# Patient Record
Sex: MALE | Race: WHITE | Employment: FULL TIME
[De-identification: names, ages, dates, MRNs, and addresses within clinical notes are randomized per-mention and may not be internally consistent; named-entity substitution may affect disease eponyms.]

---

## 2017-01-18 ENCOUNTER — SURGERY (OUTPATIENT)
Age: 67
End: 2017-01-18

## 2017-01-18 ENCOUNTER — APPOINTMENT (OUTPATIENT)
Dept: GENERAL RADIOLOGY | Age: 67
End: 2017-01-18
Attending: PHYSICAL MEDICINE & REHABILITATION

## 2017-01-18 ENCOUNTER — HOSPITAL ENCOUNTER (OUTPATIENT)
Age: 67
Setting detail: OUTPATIENT SURGERY
Discharge: HOME | End: 2017-01-18
Attending: PHYSICAL MEDICINE & REHABILITATION | Admitting: PHYSICAL MEDICINE & REHABILITATION

## 2017-01-18 VITALS
HEART RATE: 69 BPM | DIASTOLIC BLOOD PRESSURE: 80 MMHG | TEMPERATURE: 97.7 F | HEIGHT: 71 IN | BODY MASS INDEX: 20.86 KG/M2 | RESPIRATION RATE: 18 BRPM | OXYGEN SATURATION: 97 % | SYSTOLIC BLOOD PRESSURE: 130 MMHG | WEIGHT: 149 LBS

## 2017-01-18 RX ORDER — SODIUM CHLORIDE 0.9 % (FLUSH) 0.9 %
5-10 SYRINGE (ML) INJECTION AS NEEDED
Status: DISCONTINUED | OUTPATIENT
Start: 2017-01-18 | End: 2017-01-18 | Stop reason: HOSPADM

## 2017-01-18 RX ORDER — DIAZEPAM 5 MG/1
5-20 TABLET ORAL ONCE
Status: DISCONTINUED | OUTPATIENT
Start: 2017-01-18 | End: 2017-01-18 | Stop reason: HOSPADM

## 2017-01-18 NOTE — DISCHARGE INSTRUCTIONS
[facility]   ([place])  Dr. Tana Myers, Dr. Sly Rothman, Dr. Lashell Betlrán not drive a car, operate heavy machinery or dangerous equipment for 24 hours. * Activity as tolerated; rest for the remainder of the day. * Resume pre-block medications including those for your family doctor. * Do not drink alcoholic beverages for 24 hours. Alcohol and the medications you have received may interact and cause an adverse reaction. * You may feel better this evening and worse tomorrow, as the numbing medications wears off and the steroid has yet to begin to work. After 48 hrs the steroid should begin to release bringing you relief. * You may shower this evening and remove any bandages. * Avoid hot tubs and heating pads for 24 hours. You may use cold packs on the procedure site as tolerated for the first 24 hours. * If a headache develops, drink plenty of fluids and rest.  Take over the counter medications for headache if needed. If the headache continues longer than 24 hours, call MD at the [address] [place] [address]. [phone]    * Continue taking pain medications as needed. * You may resume your regular diet if tolerated. Otherwise, start with sips of water and advance slowly. * If Diabetic: check your blood sugar three times a day for the next 3 days. If your sugar is greater than 300 call your family doctor. If your sugar is greater than 400, have someone transport you to the nearest Emergency Room. * If you experience any of the following problems, Please Call the [address] [place] [address] at [phone].         * Shortness of Breath    * Fever of 101 or higher    * Nausea / Vomiting    * Severe Headache    * Weakness or numbness in arms or legs that is not      resolving    * Prolonged increase in pain greater than 4 days      DISCHARGE SUMMARY from Nurse      PATIENT INSTRUCTIONS:    After oral sedation, for 24 hours or while taking prescription Narcotics:  · Limit your activities  · Do not drive and operate hazardous machinery  · Do not make important personal or business decisions  · Do  not drink alcoholic beverages  · If you have not urinated within 8 hours after discharge, please contact your surgeon on call. Report the following to your surgeon:  · Excessive pain, swelling, redness or odor of or around the surgical area  · Temperature over 101  · Nausea and vomiting lasting longer than 4 hours or if unable to take medications  · Any signs of decreased circulation or nerve impairment to extremity: change in color, persistent  numbness, tingling, coldness or increase pain  · Any questions            What to do at Home:  Recommended activity: Activity as tolerated, NO DRIVING FOR 12 Hours post injection          *  Please give a list of your current medications to your Primary Care Provider. *  Please update this list whenever your medications are discontinued, doses are      changed, or new medications (including over-the-counter products) are added. *  Please carry medication information at all times in case of emergency situations. These are general instructions for a healthy lifestyle:    No smoking/ No tobacco products/ Avoid exposure to second hand smoke    Surgeon General's Warning:  Quitting smoking now greatly reduces serious risk to your health. Obesity, smoking, and sedentary lifestyle greatly increases your risk for illness    A healthy diet, regular physical exercise & weight monitoring are important for maintaining a healthy lifestyle    You may be retaining fluid if you have a history of heart failure or if you experience any of the following symptoms:  Weight gain of 3 pounds or more overnight or 5 pounds in a week, increased swelling in our hands or feet or shortness of breath while lying flat in bed.   Please call your doctor as soon as you notice any of these symptoms; do not wait until your next office visit. Recognize signs and symptoms of STROKE:    F-face looks uneven    A-arms unable to move or move unevenly    S-speech slurred or non-existent    T-time-call 911 as soon as signs and symptoms begin-DO NOT go       Back to bed or wait to see if you get better-TIME IS BRAIN. [employer] Activation    Thank you for requesting access to [employer]. Please follow the instructions below to securely access and download your online medical record. [employer] allows you to send messages to your doctor, view your test results, renew your prescriptions, schedule appointments, and more. How Do I Sign Up? 1. In your internet browser, go to [email or web address]  2. Click on the First Time User? Click Here link in the Sign In box. You will be redirect to the New Member Sign Up page. 3. Enter your [employer] Access Code exactly as it appears below. You will not need to use this code after youve completed the sign-up process. If you do not sign up before the expiration date, you must request a new code. [employer] Access Code: Activation code not generated  Current [employer] Status: Active (This is the date your [employer] access code will )    4. Enter the last four digits of your Social Security Number (xxxx) and Date of Birth (mm/dd/yyyy) as indicated and click Submit. You will be taken to the next sign-up page. 5. Create a [employer] ID. This will be your [employer] login ID and cannot be changed, so think of one that is secure and easy to remember. 6. Create a [employer] password. You can change your password at any time. 7. Enter your Password Reset Question and Answer. This can be used at a later time if you forget your password. 8. Enter your e-mail address. You will receive e-mail notification when new information is available in [address] 9. Click Sign Up. You can now view and download portions of your medical record.   10. Click the Download Summary menu link to download a portable copy of your medical information. Additional Information    If you have questions, please visit the Frequently Asked Questions section of the [employer] website at [email or web address] Remember, [employer] is NOT to be used for urgent needs. For medical emergencies, dial 911. Vikki Yuan

## 2017-02-15 ENCOUNTER — OFFICE VISIT (OUTPATIENT)
Dept: ORTHOPEDIC SURGERY | Age: 67
End: 2017-02-15

## 2017-02-15 VITALS
HEART RATE: 82 BPM | BODY MASS INDEX: 20.44 KG/M2 | TEMPERATURE: 98 F | HEIGHT: 71 IN | OXYGEN SATURATION: 99 % | RESPIRATION RATE: 18 BRPM | DIASTOLIC BLOOD PRESSURE: 82 MMHG | WEIGHT: 146 LBS | SYSTOLIC BLOOD PRESSURE: 135 MMHG

## 2017-02-15 DIAGNOSIS — G57.92 NEURITIS OF LEFT LOWER EXTREMITY: Primary | ICD-10-CM

## 2017-02-15 DIAGNOSIS — M48.061 LUMBAR SPINAL STENOSIS: ICD-10-CM

## 2017-02-15 DIAGNOSIS — M47.816 FACET ARTHRITIS OF LUMBAR REGION: ICD-10-CM

## 2017-02-15 DIAGNOSIS — M62.830 MUSCLE SPASM OF BACK: ICD-10-CM

## 2017-02-15 RX ORDER — TOPIRAMATE 25 MG/1
75 TABLET ORAL
Qty: 90 TAB | Refills: 5 | Status: SHIPPED | OUTPATIENT
Start: 2017-02-15 | End: 2017-10-24 | Stop reason: SDUPTHER

## 2017-02-15 NOTE — PATIENT INSTRUCTIONS
Low Back Arthritis: Exercises  Your Care Instructions  Here are some examples of typical rehabilitation exercises for your condition. Start each exercise slowly. Ease off the exercise if you start to have pain. Your doctor or physical therapist will tell you when you can start these exercises and which ones will work best for you. When you are not being active, find a comfortable position for rest. Some people are comfortable on the floor or a medium-firm bed with a small pillow under their head and another under their knees. Some people prefer to lie on their side with a pillow between their knees. Don't stay in one position for too long. Take short walks (10 to 20 minutes) every 2 to 3 hours. Avoid slopes, hills, and stairs until you feel better. Walk only distances you can manage without pain, especially leg pain. How to do the exercises  Pelvic tilt    1. Lie on your back with your knees bent. 2. \"Brace\" your stomach--tighten your muscles by pulling in and imagining your belly button moving toward your spine. 3. Press your lower back into the floor. You should feel your hips and pelvis rock back. 4. Hold for 6 seconds while breathing smoothly. 5. Relax and allow your pelvis and hips to rock forward. 6. Repeat 8 to 12 times. Back stretches    1. Get down on your hands and knees on the floor. 2. Relax your head and allow it to droop. Round your back up toward the ceiling until you feel a nice stretch in your upper, middle, and lower back. Hold this stretch for as long as it feels comfortable, or about 15 to 30 seconds. 3. Return to the starting position with a flat back while you are on your hands and knees. 4. Let your back sway by pressing your stomach toward the floor. Lift your buttocks toward the ceiling. 5. Hold this position for 15 to 30 seconds. 6. Repeat 2 to 4 times. Follow-up care is a key part of your treatment and safety.  Be sure to make and go to all appointments, and call your doctor if you are having problems. It's also a good idea to know your test results and keep a list of the medicines you take. Where can you learn more? Go to http://jorge-steve.info/. Enter B804 in the search box to learn more about \"Low Back Arthritis: Exercises. \"  Current as of: May 23, 2016  Content Version: 11.1  © 9377-5899 [employer]. Care instructions adapted under license by [employer] (which disclaims liability or warranty for this information). If you have questions about a medical condition or this instruction, always ask your healthcare professional. [address] any warranty or liability for your use of this information.

## 2017-02-15 NOTE — PROGRESS NOTES
[facility]  [address] [place], [facility] [address]  Phone: [phone]  Fax: [phone]          HISTORY OF PRESENT ILLNESS:  Jarett Salmeron is a 61 y.o. male with history of lumbar pain. He states that his left leg pain and numbness worsens as the day progressed. Pt admits to \"instant\" relief when laying on his back and bending his feet and toes towards him. He wishes to try another injection at this time. Of note, Pt tried a left L4, left L5, and left S1 SNRB in 10/2016. Pt discontinued his Neurontin due to nausea. He has been taking Topamax 25 mg 3 tabs QHS with benefit and reports no significant change in pain or numbness when taking Lyrica 75 mg. Pt has nummular dermatitis and states that he experienced inflammation when taking the full does of Lyrica. His inflammation improved when he discontinued the Lyrica. He has been taking Topamax for about 6 weeks and reports that overall his pain and numbness last month was been better than it had been over the last year. Pt reports that he occasionally uses an inversion table. He continues to take Naprosyn and does not need a refill at this time. Pt desires to continue with current care. Pain Scale: 2/10    PCP: Padilla Valencia MD       Past Medical History   Diagnosis Date    Back pain     Chronic pain     Headache(784.0)     HTN (hypertension)     Joint pain      hx of joint pain        Social History     Social History    Marital status:      Spouse name: N/A    Number of children: N/A    Years of education: N/A     Occupational History    Not on file.      Social History Main Topics    Smoking status: Former Smoker     Quit date: 10/17/2012    Smokeless tobacco: Former User    Alcohol use Yes    Drug use: No    Sexual activity: Yes     Other Topics Concern    Not on file     Social History Narrative       Current Outpatient Prescriptions   Medication Sig Dispense Refill    topiramate (TOPAMAX) 25 mg tablet Take 3 Tabs by mouth nightly. 90 Tab 5    naproxen (NAPROSYN) 500 mg tablet Take 1 Tab by mouth two (2) times daily (with meals). 60 Tab 2    LISINOPRIL PO Take 10 mg by mouth daily.  doxycycline (MONODOX) 100 mg capsule Take 100 mg by mouth daily. No Known Allergies      REVIEW OF SYSTEMS    Constitutional: Negative for fever, chills, or weight change. Respiratory: Negative for cough or shortness of breath. Cardiovascular: Negative for chest pain or palpitations. Gastrointestinal: Negative for acid reflux, change in bowel habits, or constipation. Genitourinary: Negative for dysuria and flank pain. Musculoskeletal: Positive for lumbar pain. Skin: Negative for rash. Neurological: Positive for numbness in the left leg. Negative for headaches or dizziness. Endo/Heme/Allergies: Negative for increased bruising. Psychiatric/Behavioral: Negative for difficulty with sleep. PHYSICAL EXAMINATION  Visit Vitals    /82    Pulse 82    Temp 98 °F (36.7 °C) (Oral)    Resp 18    Ht 5' 11\" (1.803 m)    Wt 146 lb (66.2 kg)    SpO2 99%    BMI 20.36 kg/m2       Constitutional: Awake, alert, and in no acute distress  Neurological: 1+ symmetrical DTRs in the lower extremities. Sensation to light touch is intact. Skin: warm, dry, and intact. Musculoskeletal: No tenderness to palpation in the lower lumbar region. Good ROM with mild pain with extension, axial loading on the left, no pain with forward flexion. No pain with internal or external rotation of his hips. Negative straight leg raise bilaterally.      Hip Flex  Quads Hamstrings Ankle DF EHL Ankle PF   Right +4/5 +4/5 +4/5 +4/5 +4/5 +4/5   Left +4/5 +4/5 +4/5 +4/5 +4/5 +4/5     IMAGING:    Lumbar Spine MRI from 12/20/2015 was personally reviewed with the Pt and demonstrated:    Results from [place] encounter on 12/20/15   MRI LUMB SPINE W WO CONT   Narrative Procedure: MRI of the lumbar spine with and without contrast.    CPT code: 53149    Indications: progressive numbness and pain in his left leg; prior laminectomy in  2002 at L4/ L5         Comparisons: None. Technique: T1 weighted, T2 FSE with fat saturation, FSE inversion recovery, T1  weighted post contrast with fat saturation sagittal images are supplemented by  T2 weighted with fat saturation and T1 weighted pre- and post contrast axial  images. Patient was administered 15 cc Magnevist via the intravenous route. Findings:          Sagittal images reveal normal vertebral body morphology. No fractures noted. No osseous lesions or abnormal signal intensity present. Conus medullaris ends at the L1 vertebral body level. No abnormal vertebral body, epidural, or intradural enhancement. Correlation of axial and sagittal images reveals the following: At T12-L1: T12 with mild chronic superior endplate depression. No significant  disc pathology. No significant central canal or foraminal stenosis. At L1-L2: Mild broad-based disc protrusion. Mild to moderate facet arthropathy  and prominent ligamentum flavum buckling closing off the posterior canal. Still  only borderline central canal stenosis. Mild to moderate bilateral foraminal  stenosis. At L2-L3: Mild broad-based disc osteophyte complex posteriorly. Moderate facet  arthropathy and buckling of the ligamentum flavum closing off the posterior  canal. Mild to moderate central canal stenosis. Mild to moderate foraminal  stenosis. At L3-L4: Subtle retrolisthesis L3 on L4 with uncovering of the disc. Overlying  mild disc osteophyte complex. Mild to moderate facet arthropathy. Mild central  canal stenosis. Moderate to severe bilateral foraminal stenosis with contact of  the exiting L3 nerve roots. At L4-L5: Grade 1 anterolisthesis of L4 on L5 with uncovering of the disc and  moderate residual facet arthropathy.  Series 5, image 14 demonstrates severe  central canal stenosis. There is additionally moderate to severe bilateral  foraminal stenosis with compression of the exiting L4 nerve roots. At L5-S1: Crossing L5 nerve roots obscured. Mild broad-based disc protrusion  with left paracentral overlying extrusion migrating 6.6 mm below the disc space. No central canal stenosis. Mild facet arthropathy. Abutment of the crossing  right S1 nerve root and displacement of the crossing left S1 nerve root. Moderate to severe or left and right foraminal stenosis compressing the L5 nerve  roots as they exit. Visualized portions of the sacroiliac joints are unremarkable. Incidentally  imaged retroperitoneal structures are unremarkable as well. Impression Impression:    Multilevel multifactorial degenerative changes as above. Severe central canal  stenosis at L4-L5. ASSESSMENT   Georgia Stratton was seen today for back pain. Diagnoses and all orders for this visit:    Neuritis of left lower extremity  -     topiramate (TOPAMAX) 25 mg tablet; Take 3 Tabs by mouth nightly.  -     SCHEDULE SURGERY    Lumbar spinal stenosis  -     topiramate (TOPAMAX) 25 mg tablet; Take 3 Tabs by mouth nightly.  -     SCHEDULE SURGERY    Facet arthritis of lumbar region  -     SCHEDULE SURGERY    Muscle spasm of back  -     SCHEDULE SURGERY       IMPRESSION AND PLAN:  Christy Esparza is a 61 y.o. male with history of lumbar pain. He states that his left leg pain and numbness worsens as the day progressed. Pt has been taking Topamax 25 mg 3 tabs QHS with benefit and reports no significant change in pain or numbness when taking Lyrica 75 mg. He has been taking Topamax for about 6 weeks and reports that overall his pain and numbness last month was been better than it had been over the last year. 1) Pt was given information on lumbar arthritis exercises. 2) He received a refill of Topamax 25 mg 3 tabs QHS.    3) Pt will continue taking Naprosyn 500 mg and does not need a refill at this time. 4) He was scheduled for a left L4, L5 SNRB and left L4-5 and left L5-S1 facet injections. 5) Mr. Izzy Holland has a reminder for a \"due or due soon\" health maintenance. I have asked that he contact his primary care provider, Nabil Chavarria MD, for follow-up on this health maintenance. 6)  reviewed. 7) Pt will follow-up in 3 months.       Written by Allie Sarmiento, as dictated by John Oneill MD.

## 2017-03-01 ENCOUNTER — HOSPITAL ENCOUNTER (OUTPATIENT)
Age: 67
Setting detail: OUTPATIENT SURGERY
Discharge: HOME | End: 2017-03-01
Attending: PHYSICAL MEDICINE & REHABILITATION | Admitting: PHYSICAL MEDICINE & REHABILITATION
Payer: COMMERCIAL

## 2017-03-01 ENCOUNTER — APPOINTMENT (OUTPATIENT)
Dept: GENERAL RADIOLOGY | Age: 67
End: 2017-03-01
Attending: PHYSICAL MEDICINE & REHABILITATION
Payer: COMMERCIAL

## 2017-03-01 VITALS
HEIGHT: 71 IN | SYSTOLIC BLOOD PRESSURE: 130 MMHG | BODY MASS INDEX: 20.44 KG/M2 | RESPIRATION RATE: 18 BRPM | OXYGEN SATURATION: 100 % | HEART RATE: 62 BPM | DIASTOLIC BLOOD PRESSURE: 82 MMHG | WEIGHT: 146 LBS | TEMPERATURE: 97.6 F

## 2017-03-01 PROCEDURE — 77030003669 HC NDL SPN COOK -B: Performed by: PHYSICAL MEDICINE & REHABILITATION

## 2017-03-01 PROCEDURE — 76010000009 HC PAIN MGT 0 TO 30 MIN PROC: Performed by: PHYSICAL MEDICINE & REHABILITATION

## 2017-03-01 PROCEDURE — 77030003676 HC NDL SPN MPRI -A: Performed by: PHYSICAL MEDICINE & REHABILITATION

## 2017-03-01 RX ORDER — LIDOCAINE HCL/PF 10 MG/ML
SYRINGE (ML) INJECTION AS NEEDED
Status: DISCONTINUED | OUTPATIENT
Start: 2017-03-01 | End: 2017-03-01 | Stop reason: HOSPADM

## 2017-03-01 RX ORDER — DEXAMETHASONE SODIUM PHOSPHATE 100 MG/10ML
INJECTION INTRAMUSCULAR; INTRAVENOUS AS NEEDED
Status: DISCONTINUED | OUTPATIENT
Start: 2017-03-01 | End: 2017-03-01 | Stop reason: HOSPADM

## 2017-03-01 RX ORDER — MELATONIN
1000 DAILY
COMMUNITY
End: 2017-12-18

## 2017-03-01 RX ORDER — DIAZEPAM 5 MG/1
5-20 TABLET ORAL ONCE
Status: DISCONTINUED | OUTPATIENT
Start: 2017-03-01 | End: 2017-03-01 | Stop reason: HOSPADM

## 2017-03-01 NOTE — DISCHARGE INSTRUCTIONS
[facility]   ([place])  Dr. Keegan Meyers, Dr. Nicolasa Green, Dr. Mahin Leonard not drive a car, operate heavy machinery or dangerous equipment for 24 hours. * Activity as tolerated; rest for the remainder of the day. * Resume pre-block medications including those for your family doctor. * Do not drink alcoholic beverages for 24 hours. Alcohol and the medications you have received may interact and cause an adverse reaction. * You may feel better this evening and worse tomorrow, as the numbing medications wears off and the steroid has yet to begin to work. After 48 hrs the steroid should begin to release bringing you relief. * You may shower this evening and remove any bandages. * Avoid hot tubs and heating pads for 24 hours. You may use cold packs on the procedure site as tolerated for the first 24 hours. * If a headache develops, drink plenty of fluids and rest.  Take over the counter medications for headache if needed. If the headache continues longer than 24 hours, call MD at the [address] [place] [address]. [phone]    * Continue taking pain medications as needed. * You may resume your regular diet if tolerated. Otherwise, start with sips of water and advance slowly. * If Diabetic: check your blood sugar three times a day for the next 3 days. If your sugar is greater than 300 call your family doctor. If your sugar is greater than 400, have someone transport you to the nearest Emergency Room. * If you experience any of the following problems, Please Call the [address] [place] [address] at [phone].         * Shortness of Breath    * Fever of 101 or higher    * Nausea / Vomiting    * Severe Headache    * Weakness or numbness in arms or legs that is not      resolving    * Prolonged increase in pain greater than 4 days      DISCHARGE SUMMARY from Nurse      PATIENT INSTRUCTIONS:    After oral sedation, for 24 hours or while taking prescription Narcotics:  · Limit your activities  · Do not drive and operate hazardous machinery  · Do not make important personal or business decisions  · Do  not drink alcoholic beverages  · If you have not urinated within 8 hours after discharge, please contact your surgeon on call. Report the following to your surgeon:  · Excessive pain, swelling, redness or odor of or around the surgical area  · Temperature over 101  · Nausea and vomiting lasting longer than 4 hours or if unable to take medications  · Any signs of decreased circulation or nerve impairment to extremity: change in color, persistent  numbness, tingling, coldness or increase pain  · Any questions            What to do at Home:  Recommended activity: Activity as tolerated, NO DRIVING FOR 12 Hours post injection          *  Please give a list of your current medications to your Primary Care Provider. *  Please update this list whenever your medications are discontinued, doses are      changed, or new medications (including over-the-counter products) are added. *  Please carry medication information at all times in case of emergency situations. These are general instructions for a healthy lifestyle:    No smoking/ No tobacco products/ Avoid exposure to second hand smoke    Surgeon General's Warning:  Quitting smoking now greatly reduces serious risk to your health. Obesity, smoking, and sedentary lifestyle greatly increases your risk for illness    A healthy diet, regular physical exercise & weight monitoring are important for maintaining a healthy lifestyle    You may be retaining fluid if you have a history of heart failure or if you experience any of the following symptoms:  Weight gain of 3 pounds or more overnight or 5 pounds in a week, increased swelling in our hands or feet or shortness of breath while lying flat in bed.   Please call your doctor as soon as you notice any of these symptoms; do not wait until your next office visit. Recognize signs and symptoms of STROKE:    F-face looks uneven    A-arms unable to move or move unevenly    S-speech slurred or non-existent    T-time-call 911 as soon as signs and symptoms begin-DO NOT go       Back to bed or wait to see if you get better-TIME IS BRAIN.

## 2017-03-01 NOTE — PROCEDURES
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 The patient was given the opportunity to ask questions regarding the procedure and its associated risks. In addition to the potential risks associated with the procedure itself, the patient was informed both verbally and in writing of potential side effects of the use of glucocorticoids. The patient appeared to comprehend the informed consent and desired to have the procedure perfored. Procedure: The patient was placed in the prone position on the flouroscopy table and the back was prepped and draped in the usual sterile manner. At each blocked level, the exact location of the facet joint was identified with flouroscopy, and after local Lidocaine 1% injection, a #22 gauge spinal needle was then advanced toward the joint. A total of 15 mg of preservative free Dexamethasone and 2.5 cc of Lidocaine was injected around and equally divided among all of the sites. The patient tolerated the procedure well. The injection area was cleaned and bandaids applied. No excessive bleeding was noted. Patient dressed and was discharged to home with instructions.     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 The patient was given the opportunity to ask questions regarding the procedure and its associated risks. In addition to the potential risks associated with the procedure itself, the patient was informed both verbally and in writing of the potential side effects of the use of glucocorticoid. The patient appeared to comprehend the informed consent and desired to have the procedure performed. Procedure: The patient was placed in the prone position on the fluoroscopy table and the back was prepped and draped in the usual sterile manner. The exact spinal level was  identified using fluoroscopy, and Lidocaine 1 % was injected locally, a # 22 gauge spinal needle was passed to the transverse process. The depth was noted and the needle redirected to pass inferior and approximately one cm anterior to the transverse process. YES    1 cc of Isovue M-200 was used to verify positioning in the epidural and paravertebral space and outlined the course of the spinal nerve into the epidural space. The same procedure was repeated at each spinal level indicated above. A total of 15 mg of preservative free Dexamethasone and 2.5 cc of Lidocaine was slowly injected. The patient tolerated the procedure well. The injection area was cleaned and bandaids applied. Not excessive bleeding was noted.    Patient dressed and discharged to home with instructions. Discussion: The patient tolerated the procedure well.                                               Teja Ríos MD  March 1, 2017

## 2017-03-01 NOTE — H&P
Date of Surgery Update:  Ashly Rankin was seen and examined. History and physical has been reviewed. The patient has been examined. There have been no significant clinical changes since the completion of the last office visit.       Signed By: Jeremy Leal MD     March 1, 2017 12:34 PM

## 2017-03-29 ENCOUNTER — OFFICE VISIT (OUTPATIENT)
Dept: ORTHOPEDIC SURGERY | Age: 67
End: 2017-03-29

## 2017-03-29 VITALS
WEIGHT: 144.2 LBS | TEMPERATURE: 97.6 F | BODY MASS INDEX: 20.19 KG/M2 | HEART RATE: 82 BPM | RESPIRATION RATE: 16 BRPM | HEIGHT: 71 IN | DIASTOLIC BLOOD PRESSURE: 75 MMHG | SYSTOLIC BLOOD PRESSURE: 112 MMHG

## 2017-03-29 DIAGNOSIS — M79.2 NEURITIS: ICD-10-CM

## 2017-03-29 DIAGNOSIS — M47.816 FACET ARTHRITIS OF LUMBAR REGION: ICD-10-CM

## 2017-03-29 DIAGNOSIS — R63.4 WEIGHT LOSS: ICD-10-CM

## 2017-03-29 DIAGNOSIS — M48.061 LUMBAR SPINAL STENOSIS: Primary | ICD-10-CM

## 2017-03-29 RX ORDER — LISINOPRIL AND HYDROCHLOROTHIAZIDE 12.5; 2 MG/1; MG/1
1 TABLET ORAL DAILY
COMMUNITY
Start: 2016-11-29

## 2017-03-29 RX ORDER — NORTRIPTYLINE HYDROCHLORIDE 25 MG/1
CAPSULE ORAL
Qty: 60 CAP | Refills: 2 | Status: SHIPPED | OUTPATIENT
Start: 2017-03-29 | End: 2017-11-16

## 2017-03-29 RX ORDER — DESONIDE 0.5 MG/ML
LOTION TOPICAL
COMMUNITY
Start: 2016-12-21

## 2017-03-29 RX ORDER — CLOBETASOL PROPIONATE 0.5 MG/G
CREAM TOPICAL AS NEEDED
COMMUNITY
Start: 2016-10-21

## 2017-03-29 RX ORDER — ZOLPIDEM TARTRATE 5 MG/1
5 TABLET ORAL
COMMUNITY
Start: 2016-10-21

## 2017-03-29 NOTE — MR AVS SNAPSHOT
Visit Information Date & Time Provider Department Dept. Phone Encounter #  
 3/29/2017  9:00 AM Mily Florez, [address] [facility] [phone] [id number] Follow-up Instructions Return in about 6 weeks (around 5/10/2017) for Medication follow up. Upcoming Health Maintenance Date Due Hepatitis C Screening 1950 DTaP/Tdap/Td series (1 - Tdap) 2/26/1973 FOBT Q 1 YEAR AGE 50-75 2/26/2002 ZOSTER VACCINE AGE 60> 2/26/2012 INFLUENZA AGE 9 TO ADULT 8/1/2016 GLAUCOMA SCREENING Q2Y 2/26/2017 Pneumococcal 65+ Low/Medium Risk (1 of 2 - PCV13) 2/26/2017 MEDICARE YEARLY EXAM 2/26/2017 Allergies as of 3/29/2017  Review Complete On: 3/29/2017 By: Mily Florez MD  
  
 Severity Noted Reaction Type Reactions Bee Sting  [Sting, Bee] High 04/06/2009    Swelling DIZZINESS Current Immunizations  Never Reviewed No immunizations on file. Not reviewed this visit You Were Diagnosed With   
  
 Codes Comments Lumbar spinal stenosis    -  Primary ICD-10-CM: M48.06 
ICD-9-CM: 724.02 Facet arthritis of lumbar region     ICD-10-CM: M46.96 
ICD-9-CM: 721.3 Weight loss     ICD-10-CM: R63.4 ICD-9-CM: 783.21 Neuritis     ICD-10-CM: M79.2 ICD-9-CM: 729.2 Vitals BP Pulse Temp Resp Height(growth percentile) Weight(growth percentile) 112/75 82 97.6 °F (36.4 °C) (Oral) 16 5' 11\" (1.803 m) 144 lb 3.2 oz (65.4 kg) BMI Smoking Status 20.11 kg/m2 Former Smoker BMI and BSA Data Body Mass Index Body Surface Area  
 20.11 kg/m 2 1.81 m 2 Preferred Pharmacy Pharmacy Name Phone [facility] [address] [facility] Your Updated Medication List  
  
   
This list is accurate as of: 3/29/17  9:50 AM.  Always use your most recent med list.  
  
  
  
  
 clobetasol 0.05 % topical cream  
Commonly known as:  Rohit Kerr Use 1 Application to affected area Twice Daily. desonide 0.05 % topical lotion Commonly known as:  Lazarus Guzman Use 1 Application to affected area Twice Daily. doxycycline 100 mg capsule Commonly known as:  Romulo Rick Take 100 mg by mouth daily. lisinopril-hydroCHLOROthiazide 20-12.5 mg per tablet Commonly known as:  PRINZIDE, ZESTORETIC  
TAKE ONE TABLET BY MOUTH ONCE A DAY  
  
 naproxen 500 mg tablet Commonly known as:  NAPROSYN Take 1 Tab by mouth two (2) times daily (with meals). nortriptyline 25 mg capsule Commonly known as:  PAMELOR  
1-2 in the evening as directed  
  
 topiramate 25 mg tablet Commonly known as:  TOPAMAX Take 3 Tabs by mouth nightly. VITAMIN D3 1,000 unit tablet Generic drug:  cholecalciferol Take 1,000 Units by mouth daily. zolpidem 5 mg tablet Commonly known as:  AMBIEN  
5 mg. Prescriptions Printed Refills  
 nortriptyline (PAMELOR) 25 mg capsule 2 Si-2 in the evening as directed Class: Print Follow-up Instructions Return in about 6 weeks (around 5/10/2017) for Medication follow up. Patient Instructions Low Back Arthritis: Exercises Your Care Instructions Here are some examples of typical rehabilitation exercises for your condition. Start each exercise slowly. Ease off the exercise if you start to have pain. Your doctor or physical therapist will tell you when you can start these exercises and which ones will work best for you. When you are not being active, find a comfortable position for rest. Some people are comfortable on the floor or a medium-firm bed with a small pillow under their head and another under their knees. Some people prefer to lie on their side with a pillow between their knees. Don't stay in one position for too long. Take short walks (10 to 20 minutes) every 2 to 3 hours. Avoid slopes, hills, and stairs until you feel better.  Walk only distances you can manage without pain, especially leg pain. How to do the exercises Pelvic tilt 1. Lie on your back with your knees bent. 2. \"Brace\" your stomachtighten your muscles by pulling in and imagining your belly button moving toward your spine. 3. Press your lower back into the floor. You should feel your hips and pelvis rock back. 4. Hold for 6 seconds while breathing smoothly. 5. Relax and allow your pelvis and hips to rock forward. 6. Repeat 8 to 12 times. Back stretches 1. Get down on your hands and knees on the floor. 2. Relax your head and allow it to droop. Round your back up toward the ceiling until you feel a nice stretch in your upper, middle, and lower back. Hold this stretch for as long as it feels comfortable, or about 15 to 30 seconds. 3. Return to the starting position with a flat back while you are on your hands and knees. 4. Let your back sway by pressing your stomach toward the floor. Lift your buttocks toward the ceiling. 5. Hold this position for 15 to 30 seconds. 6. Repeat 2 to 4 times. Follow-up care is a key part of your treatment and safety. Be sure to make and go to all appointments, and call your doctor if you are having problems. It's also a good idea to know your test results and keep a list of the medicines you take. Where can you learn more? Go to http://jorge-steve.info/. Enter Y635 in the search box to learn more about \"Low Back Arthritis: Exercises. \" Current as of: May 23, 2016 Content Version: 11.2 © 7363-1678 [employer]. Care instructions adapted under license by [employer] (which disclaims liability or warranty for this information). If you have questions about a medical condition or this instruction, always ask your healthcare professional. [place] [address] any warranty or liability for your use of this information. Lumbar Stenosis: Care Instructions Your Care Instructions Stenosis in the spine is a narrowing of the canal that is around the spinal cord and nerve roots in your back. It can happen as part of aging. Sometimes bone and other tissue grow into this canal and press on the spinal nerves. This can cause pain, numbness, and weakness. When it happens in the lower part of your back, it is called lumbar stenosis. Lumbar stenosis can cause problems in the legs, feet, and rear end (buttocks). You may be able to relieve symptoms of lumbar stenosis with pain medicine. Your doctor may suggest physical therapy and exercises to keep your spine strong and flexible. Some people try cortisone shots to reduce swelling. If pain and numbness in your legs are still so bad that you cannot do your normal activities, you may need surgery. Follow-up care is a key part of your treatment and safety. Be sure to make and go to all appointments, and call your doctor if you are having problems. It's also a good idea to know your test results and keep a list of the medicines you take. How can you care for yourself at home? · Take an over-the-counter pain medicine, such as acetaminophen (Tylenol), ibuprofen (Advil, Motrin), or naproxen (Aleve). Read and follow all instructions on the label. · Do not take two or more pain medicines at the same time unless the doctor told you to. Many pain medicines have acetaminophen, which is Tylenol. Too much acetaminophen (Tylenol) can be harmful. · Stay at a healthy weight. Being overweight puts extra strain on your spine. · Change positions often when you sit or stand. This can ease pain. For example, lean forward. This may reduce pressure on the spinal cord and its nerves. · Avoid doing things that make your symptoms worse. Walking downhill and standing for a long time may cause pain. · Stretch and strengthen your back muscles as your doctor or physical therapist recommends. If your doctor says it is okay to do them, these exercises may help. ¨ Lie on your back with your knees bent. Gently pull one bent knee to your chest. Put that foot back on the floor, and then pull the other knee to your chest. 
¨ Do pelvic tilts. Lie on your back with your knees bent. Tighten your stomach muscles. Pull your belly button (navel) in and up toward your ribs. You should feel like your back is pressing to the floor and your hips and pelvis are slightly lifting off the floor. Hold for 6 seconds while breathing smoothly. ¨ Stand with your back flat against a wall. Slowly slide down until your knees are slightly bent. Hold for 10 seconds, then slide back up the wall. · Remove or change anything in your house that may cause you to fall. Keep walkways clear of clutter, electrical cords, and throw rugs. When should you call for help? Call 911 anytime you think you may need emergency care. For example, call if: 
· You are unable to move a leg at all. Call your doctor now or seek immediate medical care if: 
· You have new or worse symptoms in your legs, belly, or buttocks. Symptoms may include: ¨ Numbness or tingling. ¨ Weakness. ¨ Pain. · You lose bladder or bowel control. Watch closely for changes in your health, and be sure to contact your doctor if: 
· You are not getting better as expected. Where can you learn more? Go to http://jorge-steve.info/. Marilee Ray in the search box to learn more about \"Lumbar Stenosis: Care Instructions. \" Current as of: May 23, 2016 Content Version: 11.2 © 6139-5542 [employer]. Care instructions adapted under license by [employer] (which disclaims liability or warranty for this information). If you have questions about a medical condition or this instruction, always ask your healthcare professional. [place] [address] any warranty or liability for your use of this information. Introducing [facility]! Dear Lurdes Oneal: Thank you for requesting a COVEGA account. Our records indicate that you already have an active COVEGA account. You can access your account anytime at [email or web address] Did you know that you can access your hospital and ER discharge instructions at any time in COVEGA? You can also review all of your test results from your hospital stay or ER visit. Additional Information If you have questions, please visit the Frequently Asked Questions section of the COVEGA website at [email or web address] Remember, COVEGA is NOT to be used for urgent needs. For medical emergencies, dial 911. Now available from your iPhone and Android! Please provide this summary of care documentation to your next provider. Your primary care clinician is listed as Zulema Snow. If you have any questions after today's visit, please call [phone].

## 2017-03-29 NOTE — PATIENT INSTRUCTIONS
Low Back Arthritis: Exercises  Your Care Instructions  Here are some examples of typical rehabilitation exercises for your condition. Start each exercise slowly. Ease off the exercise if you start to have pain. Your doctor or physical therapist will tell you when you can start these exercises and which ones will work best for you. When you are not being active, find a comfortable position for rest. Some people are comfortable on the floor or a medium-firm bed with a small pillow under their head and another under their knees. Some people prefer to lie on their side with a pillow between their knees. Don't stay in one position for too long. Take short walks (10 to 20 minutes) every 2 to 3 hours. Avoid slopes, hills, and stairs until you feel better. Walk only distances you can manage without pain, especially leg pain. How to do the exercises  Pelvic tilt    1. Lie on your back with your knees bent. 2. \"Brace\" your stomach--tighten your muscles by pulling in and imagining your belly button moving toward your spine. 3. Press your lower back into the floor. You should feel your hips and pelvis rock back. 4. Hold for 6 seconds while breathing smoothly. 5. Relax and allow your pelvis and hips to rock forward. 6. Repeat 8 to 12 times. Back stretches    1. Get down on your hands and knees on the floor. 2. Relax your head and allow it to droop. Round your back up toward the ceiling until you feel a nice stretch in your upper, middle, and lower back. Hold this stretch for as long as it feels comfortable, or about 15 to 30 seconds. 3. Return to the starting position with a flat back while you are on your hands and knees. 4. Let your back sway by pressing your stomach toward the floor. Lift your buttocks toward the ceiling. 5. Hold this position for 15 to 30 seconds. 6. Repeat 2 to 4 times. Follow-up care is a key part of your treatment and safety.  Be sure to make and go to all appointments, and call your doctor if you are having problems. It's also a good idea to know your test results and keep a list of the medicines you take. Where can you learn more? Go to http://jorge-steve.info/. Enter Q529 in the search box to learn more about \"Low Back Arthritis: Exercises. \"  Current as of: May 23, 2016  Content Version: 11.2  © [id number] [employer]. Care instructions adapted under license by [employer] (which disclaims liability or warranty for this information). If you have questions about a medical condition or this instruction, always ask your healthcare professional. [place] [address] any warranty or liability for your use of this information. Lumbar Stenosis: Care Instructions  Your Care Instructions    Stenosis in the spine is a narrowing of the canal that is around the spinal cord and nerve roots in your back. It can happen as part of aging. Sometimes bone and other tissue grow into this canal and press on the spinal nerves. This can cause pain, numbness, and weakness. When it happens in the lower part of your back, it is called lumbar stenosis. Lumbar stenosis can cause problems in the legs, feet, and rear end (buttocks). You may be able to relieve symptoms of lumbar stenosis with pain medicine. Your doctor may suggest physical therapy and exercises to keep your spine strong and flexible. Some people try cortisone shots to reduce swelling. If pain and numbness in your legs are still so bad that you cannot do your normal activities, you may need surgery. Follow-up care is a key part of your treatment and safety. Be sure to make and go to all appointments, and call your doctor if you are having problems. It's also a good idea to know your test results and keep a list of the medicines you take. How can you care for yourself at home?   · Take an over-the-counter pain medicine, such as acetaminophen (Tylenol), ibuprofen (Advil, Motrin), or naproxen (Nadine). Read and follow all instructions on the label. · Do not take two or more pain medicines at the same time unless the doctor told you to. Many pain medicines have acetaminophen, which is Tylenol. Too much acetaminophen (Tylenol) can be harmful. · Stay at a healthy weight. Being overweight puts extra strain on your spine. · Change positions often when you sit or stand. This can ease pain. For example, lean forward. This may reduce pressure on the spinal cord and its nerves. · Avoid doing things that make your symptoms worse. Walking downhill and standing for a long time may cause pain. · Stretch and strengthen your back muscles as your doctor or physical therapist recommends. If your doctor says it is okay to do them, these exercises may help. ¨ Lie on your back with your knees bent. Gently pull one bent knee to your chest. Put that foot back on the floor, and then pull the other knee to your chest.  ¨ Do pelvic tilts. Lie on your back with your knees bent. Tighten your stomach muscles. Pull your belly button (navel) in and up toward your ribs. You should feel like your back is pressing to the floor and your hips and pelvis are slightly lifting off the floor. Hold for 6 seconds while breathing smoothly. ¨ Stand with your back flat against a wall. Slowly slide down until your knees are slightly bent. Hold for 10 seconds, then slide back up the wall. · Remove or change anything in your house that may cause you to fall. Keep walkways clear of clutter, electrical cords, and throw rugs. When should you call for help? Call 911 anytime you think you may need emergency care. For example, call if:  · You are unable to move a leg at all. Call your doctor now or seek immediate medical care if:  · You have new or worse symptoms in your legs, belly, or buttocks. Symptoms may include:  ¨ Numbness or tingling. ¨ Weakness. ¨ Pain. · You lose bladder or bowel control.   Watch closely for changes in your health, and be sure to contact your doctor if:  · You are not getting better as expected. Where can you learn more? Go to http://jorge-steve.info/. Shelley Meeks in the search box to learn more about \"Lumbar Stenosis: Care Instructions. \"  Current as of: May 23, 2016  Content Version: 11.2  © 3490-6995 [employer]. Care instructions adapted under license by [employer] (which disclaims liability or warranty for this information). If you have questions about a medical condition or this instruction, always ask your healthcare professional. [place] [address] any warranty or liability for your use of this information.

## 2017-03-29 NOTE — PROGRESS NOTES
[facility]  [address] [place], [facility] [address]  Phone: [phone]  Fax: [phone]          HISTORY OF PRESENT ILLNESS:  Amira Sher is a 74 y.o. male with history of lumbar pain. Pt c/o constant numbness in the left leg. He reports that he is able to walk in the treadmill for about 1 hour without pain but when he starts to vacuum or rake, he experiences pain in the left leg. Pt reports that his pain is generally worse when standing and twisting. He tried a left L4-5 and left L5-S1 facet injection and a left L4 and left L5 SNRB on 03/01/2017 and has experienced improvement since the procedure. Pt continues to take Topamax 25 mg 3 tabs QHS and reports that he has lost 2 lbs since his last office visit. He experienced itching when taking Lyrica and reports that the itching improved when he discontinued the medication. Pt reports sedation when trying Neurontin in the past. Pt at this time desires to proceed with medication evaluation. Pain Scale: 1/10    PCP: Gabriela Baugh MD       Past Medical History:   Diagnosis Date    Back pain     Chronic pain     Headache(784.0)     HTN (hypertension)     Joint pain     hx of joint pain        Social History     Social History    Marital status:      Spouse name: N/A    Number of children: N/A    Years of education: N/A     Occupational History    Not on file. Social History Main Topics    Smoking status: Former Smoker     Quit date: 10/17/2012    Smokeless tobacco: Former User    Alcohol use Yes      Comment: socially     Drug use: No    Sexual activity: Yes     Other Topics Concern    Not on file     Social History Narrative       Current Outpatient Prescriptions   Medication Sig Dispense Refill    clobetasol (TEMOVATE) 0.05 % topical cream Use 1 Application to affected area Twice Daily.       desonide (TRIDESILON) 0.05 % topical lotion Use 1 Application to affected area Twice Daily.      lisinopril-hydroCHLOROthiazide (PRINZIDE, ZESTORETIC) 20-12.5 mg per tablet TAKE ONE TABLET BY MOUTH ONCE A DAY      zolpidem (AMBIEN) 5 mg tablet 5 mg.  nortriptyline (PAMELOR) 25 mg capsule 1-2 in the evening as directed 60 Cap 2    cholecalciferol (VITAMIN D3) 1,000 unit tablet Take 1,000 Units by mouth daily.  topiramate (TOPAMAX) 25 mg tablet Take 3 Tabs by mouth nightly. 90 Tab 5    naproxen (NAPROSYN) 500 mg tablet Take 1 Tab by mouth two (2) times daily (with meals). 60 Tab 2    doxycycline (MONODOX) 100 mg capsule Take 100 mg by mouth daily. Allergies   Allergen Reactions    Bee Sting  [Sting, Bee] Swelling     DIZZINESS         REVIEW OF SYSTEMS    Constitutional: Positive for 2 lb weight loss. Negative for fever or chills. Respiratory: Negative for cough or shortness of breath. Cardiovascular: Negative for chest pain or palpitations. Gastrointestinal: Negative for acid reflux, change in bowel habits, or constipation. Genitourinary: Negative for dysuria and flank pain. Musculoskeletal: Positive for lumbar pain. Skin: Negative for rash. Neurological: Positive for numbness in the left leg. Negative for headaches or dizziness. Endo/Heme/Allergies: Negative for increased bruising. Psychiatric/Behavioral: Negative for difficulty with sleep. PHYSICAL EXAMINATION  Visit Vitals    /75    Pulse 82    Temp 97.6 °F (36.4 °C) (Oral)    Resp 16    Ht 5' 11\" (1.803 m)    Wt 144 lb 3.2 oz (65.4 kg)    BMI 20.11 kg/m2       Constitutional: Awake, alert, and in no acute distress  Neurological: 1+ symmetrical DTRs in the lower extremities. Sensation to light touch is intact. Skin: warm, dry, and intact. Musculoskeletal: Minimal tenderness to palpation in the lower lumbar region. Moderate pain with extension and axial loading. No pain with internal or external rotation of his hips. Negative straight leg raise bilaterally.      Hip Flex  Quads Hamstrings Ankle DF EHL Ankle PF   Right +4/5 +4/5 +4/5 +4/5 +4/5 +4/5   Left +4/5 +4/5 +4/5 +4/5 +4/5 +4/5     IMAGING:    Lumbar spine MRI from 12/20/2015 was personally reviewed with the Pt and demonstrated:    Results from [place] encounter on 12/20/15   MRI LUMB SPINE W WO CONT   Narrative Procedure: MRI of the lumbar spine with and without contrast.    CPT code: 56792    Indications: progressive numbness and pain in his left leg; prior laminectomy in  2002 at L4/ L5         Comparisons: None. Technique: T1 weighted, T2 FSE with fat saturation, FSE inversion recovery, T1  weighted post contrast with fat saturation sagittal images are supplemented by  T2 weighted with fat saturation and T1 weighted pre- and post contrast axial  images. Patient was administered 15 cc Magnevist via the intravenous route. Findings:          Sagittal images reveal normal vertebral body morphology. No fractures noted. No osseous lesions or abnormal signal intensity present. Conus medullaris ends at the L1 vertebral body level. No abnormal vertebral body, epidural, or intradural enhancement. Correlation of axial and sagittal images reveals the following: At T12-L1: T12 with mild chronic superior endplate depression. No significant  disc pathology. No significant central canal or foraminal stenosis. At L1-L2: Mild broad-based disc protrusion. Mild to moderate facet arthropathy  and prominent ligamentum flavum buckling closing off the posterior canal. Still  only borderline central canal stenosis. Mild to moderate bilateral foraminal  stenosis. At L2-L3: Mild broad-based disc osteophyte complex posteriorly. Moderate facet  arthropathy and buckling of the ligamentum flavum closing off the posterior  canal. Mild to moderate central canal stenosis. Mild to moderate foraminal  stenosis. At L3-L4: Subtle retrolisthesis L3 on L4 with uncovering of the disc. Overlying  mild disc osteophyte complex.  Mild to moderate facet arthropathy. Mild central  canal stenosis. Moderate to severe bilateral foraminal stenosis with contact of  the exiting L3 nerve roots. At L4-L5: Grade 1 anterolisthesis of L4 on L5 with uncovering of the disc and  moderate residual facet arthropathy. Series 5, image 14 demonstrates severe  central canal stenosis. There is additionally moderate to severe bilateral  foraminal stenosis with compression of the exiting L4 nerve roots. At L5-S1: Crossing L5 nerve roots obscured. Mild broad-based disc protrusion  with left paracentral overlying extrusion migrating 6.6 mm below the disc space. No central canal stenosis. Mild facet arthropathy. Abutment of the crossing  right S1 nerve root and displacement of the crossing left S1 nerve root. Moderate to severe or left and right foraminal stenosis compressing the L5 nerve  roots as they exit. Visualized portions of the sacroiliac joints are unremarkable. Incidentally  imaged retroperitoneal structures are unremarkable as well. Impression Impression:    Multilevel multifactorial degenerative changes as above. Severe central canal  stenosis at L4-L5. ASSESSMENT   Kinga Ro was seen today for back pain. Diagnoses and all orders for this visit:    Lumbar spinal stenosis  -     nortriptyline (PAMELOR) 25 mg capsule; 1-2 in the evening as directed    Facet arthritis of lumbar region    Weight loss    Neuritis  -     nortriptyline (PAMELOR) 25 mg capsule; 1-2 in the evening as directed         IMPRESSION AND PLAN:  Rajat Ramachandran is a 74 y.o. male with history of lumbar pain. Pt c/o constant numbness in the left leg. He reports minimal left leg pain when walking but experiences pain when standing and twisting. He tried a left L4-5 and left L5-S1 facet injection and a left L4 and left L5 SNRB on 03/01/2017 and has experienced improvement since the procedure.  Pt reports weight loss with the Topamax and does not tolerated Lyrica or Neurontin. 1) Pt was given information on lumbar arthritis exercises. 2) Discussed treatment options with the Pt, including steroid injections and surgical intervention. 3) He will try tapering off Topamax 25 mg 3 tabs QHS in order to address his weight loss. 4) Pt was prescribed Pamelor 25 mg 1-2 tabs QHS. 5) Mr. Rigo Martins has a reminder for a \"due or due soon\" health maintenance. I have asked that he contact his primary care provider, Zulema Snow MD, for follow-up on this health maintenance. 6)  reviewed. 7) Pt will follow-up in 6 weeks. Written by Rojelio Matthews, as dictated by Joel Serrano MD.  I, Dr. Joel Serrano confirm that all documentation is accurate.

## 2017-05-30 ENCOUNTER — OFFICE VISIT (OUTPATIENT)
Dept: ORTHOPEDIC SURGERY | Age: 67
End: 2017-05-30

## 2017-05-30 VITALS
DIASTOLIC BLOOD PRESSURE: 93 MMHG | RESPIRATION RATE: 16 BRPM | HEIGHT: 71 IN | SYSTOLIC BLOOD PRESSURE: 144 MMHG | WEIGHT: 145.8 LBS | BODY MASS INDEX: 20.41 KG/M2 | TEMPERATURE: 97.6 F | HEART RATE: 73 BPM

## 2017-05-30 DIAGNOSIS — G57.92 NEURITIS OF LEFT LOWER EXTREMITY: ICD-10-CM

## 2017-05-30 DIAGNOSIS — M47.816 LUMBAR FACET ARTHROPATHY: ICD-10-CM

## 2017-05-30 DIAGNOSIS — M48.061 LUMBAR SPINAL STENOSIS: Primary | ICD-10-CM
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
## 2017-05-30 NOTE — PATIENT INSTRUCTIONS
Low Back Arthritis: Exercises  Your Care Instructions  Here are some examples of typical rehabilitation exercises for your condition. Start each exercise slowly. Ease off the exercise if you start to have pain. Your doctor or physical therapist will tell you when you can start these exercises and which ones will work best for you. When you are not being active, find a comfortable position for rest. Some people are comfortable on the floor or a medium-firm bed with a small pillow under their head and another under their knees. Some people prefer to lie on their side with a pillow between their knees. Don't stay in one position for too long. Take short walks (10 to 20 minutes) every 2 to 3 hours. Avoid slopes, hills, and stairs until you feel better. Walk only distances you can manage without pain, especially leg pain. How to do the exercises  Pelvic tilt    1. Lie on your back with your knees bent. 2. \"Brace\" your stomachtighten your muscles by pulling in and imagining your belly button moving toward your spine. 3. Press your lower back into the floor. You should feel your hips and pelvis rock back. 4. Hold for 6 seconds while breathing smoothly. 5. Relax and allow your pelvis and hips to rock forward. 6. Repeat 8 to 12 times. Back stretches    1. Get down on your hands and knees on the floor. 2. Relax your head and allow it to droop. Round your back up toward the ceiling until you feel a nice stretch in your upper, middle, and lower back. Hold this stretch for as long as it feels comfortable, or about 15 to 30 seconds. 3. Return to the starting position with a flat back while you are on your hands and knees. 4. Let your back sway by pressing your stomach toward the floor. Lift your buttocks toward the ceiling. 5. Hold this position for 15 to 30 seconds. 6. Repeat 2 to 4 times. Follow-up care is a key part of your treatment and safety.  Be sure to make and go to all appointments, and call your doctor if you are having problems. It's also a good idea to know your test results and keep a list of the medicines you take. Where can you learn more? Go to http://jorge-steve.info/. Enter U690 in the search box to learn more about \"Low Back Arthritis: Exercises. \"  Current as of: May 23, 2016  Content Version: 11.2  © 5057-9844 [employer]. Care instructions adapted under license by [employer] (which disclaims liability or warranty for this information). If you have questions about a medical condition or this instruction, always ask your healthcare professional. [place] [address] any warranty or liability for your use of this information.

## 2017-05-30 NOTE — MR AVS SNAPSHOT
Visit Information Date & Time Provider Department Dept. Phone Encounter #  
 5/30/2017  8:45 AM Jacque Hernandez MD [facility] [phone] [id number] Follow-up Instructions Return in about 3 weeks (around 6/20/2017) for Diagnostic Test follow up, Medication follow up. Upcoming Health Maintenance Date Due Hepatitis C Screening 1950 DTaP/Tdap/Td series (1 - Tdap) 2/26/1973 FOBT Q 1 YEAR AGE 50-75 2/26/2002 ZOSTER VACCINE AGE 60> 2/26/2012 GLAUCOMA SCREENING Q2Y 2/26/2017 Pneumococcal 65+ Low/Medium Risk (1 of 2 - PCV13) 2/26/2017 MEDICARE YEARLY EXAM 2/26/2017 INFLUENZA AGE 9 TO ADULT 8/1/2017 Allergies as of 5/30/2017  Review Complete On: 5/30/2017 By: Mayte Sherman LPN Severity Noted Reaction Type Reactions Bee Sting  [Sting, Bee] High 04/06/2009    Swelling DIZZINESS Current Immunizations  Never Reviewed No immunizations on file. Not reviewed this visit You Were Diagnosed With   
  
 Codes Comments Lumbar spinal stenosis    -  Primary ICD-10-CM: M48.06 
ICD-9-CM: 724.02 Neuritis of left lower extremity     ICD-10-CM: G57.92 
ICD-9-CM: 355.8 Lumbar facet arthropathy (HCC)     ICD-10-CM: M12.88 ICD-9-CM: 721.3 Vitals BP Pulse Temp Resp Height(growth percentile) Weight(growth percentile) (!) 144/93 73 97.6 °F (36.4 °C) (Oral) 16 5' 11\" (1.803 m) 145 lb 12.8 oz (66.1 kg) BMI Smoking Status 20.33 kg/m2 Former Smoker BMI and BSA Data Body Mass Index Body Surface Area  
 20.33 kg/m 2 1.82 m 2 Preferred Pharmacy Pharmacy Name Phone [facility] Your Updated Medication List  
  
   
This list is accurate as of: 5/30/17 10:00 AM.  Always use your most recent med list.  
  
  
  
  
 clobetasol 0.05 % topical cream  
Commonly known as:  Saul Gonzalez Use 1 Application to affected area Twice Daily. desonide 0.05 % topical lotion Commonly known as:  Riki Gifford Use 1 Application to affected area Twice Daily. doxycycline 100 mg capsule Commonly known as:  Meade Smart Take 100 mg by mouth daily. lisinopril-hydroCHLOROthiazide 20-12.5 mg per tablet Commonly known as:  PRINZIDE, ZESTORETIC  
TAKE ONE TABLET BY MOUTH ONCE A DAY  
  
 naproxen 500 mg tablet Commonly known as:  NAPROSYN Take 1 Tab by mouth two (2) times daily (with meals). nortriptyline 25 mg capsule Commonly known as:  PAMELOR  
1-2 in the evening as directed  
  
 topiramate 25 mg tablet Commonly known as:  TOPAMAX Take 3 Tabs by mouth nightly. VITAMIN D3 1,000 unit tablet Generic drug:  cholecalciferol Take 1,000 Units by mouth daily. zolpidem 5 mg tablet Commonly known as:  AMBIEN  
5 mg. Follow-up Instructions Return in about 3 weeks (around 6/20/2017) for Diagnostic Test follow up, Medication follow up. To-Do List   
 05/30/2017 Imaging:  MRI LUMB SPINE W WO CONT   
  
 06/03/2017 7:00 AM  
  Appointment with [facility] MRI RM 1 at [address] [place] [address] ([phone]) GENERAL INSTRUCTIONS  Bring information (ID card) if you have any medically implanted devices. You will be required to lie still while the procedure is being performed. Remove any jewelry (including body piercing, hairpins) prior to MRI. If you have had a creatinine level drawn within the past 30 days, please bring most recent results to your appt. Bring any films, CD's, and reports related to your study with you on the day of your exam.  This only includes studies done outside of [address] [place] [address], [facility], and [facility].   Bring a complete list of all medications you are currently taking to include prescriptions, over-the-counter meds, herbals, vitamins & any dietary supplements. If you were given medications for claustrophobia or anxiety, please arrange to have someone drive you to your appointment. QUESTIONS  Notify the MRI Department if you have any questions concerning your study. [facility] - [phone] [facility] [address] [place] [address] [phone] Referral Information Referral ID Referred By Referred To  
  
 [id number] Mariposa Urbina Not Available Visits Status Start Date End Date 1 New Request 5/30/17 5/30/18 If your referral has a status of pending review or denied, additional information will be sent to support the outcome of this decision. Patient Instructions Low Back Arthritis: Exercises Your Care Instructions Here are some examples of typical rehabilitation exercises for your condition. Start each exercise slowly. Ease off the exercise if you start to have pain. Your doctor or physical therapist will tell you when you can start these exercises and which ones will work best for you. When you are not being active, find a comfortable position for rest. Some people are comfortable on the floor or a medium-firm bed with a small pillow under their head and another under their knees. Some people prefer to lie on their side with a pillow between their knees. Don't stay in one position for too long. Take short walks (10 to 20 minutes) every 2 to 3 hours. Avoid slopes, hills, and stairs until you feel better. Walk only distances you can manage without pain, especially leg pain. How to do the exercises Pelvic tilt 1. Lie on your back with your knees bent. 2. \"Brace\" your stomachtighten your muscles by pulling in and imagining your belly button moving toward your spine. 3. Press your lower back into the floor. You should feel your hips and pelvis rock back. 4. Hold for 6 seconds while breathing smoothly. 5. Relax and allow your pelvis and hips to rock forward. 6. Repeat 8 to 12 times. Back stretches 1. Get down on your hands and knees on the floor. 2. Relax your head and allow it to droop. Round your back up toward the ceiling until you feel a nice stretch in your upper, middle, and lower back. Hold this stretch for as long as it feels comfortable, or about 15 to 30 seconds. 3. Return to the starting position with a flat back while you are on your hands and knees. 4. Let your back sway by pressing your stomach toward the floor. Lift your buttocks toward the ceiling. 5. Hold this position for 15 to 30 seconds. 6. Repeat 2 to 4 times. Follow-up care is a key part of your treatment and safety. Be sure to make and go to all appointments, and call your doctor if you are having problems. It's also a good idea to know your test results and keep a list of the medicines you take. Where can you learn more? Go to http://jorge-steve.info/. Enter M861 in the search box to learn more about \"Low Back Arthritis: Exercises. \" Current as of: May 23, 2016 Content Version: 11.2 © 2801-3832 Healthwise, Incorporated. Care instructions adapted under license by [employer] (which disclaims liability or warranty for this information). If you have questions about a medical condition or this instruction, always ask your healthcare professional. [place] [address] any warranty or liability for your use of this information. Introducing [facility]! Dear Courtney Diaz: Thank you for requesting a Optifreeze account. Our records indicate that you already have an active Optifreeze account. You can access your account anytime at [email or web address] Did you know that you can access your hospital and ER discharge instructions at any time in Optifreeze? You can also review all of your test results from your hospital stay or ER visit. Additional Information If you have questions, please visit the Frequently Asked Questions section of the [employer]I website at [email or web address] Remember, D[employer]I is NOT to be used for urgent needs. For medical emergencies, dial 911. Now available from your iPhone and Android! Please provide this summary of care documentation to your next provider. Your primary care clinician is listed as Madi Bhatti. If you have any questions after today's visit, please call [phone].

## 2017-06-03 ENCOUNTER — HOSPITAL ENCOUNTER (OUTPATIENT)
Age: 67
Discharge: HOME | End: 2017-06-03
Attending: PHYSICAL MEDICINE & REHABILITATION
Payer: COMMERCIAL

## 2017-06-03 DIAGNOSIS — M48.061 LUMBAR SPINAL STENOSIS: ICD-10-CM

## 2017-06-03 DIAGNOSIS — G57.92 NEURITIS OF LEFT LOWER EXTREMITY: ICD-10-CM

## 2017-06-03 DIAGNOSIS — M47.816 LUMBAR FACET ARTHROPATHY: ICD-10-CM

## 2017-06-03 LAB — CREAT UR-MCNC: 0.8 MG/DL (ref 0.6–1.3)

## 2017-06-03 PROCEDURE — 74011250636 HC RX REV CODE- 250/636: Performed by: PHYSICAL MEDICINE & REHABILITATION

## 2017-06-03 PROCEDURE — 72158 MRI LUMBAR SPINE W/O & W/DYE: CPT

## 2017-06-03 PROCEDURE — 82565 ASSAY OF CREATININE: CPT

## 2017-06-03 PROCEDURE — A9585 GADOBUTROL INJECTION: HCPCS | Performed by: PHYSICAL MEDICINE & REHABILITATION

## 2017-06-03 RX ADMIN — GADOBUTROL 7 ML: 604.72 INJECTION INTRAVENOUS at 08:00

## 2017-07-07 ENCOUNTER — OFFICE VISIT (OUTPATIENT)
Dept: ORTHOPEDIC SURGERY | Age: 67
End: 2017-07-07

## 2017-07-07 VITALS
TEMPERATURE: 97.7 F | HEIGHT: 71 IN | DIASTOLIC BLOOD PRESSURE: 76 MMHG | OXYGEN SATURATION: 100 % | BODY MASS INDEX: 20.47 KG/M2 | RESPIRATION RATE: 18 BRPM | HEART RATE: 85 BPM | SYSTOLIC BLOOD PRESSURE: 118 MMHG | WEIGHT: 146.2 LBS

## 2017-07-07 DIAGNOSIS — M47.816 LUMBAR FACET ARTHROPATHY: ICD-10-CM

## 2017-07-07 DIAGNOSIS — M43.16 SPONDYLOLISTHESIS AT L4-L5 LEVEL: ICD-10-CM

## 2017-07-07 DIAGNOSIS — M79.2 NEURITIS: ICD-10-CM

## 2017-07-07 DIAGNOSIS — R29.898 LEFT LEG WEAKNESS: ICD-10-CM

## 2017-07-07 DIAGNOSIS — M48.061 LUMBAR SPINAL STENOSIS: Primary | ICD-10-CM

## 2017-07-07 NOTE — PATIENT INSTRUCTIONS
Low Back Arthritis: Exercises  Your Care Instructions  Here are some examples of typical rehabilitation exercises for your condition. Start each exercise slowly. Ease off the exercise if you start to have pain. Your doctor or physical therapist will tell you when you can start these exercises and which ones will work best for you. When you are not being active, find a comfortable position for rest. Some people are comfortable on the floor or a medium-firm bed with a small pillow under their head and another under their knees. Some people prefer to lie on their side with a pillow between their knees. Don't stay in one position for too long. Take short walks (10 to 20 minutes) every 2 to 3 hours. Avoid slopes, hills, and stairs until you feel better. Walk only distances you can manage without pain, especially leg pain. How to do the exercises  Pelvic tilt    1. Lie on your back with your knees bent. 2. \"Brace\" your stomach--tighten your muscles by pulling in and imagining your belly button moving toward your spine. 3. Press your lower back into the floor. You should feel your hips and pelvis rock back. 4. Hold for 6 seconds while breathing smoothly. 5. Relax and allow your pelvis and hips to rock forward. 6. Repeat 8 to 12 times. Back stretches    1. Get down on your hands and knees on the floor. 2. Relax your head and allow it to droop. Round your back up toward the ceiling until you feel a nice stretch in your upper, middle, and lower back. Hold this stretch for as long as it feels comfortable, or about 15 to 30 seconds. 3. Return to the starting position with a flat back while you are on your hands and knees. 4. Let your back sway by pressing your stomach toward the floor. Lift your buttocks toward the ceiling. 5. Hold this position for 15 to 30 seconds. 6. Repeat 2 to 4 times. Follow-up care is a key part of your treatment and safety.  Be sure to make and go to all appointments, and call your doctor if you are having problems. It's also a good idea to know your test results and keep a list of the medicines you take. Where can you learn more? Go to http://jorge-steve.info/. Enter N186 in the search box to learn more about \"Low Back Arthritis: Exercises. \"  Current as of: March 21, 2017  Content Version: 11.3  © 6194-1809 [employer]. Care instructions adapted under license by [employer] (which disclaims liability or warranty for this information). If you have questions about a medical condition or this instruction, always ask your healthcare professional. [place] [address] any warranty or liability for your use of this information.

## 2017-07-07 NOTE — PROGRESS NOTES
[facility]  [address] [place], [facility] [address]  Phone: [phone]  Fax: [phone]      CARLA Bosch was seen today for back pain. Diagnoses and all orders for this visit:    Lumbar spinal stenosis    Spondylolisthesis at L4-L5 level    Lumbar facet arthropathy (HCC)    Left leg weakness    Neuritis         IMPRESSION AND PLAN:  Mulugeta Rouse is a 74 y.o. male with history of lumbar pain and spinal stenosis. Pt admits to experiencing sharp burning pain radiating down the left lower extremity when standing and with lateral movement. He denies any loss of bowel or bladder control and had a L4-5 laminectomy in 02/2002. 1) Pt was given information on lumbar arthritis exercises. 2) Discussed treatment options with the Pt, including surgical intervention. 3) He will follow up with Dr. Willow Peña for surgical evaluation regarding his spinal stenosis. 4) Pt was given a lumbar surgery brochure. 5) Mr. Christina Suarez has a reminder for a \"due or due soon\" health maintenance. I have asked that he contact his primary care provider, Marcelle Thapa MD, for follow-up on this health maintenance. 6)  demonstrated consistency with prescribing. 7) Pt will follow-up in Dr. Willow Peña in 1 month. HISTORY OF PRESENT ILLNESS:  Mulugeta Rouse is a 74 y.o. male with history of lumbar pain and spinal stenosis. He presents to the office today for lumbar MRI follow up. Pt admits to experiencing sharp burning pain radiating down the left lower extremity when standing and with lateral movement. He states that his pain is pretty constant and regularly experiences pain when walking and playing golf. Pt admits to weakness, numbness, and pain in the left leg, particularly when climbing and descending stairs. He denies any loss of bowel or bladder control. Pt is not opposed to surgical intervention at this time.  Of note, Pt had a prior L4-5 laminectomy in 02/2002 with Dr. Edy Pike to address his spinal stenosis. He takes Topamax 25 mg 3 tabs QHS. Pt experienced increased pain when taking Pamelor 25 mg. Pt at this time desires to proceed with surgical evaluation with Dr. Barbie Miller regarding his spinal stenosis. Pain Scale: 2/10    PCP: Margaret Gómez MD       Past Medical History:   Diagnosis Date    Back pain     Chronic pain     Headache     HTN (hypertension)     Joint pain     hx of joint pain        Social History     Social History    Marital status:      Spouse name: N/A    Number of children: N/A    Years of education: N/A     Occupational History    Not on file. Social History Main Topics    Smoking status: Former Smoker     Quit date: 10/17/2012    Smokeless tobacco: Former User    Alcohol use Yes      Comment: socially     Drug use: No    Sexual activity: Yes     Other Topics Concern    Not on file     Social History Narrative       Current Outpatient Prescriptions   Medication Sig Dispense Refill    clobetasol (TEMOVATE) 0.05 % topical cream Use 1 Application to affected area Twice Daily.  desonide (TRIDESILON) 0.05 % topical lotion Use 1 Application to affected area Twice Daily.  lisinopril-hydroCHLOROthiazide (PRINZIDE, ZESTORETIC) 20-12.5 mg per tablet TAKE ONE TABLET BY MOUTH ONCE A DAY      zolpidem (AMBIEN) 5 mg tablet 5 mg.  cholecalciferol (VITAMIN D3) 1,000 unit tablet Take 1,000 Units by mouth daily.  topiramate (TOPAMAX) 25 mg tablet Take 3 Tabs by mouth nightly. 90 Tab 5    naproxen (NAPROSYN) 500 mg tablet Take 1 Tab by mouth two (2) times daily (with meals). 60 Tab 2    nortriptyline (PAMELOR) 25 mg capsule 1-2 in the evening as directed 60 Cap 2    doxycycline (MONODOX) 100 mg capsule Take 100 mg by mouth daily. Allergies   Allergen Reactions    Bee Sting  [Sting, Bee] Swelling     DIZZINESS         REVIEW OF SYSTEMS    Constitutional: Negative for fever, chills, or weight change. Respiratory: Negative for cough or shortness of breath. Cardiovascular: Negative for chest pain or palpitations. Gastrointestinal: Negative for acid reflux, change in bowel habits, or constipation. Genitourinary: Negative for dysuria and flank pain. Musculoskeletal: Positive for lumbar pain. Skin: Negative for rash. Neurological: Positive for intermittent numbness in the left leg. Negative for headaches or dizziness. Endo/Heme/Allergies: Negative for increased bruising. Psychiatric/Behavioral: Negative for difficulty with sleep. PHYSICAL EXAMINATION  Visit Vitals    /76    Pulse 85    Temp 97.7 °F (36.5 °C) (Oral)    Resp 18    Ht 5' 11\" (1.803 m)    Wt 146 lb 3.2 oz (66.3 kg)    SpO2 100%    BMI 20.39 kg/m2       Constitutional: Awake, alert, and in no acute distress  Neurological: 1+ symmetrical DTRs in the lower extremities. Sensation to light touch is intact. Skin: warm, dry, and intact. Musculoskeletal: Tenderness to palpation in the lower lumbar region. Moderate pain with extension and axial loading. No pain with internal or external rotation of his hips. Negative straight leg raise bilaterally. Hip Flex  Quads Hamstrings Ankle DF EHL Ankle PF   Right 4/5 +4/5 +4/5 +4/5 +4/5 +4/5   Left +4/5 +4/5  4/5  4/5  4/5 +4/5     IMAGING:    Lumbar spine MRI from 06/03/2017 was personally reviewed with the Pt and demonstrated:    Results from [place] encounter on 06/03/17   MRI LUMB SPINE W WO CONT   Narrative Sagittal and axial multisequence MR images of lumbar spine were obtained without  and with 7 cc Gadavist gadolinium IV contrast    Comparison December 20, 2015    Significant motion artifacts present    Straightening of lumbar curvature, stable, with similar 11 mm anterior  spondylolisthesis of L4 on L5. Bilateral L4 spondylolysis. Discogenic endplate  edema at P5-X5. Compression deformity of L3 and T12, stable. No retropulsion. No  marrow edema.     More facet inflammation at L1-L2 and L3-L4, with mild inflammation in the  posterior soft tissues. No abnormal neural enhancement. No focal fluid  collection. Conus medullaris ends at L1 with normal morphology and signal  intensity. No disc disease in the lower thoracic segment. L1-L2: Mild posterior disc bulge. Posterior epidural fat present. No significant  central stenosis. Mild triangular configuration of the central canal. Facet  ligamentous hypertrophy with mild right lateral indentation to the thecal sac. Mild foraminal narrowing with no significant nerve root compression suspected. L2-L3: No focal disc herniation. Facet and ligamentous hypertrophy with mild to  moderate central stenosis, posterior lateral indentation of the thecal sac. AP  canal measures 8.2 mm. Perhaps stable. Moderate bilateral foraminal stenosis. Mild compression of exiting L2 nerve root possible. Perhaps slightly more  conspicuous than before. L3-L4: Posterior disc bulge. Facet ligamentous hypertrophy. Mild central  stenosis. Moderately severe bilateral foraminal stenosis with compression of  bilateral exiting L3 nerve roots. Worse on the right. Stable since last study. L4-L5: Disc bulge, uncovertebral disc from anterior spondylolisthesis of L4 on  L5. Severe facet hypertrophy, facet fragmentation, subluxation. Severe central  stenosis with AP canal measuring less than 5 mm. Obliterated CSF signal.  Compression of descending nerve roots. Severe bilateral foraminal stenosis with  compression of bilateral exiting L4 nerve roots. Similar to prior study. L5-S1: Posterior disc bulge with left posterior lateral disc protrusion. Facet  hypertrophy. Severe bilateral foraminal stenosis with compression of bilateral  exiting L5 nerve roots. Also suspect mild displacement or contact to left  descending S1 nerve root.          Impression IMPRESSION: Severe degenerative disease at multiple levels with central  stenosis, foraminal stenosis and nerve root compression as described. Worst  central stenosis at L4-L5. More facet inflammation at L1-L2 and L3-4. Written by Claribel Hills, as dictated by Abel Woods MD.  I, Dr. Abel Woods confirm that all documentation is accurate.

## 2017-08-11 ENCOUNTER — OFFICE VISIT (OUTPATIENT)
Dept: ORTHOPEDIC SURGERY | Age: 67
End: 2017-08-11

## 2017-08-11 VITALS
HEIGHT: 71 IN | OXYGEN SATURATION: 99 % | SYSTOLIC BLOOD PRESSURE: 124 MMHG | HEART RATE: 79 BPM | DIASTOLIC BLOOD PRESSURE: 77 MMHG | BODY MASS INDEX: 20.13 KG/M2 | WEIGHT: 143.8 LBS | RESPIRATION RATE: 18 BRPM | TEMPERATURE: 97.8 F

## 2017-08-11 DIAGNOSIS — G89.29 CHRONIC BILATERAL LOW BACK PAIN WITH LEFT-SIDED SCIATICA: Primary | ICD-10-CM

## 2017-08-11 DIAGNOSIS — M48.061 LUMBAR SPINAL STENOSIS: Primary | ICD-10-CM

## 2017-08-11 DIAGNOSIS — Z98.890 HX OF DECOMPRESSIVE LUMBAR LAMINECTOMY: ICD-10-CM

## 2017-08-11 DIAGNOSIS — M43.16 SPONDYLOLISTHESIS AT L4-L5 LEVEL: ICD-10-CM

## 2017-08-11 DIAGNOSIS — M54.42 CHRONIC BILATERAL LOW BACK PAIN WITH LEFT-SIDED SCIATICA: Primary | ICD-10-CM

## 2017-08-11 NOTE — PROGRESS NOTES
[address]  [address], [address]  [address]  Phone: [phone]  Fax: [phone]  INITIAL CONSULTATION  Patient: Evangelina Centeno                MRN: [id number]       SSN: [id number]  YOB: 1950        AGE: 74 y.o. SEX: male  Body mass index is 20.06 kg/(m^2). PCP: Mila Barthel, MD  08/11/17    Chief Complaint   Patient presents with    Back Pain     back pain eval    Referral / Consult         HISTORY OF PRESENT ILLNESS, RADIOGRAPHS, and PLAN:         HISTORY OF PRESENT ILLNESS:  Mr. Fadia Monge is seen today at the request of Dr. Diana Shin and Dr. Christiano Bowles. 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 He has a back history of a previous T12 and L3 compression fracture from a service injury, as well as an L4-5 laminectomy in 2002 by Dr. Brice Blas for right-sided radiculopathy. It appears to have been a total laminectomy. He has noticed for the past couple of years progressive right-sided buttock and left-sided buttock and radicular pain with progressive weakness and classic stenotic symptoms and maybe a functional foot drop. He is quite a physically fit individual and exercises regularly and powered through it. He gets relief with forward bending. He has been through physical therapy and medications. PHYSICAL EXAMINATION:  His physical exam is significant for weakness of his left EHL. It is otherwise normal.    RADIOGRAPHS:  Radiographs demonstrate a grade II-III spondylolisthesis and possible pars defect. MRI, however, demonstrates what looks more like a degenerative spondylolisthesis with severe stenosis, occlusive foraminal stenosis, and severe central stenosis. There is also an left L5-S1 left foraminal lateral recess disc herniation. The stenosis at L4-5 is occlusive and dramatic.   This is what would often cause a cauda equina syndrome. The foraminal stenosis is severe, disc space collapse complete. ASSESSMENT/PLAN: I discussed the matter at length with him. The patient has dramatic instability, stenosis, and severe radiculopathy. I think he is not greater affected just by the high level of fitness he has maintained and his great flexibility. The need for surgery is evident having failed conservative treatments, his neurologic deficit, and the severity of his stenosis. He requires a decompression and fusion of L4-5 and decompression on the left at L5-S1. The degree of up/down foraminal stenosis and listhesis is such that he needs to be reduced, and we have to restore disc space height. Given the type of surgery he has had previously, he will likely require an interbody fusion. I prefer not to have to do that from a posterior approach given the scarring I know I am going to see. Either an anterior or lateral approach may be preferable. I would like to see if he has a true pars defect. We are going to get a CT scan of his lumbar spine for preoperative planning and a better view of some of his vascular anatomy to consider whether he would be a reasonable candidate for an ALIF, XLIF or other combined approach. I will see him back following these studies and discuss surgical decompression and fusion. cc: Tmair Nuñez M.D. Past Medical History:   Diagnosis Date    Back pain     Chronic pain     Headache     HTN (hypertension)     Joint pain     hx of joint pain       Family History   Problem Relation Age of Onset    Colon Cancer Mother     Hypertension Mother     Hypertension Father     Other Brother     Cancer Brother        Current Outpatient Prescriptions   Medication Sig Dispense Refill    clobetasol (TEMOVATE) 0.05 % topical cream Use 1 Application to affected area Twice Daily.       desonide (TRIDESILON) 0.05 % topical lotion Use 1 Application to affected area Twice Daily.  lisinopril-hydroCHLOROthiazide (PRINZIDE, ZESTORETIC) 20-12.5 mg per tablet TAKE ONE TABLET BY MOUTH ONCE A DAY      zolpidem (AMBIEN) 5 mg tablet Take 5 mg by mouth nightly as needed.  cholecalciferol (VITAMIN D3) 1,000 unit tablet Take 1,000 Units by mouth daily.  topiramate (TOPAMAX) 25 mg tablet Take 3 Tabs by mouth nightly. 90 Tab 5    naproxen (NAPROSYN) 500 mg tablet Take 1 Tab by mouth two (2) times daily (with meals). 60 Tab 2    nortriptyline (PAMELOR) 25 mg capsule 1-2 in the evening as directed 60 Cap 2    doxycycline (MONODOX) 100 mg capsule Take 100 mg by mouth daily. Allergies   Allergen Reactions    Bee Sting  [Sting, Bee] Swelling     DIZZINESS       Past Surgical History:   Procedure Laterality Date    HX LUMBAR LAMINECTOMY  2002    L4/L5    NEUROLOGICAL PROCEDURE UNLISTED  2003    pt spinal fracture T12 and L3       Past Medical History:   Diagnosis Date    Back pain     Chronic pain     Headache     HTN (hypertension)     Joint pain     hx of joint pain       Social History     Social History    Marital status:      Spouse name: N/A    Number of children: N/A    Years of education: N/A     Occupational History    Not on file. Social History Main Topics    Smoking status: Former Smoker     Quit date: 10/17/2012    Smokeless tobacco: Former User    Alcohol use Yes      Comment: socially     Drug use: No    Sexual activity: Yes     Other Topics Concern    Not on file     Social History Narrative       REVIEW OF SYSTEMS:   CONSTITUTIONAL SYMPTOMS:  Negative. EYES:  Negative. EARS, NOSE, THROAT AND MOUTH:  Negative. CARDIOVASCULAR:  Negative. RESPIRATORY:  Negative. GENITOURINARY: Negative. GASTROINTESTINAL:  Negative. INTEGUMENTARY (SKIN AND/OR BREAST):  Negative. MUSCULOSKELETAL: Per HPI.   ENDOCRINE/RHEUMATOLOGIC:  Negative. NEUROLOGICAL:  Per HPI. HEMATOLOGIC/LYMPHATIC:  Negative.    ALLERGIC/IMMUNOLOGIC: Negative. PSYCHIATRIC:  Negative. PHYSICAL EXAMINATION:   Visit Vitals    /77    Pulse 79    Temp 97.8 °F (36.6 °C) (Oral)    Resp 18    Ht 5' 11\" (1.803 m)    Wt 143 lb 12.8 oz (65.2 kg)    SpO2 99%    BMI 20.06 kg/m2    PAIN SCALE: 2/10    CONSTITUTIONAL: The patient is in no apparent distress and is alert and oriented x 3. NEUROLOGICAL: Alert and oriented x 3. Ambulation without assistive device. FWB. EXTREMITIES:  See musculoskeletal.  MUSCULOSKELETAL:   Head and Neck:  Negative for misalignment, asymmetry, crepitation, defects, tenderness masses or effusions.  Left Upper Extremity: Inspection, percussion and palpation preformed. Sauls sign is negative.  Right Upper Extremity: Inspection, percussion and palpation preformed. Sauls sign is negative.  Spine, Ribs and Pelvis: Inspection, percussion and palpation preformed. Negative for misalignment, asymmetry, crepitation, defects, tenderness masses or effusions.  Left Lower Extremity: Burning pain. Paresthesia. Slight weakness EHL. Inspection, percussion and palpation preformed. Negative straight leg raise.  Right Lower Extremity: Inspection, percussion and palpation preformed. Negative straight leg raise. SPINE EXAM:     Lumbar spine: No rash, ecchymosis, or gross obliquity. No focal atrophy is noted. ASSESSMENT    ICD-10-CM ICD-9-CM    1. Lumbar spinal stenosis M48.06 724.02 AMB POC XRAY, SPINE, LUMBOSACRAL; 4+      CT SPINE LUMB WO CONT   2. Spondylolisthesis at L4-L5 level M43.16 756.12 AMB POC XRAY, SPINE, LUMBOSACRAL; 4+      CT SPINE LUMB WO CONT   3. Hx of decompressive lumbar laminectomy Z98.890 V45.89 CT SPINE LUMB WO CONT       Written by Kanika Ramirez, as dictated by Cecilia Elizabeth MD.    I, Dr. Cecilia Elizabeth MD, confirm that all documentation is accurate.

## 2017-08-12 ENCOUNTER — HOSPITAL ENCOUNTER (OUTPATIENT)
Dept: CT IMAGING | Age: 67
Discharge: HOME | End: 2017-08-12
Attending: ORTHOPAEDIC SURGERY
Payer: COMMERCIAL

## 2017-08-12 DIAGNOSIS — M43.16 SPONDYLOLISTHESIS AT L4-L5 LEVEL: ICD-10-CM

## 2017-08-12 DIAGNOSIS — Z98.890 HX OF DECOMPRESSIVE LUMBAR LAMINECTOMY: ICD-10-CM

## 2017-08-12 DIAGNOSIS — M48.061 LUMBAR SPINAL STENOSIS: ICD-10-CM

## 2017-08-12 PROCEDURE — 72131 CT LUMBAR SPINE W/O DYE: CPT

## 2017-08-25 ENCOUNTER — OFFICE VISIT (OUTPATIENT)
Dept: ORTHOPEDIC SURGERY | Age: 67
End: 2017-08-25

## 2017-08-25 VITALS
SYSTOLIC BLOOD PRESSURE: 111 MMHG | TEMPERATURE: 97.8 F | BODY MASS INDEX: 20.3 KG/M2 | DIASTOLIC BLOOD PRESSURE: 69 MMHG | HEIGHT: 71 IN | OXYGEN SATURATION: 99 % | RESPIRATION RATE: 20 BRPM | WEIGHT: 145 LBS | HEART RATE: 62 BPM

## 2017-08-25 DIAGNOSIS — M43.00 PARS DEFECT WITH SPONDYLOLISTHESIS: ICD-10-CM

## 2017-08-25 DIAGNOSIS — M48.061 LUMBAR SPINAL STENOSIS: Primary | ICD-10-CM

## 2017-08-25 DIAGNOSIS — M43.10 PARS DEFECT WITH SPONDYLOLISTHESIS: ICD-10-CM

## 2017-08-25 NOTE — PROGRESS NOTES
[address] [facility]   Pre-Surgical Worksheet    Patient: Junie Rojo                         MRN: [id number]     Age:  74 y.o.,      Sex: male    YOB: 1950           MARÍA: August 25, 2017  PCP: Ilsa Curtis MD    Allergies   Allergen Reactions    Bee Sting  [Sting, Bee] Swelling     DIZZINESS         ICD-10-CM ICD-9-CM    1. Lumbar spinal stenosis M48.06 724.02    2. Pars defect with spondylolisthesis M43.00 756.11     M43.10 756.12        Surgery: L4/5 Redo Lami Fusion TLIF    Pain Assessment   Pain Assessment  8/25/2017   Location of Pain Back;Leg;Foot   Location Modifiers Left   Severity of Pain 2   Quality of Pain Aching;Dull; Other (Comment)   Quality of Pain Comment numbness and weakness   Duration of Pain Persistent   Frequency of Pain Constant   Aggravating Factors Standing   Aggravating Factors Comment -   Relieving Factors Other (Comment)   Relieving Factors Comment stretching   Result of Injury No       Visit Vitals    /69    Pulse 62    Temp 97.8 °F (36.6 °C) (Oral)    Resp 20    Ht 5' 11\" (1.803 m)    Wt 145 lb (65.8 kg)    SpO2 99%    BMI 20.22 kg/m2       ADL Limits: Bathing and Dressing, Standing, Working, Driving, Walking    Spine Surgery?: Yes When 2002. Where Dr. Yelitza Ma . Spinal Injections?: Yes  When 2016/2017. Where . Physical Therapy?: No:   When N/A. Where N/A.    NSAID's?: Yes    Pain Medications?: No:   Type: N/A. In Pain Management: NO, Where: N/A    Current Outpatient Prescriptions   Medication Sig    clobetasol (TEMOVATE) 0.05 % topical cream Use 1 Application to affected area Twice Daily.  desonide (TRIDESILON) 0.05 % topical lotion Use 1 Application to affected area Twice Daily.  lisinopril-hydroCHLOROthiazide (PRINZIDE, ZESTORETIC) 20-12.5 mg per tablet TAKE ONE TABLET BY MOUTH ONCE A DAY    zolpidem (AMBIEN) 5 mg tablet Take 5 mg by mouth nightly as needed.     cholecalciferol (VITAMIN D3) 1,000 unit tablet Take 1,000 Units by mouth daily.  topiramate (TOPAMAX) 25 mg tablet Take 3 Tabs by mouth nightly.  naproxen (NAPROSYN) 500 mg tablet Take 1 Tab by mouth two (2) times daily (with meals).  doxycycline (MONODOX) 100 mg capsule Take 100 mg by mouth daily.  nortriptyline (PAMELOR) 25 mg capsule 1-2 in the evening as directed     No current facility-administered medications for this visit.         Past Medical History:   Diagnosis Date    Back pain     Chronic pain     Headache     HTN (hypertension)     Joint pain     hx of joint pain       Past Surgical History:   Procedure Laterality Date    HX LUMBAR LAMINECTOMY  2002    L4/L5    NEUROLOGICAL PROCEDURE UNLISTED  2003    pt spinal fracture T12 and L3       Social History     Social History    Marital status:      Spouse name: N/A    Number of children: N/A    Years of education: N/A     Social History Main Topics    Smoking status: Former Smoker     Quit date: 10/17/2012    Smokeless tobacco: Former User    Alcohol use Yes      Comment: socially     Drug use: No    Sexual activity: Yes     Other Topics Concern    None     Social History Narrative

## 2017-08-25 NOTE — PROGRESS NOTES
[address]  [address], [address]  [place], [address] [place] [address] Street  Phone: [phone]  Fax: [phone]  PROGRESS NOTE  Patient: Jr Rebolledo                MRN: [id number]       SSN: [id number]  YOB: 1950        AGE: 74 y.o. SEX: male  Body mass index is 20.22 kg/(m^2). PCP: Maya Ochoa MD  08/25/17    Chief Complaint   Patient presents with    Back Pain     CT follow up       HISTORY OF PRESENT ILLNESS, RADIOGRAPHS, and PLAN:     HISTORY:  Mr. Jeff Navas returns today. I reviewed his CT scan. He is continuing to have severe back and radiating left leg pain with leg pain more severe than back pain. His CT scan demonstrates severity of the slip. It appears that one of these pars defects may be partially healed with this rotated high-grade slip, which may make it impossible for him to reduce spontaneously simply on positioning. Also, with his disc space at the intercostal line, it may be difficult to do as an XLIF. ASSESSMENT/PLAN: If I cannot get him to reduce as an ALIF, we would be in a very difficult position. With all of these factors in place, I think it is probably best that we proceed with this as a posterior fusion conventionally for decompression and an interbody approach to restore disc space height with anterior column support. I discussed the risks, benefits, complications, and alternatives to surgery. He wishes to proceed. We will proceed with a posterior lumbar interbody fusion at L4-5 once the appropriate approvals and clearances take place. cc: Emilia Celis M.D.          Past Medical History:   Diagnosis Date    Back pain     Chronic pain     Headache     HTN (hypertension)     Joint pain     hx of joint pain       Family History   Problem Relation Age of Onset    Colon Cancer Mother     Hypertension Mother     Hypertension Father     Other Brother     Cancer Brother        Current Outpatient Prescriptions Medication Sig Dispense Refill    clobetasol (TEMOVATE) 0.05 % topical cream Use 1 Application to affected area Twice Daily.  desonide (TRIDESILON) 0.05 % topical lotion Use 1 Application to affected area Twice Daily.  lisinopril-hydroCHLOROthiazide (PRINZIDE, ZESTORETIC) 20-12.5 mg per tablet TAKE ONE TABLET BY MOUTH ONCE A DAY      zolpidem (AMBIEN) 5 mg tablet Take 5 mg by mouth nightly as needed.  cholecalciferol (VITAMIN D3) 1,000 unit tablet Take 1,000 Units by mouth daily.  topiramate (TOPAMAX) 25 mg tablet Take 3 Tabs by mouth nightly. 90 Tab 5    naproxen (NAPROSYN) 500 mg tablet Take 1 Tab by mouth two (2) times daily (with meals). 60 Tab 2    doxycycline (MONODOX) 100 mg capsule Take 100 mg by mouth daily.  nortriptyline (PAMELOR) 25 mg capsule 1-2 in the evening as directed 60 Cap 2       Allergies   Allergen Reactions    Bee Sting  [Sting, Bee] Swelling     DIZZINESS       Past Surgical History:   Procedure Laterality Date    HX LUMBAR LAMINECTOMY  2002    L4/L5    NEUROLOGICAL PROCEDURE UNLISTED  2003    pt spinal fracture T12 and L3       Past Medical History:   Diagnosis Date    Back pain     Chronic pain     Headache     HTN (hypertension)     Joint pain     hx of joint pain       Social History     Social History    Marital status:      Spouse name: N/A    Number of children: N/A    Years of education: N/A     Occupational History    Not on file. Social History Main Topics    Smoking status: Former Smoker     Quit date: 10/17/2012    Smokeless tobacco: Former User    Alcohol use Yes      Comment: socially     Drug use: No    Sexual activity: Yes     Other Topics Concern    Not on file     Social History Narrative         REVIEW OF SYSTEMS:   CONSTITUTIONAL SYMPTOMS:  Negative. EYES:  Negative. EARS, NOSE, THROAT AND MOUTH:  Negative. CARDIOVASCULAR:  Negative. RESPIRATORY:  Negative. GENITOURINARY: Negative.    GASTROINTESTINAL: Negative. INTEGUMENTARY (SKIN AND/OR BREAST):  Negative. MUSCULOSKELETAL: Per HPI.   ENDOCRINE/RHEUMATOLOGIC:  Negative. NEUROLOGICAL:  Per HPI. HEMATOLOGIC/LYMPHATIC:  Negative. ALLERGIC/IMMUNOLOGIC:  Negative. PSYCHIATRIC:  Negative. PHYSICAL EXAMINATION:   Visit Vitals    /69    Pulse 62    Temp 97.8 °F (36.6 °C) (Oral)    Resp 20    Ht 5' 11\" (1.803 m)    Wt 145 lb (65.8 kg)    SpO2 99%    BMI 20.22 kg/m2    PAIN SCALE: 2/10    CONSTITUTIONAL: The patient is in no apparent distress and is alert and oriented x 3. HEENT: Normocephalic. Hearing grossly intact. NECK: Supple and symmetric. no tenderness, or masses were felt. RESPIRATORY: No labored breathing. CARDIOVASCULAR: The carotid pulses were normal. Peripheral pulses were 2+. CHEST: Normal AP diameter and normal contour without any kyphoscoliosis. LYMPHATIC: No lymphadenopathy was appreciated in the neck, axillae or groin. SKIN: . Negative for scars, rashes, lesions, or ulcers on the right upper, right lower, left upper, left lower and trunk. NEUROLOGICAL: Alert and oriented x 3. Ambulation without assistive device. FWB. EXTREMITIES: See musculoskeletal.  MUSCULOSKELETAL:   Head and Neck:  Negative for misalignment, asymmetry, crepitation, defects, tenderness masses or effusions.  Left Upper Extremity: Inspection, percussion and palpation preformed. Sauls sign is negative.  Right Upper Extremity: Inspection, percussion and palpation preformed. Sauls sign is negative.  Spine, Ribs and Pelvis: Inspection, percussion and palpation preformed. Negative for misalignment, asymmetry, crepitation, defects, tenderness masses or effusions.  Left Lower Extremity: Burning radiating pain. Paresthesias. Slight weakness EHL. Foot drop. Inspection, percussion and palpation preformed. Negative straight leg raise.  Right Lower Extremity: Inspection, percussion and palpation preformed.    Negative straight leg raise.        SPINE EXAM:     Lumbar spine: No rash, ecchymosis, or gross obliquity. No focal atrophy is noted. ASSESSMENT    ICD-10-CM ICD-9-CM    1. Lumbar spinal stenosis M48.06 724.02    2. Pars defect with spondylolisthesis M43.00 756.11     M43.10 756.12        Written by Basilia Gonsalves, as dictated by Carrie Wyatt MD.    I, Dr. Carrie Wyatt MD, confirm that all documentation is accurate.

## 2017-11-24 ENCOUNTER — HOSPITAL ENCOUNTER (OUTPATIENT)
Dept: PREADMISSION TESTING | Age: 67
Discharge: HOME | End: 2017-11-24
Payer: COMMERCIAL

## 2017-11-24 DIAGNOSIS — Z01.818 PRE-OP TESTING: ICD-10-CM

## 2017-11-24 DIAGNOSIS — M43.00 SPONDYLOLYSIS: ICD-10-CM

## 2017-11-24 LAB
ABO + RH BLD: NORMAL
ALBUMIN SERPL-MCNC: 3.9 G/DL (ref 3.4–5)
ALBUMIN/GLOB SERPL: 1.3 {RATIO} (ref 0.8–1.7)
ALP SERPL-CCNC: 73 U/L (ref 45–117)
ALT SERPL-CCNC: 20 U/L (ref 16–61)
ANION GAP SERPL CALC-SCNC: 6 MMOL/L (ref 3–18)
AST SERPL-CCNC: 19 U/L (ref 15–37)
ATRIAL RATE: 50 BPM
BILIRUB SERPL-MCNC: 0.6 MG/DL (ref 0.2–1)
BLOOD GROUP ANTIBODIES SERPL: NORMAL
BUN SERPL-MCNC: 23 MG/DL (ref 7–18)
BUN/CREAT SERPL: 28 (ref 12–20)
CALCIUM SERPL-MCNC: 8.4 MG/DL (ref 8.5–10.1)
CALCULATED P AXIS, ECG09: 73 DEGREES
CALCULATED R AXIS, ECG10: 74 DEGREES
CALCULATED T AXIS, ECG11: 64 DEGREES
CHLORIDE SERPL-SCNC: 104 MMOL/L (ref 100–108)
CO2 SERPL-SCNC: 28 MMOL/L (ref 21–32)
CREAT SERPL-MCNC: 0.82 MG/DL (ref 0.6–1.3)
DIAGNOSIS, 93000: NORMAL
ERYTHROCYTE [DISTWIDTH] IN BLOOD BY AUTOMATED COUNT: 13 % (ref 11.6–14.5)
GLOBULIN SER CALC-MCNC: 3 G/DL (ref 2–4)
GLUCOSE SERPL-MCNC: 87 MG/DL (ref 74–99)
HCT VFR BLD AUTO: 44.9 % (ref 36–48)
HGB BLD-MCNC: 15.3 G/DL (ref 13–16)
MCH RBC QN AUTO: 31.1 PG (ref 24–34)
MCHC RBC AUTO-ENTMCNC: 34.1 G/DL (ref 31–37)
MCV RBC AUTO: 91.3 FL (ref 74–97)
P-R INTERVAL, ECG05: 150 MS
PLATELET # BLD AUTO: 234 K/UL (ref 135–420)
PMV BLD AUTO: 9.5 FL (ref 9.2–11.8)
POTASSIUM SERPL-SCNC: 4.7 MMOL/L (ref 3.5–5.5)
PROT SERPL-MCNC: 6.9 G/DL (ref 6.4–8.2)
Q-T INTERVAL, ECG07: 476 MS
QRS DURATION, ECG06: 104 MS
QTC CALCULATION (BEZET), ECG08: 433 MS
RBC # BLD AUTO: 4.92 M/UL (ref 4.7–5.5)
SODIUM SERPL-SCNC: 138 MMOL/L (ref 136–145)
SPECIMEN EXP DATE BLD: NORMAL
VENTRICULAR RATE, ECG03: 50 BPM
WBC # BLD AUTO: 4.3 K/UL (ref 4.6–13.2)

## 2017-11-24 PROCEDURE — 93005 ELECTROCARDIOGRAM TRACING: CPT

## 2017-11-24 PROCEDURE — 36415 COLL VENOUS BLD VENIPUNCTURE: CPT | Performed by: ORTHOPAEDIC SURGERY

## 2017-11-24 PROCEDURE — 80053 COMPREHEN METABOLIC PANEL: CPT | Performed by: ORTHOPAEDIC SURGERY

## 2017-11-24 PROCEDURE — 86900 BLOOD TYPING SEROLOGIC ABO: CPT | Performed by: ORTHOPAEDIC SURGERY

## 2017-11-24 PROCEDURE — 85027 COMPLETE CBC AUTOMATED: CPT | Performed by: ORTHOPAEDIC SURGERY

## 2017-11-29 NOTE — H&P
Pre-Admission History and Physical    Patient: Sheila Stone   MRN: [id number]   SSN: [id number]   YOB: 1950   Age: 74 y.o. Sex: male     Patient scheduled for: Redo L4/5 lami fusion, TLIF. Date of surgery: 12/4/17. [facility] Surgeon: Chinedu Juárez MD    HPI:  Sheila Stone is a 74 y.o. male with progressive back pain with radiating left greater than right leg pain, numbness, and weakness. He has a back history of a previous T12 and L3 compression fracture from a service injury, as well as an L4-5 laminectomy in 2002 by Dr. Yennifer Herman for right-sided radiculopathy. It appears to have been a total laminectomy. He has noticed for the past couple of years progressive right-sided buttock and left-sided buttock and radicular pain with progressive weakness and classic stenotic symptoms and maybe a functional foot drop. He is quite a physically fit individual and exercises regularly and powered through it. He gets relief with forward bending. He has been through physical therapy and medications. Leg pain more severe than back pain. Radiographs demonstrate a grade II-III spondylolisthesis and possible pars defect. MRI, however, demonstrates what looks more like a degenerative spondylolisthesis with severe stenosis, occlusive foraminal stenosis, and severe central stenosis. There is also an left L5-S1 left foraminal lateral recess disc herniation. The stenosis at L4-5 is occlusive and dramatic. This is what would often cause a cauda equina syndrome. The foraminal stenosis is severe, disc space collapse complete. His CT scan demonstrates severity of the slip. It appears that one of these pars defects may be partially healed with this rotated high-grade slip, which may make it impossible for him to reduce spontaneously simply on positioning    He reports a pain level of 2-8/10.  This patient has failed the presurgical conservative treatments  including exercise, spinal block injections and medications. Pain has impacted the patient's functional ability to bathe, dress, stand, work, drive or walk without pain. He is being admitted for surgical intervention. Past Medical History:   Diagnosis Date    Back pain     Chronic pain     Headache(784.0)     HTN (hypertension)     Joint pain     hx of joint pain     Social History     Social History    Marital status:      Spouse name: N/A    Number of children: N/A    Years of education: N/A     Social History Main Topics    Smoking status: Former Smoker     Quit date: 10/17/2012    Smokeless tobacco: Former User    Alcohol use Yes      Comment: daily-beer and wine    Drug use: No    Sexual activity: Yes     Other Topics Concern    None     Social History Narrative     Past Surgical History:   Procedure Laterality Date    HX LUMBAR LAMINECTOMY  2002    L4/L5    HX OTHER SURGICAL  1978    \"open lung bx\"    NEUROLOGICAL PROCEDURE UNLISTED  2003    pt spinal fracture T12 and L3     Family History   Problem Relation Age of Onset    Colon Cancer Mother     Hypertension Mother     Hypertension Father     Other Brother     Cancer Brother      Allergies   Allergen Reactions    Bee Sting  [Sting, Bee] Swelling     DIZZINESS     Current Outpatient Prescriptions   Medication Sig Dispense Refill    topiramate (TOPAMAX) 25 mg tablet Take 3 Tabs by mouth nightly. Indications: neuritis 90 Tab 5    clobetasol (TEMOVATE) 0.05 % topical cream Use 1 Application to affected area Twice Daily.  desonide (TRIDESILON) 0.05 % topical lotion Use 1 Application to affected area Twice Daily.  lisinopril-hydroCHLOROthiazide (PRINZIDE, ZESTORETIC) 20-12.5 mg per tablet TAKE ONE TABLET BY MOUTH ONCE A DAY      cholecalciferol (VITAMIN D3) 1,000 unit tablet Take 1,000 Units by mouth daily.  zolpidem (AMBIEN) 5 mg tablet Take 5 mg by mouth nightly as needed.       naproxen (NAPROSYN) 500 mg tablet Take 1 Tab by mouth two (2) times daily (with meals). 60 Tab 2    doxycycline (MONODOX) 100 mg capsule Take 100 mg by mouth daily. ROS:  Denies chills, fever,night sweats,  bowel or bladder dysfunction, unexplained weight loss/weight gain, chest pain, sob or anxiety. Physical Examination    Gen: Well developed, well nourished 74 y.o. male    /69    Pulse 62    Temp 97.8 °F (36.6 °C) (Oral)    Resp 20    Ht 5' 11\" (1.803 m)    Wt 145 lb (65.8 kg)    SpO2 99%    BMI 20.22 kg/m2    PAIN SCALE: 2/10     CONSTITUTIONAL: The patient is in no apparent distress and is alert and oriented x 3. HEENT: Normocephalic. Hearing grossly intact. NECK: Supple and symmetric. no tenderness, or masses were felt. RESPIRATORY: No labored breathing. CARDIOVASCULAR: The carotid pulses were normal. Peripheral pulses were 2+. CHEST: Normal AP diameter and normal contour without any kyphoscoliosis. LYMPHATIC: No lymphadenopathy was appreciated in the neck, axillae or groin. SKIN: . Negative for scars, rashes, lesions, or ulcers on the right upper, right lower, left upper, left lower and trunk. NEUROLOGICAL: Alert and oriented x 3. Ambulation without assistive device. FWB. EXTREMITIES: See musculoskeletal.  MUSCULOSKELETAL:  · Head and Neck:  Negative for misalignment, asymmetry, crepitation, defects, tenderness masses or effusions. · Left Upper Extremity: Inspection, percussion and palpation preformed. Sauls sign is negative. · Right Upper Extremity: Inspection, percussion and palpation preformed. Sauls sign is negative. · Spine, Ribs and Pelvis: Inspection, percussion and palpation preformed. Negative for misalignment, asymmetry, crepitation, defects, tenderness masses or effusions. · Left Lower Extremity: Burning radiating pain. Paresthesias. Slight weakness EHL. Foot drop. Inspection, percussion and palpation preformed. Negative straight leg raise.   · Right Lower Extremity: Inspection, percussion and palpation preformed. Negative straight leg raise.           SPINE EXAM:      Lumbar spine: No rash, ecchymosis, or gross obliquity. No focal atrophy is noted. Assessment and Plan    Due to the pt's persistent symptoms unrelieved by conservative measure Mejia Toledo is being admitted [facility] to undergo surgical intervention. The post-operative plan of care consists of physical therapy, home health and a 2 week f/u office visit. We are pending medical clearance by Dr. Manuel Jones. The risks, benefits, complications and alternatives to surgery have been discussed in detail with the patient. The patient understands and agrees to proceed.      Catrina Perez NP-C dictating for Missy Ferro MD

## 2017-12-01 ENCOUNTER — ANESTHESIA EVENT (OUTPATIENT)
Dept: SURGERY | Age: 67
DRG: 460 | End: 2017-12-01
Payer: COMMERCIAL

## 2017-12-04 ENCOUNTER — ANESTHESIA (OUTPATIENT)
Dept: SURGERY | Age: 67
DRG: 460 | End: 2017-12-04
Payer: COMMERCIAL

## 2017-12-04 ENCOUNTER — HOSPITAL ENCOUNTER (INPATIENT)
Age: 67
LOS: 2 days | Discharge: HOME HEALTH | DRG: 460 | End: 2017-12-06
Attending: ORTHOPAEDIC SURGERY | Admitting: ORTHOPAEDIC SURGERY
Payer: COMMERCIAL

## 2017-12-04 ENCOUNTER — APPOINTMENT (OUTPATIENT)
Dept: GENERAL RADIOLOGY | Age: 67
DRG: 460 | End: 2017-12-04
Attending: ORTHOPAEDIC SURGERY
Payer: COMMERCIAL

## 2017-12-04 PROBLEM — M43.16 SPONDYLOLISTHESIS OF LUMBAR REGION: Status: ACTIVE | Noted: 2017-12-04

## 2017-12-04 LAB
HCT VFR BLD AUTO: 35.9 % (ref 36–48)
HGB BLD-MCNC: 11.8 G/DL (ref 13–16)

## 2017-12-04 PROCEDURE — 77030019908 HC STETH ESOPH SIMS -A: Performed by: ANESTHESIOLOGY

## 2017-12-04 PROCEDURE — 65270000029 HC RM PRIVATE

## 2017-12-04 PROCEDURE — 77030034479 HC ADH SKN CLSR PRINEO J&J -B: Performed by: ORTHOPAEDIC SURGERY

## 2017-12-04 PROCEDURE — 77030032490 HC SLV COMPR SCD KNE COVD -B: Performed by: ORTHOPAEDIC SURGERY

## 2017-12-04 PROCEDURE — 97530 THERAPEUTIC ACTIVITIES: CPT

## 2017-12-04 PROCEDURE — 74011250636 HC RX REV CODE- 250/636: Performed by: NURSE ANESTHETIST, CERTIFIED REGISTERED

## 2017-12-04 PROCEDURE — 85018 HEMOGLOBIN: CPT | Performed by: ANESTHESIOLOGY

## 2017-12-04 PROCEDURE — 77030034850: Performed by: ORTHOPAEDIC SURGERY

## 2017-12-04 PROCEDURE — C1713 ANCHOR/SCREW BN/BN,TIS/BN: HCPCS | Performed by: ORTHOPAEDIC SURGERY

## 2017-12-04 PROCEDURE — 74011250636 HC RX REV CODE- 250/636

## 2017-12-04 PROCEDURE — 00NY0ZZ RELEASE LUMBAR SPINAL CORD, OPEN APPROACH: ICD-10-PCS | Performed by: ORTHOPAEDIC SURGERY

## 2017-12-04 PROCEDURE — 77030014005 HC SPNG HEMSTAT GEL CARD -B: Performed by: ORTHOPAEDIC SURGERY

## 2017-12-04 PROCEDURE — 77030005530 HC CATH URETH FOL40 BARD -B: Performed by: ORTHOPAEDIC SURGERY

## 2017-12-04 PROCEDURE — 77030019938 HC TBNG IV PCA ICUM -A: Performed by: ORTHOPAEDIC SURGERY

## 2017-12-04 PROCEDURE — 77030037134 HC WRAP COMPR BACK THER SOLM -B

## 2017-12-04 PROCEDURE — 0SG0071 FUSION OF LUMBAR VERTEBRAL JOINT WITH AUTOLOGOUS TISSUE SUBSTITUTE, POSTERIOR APPROACH, POSTERIOR COLUMN, OPEN APPROACH: ICD-10-PCS | Performed by: ORTHOPAEDIC SURGERY

## 2017-12-04 PROCEDURE — 77030012406 HC DRN WND PENRS BARD -A: Performed by: ORTHOPAEDIC SURGERY

## 2017-12-04 PROCEDURE — 77030030409 HC DIL SPN KT M5 DISP NUVA -G: Performed by: ORTHOPAEDIC SURGERY

## 2017-12-04 PROCEDURE — 77030031878 HC NDL SPN ACC SYS I-PAS NUVA -D: Performed by: ORTHOPAEDIC SURGERY

## 2017-12-04 PROCEDURE — 74011250636 HC RX REV CODE- 250/636: Performed by: ORTHOPAEDIC SURGERY

## 2017-12-04 PROCEDURE — 36415 COLL VENOUS BLD VENIPUNCTURE: CPT | Performed by: ANESTHESIOLOGY

## 2017-12-04 PROCEDURE — 74011000250 HC RX REV CODE- 250: Performed by: ORTHOPAEDIC SURGERY

## 2017-12-04 PROCEDURE — 99218 HC RM OBSERVATION: CPT

## 2017-12-04 PROCEDURE — 76010000174 HC OR TIME 3.5 TO 4 HR INTENSV-TIER 1: Performed by: ORTHOPAEDIC SURGERY

## 2017-12-04 PROCEDURE — 77030031898 HC WRE K DISP NUVA -B: Performed by: ORTHOPAEDIC SURGERY

## 2017-12-04 PROCEDURE — 77030035236 HC SUT PDS STRATFX BARB J&J -B: Performed by: ORTHOPAEDIC SURGERY

## 2017-12-04 PROCEDURE — 77030020782 HC GWN BAIR PAWS FLX 3M -B: Performed by: ORTHOPAEDIC SURGERY

## 2017-12-04 PROCEDURE — 74011000272 HC RX REV CODE- 272: Performed by: ORTHOPAEDIC SURGERY

## 2017-12-04 PROCEDURE — 77030018836 HC SOL IRR NACL ICUM -A: Performed by: ORTHOPAEDIC SURGERY

## 2017-12-04 PROCEDURE — 77030026438 HC STYL ET INTUB CARD -A: Performed by: ANESTHESIOLOGY

## 2017-12-04 PROCEDURE — 77030020268 HC MISC GENERAL SUPPLY: Performed by: ORTHOPAEDIC SURGERY

## 2017-12-04 PROCEDURE — 77030008683 HC TU ET CUF COVD -A: Performed by: ANESTHESIOLOGY

## 2017-12-04 PROCEDURE — 74011250637 HC RX REV CODE- 250/637: Performed by: NURSE ANESTHETIST, CERTIFIED REGISTERED

## 2017-12-04 PROCEDURE — 77030034475 HC MISC IMPL SPN: Performed by: ORTHOPAEDIC SURGERY

## 2017-12-04 PROCEDURE — 97162 PT EVAL MOD COMPLEX 30 MIN: CPT

## 2017-12-04 PROCEDURE — 74011250636 HC RX REV CODE- 250/636: Performed by: NURSE PRACTITIONER

## 2017-12-04 PROCEDURE — 77030027138 HC INCENT SPIROMETER -A

## 2017-12-04 PROCEDURE — 74011250637 HC RX REV CODE- 250/637: Performed by: ORTHOPAEDIC SURGERY

## 2017-12-04 PROCEDURE — 74011000250 HC RX REV CODE- 250

## 2017-12-04 PROCEDURE — 0SB20ZZ EXCISION OF LUMBAR VERTEBRAL DISC, OPEN APPROACH: ICD-10-PCS | Performed by: ORTHOPAEDIC SURGERY

## 2017-12-04 PROCEDURE — 77030026188 HC BN CANC CHP CRSH PR LIFV -E: Performed by: ORTHOPAEDIC SURGERY

## 2017-12-04 PROCEDURE — 77030030163 HC BN WAX J&J -A: Performed by: ORTHOPAEDIC SURGERY

## 2017-12-04 PROCEDURE — 77030013567 HC DRN WND RESERV BARD -A: Performed by: ORTHOPAEDIC SURGERY

## 2017-12-04 PROCEDURE — 77030004402 HC BUR NEUR STRY -C: Performed by: ORTHOPAEDIC SURGERY

## 2017-12-04 PROCEDURE — 77030011640 HC PAD GRND REM COVD -A: Performed by: ORTHOPAEDIC SURGERY

## 2017-12-04 PROCEDURE — C1763 CONN TISS, NON-HUMAN: HCPCS | Performed by: ORTHOPAEDIC SURGERY

## 2017-12-04 PROCEDURE — 01NB0ZZ RELEASE LUMBAR NERVE, OPEN APPROACH: ICD-10-PCS | Performed by: ORTHOPAEDIC SURGERY

## 2017-12-04 PROCEDURE — 77030012893: Performed by: ORTHOPAEDIC SURGERY

## 2017-12-04 PROCEDURE — 77030031139 HC SUT VCRL2 J&J -A: Performed by: ORTHOPAEDIC SURGERY

## 2017-12-04 PROCEDURE — 76060000038 HC ANESTHESIA 3.5 TO 4 HR: Performed by: ORTHOPAEDIC SURGERY

## 2017-12-04 PROCEDURE — 76210000017 HC OR PH I REC 1.5 TO 2 HR: Performed by: ORTHOPAEDIC SURGERY

## 2017-12-04 DEVICE — IMPLANTABLE DEVICE: Type: IMPLANTABLE DEVICE | Site: SPINE LUMBAR | Status: FUNCTIONAL

## 2017-12-04 DEVICE — K WIRE FIX NIT BVL BLNT TIP PRECEPT: Type: IMPLANTABLE DEVICE | Status: FUNCTIONAL

## 2017-12-04 DEVICE — BONE CHIP CANC CRSH 1-8MM 30ML --: Type: IMPLANTABLE DEVICE | Site: SPINE LUMBAR | Status: FUNCTIONAL

## 2017-12-04 DEVICE — SCREW SPNL DIA5.5MM OPN TULIP LOK RELINE: Type: IMPLANTABLE DEVICE | Site: SPINE LUMBAR | Status: FUNCTIONAL

## 2017-12-04 DEVICE — GRAFT BONE SUB 5ML PUTTY CA PHOS SYNTH GRAN BIOABSRB ATTRAX: Type: IMPLANTABLE DEVICE | Site: SPINE LUMBAR | Status: FUNCTIONAL

## 2017-12-04 RX ORDER — CELECOXIB 400 MG/1
400 CAPSULE ORAL
Status: COMPLETED | OUTPATIENT
Start: 2017-12-04 | End: 2017-12-04

## 2017-12-04 RX ORDER — GLYCOPYRROLATE 0.2 MG/ML
INJECTION INTRAMUSCULAR; INTRAVENOUS AS NEEDED
Status: DISCONTINUED | OUTPATIENT
Start: 2017-12-04 | End: 2017-12-04 | Stop reason: HOSPADM

## 2017-12-04 RX ORDER — HYDROMORPHONE HYDROCHLORIDE 1 MG/ML
INJECTION, SOLUTION INTRAMUSCULAR; INTRAVENOUS; SUBCUTANEOUS AS NEEDED
Status: DISCONTINUED | OUTPATIENT
Start: 2017-12-04 | End: 2017-12-04 | Stop reason: HOSPADM

## 2017-12-04 RX ORDER — SODIUM CHLORIDE 9 MG/ML
100 INJECTION, SOLUTION INTRAVENOUS CONTINUOUS
Status: DISCONTINUED | OUTPATIENT
Start: 2017-12-04 | End: 2017-12-05

## 2017-12-04 RX ORDER — ONDANSETRON 2 MG/ML
INJECTION INTRAMUSCULAR; INTRAVENOUS AS NEEDED
Status: DISCONTINUED | OUTPATIENT
Start: 2017-12-04 | End: 2017-12-04 | Stop reason: HOSPADM

## 2017-12-04 RX ORDER — BISACODYL 5 MG
10 TABLET, DELAYED RELEASE (ENTERIC COATED) ORAL DAILY
Status: DISCONTINUED | OUTPATIENT
Start: 2017-12-05 | End: 2017-12-06 | Stop reason: HOSPADM

## 2017-12-04 RX ORDER — ALBUTEROL SULFATE 0.83 MG/ML
2.5 SOLUTION RESPIRATORY (INHALATION) AS NEEDED
Status: DISCONTINUED | OUTPATIENT
Start: 2017-12-04 | End: 2017-12-04 | Stop reason: HOSPADM

## 2017-12-04 RX ORDER — LISINOPRIL AND HYDROCHLOROTHIAZIDE 12.5; 2 MG/1; MG/1
1 TABLET ORAL DAILY
Status: DISCONTINUED | OUTPATIENT
Start: 2017-12-05 | End: 2017-12-06 | Stop reason: HOSPADM

## 2017-12-04 RX ORDER — HYDROMORPHONE HYDROCHLORIDE 1 MG/ML
1 INJECTION, SOLUTION INTRAMUSCULAR; INTRAVENOUS; SUBCUTANEOUS
Status: DISCONTINUED | OUTPATIENT
Start: 2017-12-04 | End: 2017-12-06 | Stop reason: HOSPADM

## 2017-12-04 RX ORDER — CEFAZOLIN SODIUM 2 G/50ML
2 SOLUTION INTRAVENOUS ONCE
Status: COMPLETED | OUTPATIENT
Start: 2017-12-04 | End: 2017-12-04

## 2017-12-04 RX ORDER — NALOXONE HYDROCHLORIDE 0.4 MG/ML
0.04 INJECTION, SOLUTION INTRAMUSCULAR; INTRAVENOUS; SUBCUTANEOUS AS NEEDED
Status: DISCONTINUED | OUTPATIENT
Start: 2017-12-04 | End: 2017-12-04 | Stop reason: HOSPADM

## 2017-12-04 RX ORDER — HYDROMORPHONE HYDROCHLORIDE 2 MG/ML
0.5 INJECTION, SOLUTION INTRAMUSCULAR; INTRAVENOUS; SUBCUTANEOUS
Status: DISCONTINUED | OUTPATIENT
Start: 2017-12-04 | End: 2017-12-04 | Stop reason: HOSPADM

## 2017-12-04 RX ORDER — SODIUM CHLORIDE 0.9 % (FLUSH) 0.9 %
5-10 SYRINGE (ML) INJECTION AS NEEDED
Status: DISCONTINUED | OUTPATIENT
Start: 2017-12-04 | End: 2017-12-06 | Stop reason: HOSPADM

## 2017-12-04 RX ORDER — ONDANSETRON 2 MG/ML
4 INJECTION INTRAMUSCULAR; INTRAVENOUS AS NEEDED
Status: DISCONTINUED | OUTPATIENT
Start: 2017-12-04 | End: 2017-12-05

## 2017-12-04 RX ORDER — VANCOMYCIN HYDROCHLORIDE 1 G/20ML
INJECTION, POWDER, LYOPHILIZED, FOR SOLUTION INTRAVENOUS AS NEEDED
Status: DISCONTINUED | OUTPATIENT
Start: 2017-12-04 | End: 2017-12-04 | Stop reason: HOSPADM

## 2017-12-04 RX ORDER — DIAZEPAM 5 MG/1
5 TABLET ORAL
Status: DISCONTINUED | OUTPATIENT
Start: 2017-12-04 | End: 2017-12-06 | Stop reason: HOSPADM

## 2017-12-04 RX ORDER — CEFAZOLIN SODIUM 2 G/50ML
2 SOLUTION INTRAVENOUS EVERY 8 HOURS
Status: DISCONTINUED | OUTPATIENT
Start: 2017-12-04 | End: 2017-12-06 | Stop reason: HOSPADM

## 2017-12-04 RX ORDER — SODIUM CHLORIDE, SODIUM LACTATE, POTASSIUM CHLORIDE, CALCIUM CHLORIDE 600; 310; 30; 20 MG/100ML; MG/100ML; MG/100ML; MG/100ML
75 INJECTION, SOLUTION INTRAVENOUS CONTINUOUS
Status: DISCONTINUED | OUTPATIENT
Start: 2017-12-05 | End: 2017-12-04 | Stop reason: HOSPADM

## 2017-12-04 RX ORDER — NALOXONE HYDROCHLORIDE 0.4 MG/ML
0.4 INJECTION, SOLUTION INTRAMUSCULAR; INTRAVENOUS; SUBCUTANEOUS AS NEEDED
Status: DISCONTINUED | OUTPATIENT
Start: 2017-12-04 | End: 2017-12-06 | Stop reason: HOSPADM

## 2017-12-04 RX ORDER — ONDANSETRON 2 MG/ML
4 INJECTION INTRAMUSCULAR; INTRAVENOUS ONCE
Status: COMPLETED | OUTPATIENT
Start: 2017-12-04 | End: 2017-12-04

## 2017-12-04 RX ORDER — DIPHENHYDRAMINE HYDROCHLORIDE 50 MG/ML
12.5 INJECTION, SOLUTION INTRAMUSCULAR; INTRAVENOUS
Status: DISCONTINUED | OUTPATIENT
Start: 2017-12-04 | End: 2017-12-04 | Stop reason: HOSPADM

## 2017-12-04 RX ORDER — DIPHENHYDRAMINE HYDROCHLORIDE 50 MG/ML
12.5 INJECTION, SOLUTION INTRAMUSCULAR; INTRAVENOUS
Status: DISCONTINUED | OUTPATIENT
Start: 2017-12-04 | End: 2017-12-06 | Stop reason: HOSPADM

## 2017-12-04 RX ORDER — SODIUM CHLORIDE 0.9 % (FLUSH) 0.9 %
5-10 SYRINGE (ML) INJECTION AS NEEDED
Status: DISCONTINUED | OUTPATIENT
Start: 2017-12-04 | End: 2017-12-04 | Stop reason: HOSPADM

## 2017-12-04 RX ORDER — LORAZEPAM 2 MG/ML
1 INJECTION INTRAMUSCULAR
Status: DISCONTINUED | OUTPATIENT
Start: 2017-12-04 | End: 2017-12-06 | Stop reason: HOSPADM

## 2017-12-04 RX ORDER — FENTANYL CITRATE 50 UG/ML
INJECTION, SOLUTION INTRAMUSCULAR; INTRAVENOUS AS NEEDED
Status: DISCONTINUED | OUTPATIENT
Start: 2017-12-04 | End: 2017-12-04 | Stop reason: HOSPADM

## 2017-12-04 RX ORDER — DEXAMETHASONE SODIUM PHOSPHATE 4 MG/ML
INJECTION, SOLUTION INTRA-ARTICULAR; INTRALESIONAL; INTRAMUSCULAR; INTRAVENOUS; SOFT TISSUE AS NEEDED
Status: DISCONTINUED | OUTPATIENT
Start: 2017-12-04 | End: 2017-12-04 | Stop reason: HOSPADM

## 2017-12-04 RX ORDER — ZOLPIDEM TARTRATE 5 MG/1
5 TABLET ORAL
Status: DISCONTINUED | OUTPATIENT
Start: 2017-12-04 | End: 2017-12-06 | Stop reason: HOSPADM

## 2017-12-04 RX ORDER — PREGABALIN 75 MG/1
75 CAPSULE ORAL
Status: COMPLETED | OUTPATIENT
Start: 2017-12-04 | End: 2017-12-04

## 2017-12-04 RX ORDER — OXYCODONE AND ACETAMINOPHEN 10; 325 MG/1; MG/1
1 TABLET ORAL
Status: DISCONTINUED | OUTPATIENT
Start: 2017-12-04 | End: 2017-12-06 | Stop reason: HOSPADM

## 2017-12-04 RX ORDER — SODIUM CHLORIDE 0.9 % (FLUSH) 0.9 %
5-10 SYRINGE (ML) INJECTION EVERY 8 HOURS
Status: DISCONTINUED | OUTPATIENT
Start: 2017-12-04 | End: 2017-12-06 | Stop reason: HOSPADM

## 2017-12-04 RX ORDER — NALOXONE HYDROCHLORIDE 0.4 MG/ML
0.1 INJECTION, SOLUTION INTRAMUSCULAR; INTRAVENOUS; SUBCUTANEOUS AS NEEDED
Status: DISCONTINUED | OUTPATIENT
Start: 2017-12-04 | End: 2017-12-06 | Stop reason: HOSPADM

## 2017-12-04 RX ORDER — MIDAZOLAM HYDROCHLORIDE 1 MG/ML
INJECTION, SOLUTION INTRAMUSCULAR; INTRAVENOUS AS NEEDED
Status: DISCONTINUED | OUTPATIENT
Start: 2017-12-04 | End: 2017-12-04 | Stop reason: HOSPADM

## 2017-12-04 RX ORDER — ONDANSETRON 2 MG/ML
4 INJECTION INTRAMUSCULAR; INTRAVENOUS
Status: DISCONTINUED | OUTPATIENT
Start: 2017-12-04 | End: 2017-12-06 | Stop reason: HOSPADM

## 2017-12-04 RX ORDER — DIPHENHYDRAMINE HYDROCHLORIDE 50 MG/ML
12.5 INJECTION, SOLUTION INTRAMUSCULAR; INTRAVENOUS
Status: DISCONTINUED | OUTPATIENT
Start: 2017-12-04 | End: 2017-12-04 | Stop reason: SDUPTHER

## 2017-12-04 RX ORDER — PROPOFOL 10 MG/ML
INJECTION, EMULSION INTRAVENOUS AS NEEDED
Status: DISCONTINUED | OUTPATIENT
Start: 2017-12-04 | End: 2017-12-04 | Stop reason: HOSPADM

## 2017-12-04 RX ORDER — LIDOCAINE HYDROCHLORIDE 20 MG/ML
INJECTION, SOLUTION EPIDURAL; INFILTRATION; INTRACAUDAL; PERINEURAL AS NEEDED
Status: DISCONTINUED | OUTPATIENT
Start: 2017-12-04 | End: 2017-12-04 | Stop reason: HOSPADM

## 2017-12-04 RX ORDER — SUCCINYLCHOLINE CHLORIDE 20 MG/ML
INJECTION INTRAMUSCULAR; INTRAVENOUS AS NEEDED
Status: DISCONTINUED | OUTPATIENT
Start: 2017-12-04 | End: 2017-12-04 | Stop reason: HOSPADM

## 2017-12-04 RX ORDER — SODIUM CHLORIDE 0.9 % (FLUSH) 0.9 %
5-10 SYRINGE (ML) INJECTION EVERY 8 HOURS
Status: DISCONTINUED | OUTPATIENT
Start: 2017-12-04 | End: 2017-12-04 | Stop reason: HOSPADM

## 2017-12-04 RX ORDER — FAMOTIDINE 20 MG/1
20 TABLET, FILM COATED ORAL ONCE
Status: COMPLETED | OUTPATIENT
Start: 2017-12-04 | End: 2017-12-04

## 2017-12-04 RX ORDER — EPHEDRINE SULFATE/0.9% NACL/PF 25 MG/5 ML
SYRINGE (ML) INTRAVENOUS AS NEEDED
Status: DISCONTINUED | OUTPATIENT
Start: 2017-12-04 | End: 2017-12-04 | Stop reason: HOSPADM

## 2017-12-04 RX ORDER — TOPIRAMATE 25 MG/1
75 TABLET ORAL
Status: DISCONTINUED | OUTPATIENT
Start: 2017-12-04 | End: 2017-12-06 | Stop reason: HOSPADM

## 2017-12-04 RX ORDER — SODIUM CHLORIDE, SODIUM LACTATE, POTASSIUM CHLORIDE, CALCIUM CHLORIDE 600; 310; 30; 20 MG/100ML; MG/100ML; MG/100ML; MG/100ML
50 INJECTION, SOLUTION INTRAVENOUS CONTINUOUS
Status: DISCONTINUED | OUTPATIENT
Start: 2017-12-04 | End: 2017-12-04 | Stop reason: HOSPADM

## 2017-12-04 RX ADMIN — SODIUM CHLORIDE, SODIUM LACTATE, POTASSIUM CHLORIDE, AND CALCIUM CHLORIDE 75 ML/HR: 600; 310; 30; 20 INJECTION, SOLUTION INTRAVENOUS at 06:41

## 2017-12-04 RX ADMIN — CELECOXIB 400 MG: 400 CAPSULE ORAL at 06:40

## 2017-12-04 RX ADMIN — Medication 10 ML: at 21:53

## 2017-12-04 RX ADMIN — PREGABALIN 75 MG: 75 CAPSULE ORAL at 06:41

## 2017-12-04 RX ADMIN — CEFAZOLIN SODIUM 2 G: 2 SOLUTION INTRAVENOUS at 07:28

## 2017-12-04 RX ADMIN — SUCCINYLCHOLINE CHLORIDE 140 MG: 20 INJECTION INTRAMUSCULAR; INTRAVENOUS at 07:33

## 2017-12-04 RX ADMIN — SODIUM CHLORIDE, SODIUM LACTATE, POTASSIUM CHLORIDE, AND CALCIUM CHLORIDE: 600; 310; 30; 20 INJECTION, SOLUTION INTRAVENOUS at 07:28

## 2017-12-04 RX ADMIN — HYDROMORPHONE HYDROCHLORIDE 1 MG: 1 INJECTION, SOLUTION INTRAMUSCULAR; INTRAVENOUS; SUBCUTANEOUS at 08:06

## 2017-12-04 RX ADMIN — Medication 20 MG: at 07:56

## 2017-12-04 RX ADMIN — PROPOFOL 160 MG: 10 INJECTION, EMULSION INTRAVENOUS at 07:33

## 2017-12-04 RX ADMIN — Medication: at 11:42

## 2017-12-04 RX ADMIN — Medication 10 ML: at 06:30

## 2017-12-04 RX ADMIN — Medication 20 MG: at 10:32

## 2017-12-04 RX ADMIN — SODIUM CHLORIDE 100 ML/HR: 900 INJECTION, SOLUTION INTRAVENOUS at 16:31

## 2017-12-04 RX ADMIN — FENTANYL CITRATE 100 MCG: 50 INJECTION, SOLUTION INTRAMUSCULAR; INTRAVENOUS at 11:12

## 2017-12-04 RX ADMIN — FENTANYL CITRATE 100 MCG: 50 INJECTION, SOLUTION INTRAMUSCULAR; INTRAVENOUS at 07:33

## 2017-12-04 RX ADMIN — ONDANSETRON 4 MG: 2 INJECTION INTRAMUSCULAR; INTRAVENOUS at 07:28

## 2017-12-04 RX ADMIN — CEFAZOLIN SODIUM 2 G: 2 SOLUTION INTRAVENOUS at 21:49

## 2017-12-04 RX ADMIN — HYDROMORPHONE HYDROCHLORIDE 0.5 MG: 2 INJECTION, SOLUTION INTRAMUSCULAR; INTRAVENOUS; SUBCUTANEOUS at 12:02

## 2017-12-04 RX ADMIN — ONDANSETRON 4 MG: 2 INJECTION INTRAMUSCULAR; INTRAVENOUS at 11:52

## 2017-12-04 RX ADMIN — HYDROMORPHONE HYDROCHLORIDE 0.5 MG: 2 INJECTION, SOLUTION INTRAMUSCULAR; INTRAVENOUS; SUBCUTANEOUS at 11:47

## 2017-12-04 RX ADMIN — Medication 20 MG: at 10:12

## 2017-12-04 RX ADMIN — Medication 10 ML: at 16:32

## 2017-12-04 RX ADMIN — SODIUM CHLORIDE, SODIUM LACTATE, POTASSIUM CHLORIDE, AND CALCIUM CHLORIDE 50 ML/HR: 600; 310; 30; 20 INJECTION, SOLUTION INTRAVENOUS at 12:13

## 2017-12-04 RX ADMIN — LIDOCAINE HYDROCHLORIDE 80 MG: 20 INJECTION, SOLUTION EPIDURAL; INFILTRATION; INTRACAUDAL; PERINEURAL at 07:33

## 2017-12-04 RX ADMIN — CEFAZOLIN SODIUM 2 G: 2 SOLUTION INTRAVENOUS at 16:32

## 2017-12-04 RX ADMIN — DEXAMETHASONE SODIUM PHOSPHATE 8 MG: 4 INJECTION, SOLUTION INTRA-ARTICULAR; INTRALESIONAL; INTRAMUSCULAR; INTRAVENOUS; SOFT TISSUE at 07:38

## 2017-12-04 RX ADMIN — HYDROMORPHONE HYDROCHLORIDE 0.5 MG: 2 INJECTION, SOLUTION INTRAMUSCULAR; INTRAVENOUS; SUBCUTANEOUS at 12:17

## 2017-12-04 RX ADMIN — SODIUM CHLORIDE, SODIUM LACTATE, POTASSIUM CHLORIDE, AND CALCIUM CHLORIDE: 600; 310; 30; 20 INJECTION, SOLUTION INTRAVENOUS at 10:00

## 2017-12-04 RX ADMIN — TOPIRAMATE 75 MG: 25 TABLET, FILM COATED ORAL at 21:49

## 2017-12-04 RX ADMIN — MIDAZOLAM HYDROCHLORIDE 2 MG: 1 INJECTION, SOLUTION INTRAMUSCULAR; INTRAVENOUS at 07:28

## 2017-12-04 RX ADMIN — GLYCOPYRROLATE 0.2 MG: 0.2 INJECTION INTRAMUSCULAR; INTRAVENOUS at 07:28

## 2017-12-04 RX ADMIN — FAMOTIDINE 20 MG: 20 TABLET, FILM COATED ORAL at 06:41

## 2017-12-04 NOTE — PROGRESS NOTES
Problem: Mobility Impaired (Adult and Pediatric)  Goal: *Acute Goals and Plan of Care (Insert Text)  STG's to be addressed within 3 days:  1. Bed mobility:  Supine to sit to supine S with HR for meals. 2. Activity tolerance: Tolerate up in chair 1-2 hrs for ADLs. 3. Transfers:  Sit to stand to chair S with LRAD for ADL's. LTG's to be addressed within 7 days:  1. Standing/Ambulation Balance:  Increase to Good with LRAD for safe transfers and gait. 2. Ambulation:  Ambulate > 200 ft. S with LRAD for home mobility. 3. Patient Education:  Independent with HEP for home safety. 4. Stairs:  Up/Down 4 steps CGA with HR for home entry. Outcome: Progressing Towards Goal  physical Therapy EVALUATION    Patient: Barbie Winslow (78 y.o. male)  Date: 12/4/2017  Primary Diagnosis: Spinal stenosis of lumbar region, unspecified whether neurogenic claudication present [M48.061]  Spondylolysis, site unspecified [M43.00]  Procedure(s) (LRB):  REDO L4/5  LAMINECTOMY/ REDO L4/5 LAMINECTOMY FUSION WITH INSTRUMENTATION (N/A) Day of Surgery   Precautions: Fall, Back    ASSESSMENT :  Based on the objective data described below, the patient presents with impaired functional mobility including bed mobility, transfers, ambulation, and general activity tolerance s/p L4-S1 laminectomy fusion. Patient presents today drowsy but agreeable to PT evaluation. He was educated on back precautions with only minimal cuing needed, transferred to sitting EOB with Tamiko using log roll technique. Upon sitting, patient reports increased dizziness and nausea, provided with emesis bag. Following prolonged seated rest break, patient transferred to standing using Tamiko and RW. He again required rest break prior to ambulation d/t dizziness. Patient ambulated ~15 ft with CGA and RW, reported continued dizziness and returned to supine using log roll technique. Patient left with ice pack in place, call bell in reach, and nurse notified.     Patient will benefit from skilled intervention to address the above impairments. Patients rehabilitation potential is considered to be Good  Factors which may influence rehabilitation potential include:   []         None noted  []         Mental ability/status  [x]         Medical condition  []         Home/family situation and support systems  []         Safety awareness  [x]         Pain tolerance/management  []         Other:      PLAN :  Recommendations and Planned Interventions:  [x]           Bed Mobility Training             [x]    Neuromuscular Re-Education  [x]           Transfer Training                   []    Orthotic/Prosthetic Training  [x]           Gait Training                          []    Modalities  [x]           Therapeutic Exercises          []    Edema Management/Control  [x]           Therapeutic Activities            [x]    Patient and Family Training/Education  []           Other (comment):    Frequency/Duration: Patient will be followed by physical therapy 1-2 times per day to address goals. Discharge Recommendations: Home Health  Further Equipment Recommendations for Discharge: rolling walker     SUBJECTIVE:   Patient stated I'm feeling nauseous.     OBJECTIVE DATA SUMMARY:     Past Medical History:   Diagnosis Date    Back pain     Chronic pain     Headache(784.0)     HTN (hypertension)     Joint pain     hx of joint pain     Past Surgical History:   Procedure Laterality Date    HX LUMBAR LAMINECTOMY  2002    L4/L5    HX OTHER SURGICAL  1978    \"open lung bx\"    NEUROLOGICAL PROCEDURE UNLISTED  2003    pt spinal fracture T12 and L3     Barriers to Learning/Limitations: None  Compensate with: N/A  Prior Level of Function/Home Situation: Patient resides on first floor of 2 story home with his wife, was independent with all functional mobility PTA and was walking 5 miles/day prior to surgery.   Home Situation  Home Environment: Private residence  # Steps to Enter: 4  Rails to Enter: Yes  [employer] : Bilateral  One/Two Story Residence: Two story, live on 1st floor  Living Alone: No  Support Systems: Spouse/Significant Other/Partner  Patient Expects to be Discharged to[de-identified] Private residence  Current DME Used/Available at Home: jose elias Aldana  Critical Behavior:  Neurologic State: Drowsy; Alert  Psychosocial  Patient Behaviors: Calm; Cooperative  Strength:    Strength: Generally decreased, functional (BLE)  Tone & Sensation:   Tone: Normal  Sensation: Intact (BLE)   Range Of Motion:  AROM: Within functional limits (BLE)  Functional Mobility:  Bed Mobility:  Rolling: Contact guard assistance  Supine to Sit: Minimum assistance  Sit to Supine: Contact guard assistance  Scooting: Contact guard assistance  Transfers:  Sit to Stand: Minimum assistance  Stand to Sit: Minimum assistance  Balance:   Sitting: Intact  Standing: Impaired; With support (RW)  Standing - Static: Good  Standing - Dynamic : Fair  Ambulation/Gait Training:  Distance (ft): 15 Feet (ft)  Assistive Device: Walker, rolling  Ambulation - Level of Assistance: Contact guard assistance  Base of Support: Narrowed  Speed/Katie: Pace decreased (<100 feet/min)  Pain:  Pain Scale 1: Numeric (0 - 10)  Pain Intensity 1: 4  Pain Location 1: Back; Incisional  Pain Orientation 1: Lower  Pain Description 1: Sore  Pain Intervention(s) 1: Medication (see MAR)  Activity Tolerance:   Fair  Please refer to the flowsheet for vital signs taken during this treatment. After treatment:   [] Patient left in no apparent distress sitting up in chair  [] Patient left sitting on EOB  [x] Patient left in no apparent distress in bed  [] Patient declined to be OOB at this time due to  [x] Call bell left within reach  [x] Nursing notified(Fe)  [] Caregiver present  [] Bed alarm activated    COMMUNICATION/EDUCATION:   [x]         Fall prevention education was provided and the patient/caregiver indicated understanding.   [x]         Patient/family have participated as able in goal setting and plan of care. [x]         Patient/family agree to work toward stated goals and plan of care. []         Patient understands intent and goals of therapy, but is neutral about his/her participation. []         Patient is unable to participate in goal setting and plan of care. Thank you for this referral.  Richard Nealeladio   Time Calculation: 29 mins      Mobility  Current  CJ= 20-39%   Goal  CI= 1-19%. The severity rating is based on the Level of Assistance required for Functional Mobility and ADLs.     Eval Complexity: History: MEDIUM  Complexity : 1-2 comorbidities / personal factors will impact the outcome/ POC Exam:MEDIUM Complexity : 3 Standardized tests and measures addressing body structure, function, activity limitation and / or participation in recreation  Presentation: MEDIUM Complexity : Evolving with changing characteristics Overall Complexity:MEDIUM

## 2017-12-04 NOTE — PROGRESS NOTES
Date of Surgery: OR today  Surgery: 1. Redo laminectomy L4-L5. 2. Redo laminectomy left L5-S1.  3. Diskectomy on left L4-L5. 4. Posterolateral fusion, segmental spinal instrumentation, NuVasive  type L4-5. VSS  Wound: Dressing: intact. There is a small amount of bloody drainage to the bottom dressing. Nursing instructed to outline this and monitor. SANDRA in place. Ice to back. SANDRA:to suction, scant output  PCA: Dilaudid 30 mg/ 30 mL. Nurse loading dose 0.1 mg. Patient bolus dose 0.1 mg. Lockout 6 mins, one hour dose limit 1.5 mg. Swain: draining clear, yellow urine. PT: in to see patient now. Swallowing ok. Neuro intact. Moving all extremities. 4/5 strength to BLE. Pre op pain: back pain, L>R leg pain/ numbness/ weakness. Post op pain: patient states his pain is controlled at the time. He can not tell if the leg pain/numbness has improved at this time.      Jesus Montero NP

## 2017-12-04 NOTE — PROGRESS NOTES
conducted a pre-surgery visit with Mejia Toledo, who is a 74 y. o.,male. The  provided the following Interventions:  Initiated a relationship of care and support. Plan:  Chaplains will continue to follow and will provide pastoral care on an as needed/requested basis.  recommends bedside caregivers page  on duty if patient shows signs of acute spiritual or emotional distress.     [address] [facility] Certified [facility]   [phone]

## 2017-12-04 NOTE — PROGRESS NOTES
Patient arrives in room 547 alert awake and oriented, CMS+ area to back noted slight drainage and dry no complain. Family members at bed side.  Ice pack given and ics education given and verbalized understanding

## 2017-12-04 NOTE — ANESTHESIA POSTPROCEDURE EVALUATION
Post-Anesthesia Evaluation and Assessment    Patient: Sridhar Reyna MRN: [id number]  SSN: [id number]    YOB: 1950  Age: 74 y.o. Sex: male     VS from flow sheet    Cardiovascular Function/Vital Signs  Visit Vitals    /77    Pulse 84    Temp 36.3 °C (97.4 °F)    Resp 14    Ht 5' 11\" (1.803 m)    Wt 67.2 kg (148 lb 4 oz)    SpO2 99%    BMI 20.68 kg/m2       Patient is status post general anesthesia for Procedure(s):  REDO L4/5  LAMINECTOMY/ REDO L4/5 LAMINECTOMY FUSION WITH INSTRUMENTATION. Nausea/Vomiting: None    Postoperative hydration reviewed and adequate. Pain:  Pain Scale 1: Numeric (0 - 10) (12/04/17 1247)  Pain Intensity 1: 4 (12/04/17 1247)   Managed    Neurological Status:   Neuro (WDL): Within Defined Limits (12/04/17 1247)  Neuro  Neurologic State: Drowsy (12/04/17 1200)  LUE Motor Response: Moderate (12/04/17 1247)  LLE Motor Response: Moderate (12/04/17 1247)  RUE Motor Response: Moderate (12/04/17 1247)  RLE Motor Response: Moderate (12/04/17 1247)   At baseline    Mental Status and Level of Consciousness: Arousable    Pulmonary Status:   O2 Device: Nasal cannula (12/04/17 1134)   Adequate oxygenation and airway patent    Complications related to anesthesia: None    Post-anesthesia assessment completed.  No concerns    Signed By: Jamie Noble MD     December 4, 2017

## 2017-12-04 NOTE — ANESTHESIA PREPROCEDURE EVALUATION
Anesthetic History   No history of anesthetic complications            Review of Systems / Medical History  Patient summary reviewed and pertinent labs reviewed    Pulmonary  Within defined limits                 Neuro/Psych         Headaches     Cardiovascular    Hypertension                   GI/Hepatic/Renal                Endo/Other        Arthritis     Other Findings   Comments:   Risk Factors for Postoperative nausea/vomiting:       History of postoperative nausea/vomiting? NO       Female? NO       Motion sickness? NO       Intended opioid administration for postoperative analgesia? YES      Smoking Abstinence  Current Smoker? NO  Elective Surgery? YES  Seen preoperatively by anesthesiologist or proxy prior to day of surgery? YES  Pt abstained from smoking 24 hours prior to anesthesia?  N/A           Physical Exam    Airway  Mallampati: III  TM Distance: 4 - 6 cm  Neck ROM: normal range of motion   Mouth opening: Normal     Cardiovascular    Rhythm: irregular  Rate: normal         Dental    Dentition: Poor dentition     Pulmonary  Breath sounds clear to auscultation               Abdominal  GI exam deferred       Other Findings            Anesthetic Plan    ASA: 2  Anesthesia type: general          Induction: Intravenous  Anesthetic plan and risks discussed with: Patient

## 2017-12-04 NOTE — PROGRESS NOTES
Di Many attended the Spine Surgery Pre-Operative class on 11/16/17. Topics discussed included surgery preparation, what to expect the day of surgery, medications, physical and occupational therapy, and discharge planning. It was discussed that this is considered an elective surgery and that prior to the surgery they need to make decisions such as arranging for help at home once they are discharged. He was given a Spine Patient education notebook to take home. Opportunity was given to ask questions and the phone number of the Orthopaedic Nurse Clinician was given for any questions or concerns that may arise later.

## 2017-12-04 NOTE — PERIOP NOTES
TRANSFER - OUT REPORT:    Verbal report given to Angelika Caceres RN(name) on Luwanna Aschoff  being transferred to [address] [place] [address](unit) for routine post - op       Report consisted of patients Situation, Background, Assessment and   Recommendations(SBAR). Information from the following report(s) SBAR, Kardex, OR Summary, Procedure Summary, Intake/Output, MAR, Recent Results and Med Rec Status was reviewed with the receiving nurse. Lines:   Peripheral IV 12/04/17 Left Arm (Active)   Site Assessment Clean, dry, & intact 12/4/2017 11:27 AM   Phlebitis Assessment 0 12/4/2017 11:27 AM   Infiltration Assessment 0 12/4/2017 11:27 AM   Dressing Status Clean, dry, & intact 12/4/2017 11:27 AM   Dressing Type Tape;Transparent 12/4/2017 11:27 AM   Hub Color/Line Status Pink; Infusing 12/4/2017 11:27 AM        Opportunity for questions and clarification was provided.       Patient transported with:   O2 @ 2 liters  Tech

## 2017-12-04 NOTE — ROUTINE PROCESS
Bedside and Verbal shift change report given to Louie Gutierrez RN (oncoming nurse) by Mariah Lynn RN (offgoing nurse). Report included the following information SBAR, Kardex, Intake/Output and MAR.

## 2017-12-04 NOTE — IP AVS SNAPSHOT
[address] [id number] [facility] [address] [facility] Patient: Liliane Ahumada MRN: [id number] About your hospitalization You were admitted on:  December 4, 2017 You last received care in the:  [facility] [address] [facility] You were discharged on:  December 6, 2017 Why you were hospitalized Your primary diagnosis was:  Not on File Your diagnoses also included:  Spondylolisthesis Of Lumbar Region Things You Need To Do (next 8 weeks) Friday Dec 08, 2017 Follow up with INAN Wright December 8, 2017 @ 1:40 pm with Barrera KEITH. Phone:  [phone] Where:  Talya Nair [address], [address] [facility] [address] [facility], [id number] [place] [address] Tuesday Dec 12, 2017 POST OP with Kale Mitchell NP at  9:30 AM  
Where:  4 [facility], [address] [facility] ([facility]) Tuesday Jan 16, 2018 POST OP with Pa Perea MD at  8:00 AM  
Where:  4 [facility], [address] [facility] ([facility]) Discharge Orders None A check shalini indicates which time of day the medication should be taken. My Medications STOP taking these medications   
 naproxen 500 mg tablet Commonly known as:  NAPROSYN  
   
  
  
TAKE these medications as instructed Instructions Each Dose to Equal  
 Morning Noon Evening Bedtime  
 cephALEXin 500 mg capsule Commonly known as:  Melissa Rummer Your last dose was: Your next dose is: Take 1 Cap by mouth four (4) times daily. 500 mg  
    
   
   
   
  
 clobetasol 0.05 % topical cream  
Commonly known as:  Luetta Slimmer Your last dose was: Your next dose is:    
   
   
 Use 1 Application to affected area Twice Daily. desonide 0.05 % topical lotion Commonly known as:  Adolfo Cordia Your last dose was: Your next dose is: Use 1 Application to affected area Twice Daily. doxycycline 100 mg capsule Commonly known as:  Romulo Cifuentes Your last dose was: Your next dose is: Take 100 mg by mouth daily. 100 mg  
    
   
   
   
  
 lisinopril-hydroCHLOROthiazide 20-12.5 mg per tablet Commonly known as:  Raul Nashville Your last dose was: Your next dose is: TAKE ONE TABLET BY MOUTH ONCE A DAY  
     
   
   
   
  
 oxyCODONE-acetaminophen  mg per tablet Commonly known as:  PERCOCET 10 Your last dose was: Your next dose is: Take 1 Tab by mouth every six (6) hours as needed. Max Daily Amount: 4 Tabs. 1 Tab  
    
   
   
   
  
 topiramate 25 mg tablet Commonly known as:  TOPAMAX Your last dose was: Your next dose is: Take 3 Tabs by mouth nightly. Indications: neuritis 75 mg  
    
   
   
   
  
 VITAMIN D3 1,000 unit tablet Generic drug:  cholecalciferol Your last dose was: Your next dose is: Take 1,000 Units by mouth daily. 1000 Units  
    
   
   
   
  
 zolpidem 5 mg tablet Commonly known as:  AMBIEN Your last dose was: Your next dose is: Take 5 mg by mouth nightly as needed. 5 mg Where to Get Your Medications These medications were sent to [address], [address] [place] [address] Phone:  [phone]  
  cephALEXin 500 mg capsule Information on where to get these meds will be given to you by the nurse or doctor. ! Ask your nurse or doctor about these medications  
  oxyCODONE-acetaminophen  mg per tablet Discharge Instructions Post-Operative Discharge Instructions after Spine Surgery [facility] 
[phone] At your 2-week post-operative visit we will remove any skin staples or sutures, if present. (Appointment was made at the time you scheduled your surgery) Call office or physician on-call for any of the following: · Fever greater than 100.5 · Uncontrollable chills · Drainage from incision · Pain not controlled by pain medication · New pain · New weakness or progressive weakness · Food sticking in throat or excessive coughing when swallowing Stop all anti-inflammatories (arthritis medication) such as Ibuprofen, Motrin, Aleve, Voltaren, Relafen, Naproxen, Arthrotec, etc. For 12 weeks ONLY if you had a neck or back Fusion. You may take Tylenol or acetaminophen over the counter. May remove RAFY stockings when discharged from the hospital. 
 
May shower on the third day after surgery. Leave dressing on while you shower; the dressing is waterproof as long as the edges are sealed. Change dressing after 1 week, or sooner if saturated with drainage. Replace with a gauze dressing. Abstain from driving until your first post-operative visit. If you must drive, do not take pain medications that may alter your abilities. Lumbar braces and neck collars are for comfort only. Neck patients feel better sleeping in a recliner or \"propped up\" position for a few days after surgery. This helps reduce the swelling. It is normal for neck patients to have some difficulty swallowing the first few days. Use a straw for all liquids. Cut up solid foods smaller than normal until you are swallowing without difficulty. Walking is the best therapy after back surgery. Avoid extremes of bending, twisting, or lifting. All post-operative surgical patients should function within the range of the pain. \"If it hurts - do not do it. \" 
 
 
Patient armband removed and shredded DISCHARGE SUMMARY from Nurse PATIENT INSTRUCTIONS: 
 
 
F-face looks uneven A-arms unable to move or move unevenly S-speech slurred or non-existent T-time-call 911 as soon as signs and symptoms begin-DO NOT go Back to bed or wait to see if you get better-TIME IS BRAIN. Warning Signs of HEART ATTACK Call 911 if you have these symptoms: 
? Chest discomfort. Most heart attacks involve discomfort in the center of the chest that lasts more than a few minutes, or that goes away and comes back. It can feel like uncomfortable pressure, squeezing, fullness, or pain. ? Discomfort in other areas of the upper body. Symptoms can include pain or discomfort in one or both arms, the back, neck, jaw, or stomach. ? Shortness of breath with or without chest discomfort. ? Other signs may include breaking out in a cold sweat, nausea, or lightheadedness. Don't wait more than five minutes to call [address]! Fast action can save your life. Calling 911 is almost always the fastest way to get lifesaving treatment. Emergency Medical Services staff can begin treatment when they arrive  up to an hour sooner than if someone gets to the hospital by car. The discharge information has been reviewed with the patient. The patient verbalized understanding. Discharge medications reviewed with the patient and appropriate educational materials and side effects teaching were provided. Oxycodone/Acetaminophen (By mouth) Acetaminophen (s-fjhh-d-MIN-oh-fen), Oxycodone Hydrochloride (wf-u-LPE-done lexi-droe-KLOR-lelo) Treats moderate to moderately severe pain. This medicine is a narcotic pain reliever.   
Brand Name(s): Endocet, Percocet, Primlev, Xartemis XR  
 There may be other brand names for this medicine. When This Medicine Should Not Be Used: This medicine is not right for everyone. Do not use it if you had an allergic reaction to acetaminophen or oxycodone, or if you have serious breathing problems or paralytic ileus. How to Use This Medicine:  
Capsule, Liquid, Tablet, Long Acting Tablet · Your doctor will tell you how much medicine to use. Do not use more than directed. · An overdose can be dangerous. Follow directions carefully so you do not get too much medicine at one time. · Oral liquid: Measure the oral liquid medicine with a marked measuring spoon, oral syringe, or medicine cup. · Swallow the extended-release tablet whole. Do not crush, break, or chew it. Do not lick or wet the tablet before placing it in your mouth. Do not give this medicine through a feeding tube. · This medicine should come with a Medication Guide. Ask your pharmacist for a copy if you do not have one. · Missed dose: If you miss a dose of this medicine, skip the missed dose and go back to your regular dosing schedule. Do not double doses. · Store the medicine in a closed container at room temperature, away from heat, moisture, and direct light. Ask your pharmacist about the best way to dispose of medicine you do not use. Drugs and Foods to Avoid: Ask your doctor or pharmacist before using any other medicine, including over-the-counter medicines, vitamins, and herbal products. · Do not use Xartemis XR if you are using or have used an MAO inhibitor in the past 14 days. · Some medicines can affect how this medicine works. Tell your doctor if you are using any of the following: ¨ Carbamazepine, erythromycin, ketoconazole, lamotrigine, mirtazapine, naltrexone, phenytoin, propranolol, rifampin, ritonavir, tramadol, trazodone, or zidovudine ¨ Birth control pills ¨ Diuretic (water pill) ¨ Medicine to treat depression ¨ Phenothiazine medicine ¨ Triptan medicine to treat migraine headaches · Do not drink alcohol while you are using this medicine. Acetaminophen can damage your liver, and alcohol can increase this risk. Do not take acetaminophen without asking your doctor if you have 3 or more drinks of alcohol every day. · Tell your doctor if you use anything else that makes you sleepy. Some examples are allergy medicine, narcotic pain medicine, and alcohol. Tell your doctor if you are using buprenorphine, butorphanol, nalbuphine, pentazocine, a benzodiazepine, or a muscle relaxer. Warnings While Using This Medicine: · Tell your doctor if you are pregnant or breastfeeding, or if you have kidney disease, liver disease, heart disease, low blood pressure, breathing problems or lung disease (such as asthma, COPD), thyroid problems, [place] disease, pancreas or gallbladder problems, prostate problems, trouble urinating, or a stomach problems, or a history of head injury or brain damage, seizures, or alcohol or drug abuse. Tell your doctor if you are allergic to codeine. · This medicine may cause the following problems: 
¨ High risk of overdose, which can lead to death ¨ Respiratory depression (serious breathing problem that can be life-threatening) ¨ Liver problems ¨ Serious skin reactions ¨ Serotonin syndrome (when used with certain medicines) · This medicine may make you dizzy or drowsy. Do not drive or do anything that could be dangerous until you know how this medicine affects you. Sit or lie down if you feel dizzy. Stand up carefully. · This medicine contains acetaminophen. Read the labels of all other medicines you are using to see if they also contain acetaminophen, or ask your doctor or pharmacist. Moon Hollingsworth not use more than 4 grams (4,000 milligrams) total of acetaminophen in one day. · This medicine can be habit-forming. Do not use more than your prescribed dose. Call your doctor if you think your medicine is not working. · Do not stop using this medicine suddenly. Your doctor will need to slowly decrease your dose before you stop it completely. · This medicine could cause infertility. Talk with your doctor before using this medicine if you plan to have children. · This medicine may cause constipation, especially with long-term use. Ask your doctor if you should use a laxative to prevent and treat constipation. · Keep all medicine out of the reach of children. Never share your medicine with anyone. Possible Side Effects While Using This Medicine:  
Call your doctor right away if you notice any of these side effects: · Allergic reaction: Itching or hives, swelling in your face or hands, swelling or tingling in your mouth or throat, chest tightness, trouble breathing · Anxiety, restlessness, fast heartbeat, fever, muscle spasms, twitching, diarrhea, seeing or hearing things that are not there · Blistering, peeling, red skin rash · Blue lips, fingernails, or skin · Dark urine or pale stools, loss of appetite, stomach pain, yellow skin or eyes · Extreme weakness, shallow breathing, uneven heartbeat, seizures, sweating, or cold or clammy skin · Severe confusion, lightheadedness, dizziness, or fainting · Severe constipation, nausea, or vomiting · Trouble breathing or slow breathing If you notice these less serious side effects, talk with your doctor:  
· Headache · Mild constipation, nausea, or vomiting · Mild sleepiness or drowsiness If you notice other side effects that you think are caused by this medicine, tell your doctor. Call your doctor for medical advice about side effects. You may report side effects to FDA at 0-963-KJX-0604 © 2017 [facility] Information is for End User's use only and may not be sold, redistributed or otherwise used for commercial purposes. The above information is an  only.  It is not intended as medical advice for individual conditions or treatments. Talk to your doctor, nurse or pharmacist before following any medical regimen to see if it is safe and effective for you. Jobr Announcement We are excited to announce that we are making your provider's discharge notes available to you in Jobr. You will see these notes when they are completed and signed by the physician that discharged you from your recent hospital stay. If you have any questions or concerns about any information you see in Jobr, please call the Health Information Department where you were seen or reach out to your Primary Care Provider for more information about your plan of care. Introducing hospitals & HEALTH SERVICES! Dear Nitin Thibodeaux: Thank you for requesting a Jobr account. Our records indicate that you already have an active Jobr account. You can access your account anytime at [email or web address] Did you know that you can access your hospital and ER discharge instructions at any time in Jobr? You can also review all of your test results from your hospital stay or ER visit. Additional Information If you have questions, please visit the Frequently Asked Questions section of the Jobr website at [email or web address] Remember, Jobr is NOT to be used for urgent needs. For medical emergencies, dial 911. Now available from your iPhone and Android! Unresulted Labs-Please follow up with your PCP about these lab tests Order Current Status [place] XR TECHNOLOGIST SERVICE In process Providers Seen During Your Hospitalization Provider Specialty Primary office phone Carin Osorio MD Orthopedic Surgery [phone] Your Primary Care Physician (PCP) Primary Care Physician Office Phone Office Fax  
 [address], [address] [phone] You are allergic to the following Allergen Reactions Bee Sting  (Sting, Bee) Swelling DIZZINESS Recent Documentation Height Weight BMI Smoking Status 1.803 m 67.2 kg 20.68 kg/m2 Former Smoker Emergency Contacts Name Discharge Info Relation Home Work Mobile [facility] DISCHARGE CAREGIVER [3] Spouse [3] [phone] Patient Belongings The following personal items are in your possession at time of discharge: 
  Dental Appliances: None  Visual Aid: Glasses      Home Medications: None   Jewelry: Watch  Clothing: Pants, Robe    Other Valuables: Cell Phone (tablet) Discharge Instructions Attachments/References CEPHALEXIN (BY MOUTH) (ENGLISH) OXYCODONE/ACETAMINOPHEN (BY MOUTH) (ENGLISH) Patient Handouts Cephalexin (By mouth) Cephalexin (vsh-t-GMM-in) Treats infections. This medicine is a cephalosporin antibiotic. Brand Name(s): Crow Altamirano There may be other brand names for this medicine. When This Medicine Should Not Be Used: This medicine is not right for everyone. Do not use this medicine if you had an allergic reaction to cephalexin or another cephalosporin medicine. How to Use This Medicine:  
Capsule, Tablet, Tablet for Suspension, Liquid · Your doctor will tell you how much medicine to use. Do not use more than directed. · Read and follow the patient instructions that come with this medicine. Talk to your doctor or pharmacist if you have any questions. · You may take your medicine with food or milk to avoid stomach upset. · Oral liquid: Shake well just before use. Measure the oral liquid medicine with a marked measuring spoon, oral syringe, or medicine cup. · Take all of the medicine in your prescription to clear up your infection, even if you feel better after the first few doses. · Missed dose: Take a dose as soon as you remember. If it is almost time for your next dose, wait until then and take a regular dose. Do not take extra medicine to make up for a missed dose. · Capsule or tablet: Store at room temperature away from heat, moisture, and direct light. · Oral liquid: Store in the refrigerator for 14 days. After 14 days, throw away any unused medicine. Do not freeze. Drugs and Foods to Avoid: Ask your doctor or pharmacist before using any other medicine, including over-the-counter medicines, vitamins, and herbal products. · Some medicines and foods can affect how cephalexin works. Tell your doctor if you are also using ¨ Metformin ¨ Probenecid Warnings While Using This Medicine: · Tell your doctor if you are pregnant or breastfeeding, or if you have kidney disease, liver disease, or a history of digestive problems, such as colitis. Tell your doctor if you are allergic to penicillin. · This medicine can cause diarrhea. Call your doctor if the diarrhea becomes severe, does not stop, or is bloody. Do not take any medicine to stop diarrhea until you have talked to your doctor. Diarrhea can occur 2 months or more after you stop taking this medicine. · Tell any doctor or dentist who treats you that you are using this medicine. This medicine may affect certain medical test results. · Call your doctor if your symptoms do not improve or if they get worse. · Keep all medicine out of the reach of children. Never share your medicine with anyone. Possible Side Effects While Using This Medicine:  
Call your doctor right away if you notice any of these side effects: · Allergic reaction: Itching or hives, swelling in your face or hands, swelling or tingling in your mouth or throat, chest tightness, trouble breathing · Blistering, peeling, red skin rash · Severe diarrhea, especially if bloody or ongoing · Severe stomach pain, vomiting If you notice these less serious side effects, talk with your doctor: · Mild diarrhea or nausea If you notice other side effects that you think are caused by this medicine, tell your doctor. Call your doctor for medical advice about side effects. You may report side effects to FDA at 0-885-XHB-1457 © 2017 [facility] Information is for End User's use only and may not be sold, redistributed or otherwise used for commercial purposes. The above information is an  only. It is not intended as medical advice for individual conditions or treatments. Talk to your doctor, nurse or pharmacist before following any medical regimen to see if it is safe and effective for you. Oxycodone/Acetaminophen (By mouth) Acetaminophen (e-npzd-m-MIN-oh-fen), Oxycodone Hydrochloride (oq-w-APW-done lexi-droe-KLOR-lelo) Treats moderate to moderately severe pain. This medicine is a narcotic pain reliever. Brand Name(s): Endocet, Percocet, Primlev, Xartemis XR There may be other brand names for this medicine. When This Medicine Should Not Be Used: This medicine is not right for everyone. Do not use it if you had an allergic reaction to acetaminophen or oxycodone, or if you have serious breathing problems or paralytic ileus. How to Use This Medicine:  
Capsule, Liquid, Tablet, Long Acting Tablet · Your doctor will tell you how much medicine to use. Do not use more than directed. · An overdose can be dangerous. Follow directions carefully so you do not get too much medicine at one time. · Oral liquid: Measure the oral liquid medicine with a marked measuring spoon, oral syringe, or medicine cup. · Swallow the extended-release tablet whole. Do not crush, break, or chew it. Do not lick or wet the tablet before placing it in your mouth. Do not give this medicine through a feeding tube. · This medicine should come with a Medication Guide. Ask your pharmacist for a copy if you do not have one. · Missed dose: If you miss a dose of this medicine, skip the missed dose and go back to your regular dosing schedule. Do not double doses. · Store the medicine in a closed container at room temperature, away from heat, moisture, and direct light. Ask your pharmacist about the best way to dispose of medicine you do not use. Drugs and Foods to Avoid: Ask your doctor or pharmacist before using any other medicine, including over-the-counter medicines, vitamins, and herbal products. · Do not use Xartemis XR if you are using or have used an MAO inhibitor in the past 14 days. · Some medicines can affect how this medicine works. Tell your doctor if you are using any of the following: ¨ Carbamazepine, erythromycin, ketoconazole, lamotrigine, mirtazapine, naltrexone, phenytoin, propranolol, rifampin, ritonavir, tramadol, trazodone, or zidovudine ¨ Birth control pills ¨ Diuretic (water pill) ¨ Medicine to treat depression ¨ Phenothiazine medicine ¨ Triptan medicine to treat migraine headaches · Do not drink alcohol while you are using this medicine. Acetaminophen can damage your liver, and alcohol can increase this risk. Do not take acetaminophen without asking your doctor if you have 3 or more drinks of alcohol every day. · Tell your doctor if you use anything else that makes you sleepy. Some examples are allergy medicine, narcotic pain medicine, and alcohol. Tell your doctor if you are using buprenorphine, butorphanol, nalbuphine, pentazocine, a benzodiazepine, or a muscle relaxer. Warnings While Using This Medicine: · Tell your doctor if you are pregnant or breastfeeding, or if you have kidney disease, liver disease, heart disease, low blood pressure, breathing problems or lung disease (such as asthma, COPD), thyroid problems, [place] disease, pancreas or gallbladder problems, prostate problems, trouble urinating, or a stomach problems, or a history of head injury or brain damage, seizures, or alcohol or drug abuse. Tell your doctor if you are allergic to codeine. · This medicine may cause the following problems: ¨ High risk of overdose, which can lead to death ¨ Respiratory depression (serious breathing problem that can be life-threatening) ¨ Liver problems ¨ Serious skin reactions ¨ Serotonin syndrome (when used with certain medicines) · This medicine may make you dizzy or drowsy. Do not drive or do anything that could be dangerous until you know how this medicine affects you. Sit or lie down if you feel dizzy. Stand up carefully. · This medicine contains acetaminophen. Read the labels of all other medicines you are using to see if they also contain acetaminophen, or ask your doctor or pharmacist. Sara Salgado not use more than 4 grams (4,000 milligrams) total of acetaminophen in one day. · This medicine can be habit-forming. Do not use more than your prescribed dose. Call your doctor if you think your medicine is not working. · Do not stop using this medicine suddenly. Your doctor will need to slowly decrease your dose before you stop it completely. · This medicine could cause infertility. Talk with your doctor before using this medicine if you plan to have children. · This medicine may cause constipation, especially with long-term use. Ask your doctor if you should use a laxative to prevent and treat constipation. · Keep all medicine out of the reach of children. Never share your medicine with anyone. Possible Side Effects While Using This Medicine:  
Call your doctor right away if you notice any of these side effects: · Allergic reaction: Itching or hives, swelling in your face or hands, swelling or tingling in your mouth or throat, chest tightness, trouble breathing · Anxiety, restlessness, fast heartbeat, fever, muscle spasms, twitching, diarrhea, seeing or hearing things that are not there · Blistering, peeling, red skin rash · Blue lips, fingernails, or skin · Dark urine or pale stools, loss of appetite, stomach pain, yellow skin or eyes · Extreme weakness, shallow breathing, uneven heartbeat, seizures, sweating, or cold or clammy skin · Severe confusion, lightheadedness, dizziness, or fainting · Severe constipation, nausea, or vomiting · Trouble breathing or slow breathing If you notice these less serious side effects, talk with your doctor:  
· Headache · Mild constipation, nausea, or vomiting · Mild sleepiness or drowsiness If you notice other side effects that you think are caused by this medicine, tell your doctor. Call your doctor for medical advice about side effects. You may report side effects to FDA at 4-461-FDA-9753 © 2017 [facility] Information is for End User's use only and may not be sold, redistributed or otherwise used for commercial purposes. The above information is an  only. It is not intended as medical advice for individual conditions or treatments. Talk to your doctor, nurse or pharmacist before following any medical regimen to see if it is safe and effective for you. Please provide this summary of care documentation to your next provider. Signatures-by signing, you are acknowledging that this After Visit Summary has been reviewed with you and you have received a copy. Patient Signature:  ____________________________________________________________ Date:  ____________________________________________________________  
  
Regine Ku Provider Signature:  ____________________________________________________________ Date:  ____________________________________________________________

## 2017-12-04 NOTE — BRIEF OP NOTE
BRIEF OPERATIVE NOTE    Date of Procedure: 12/4/2017   Preoperative Diagnosis: Spinal stenosis of lumbar region, unspecified whether neurogenic claudication present [M48.061]hnp l5  Spondylolysis, site unspecified [M43.00]  Postoperative Diagnosis: Spinal stenosis of lumbar region, unspecified whether neurogenic claudication present [M48.061]  Spondylolysis, site unspecified [M43.00]    Procedure(s):REDO L5/s1 left  LAMINECTOMYdisc REDO L4/5 LAMINECTOMY FUSION WITH INSTRUMENTATION  Surgeon(s) and Role:     * Sara Glaser MD - Primary         Assistant Staff:       Surgical Staff:  Circ-1: Roc Dennis RN  Circ-Relief: Saima Singh RN  Radiology Technician: Trice Coronel  Scrub Tech-1: Javi Martin  Surg Asst-1: Lencho Pac  Event Time In   Incision Start 6142   Incision Close      Anesthesia: General   Estimated Blood Loss: 500  Specimens: * No specimens in log *   Findings: hnp l5 on left, severe stenosis, scarring, neural irritability, loose lamina   Complications: 0  Implants:   Implant Name Type Inv.  Item Serial No.  Lot No. LRB No. Used Action   Reline Reduction Screw 6.5 x 40 mm Screw   NUVASIVE NA N/A 4 Implanted   SCR LCK OPEN TULIP 5.5MM -- RELINE - [id number]  SCR LCK OPEN TULIP 5.5MM -- RELINE  NUVASIVE NA N/A 4 Implanted   BONE CHIP CANC [address] 1-8MM 30ML --  - [id number]  BONE CHIP CANC [address] 1-8MM 30ML --  [id number] [facility]  N/A 1 Implanted   GRAFT PTTY BNE SYNTH 5CC -- ATTRAX - [id number]  GRAFT PTTY BNE SYNTH 5CC -- ATTRAX  NUVASIVE [id number] N/A 1 Implanted   GRAFT PTTY BNE SYNTH 5CC -- ATTRAX - [id number]  GRAFT PTTY BNE SYNTH 5CC -- ATTRAX  NUVASIVE [id number] N/A 1 Implanted   CALEB SPNE LORDTC 5.5X50MM TI -- RELINE - [id number]   CALEB SPNE LORDTC 5.5X50MM TI -- RELINE   NUVASIVE NA N/A 2 Implanted

## 2017-12-04 NOTE — IP AVS SNAPSHOT
Merlin Laroche 
 
 
 [address] Patient: Frannie Quintana MRN: [id number]:[id number] My Medications STOP taking these medications   
 naproxen 500 mg tablet Commonly known as:  NAPROSYN  
   
  
  
TAKE these medications as instructed Instructions Each Dose to Equal  
 Morning Noon Evening Bedtime  
 cephALEXin 500 mg capsule Commonly known as:  Cassi Waldron Your last dose was: Your next dose is: Take 1 Cap by mouth four (4) times daily. 500 mg  
    
   
   
   
  
 clobetasol 0.05 % topical cream  
Commonly known as:  Heidi Ovalle Your last dose was: Your next dose is:    
   
   
 Use 1 Application to affected area Twice Daily. desonide 0.05 % topical lotion Commonly known as:  Moiz Richmond Your last dose was: Your next dose is:    
   
   
 Use 1 Application to affected area Twice Daily. doxycycline 100 mg capsule Commonly known as:  Evelyn Figueroa Your last dose was: Your next dose is: Take 100 mg by mouth daily. 100 mg  
    
   
   
   
  
 lisinopril-hydroCHLOROthiazide 20-12.5 mg per tablet Commonly known as:  Larissa Benton Your last dose was: Your next dose is: TAKE ONE TABLET BY MOUTH ONCE A DAY  
     
   
   
   
  
 oxyCODONE-acetaminophen  mg per tablet Commonly known as:  PERCOCET 10 Your last dose was: Your next dose is: Take 1 Tab by mouth every six (6) hours as needed. Max Daily Amount: 4 Tabs. 1 Tab  
    
   
   
   
  
 topiramate 25 mg tablet Commonly known as:  TOPAMAX Your last dose was: Your next dose is: Take 3 Tabs by mouth nightly. Indications: neuritis 75 mg  
    
   
   
   
  
 VITAMIN D3 1,000 unit tablet Generic drug:  cholecalciferol Your last dose was: Your next dose is: Take 1,000 Units by mouth daily. 1000 Units  
    
   
   
   
  
 zolpidem 5 mg tablet Commonly known as:  AMBIEN Your last dose was: Your next dose is: Take 5 mg by mouth nightly as needed. 5 mg Where to Get Your Medications These medications were sent to [address], [address] [place] [id number] Phone:  [phone]  
  cephALEXin 500 mg capsule Information on where to get these meds will be given to you by the nurse or doctor. ! Ask your nurse or doctor about these medications  
  oxyCODONE-acetaminophen  mg per tablet

## 2017-12-04 NOTE — OP NOTES
[address]    Name:  Yash Grider  MR#:  [id number]  :  1950  Account #:  [de-identified]  Date of Adm:  2017  Date of Surgery:  2017      SURGEON: Yuliana Hale MD.    PREOPERATIVE DIAGNOSES:  1. Postlaminectomy syndrome, lumbar. 2. Spinal stenosis L4-5. 3. Post-laminectomy spondylolisthesis grade 2.  4. Herniated nucleus pulposus L5-S1, recurrent. POSTOPERATIVE DIAGNOSES:  1. Postlaminectomy syndrome, lumbar. 2. Spinal stenosis L4-5. 3. Post-laminectomy spondylolisthesis grade 2.  4. Herniated nucleus pulposus L5-S1, recurrent. PROCEDURES PERFORMED:  1. Redo laminectomy L4-L5. 2. Redo laminectomy left L5-S1.  3. Diskectomy on left L4-L5. 4. Posterolateral fusion, segmental spinal instrumentation, NuVasive  type L4-5. ANESTHESIA:      FINDINGS: The patient had dramatic severe stenosis at L4-5 with  dense perineural scarring. NuVasive intraoperative neural monitoring  was utilized. NeuroVision manipulation of the neural elements of the  wall at L4-5 brought about evidence of severe nerve irritability. It has  limited our ability to safely decompress the neural elements. Due to the  scarring, I was able to decompress on the left at 4-5, which was the  area the patient was symptomatic. Given the minimal symptoms on the  right. The patient had preoperatively, in the lack of back pain, I felt it  was best not to attempt any reduction of the patient's listhesis, which  proved to be quite stiff intraoperatively. I simply decompressed the disk  herniation at L5-S1 on the left and did the best possible given the  nerve irritability and the dense perineural scarring that I could  decompress the neural elements on the left at 4-5. Instrumented fusion  was done to prevent progression of listhesis at L4-5 which was quite  stiff intraoperatively.  I did not attempt to manipulate the neural  elements on the right at 4-5 which was relatively asymptomatic  though it had significant radiographic stenosis. Again, there was  dramatic stenosis with multiple loose lamina fragments, dense  perineural scarring, loss of landmark for the patient's previous  extensive surgery. DESCRIPTION OF PROCEDURE: Following induction of endotracheal  anesthesia, the patient was turned in the prone position on spinal  frame. The patient was prepped and draped in the usual fashion. The  patient was quite thin and from radiographic appearance in the  preoperative studies, I felt it was best to first place instrumentation in  the pedicles of 4 and 5 and this could be NeuroVision neural  monitoring had been placed with good clean runs. There were a  percutaneous system with NuVasive screws, I placed SCDs  transpedicularly at 4 and 5, placed guidewires, tapped and placed  screws at L4 and L5, 40 mm screws with good fixation and clean neuro  stimulation runs. Fixation appeared to be good. The tabs were broken  with only the percutaneous holes and this let me have  dissection  landmarks to visualize the dissection best and had excellently placed  instrumentation from the onset. Midline incision was made. Paramedian incisions were made in lumbodorsal fascia. Dense scar  was encountered as anticipated. Best I could, I did subperiosteal  dissection down countering the pedicle screws at 4 and 5 and a  previous laminectomy done down to the sacrum. Slow meticulous  dissection was done to define huge hypertrophic facets were evident at  4-5. Synovitis was not found at the L4-5 facets. The instrumentation as  it has been placed was localized. The bony landmarks were defined as  best possible. Self-retaining retractors were placed. Previous  laminectomy was defined as best possible. It was quite ragged with  loose lamina evident at the 4-5 level with  laminectomy defined  once we got to 5. PREOPERATIVE STUDIES: There was a focal left paracentral disk  herniation evident at 5-1. A small revision and a laminotomy was done  with small medial facetectomy, defining this disk herniation which was  incised and resected, freeing the S1 nerve root. No instability was  created. Disk herniation was focal and decompressing the S1 nerve. Attention then turned to the grade 2 instability at 4-5. I debrided and  defined the facets for which on positioning and debridement, there was  no reduction, though there was maybe 2 mm of restoration of disk  space height and positioning. One could see several loose lamina  fragments though no reduction consistent with what I was concerned  about a possible healed pars injury, but just loose lamina pieces from  the previous surgery that had been done. With slow meticulous  dissection, I tried to debride and free several of these loose lamina  fragments focusing primarily on the left. The dura was quite irritable  with any manipulation at all it set off the neural monitoring consistent  with how it appeared on MRI studies with these bony fragments  invaginated into the dura causing stenosis. I focused on the interval  just proximal to the 5 pedicle screw, where the stenosis and the lateral  recess was the most severe where I felt most likely the patient's  symptoms were coming from, as well as the 4-5 foramina. I continued  to debride out the 4-5 foramina where it was terribly scarified and  stenotic. Ultimately, I was able to debride out the foramina, but it was  difficult with the neural elements quite tethered and one was on the  verge of creating a dural defect. No dural defect was encountered  throughout the debridement. Once I felt I had achieved a measure of  decompression being able to palpate with a [facility] out the  foramina at 4-5 and along the lateral recess, I reascertained  our situation.  The patient preoperatively was truly having left-sided leg  pain greater than back pain which was somewhat minimal which was  greater than any type of right leg symptoms. While he had progressed  to have a grade 2 spondylolisthesis, his back pain was not significant  and there was not much motion on flexion-extension studies. While my  initial hope was to provide interbody support, the density of the  scarring and the immobility of the segment was such that I feared that  further manipulation of the neural elements could cause neural  damage and dural injury, that I would be risking creating back pain  where there was none, creating instability where there was minimal,  and I felt james that we had not created durotomy or dural injury in our  decompression thus far. On surveying the surgical field, I felt the patient  would be best served by concluding the procedure at this point with  having decompressed the disk herniation at 5-1, doing what I felt was a  relatively safe and hopefully effective decompression at 4-5, and  leaving the right-sided stenosis which we knew was present, but  asymptomatic alone at 4-5. Unable to instrument and do an  instrumented posterolateral fusion. There was no functional anterior  column defect given the lack of motion even on positioning and  collapse present. The irritability present on neural monitoring would  give concern that further manipulation of the neural elements could  cause greater harm than good and I felt we had done the patient the  best service possible with the least assumption of risk. At this point, I  decorticated the remaining facets and intertransverse region and  placed a combination of autograft, allograft, and demineralized bone  matrix. Lordosis rods were placed in the screw heads with final  tightening and torquing. Vancomycin powder instilled upon the wound. Bleeding controlled with Gelfoam and thrombin as well as  electrocautery. There was bleeding throughout the case, especially  during the decompression.  There was epidural bleeding and bony  bleeding and bleeding from the intertransverse region on a consistent  basis. A deep drain was placed. The lumbodorsal fascia was closed  with #1 Vicryl, subcutaneous tissues closed with 2-0 Vicryl, skin closed  with a 4-0 Monocryl subcuticular suture and Dermabond. Sterile  dressing placed upon the wound. All counts were correct. ESTIMATED BLOOD LOSS: Nearly 500 mL. COMPLICATIONS: There were no complications. SPECIMENS REMOVED: No specimens.         Allan Bejarano MD    [address] Ev[address]  D:  12/04/2017   11:08  T:  12/04/2017   14:21  Job #:  [id number]

## 2017-12-04 NOTE — PROGRESS NOTES
Received report from [facility] regarding current medical situations for continuation of care.

## 2017-12-04 NOTE — INTERVAL H&P NOTE
H&P Update:  Barbie Winslow was seen and examined. History and physical has been reviewed. The patient has been examined.  There have been no significant clinical changes since the completion of the originally dated History and Physical.    Signed By: Parker Burrell MD     December 4, 2017 6:44 AM

## 2017-12-05 LAB
ERYTHROCYTE [DISTWIDTH] IN BLOOD BY AUTOMATED COUNT: 12.9 % (ref 11.6–14.5)
HCT VFR BLD AUTO: 29.5 % (ref 36–48)
HGB BLD-MCNC: 9.8 G/DL (ref 13–16)
MCH RBC QN AUTO: 29.8 PG (ref 24–34)
MCHC RBC AUTO-ENTMCNC: 33.2 G/DL (ref 31–37)
MCV RBC AUTO: 89.7 FL (ref 74–97)
PLATELET # BLD AUTO: 194 K/UL (ref 135–420)
PMV BLD AUTO: 9.3 FL (ref 9.2–11.8)
RBC # BLD AUTO: 3.29 M/UL (ref 4.7–5.5)
WBC # BLD AUTO: 7.8 K/UL (ref 4.6–13.2)

## 2017-12-05 PROCEDURE — 97116 GAIT TRAINING THERAPY: CPT

## 2017-12-05 PROCEDURE — 74011250637 HC RX REV CODE- 250/637: Performed by: ORTHOPAEDIC SURGERY

## 2017-12-05 PROCEDURE — 85027 COMPLETE CBC AUTOMATED: CPT | Performed by: ORTHOPAEDIC SURGERY

## 2017-12-05 PROCEDURE — 97535 SELF CARE MNGMENT TRAINING: CPT

## 2017-12-05 PROCEDURE — 65270000029 HC RM PRIVATE

## 2017-12-05 PROCEDURE — 97165 OT EVAL LOW COMPLEX 30 MIN: CPT

## 2017-12-05 PROCEDURE — 74011250636 HC RX REV CODE- 250/636: Performed by: ORTHOPAEDIC SURGERY

## 2017-12-05 PROCEDURE — 77030037134 HC WRAP COMPR BACK THER SOLM -B

## 2017-12-05 PROCEDURE — 36415 COLL VENOUS BLD VENIPUNCTURE: CPT | Performed by: ORTHOPAEDIC SURGERY

## 2017-12-05 RX ADMIN — HYDROMORPHONE HYDROCHLORIDE 1 MG: 1 INJECTION, SOLUTION INTRAMUSCULAR; INTRAVENOUS; SUBCUTANEOUS at 13:27

## 2017-12-05 RX ADMIN — LISINOPRIL AND HYDROCHLOROTHIAZIDE 1 TABLET: 12.5; 2 TABLET ORAL at 09:28

## 2017-12-05 RX ADMIN — Medication 10 ML: at 22:30

## 2017-12-05 RX ADMIN — BISACODYL 10 MG: 5 TABLET, COATED ORAL at 09:28

## 2017-12-05 RX ADMIN — Medication 10 ML: at 05:26

## 2017-12-05 RX ADMIN — CEFAZOLIN SODIUM 2 G: 2 SOLUTION INTRAVENOUS at 13:27

## 2017-12-05 RX ADMIN — CEFAZOLIN SODIUM 2 G: 2 SOLUTION INTRAVENOUS at 05:26

## 2017-12-05 RX ADMIN — OXYCODONE HYDROCHLORIDE AND ACETAMINOPHEN 1 TABLET: 10; 325 TABLET ORAL at 23:31

## 2017-12-05 RX ADMIN — TOPIRAMATE 75 MG: 25 TABLET, FILM COATED ORAL at 22:28

## 2017-12-05 RX ADMIN — ONDANSETRON 4 MG: 2 INJECTION INTRAMUSCULAR; INTRAVENOUS at 06:51

## 2017-12-05 RX ADMIN — OXYCODONE HYDROCHLORIDE AND ACETAMINOPHEN 1 TABLET: 10; 325 TABLET ORAL at 16:18

## 2017-12-05 RX ADMIN — DIAZEPAM 5 MG: 5 TABLET ORAL at 02:10

## 2017-12-05 RX ADMIN — CEFAZOLIN SODIUM 2 G: 2 SOLUTION INTRAVENOUS at 22:28

## 2017-12-05 RX ADMIN — OXYCODONE HYDROCHLORIDE AND ACETAMINOPHEN 1 TABLET: 10; 325 TABLET ORAL at 09:27

## 2017-12-05 NOTE — PROGRESS NOTES
Date of Surgery: PO day #1. Surgery: 1. Redo laminectomy L4-L5. 2. Redo laminectomy left L5-S1.  3. Diskectomy on left L4-L5. 4. Posterolateral fusion, segmental spinal instrumentation, NuVasive  type L4-5. VSS. BP 99/63. Asymptomatic. IV fluids D/C. Encouraged PO intake. Wound: bleeding from percutaneous screw site over night. Reinforced this morning by Surgical tech with glue. The bleeding has slowed down. There is about a half dollar size of drainage. The dressing is falling off of the lower back. Nursing instructed to replace the dressing, leave glue in place. Monitor site. Reinforce/ pressure dressing if actively bleeding and to notify NP. Continue Ancef. Plan: D/C home on Keflex. PT: 200 ft  Neuro intact. Moving all extremities. 4/5 strength to BLE. Pre op pain: back pain, L>R leg pain/ numbness/ weakness  Post op pain: left leg pain/ numbness has improved. He states his right groin is having some discomfort with ambulation. He is noticing his right leg pain a little more today. He feels his left leg pain was so severe prior to surgery that he did not notice the right leg as much. He is hopefull this will resolve like the left side.     Roselia Butler NP

## 2017-12-05 NOTE — PROGRESS NOTES
Problem: Mobility Impaired (Adult and Pediatric)  Goal: *Acute Goals and Plan of Care (Insert Text)  STG's to be addressed within 3 days:  1. Bed mobility:  Supine to sit to supine S with HR for meals. 2. Activity tolerance: Tolerate up in chair 1-2 hrs for ADLs. 3. Transfers:  Sit to stand to chair S with LRAD for ADL's. LTG's to be addressed within 7 days:  1. Standing/Ambulation Balance:  Increase to Good with LRAD for safe transfers and gait. 2. Ambulation:  Ambulate > 200 ft. S with LRAD for home mobility. 3. Patient Education:  Independent with HEP for home safety. 4. Stairs:  Up/Down 4 steps CGA with HR for home entry. Outcome: Progressing Towards Goal  physical Therapy TREATMENT    Patient: Lake Richards (22 y.o. male)  Date: 12/5/2017  Diagnosis: Spinal stenosis of lumbar region, unspecified whether neurogenic claudication present [M48.061]  Spondylolysis, site unspecified [M43.00] <principal problem not specified>  Procedure(s) (LRB):  REDO L4/5  LAMINECTOMY/ REDO L4/5 LAMINECTOMY FUSION WITH INSTRUMENTATION (N/A) 1 Day Post-Op  Precautions: Fall, Spinal   Chart, physical therapy assessment, plan of care and goals were reviewed. ASSESSMENT:  Patient presents today sitting in recliner, alert and agreeable to PT treatment. Patient able to verbalize all back precautions without cuing. He transferred to standing with Deborah without assistive device. He required SBA to ambulate ~200 ft without assistive device, reports R>L LE numbness and pain at this time which is not his norm. PT educated on effects of inflammation, encouraged to f/u with Dr. Michelle Ash should symptoms persist. Patient ascended/descended 4 stairs using B HR with SBA for safety with VCs for sequencing. Patient returned to locked recliner, left with LE elevated, call bell in reach and eating lunch.   Progression toward goals:  [x]      Improving appropriately and progressing toward goals  []      Improving slowly and progressing toward goals  []      Not making progress toward goals and plan of care will be adjusted     PLAN:  Patient continues to benefit from skilled intervention to address the above impairments. Continue treatment per established plan of care. Discharge Recommendations:  Home Health  Further Equipment Recommendations for Discharge:  N/A     SUBJECTIVE:   Patient stated My left leg feels better, but now my right one is acting up.     OBJECTIVE DATA SUMMARY:   Critical Behavior:  Neurologic State: Alert  Orientation Level: Oriented X4  Cognition: Follows commands  Safety/Judgement: Awareness of environment, Fall prevention, Insight into deficits  Functional Mobility Training:  Bed Mobility:  Rolling: Modified independent (log roll technique)  Supine to Sit: Modified independent  Scooting: Modified independent  Transfers:  Sit to Stand: Modified independent  Stand to Sit: Modified independent  Balance:  Sitting: Intact  Standing: Impaired; Without support  Standing - Static: Good  Standing - Dynamic : Fair  Ambulation/Gait Training:  Distance (ft): 200 Feet (ft)  Assistive Device:  (None)  Ambulation - Level of Assistance: Stand-by asssistance  Gait Abnormalities: Decreased step clearance; Steppage gait  Base of Support: Narrowed  Speed/Katie: Pace decreased (<100 feet/min)  Step Length: Left shortened;Right shortened  Stairs:  Number of Stairs Trained: 4  Stairs - Level of Assistance: Stand-by asssistance  Rail Use: Both  Pain:  Pain Scale 1: Numeric (0 - 10)  Pain Intensity 1: 0  Activity Tolerance:   Good  Please refer to the flowsheet for vital signs taken during this treatment. After treatment:   [x] Patient left in no apparent distress sitting up in chair  [] Patient left in no apparent distress in bed  [x] Call bell left within reach  [] Nursing notified  [] Caregiver present  [] Bed alarm activated      Stephen Diop   Time Calculation: 12 mins    Mobility  Current  CI= 1-19%   Goal  CI= 1-19%.   The severity rating is based on the Level of Assistance required for Functional Mobility and ADLs.

## 2017-12-05 NOTE — ROUTINE PROCESS
2000 Patient is alert and oriented times three with no signs or symptoms of distress. Dressing has blood on it but not leaking beyond the border of dressing yet. Neuro is intact and eating and drinking well. Patient is voiding. No numbness or tingling reported. No drainage is in the bulb. 2215   Mobility Intervention:       [] Pt dangled at edge of bed    [] Pt assisted OOB to bedside commode    [] Pt assisted OOB to chair    [] Pt ambulated to bathroom    [x] Patient was ambulated in room/hallway    Assistive Device Utilized:       [] Rolling walker   [] Crutches   [] Straight Cane   [] Knee immobilizer   [x] IV pole    After Mobilization:     [] Patient left in no apparent distress sitting up in chair  [x] Patient left in no apparent distress in bed  [x] Call bell left within reach  [x] SCDs on & machine turned on  [x] Ice applied  [] RN notified  [] Caregiver present  [] Bed alarm activated    Reason patient not mobilized:      [] Patient refused   [] Nausea/vomiting   [] Low blood pressure   [] Drowsy/lethargic    Pain Rating:     [] 0  [] 1  Assistive Device:        [] 2  [x] 3  [] 4  [] 5  [] 6  Assistive Device:        [] 7  [] 8  [] 9  [] 10    Comments:     0000 Patient is alert and oriented times three with no signs or symptoms of distress. Reinforced dressing none in the drain. Neuro is intact. 0400 Patient is alert and oriented times three with no signs or symptoms of distress. Dressing has blood on it neuro is intact.

## 2017-12-05 NOTE — ROUTINE PROCESS
Bedside and Verbal shift change report given to Gus Edwards (oncoming nurse) by Nneka Robles (offgoing nurse). Report included the following information SBAR, Kardex and MAR.

## 2017-12-05 NOTE — PROGRESS NOTES
Mobility Intervention:       [x] Pt dangled at edge of bed    [] Pt assisted OOB to bedside commode    [] Pt assisted OOB to chair    [] Pt ambulated to bathroom    [x] Patient was ambulated in room/hallway    Assistive Device Utilized:       [] Rolling walker   [] Crutches   [] Straight Cane   [] Knee immobilizer   [] IV pole    After Mobilization:     [x] Patient left in no apparent distress sitting up in chair  [] Patient left in no apparent distress in bed  [x] Call bell left within reach  [x] SCDs on & machine turned on  [] Ice applied  [] RN notified  [] Caregiver present  [] Bed alarm activated    Reason patient not mobilized:      [] Patient refused   [] Nausea/vomiting   [] Low blood pressure   [] Drowsy/lethargic    Pain Rating:     [] 0  [] 1  Assistive Device:        [] 2  [] 3  [x] 4  [] 5  [] 6  Assistive Device:        [] 7  [] 8  [] 9  [] 10    Comments:ambulated with patient in the hallway and tolerated well

## 2017-12-05 NOTE — PROGRESS NOTES
vss afeb  Neuro intact  Drain not functioning- was having wound drainage from one perc screw site- dressing replaced.   Ambulating  Pain controlled  PT OT

## 2017-12-05 NOTE — ROUTINE PROCESS
Bedside and Verbal shift change report given to Cait Nelson RN (oncoming nurse) by Jaydon Streeter (offgoing nurse). Report included the following information SBAR, Kardex and MAR.

## 2017-12-05 NOTE — PROGRESS NOTES
Rounded on patient. Discussed with patient the importance of ambulating with assistance. Reinforced using incentive spirometer and doing ankle pumps when up in chair. Encouraged patient to take pain medication so that they can get OOB and ambulate to help recovery. Informed patient that ice is to be used not heat to surgical site. Reviewed the importance of eating a healthy diet and not skipping meals for healing. Reviewed pain medication, bowel medication. Reinforced to patient that they should not take any NSAIDS until given permission by Dr. Jennifer Aguilar. Patient verbalizing understanding. Educational material given to patient. Patient reminded no bending, lifting or twisting. Log roll to side to get Out of bed. Also no lifting anything heavier than a half a gallon of milk. Patient given  CHG soap to use in hospital and at home to wash off with using a clean towel and clean clothes everyday. Patient also encouraged to use ice for pain control, distraction and repositioning. Dressing intact. Instructed no to remove dressing. Call light in reach. Patient asked if there is anything they need or questions they have. Asked patient if there is anything they need. Mobility Intervention: Patient up in the chair.         [] Pt dangled at edge of bed    [] Pt assisted OOB to bedside commode    [] Pt assisted OOB to chair    [] Pt ambulated to bathroom    [] Patient was ambulated in room/hallway    Assistive Device Utilized:       [] Rolling walker   [] Crutches   [] Straight Cane   [] Knee immobilizer   [] IV pole    After Mobilization:     [] Patient left in no apparent distress sitting up in chair  [] Patient left in no apparent distress in bed  [x] Call bell left within reach  [x] SCDs on & machine turned on  [x] Ice applied  [x] RN notified  [] Caregiver present  [] Bed alarm activated    Reason patient not mobilized:      [] Patient refused   [] Nausea/vomiting   [] Low blood pressure   [] Drowsy/lethargic    Pain Rating:     [] 0  [] 1  Assistive Device:        [] 2  [x] 3  [] 4  [] 5  [] 6  Assistive Device:        [] 7  [] 8  [] 9  [] 10    Comments:       Orthopedic

## 2017-12-05 NOTE — INTERDISCIPLINARY ROUNDS
Interdisciplinary Round Note   Patient Information: Patient Information: Desiree Washington                                      712/69   Reason for Admission: Spinal stenosis of lumbar region, unspecified whether neurogenic claudication present [M48.061]  Spondylolysis, site unspecified [M43.00]   Attending Provider:   Dale Traore MD  Primary Care Physician:       Jerry Canada MD       [phone]   Past Medical History:   Past Medical History:   Diagnosis Date    Back pain     Chronic pain     Headache(784.0)     HTN (hypertension)     Joint pain     hx of joint pain      Hospital day: 1  Estimated discharge date: tbd  RRAT Score: Low Risk            8       Total Score        3 Has Seen PCP in Last 6 Months (Yes=3, No=0)    5 Pt. Coverage (Medicare=5 , Medicaid, or Self-Pay=4)        Criteria that do not apply:    . Living with Significant Other. Assisted Living. LTAC. SNF. or   Rehab    Patient Length of Stay (>5 days = 3)    IP Visits Last 12 Months (1-3=4, 4=9, >4=11)    Charlson Comorbidity Score (Age + Comorbid Conditions)       Goals for Today: pain control      Overnight Events: n/a     VITAL SIGNS  Vitals:    12/04/17 1903 12/04/17 2347 12/05/17 0500 12/05/17 0806   BP: 111/74 112/71 110/66 110/71   Pulse: 69 63 73 67   Resp: 16 17 18 16   Temp: 96.7 °F (35.9 °C) 96 °F (35.6 °C) 97.1 °F (36.2 °C) 96.7 °F (35.9 °C)   SpO2: 99% 99% 99% 97%   Weight:       Height:              Lines, Drains, & Airways    Peripheral IV 12/04/17 Left Arm-Site Assessment: Clean, dry, & intact  Khoi-Briones Drain 12/04/17 Lower; Left Back-Site Assessment: Clean, dry, & intact       VTE Prophylaxis               Sequential Compression Device: Bilateral       Graduated Compression Stockings: Bilateral           Intake and Output:   12/03 1901 - 12/05 0700  In: 4478 [P.O.:953;  I.V.:3525]  Out: 2900 [Urine:2200]  12/05 0701 - 12/05 1900  In: 120 [P.O.:120]  Out: -  Current Diet: DIET REGULAR       Abdominal   Last Bowel Movement Date: 12/04/17  Stool Appearance: Loose, Soft  GI Prophylaxis: yes        Type: zofran        Recent Glucose Results: No results found for: GLU, GLUPOC, GLUCPOC       IV Antibiotics? yes       When started: yesterday   Activity Level: Activity Level: Bath Room Privileges  Needs assistance with ADLs: yes  PT Consult Status: pt/ot Current Immunizations: There is no immunization history on file for this patient.        Recommendations:   Discharge Disposition: Home with home health PT    Needs for Discharge: walker Recommendations from IDR team: n/a    Other Notes: n/a

## 2017-12-05 NOTE — PROGRESS NOTES
Mobility Intervention:       [] Pt dangled at edge of bed    [] Pt assisted OOB to bedside commode    [] Pt assisted OOB to chair    [] Pt ambulated to bathroom    [x] Patient was ambulated in room/hallway    Assistive Device Utilized:       [] Rolling walker   [] Crutches   [] Straight Cane   [] Knee immobilizer   [] IV pole    After Mobilization:     [] Patient left in no apparent distress sitting up in chair  [x] Patient left in no apparent distress in bed  [x] Call bell left within reach  [x] SCDs on & machine turned on  [] Ice applied  [] RN notified  [] Caregiver present  [] Bed alarm activated    Reason patient not mobilized:      [] Patient refused   [] Nausea/vomiting   [] Low blood pressure   [] Drowsy/lethargic    Pain Rating:     [x] 0  [] 1  Assistive Device:        [] 2  [] 3  [] 4  [] 5  [] 6  Assistive Device:        [] 7  [] 8  [] 9  [] 10    Comments:

## 2017-12-05 NOTE — PROGRESS NOTES
Problem: Self Care Deficits Care Plan (Adult)  Goal: *Acute Goals and Plan of Care (Insert Text)  Occupational Therapy Goals  Initiated 12/5/2017    1. Patient will perform lower body dressing/bathing with modified independence with AE. -achieved 12/5/17  Outcome: Resolved/Met Date Met: 12/05/17  Occupational Therapy EVALUATION/discharge    Patient: Timur Gonsales (42 y.o. male)  Date: 12/5/2017  Primary Diagnosis: Spinal stenosis of lumbar region, unspecified whether neurogenic claudication present [M48.061]  Spondylolysis, site unspecified [M43.00]  Procedure(s) (LRB):  REDO L4/5  LAMINECTOMY/ REDO L4/5 LAMINECTOMY FUSION WITH INSTRUMENTATION (N/A) 1 Day Post-Op   Precautions:  Fall, Spinal    ASSESSMENT AND RECOMMENDATIONS:  Based on the objective data described below, the patient was modified independent/sueprvision with functional mobility/simulated toilet transfer with HHA. Patient had WFL BUE AROM and  strength. Educated patient on adaptive dressing equipment during LE bathing/dressing tasks; patient was modified independent with donning/doffing socks/underwear and simulated LE bathing with adaptive equipment. Patient reported he will have help donning shoes at home. Educated patient on spinal precautions as it relates to function; patient needed min verbal cues to recall precautions and no verbal cues implement them during functional tasks. Patient issued a long handled sponge, reacher, and sock aide. Patient deferred to PT for mobility. Skilled acute care occupational therapy is not indicated at this time. Discharge Recommendations: Home Health  Further Equipment Recommendations for Discharge: N/A      Barriers to Learning/Limitations: None    COMPLEXITY     Eval Complexity: History: LOW Complexity : Brief history review ;  Examination: LOW Complexity : 1-3 performance deficits relating to physical, cognitive , or psychosocial skils that result in activity limitations and / or participation restrictions ; Decision Making:LOW Complexity : No comorbidities that affect functional and no verbal or physical assistance needed to complete eval tasks  Assessment: Low Complexity        G-CODES:     Self Care  Current  CI= 1-19%   Goal  CI= 1-19%   D/C  CI= 1-19%. The severity rating is based on the Level of Assistance required for Functional Mobility and ADLs. SUBJECTIVE:   Patient stated Thank you.     OBJECTIVE DATA SUMMARY:     Past Medical History:   Diagnosis Date    Back pain     Chronic pain     Headache(784.0)     HTN (hypertension)     Joint pain     hx of joint pain     Past Surgical History:   Procedure Laterality Date    HX LUMBAR LAMINECTOMY  2002    L4/L5    HX OTHER SURGICAL  1978    \"open lung bx\"    NEUROLOGICAL PROCEDURE UNLISTED  2003    pt spinal fracture T12 and L3     Prior Level of Function/Home Situation: Patient reported he was independent in basic self care tasks and functional mobility PTA. Home Situation  Home Environment: Private residence  # Steps to Enter: 4  Rails to Enter: Yes  Hand Rails : Bilateral  One/Two Story Residence: Two story, live on 1st floor  # of Interior Steps: 8  Living Alone: No  Support Systems: Family member(s)  Patient Expects to be Discharged to[de-identified] Private residence  Current DME Used/Available at Home: Lissette Kim, jose elias, Shower chair  [x]     Right hand dominant   []     Left hand dominant  Cognitive/Behavioral Status:  Neurologic State: Alert  Orientation Level: Oriented X4  Cognition: Follows commands    Skin: No skin changes noted    Edema: No edema noted    Vision/Perceptual:       Acuity: Within Defined Limits      Coordination:  Coordination: Within functional limits (BUEs)       Balance:  Sitting: Intact  Standing: Impaired; With support (HHA)  Standing - Static: Good  Standing - Dynamic : Fair    Strength:  Strength:  Within functional limits ( strength)     Tone & Sensation:  Tone: Normal (BUEs)  Sensation: Intact (BUEs) Range of Motion:  AROM: Within functional limits (BUEs)     Functional Mobility and Transfers for ADLs:  Bed Mobility:  Rolling: Modified independent (log roll technique)  Supine to Sit: Modified independent  Scooting: Modified independent  Transfers:  Sit to Stand: Modified independent    Toilet Transfer : Supervision (simulated)      ADL Assessment:(clinical judgement)  Feeding: Independent    Oral Facial Hygiene/Grooming: Modified Independent    Bathing: Modified independent; Adaptive equipment    Upper Body Dressing: Independent    Lower Body Dressing: Modified independent; Adaptive equipment    Toileting: Modified independent     Pain:  Pt reports 3/10 pain or discomfort prior to treatment.    Pt reports 3/10 pain or discomfort post treatment. Activity Tolerance:   Good    Please refer to the flowsheet for vital signs taken during this treatment. After treatment:   [x]  Patient left in no apparent distress sitting up in chair  []  Patient left in no apparent distress in bed  [x]  Call bell left within reach  [x]  Nursing notified  []  Caregiver present  []  Bed alarm activated    COMMUNICATION/EDUCATION:   Communication/Collaboration:  []      Home safety education was provided and the patient/caregiver indicated understanding. [x]      Patient/family have participated as able and agree with findings and recommendations. []      Patient is unable to participate in plan of care at this time.     Carry Current, OTR/L  Time Calculation: 25 mins

## 2017-12-05 NOTE — NURSE NAVIGATOR
Spoke with patient in room, he verifies his address and phone# as correct on face sheet. His emergency contact is his spouse [facility] [phone]. He has a cane for DME's. His PCP is Dr. Maribell Estrada and he uses [facility]. His wife will provide transportation home at time of discharge.

## 2017-12-06 ENCOUNTER — HOME HEALTH ADMISSION (OUTPATIENT)
Dept: HOME HEALTH SERVICES | Facility: HOME HEALTH | Age: 67
End: 2017-12-06
Payer: COMMERCIAL

## 2017-12-06 VITALS
OXYGEN SATURATION: 96 % | DIASTOLIC BLOOD PRESSURE: 59 MMHG | TEMPERATURE: 97.7 F | HEIGHT: 71 IN | RESPIRATION RATE: 18 BRPM | BODY MASS INDEX: 20.76 KG/M2 | SYSTOLIC BLOOD PRESSURE: 95 MMHG | HEART RATE: 79 BPM | WEIGHT: 148.25 LBS

## 2017-12-06 PROCEDURE — 74011250637 HC RX REV CODE- 250/637: Performed by: ORTHOPAEDIC SURGERY

## 2017-12-06 PROCEDURE — 74011250636 HC RX REV CODE- 250/636: Performed by: ORTHOPAEDIC SURGERY

## 2017-12-06 PROCEDURE — 97116 GAIT TRAINING THERAPY: CPT

## 2017-12-06 RX ORDER — CEPHALEXIN 500 MG/1
500 CAPSULE ORAL 4 TIMES DAILY
Qty: 28 CAP | Refills: 0 | Status: SHIPPED | OUTPATIENT
Start: 2017-12-06 | End: 2017-12-18

## 2017-12-06 RX ORDER — OXYCODONE AND ACETAMINOPHEN 10; 325 MG/1; MG/1
1 TABLET ORAL
Qty: 40 TAB | Refills: 0 | Status: SHIPPED | OUTPATIENT
Start: 2017-12-06 | End: 2017-12-12 | Stop reason: SDUPTHER

## 2017-12-06 RX ADMIN — BISACODYL 10 MG: 5 TABLET, COATED ORAL at 09:45

## 2017-12-06 RX ADMIN — OXYCODONE HYDROCHLORIDE AND ACETAMINOPHEN 1 TABLET: 10; 325 TABLET ORAL at 06:07

## 2017-12-06 RX ADMIN — LISINOPRIL AND HYDROCHLOROTHIAZIDE 1 TABLET: 12.5; 2 TABLET ORAL at 09:45

## 2017-12-06 RX ADMIN — CEFAZOLIN SODIUM 2 G: 2 SOLUTION INTRAVENOUS at 06:07

## 2017-12-06 NOTE — PROGRESS NOTES
Problem: Mobility Impaired (Adult and Pediatric)  Goal: *Acute Goals and Plan of Care (Insert Text)  STG's to be addressed within 3 days:  1. Bed mobility:  Supine to sit to supine S with HR for meals. 2. Activity tolerance: Tolerate up in chair 1-2 hrs for ADLs. 3. Transfers:  Sit to stand to chair S with LRAD for ADL's. LTG's to be addressed within 7 days:  1. Standing/Ambulation Balance:  Increase to Good with LRAD for safe transfers and gait. 2. Ambulation:  Ambulate > 200 ft. S with LRAD for home mobility. 3. Patient Education:  Independent with HEP for home safety. 4. Stairs:  Up/Down 4 steps CGA with HR for home entry. Outcome: Resolved/Met Date Met: 12/06/17  physical Therapy TREATMENT/DISCHARGE    Patient: Mariam Roy (4 y.o. male)  Date: 12/6/2017  Diagnosis: Spinal stenosis of lumbar region, unspecified whether neurogenic claudication present [M48.061]  Spondylolysis, site unspecified [M43.00] <principal problem not specified>  Procedure(s) (LRB):  REDO L4/5  LAMINECTOMY/ REDO L4/5 LAMINECTOMY FUSION WITH INSTRUMENTATION (N/A) 2 Days Post-Op  Precautions: Fall, Spinal  Chart, physical therapy assessment, plan of care and goals were reviewed. ASSESSMENT:  Patient presents today sitting up in bed with HOB elevated, alert and agreeable to PT treatment. He transferred to standing with Deborah, ambulated >300 ft with supervision, demonstrates improved balance and activity tolerance today however continues to c/o R>L LE pain in the groin region. Patient returned to room, demonstrated proper log roll technique with Deborah, left seated EOB with call bell in reach and nurse notified. He has met/exceeded all goals, will be D/C from skilled PT services at this time.   Progression toward goals:  [x]      Goals met  []      Improving appropriately and progressing toward goals  []      Improving slowly and progressing toward goals  []      Not making progress toward goals and plan of care will be adjusted PLAN:  Patient will be discharged from physical therapy at this time. Rationale for discharge:  [x] Goals Achieved  [] [address]  [] Patient not participating in therapy  [] Other:  Discharge Recommendations:  Home Health  Further Equipment Recommendations for Discharge:  N/A     SUBJECTIVE:   Patient stated It feels like I pulled my groin muscle.     OBJECTIVE DATA SUMMARY:   Critical Behavior:  Neurologic State: Alert  Orientation Level: Oriented X4  Cognition: Follows commands  Safety/Judgement: Awareness of environment, Fall prevention, Insight into deficits  Functional Mobility Training:  Bed Mobility:  Rolling: Modified independent  Supine to Sit: Modified independent  Sit to Supine: Modified independent  Scooting: Modified independent  Transfers:  Sit to Stand: Modified independent  Stand to Sit: Modified independent  Balance:  Sitting: Intact  Standing: Intact; Without support  Standing - Static: Good  Standing - Dynamic : Good  Ambulation/Gait Training:  Distance (ft): 300 Feet (ft)  Assistive Device:  (None)  Ambulation - Level of Assistance: Supervision  Gait Abnormalities: Decreased step clearance  Base of Support: Narrowed  Speed/Katie: Pace decreased (<100 feet/min)  Step Length: Left shortened;Right shortened  Pain:   Mild R LE pain, did not qualify  Activity Tolerance:   Good  Please refer to the flowsheet for vital signs taken during this treatment. After treatment:   [x] Patient left in no apparent distress sitting on EOB  [] Patient left in no apparent distress in bed  [x] Call bell left within reach  [x] Nursing notified Oralia Sanabria)  [] Caregiver present  [] Bed alarm activated  Kirk Logosman   Time Calculation: 8 mins    Mobility  Current  CI= 1-19%   Goal  CI= 1-19%  D/C  CI= 1-19%. The severity rating is based on the Level of Assistance required for Functional Mobility and ADLs.

## 2017-12-06 NOTE — PROGRESS NOTES
vss afeb  Neuro intact  Some drainage out of one screw site  will put a stitch in later to stop it  Dc home fu 2 weeks

## 2017-12-06 NOTE — CDMP QUERY
Please clarify if this patient is being treated/managed for:    => Acute Blood Loss Anemia  =>Other Explanation of clinical findings  =>Unable to Determine (no explanation of clinical findings)    The medical record reflects the following:    Risk:67yom redo l-fusion    Clinical Indicators: EBL 500cc,PRESENT H/H HGB: 9.8 (L)  HCT: 29.5 (L):preop h/h  HGB: 15.3  HCT: 44.9     Treatment: LABS MONITORED     Please clarify and document your clinical opinion in the progress notes and discharge summary including the definitive and/or presumptive diagnosis, (suspected or probable), related to the above clinical findings. Please include clinical findings supporting your diagnosis. If you DECLINE this query or would like to communicate with [facility], please utilize the \"[facility] message box\" at the TOP of the Progress Note on the right. Thank you,[facility]. .. Shara Dominguez [phone]

## 2017-12-06 NOTE — DISCHARGE INSTRUCTIONS
[facility]-Operative Dis[facility] Instructions [facility]  [phone]      At your 2-week post-operative visit we will remove any skin staples or sutures, if present. (Appointment was made at the time you scheduled your surgery)    Call office or physician on-call for any of the following:  · Fever greater than 100.5  · Uncontrollable chills  · Drainage from incision  · Pain not controlled by pain medication  · New pain  · New weakness or progressive weakness  · Food sticking in throat or excessive coughing when swallowing    Stop all anti-inflammatories (arthritis medication) such as Ibuprofen, Motrin, Aleve, Voltaren, Relafen, Naproxen, Arthrotec, etc. For 12 weeks ONLY if you had a neck or back Fusion. You may take Tylenol or acetaminophen over the counter. May remove RAFY stockings when discharged from the hospital.    May shower on the third day after surgery. Leave dressing on while you shower; the dressing is waterproof as long as the edges are sealed. Change dressing after 1 week, or sooner if saturated with drainage. Replace with a gauze dressing. Abstain from driving until your first post-operative visit. If you must drive, do not take pain medications that may alter your abilities. Lumbar braces and neck collars are for comfort only. Neck patients feel better sleeping in a recliner or \"propped up\" position for a few days after surgery. This helps reduce the swelling. It is normal for neck patients to have some difficulty swallowing the first few days. Use a straw for all liquids. Cut up solid foods smaller than normal until you are swallowing without difficulty. Walking is the best therapy after back surgery. Avoid extremes of bending, twisting, or lifting. All post-operative surgical patients should function within the range of the pain. \"If it hurts - do not do it. \"      Patient armband removed and shredded      DISCHARGE SUMMARY from Nurse    PATIENT INSTRUCTIONS:    After general anesthesia or intravenous sedation, for 24 hours or while taking prescription Narcotics:  · Limit your activities  · Do not drive and operate hazardous machinery  · Do not make important personal or business decisions  · Do  not drink alcoholic beverages  · If you have not urinated within 8 hours after discharge, please contact your surgeon on call. Report the following to your surgeon:  · Excessive pain, swelling, redness or odor of or around the surgical area  · Temperature over 100.5  · Nausea and vomiting lasting longer than 4 hours or if unable to take medications  · Any signs of decreased circulation or nerve impairment to extremity: change in color, persistent  numbness, tingling, coldness or increase pain  · Any questions    What to do at Home:  Recommended activity: Activity as tolerated within restrictions detailed by provider    If you experience any of the following symptoms Nausea, vomiting, diarrhea, fever greater than 100.5, dizziness, severe headache, shortness of breath, chest pain, increased pain, please follow up with PCP. *  Please give a list of your current medications to your Primary Care Provider. *  Please update this list whenever your medications are discontinued, doses are      changed, or new medications (including over-the-counter products) are added. *  Please carry medication information at all times in case of emergency situations. These are general instructions for a healthy lifestyle:    No smoking/ No tobacco products/ Avoid exposure to second hand smoke  Surgeon General's Warning:  Quitting smoking now greatly reduces serious risk to your health.     Obesity, smoking, and sedentary lifestyle greatly increases your risk for illness    A healthy diet, regular physical exercise & weight monitoring are important for maintaining a healthy lifestyle    You may be retaining fluid if you have a history of heart failure or if you experience any of the following symptoms:  Weight gain of 3 pounds or more overnight or 5 pounds in a week, increased swelling in our hands or feet or shortness of breath while lying flat in bed. Please call your doctor as soon as you notice any of these symptoms; do not wait until your next office visit. Recognize signs and symptoms of STROKE:    F-face looks uneven    A-arms unable to move or move unevenly    S-speech slurred or non-existent    T-time-call 911 as soon as signs and symptoms begin-DO NOT go       Back to bed or wait to see if you get better-TIME IS BRAIN. Warning Signs of HEART ATTACK     Call 911 if you have these symptoms:   Chest discomfort. Most heart attacks involve discomfort in the center of the chest that lasts more than a few minutes, or that goes away and comes back. It can feel like uncomfortable pressure, squeezing, fullness, or pain.  Discomfort in other areas of the upper body. Symptoms can include pain or discomfort in one or both arms, the back, neck, jaw, or stomach.  Shortness of breath with or without chest discomfort.  Other signs may include breaking out in a cold sweat, nausea, or lightheadedness. Don't wait more than five minutes to call 911 - MINUTES MATTER! Fast action can save your life. Calling 911 is almost always the fastest way to get lifesaving treatment. Emergency Medical Services staff can begin treatment when they arrive -- up to an hour sooner than if someone gets to the hospital by car. The discharge information has been reviewed with the patient. The patient verbalized understanding. Discharge medications reviewed with the patient and appropriate educational materials and side effects teaching were provided. Oxycodone/Acetaminophen (By mouth)   Acetaminophen (i-drws-q-MIN-oh-fen), Oxycodone Hydrochloride (er-c-ZVT-done lexi-droe-KLOR-lelo)  Treats moderate to moderately severe pain. This medicine is a narcotic pain reliever.    Brand Name(s): Endocet, Percocet, Primlev, Xartemis XR   There may be other brand names for this medicine. When This Medicine Should Not Be Used: This medicine is not right for everyone. Do not use it if you had an allergic reaction to acetaminophen or oxycodone, or if you have serious breathing problems or paralytic ileus. How to Use This Medicine:   Capsule, Liquid, Tablet, Long Acting Tablet  · Your doctor will tell you how much medicine to use. Do not use more than directed. · An overdose can be dangerous. Follow directions carefully so you do not get too much medicine at one time. · Oral liquid: Measure the oral liquid medicine with a marked measuring spoon, oral syringe, or medicine cup. · Swallow the extended-release tablet whole. Do not crush, break, or chew it. Do not lick or wet the tablet before placing it in your mouth. Do not give this medicine through a feeding tube. · This medicine should come with a Medication Guide. Ask your pharmacist for a copy if you do not have one. · Missed dose: If you miss a dose of this medicine, skip the missed dose and go back to your regular dosing schedule. Do not double doses. · Store the medicine in a closed container at room temperature, away from heat, moisture, and direct light. Ask your pharmacist about the best way to dispose of medicine you do not use. Drugs and Foods to Avoid:   Ask your doctor or pharmacist before using any other medicine, including over-the-counter medicines, vitamins, and herbal products. · Do not use Xartemis XR if you are using or have used an MAO inhibitor in the past 14 days. · Some medicines can affect how this medicine works.  Tell your doctor if you are using any of the following:   ¨ Carbamazepine, erythromycin, ketoconazole, lamotrigine, mirtazapine, naltrexone, phenytoin, propranolol, rifampin, ritonavir, tramadol, trazodone, or zidovudine  ¨ Birth control pills  ¨ Diuretic (water pill)  ¨ Medicine to treat depression  ¨ Phenothiazine medicine  ¨ Triptan medicine to treat migraine headaches  · Do not drink alcohol while you are using this medicine. Acetaminophen can damage your liver, and alcohol can increase this risk. Do not take acetaminophen without asking your doctor if you have 3 or more drinks of alcohol every day. · Tell your doctor if you use anything else that makes you sleepy. Some examples are allergy medicine, narcotic pain medicine, and alcohol. Tell your doctor if you are using buprenorphine, butorphanol, nalbuphine, pentazocine, a benzodiazepine, or a muscle relaxer. Warnings While Using This Medicine:   · Tell your doctor if you are pregnant or breastfeeding, or if you have kidney disease, liver disease, heart disease, low blood pressure, breathing problems or lung disease (such as asthma, COPD), thyroid problems, [place] disease, pancreas or gallbladder problems, prostate problems, trouble urinating, or a stomach problems, or a history of head injury or brain damage, seizures, or alcohol or drug abuse. Tell your doctor if you are allergic to codeine. · This medicine may cause the following problems:  ¨ High risk of overdose, which can lead to death  ¨ Respiratory depression (serious breathing problem that can be life-threatening)  ¨ Liver problems  ¨ Serious skin reactions  ¨ Serotonin syndrome (when used with certain medicines)  · This medicine may make you dizzy or drowsy. Do not drive or do anything that could be dangerous until you know how this medicine affects you. Sit or lie down if you feel dizzy. Stand up carefully. · This medicine contains acetaminophen. Read the labels of all other medicines you are using to see if they also contain acetaminophen, or ask your doctor or pharmacist. Nina Enriquez not use more than 4 grams (4,000 milligrams) total of acetaminophen in one day. · This medicine can be habit-forming. Do not use more than your prescribed dose.  Call your doctor if you think your medicine is not working. · Do not stop using this medicine suddenly. Your doctor will need to slowly decrease your dose before you stop it completely. · This medicine could cause infertility. Talk with your doctor before using this medicine if you plan to have children. · This medicine may cause constipation, especially with long-term use. Ask your doctor if you should use a laxative to prevent and treat constipation. · Keep all medicine out of the reach of children. Never share your medicine with anyone. Possible Side Effects While Using This Medicine:   Call your doctor right away if you notice any of these side effects:  · Allergic reaction: Itching or hives, swelling in your face or hands, swelling or tingling in your mouth or throat, chest tightness, trouble breathing  · Anxiety, restlessness, fast heartbeat, fever, muscle spasms, twitching, diarrhea, seeing or hearing things that are not there  · Blistering, peeling, red skin rash  · Blue lips, fingernails, or skin  · Dark urine or pale stools, loss of appetite, stomach pain, yellow skin or eyes  · Extreme weakness, shallow breathing, uneven heartbeat, seizures, sweating, or cold or clammy skin  · Severe confusion, lightheadedness, dizziness, or fainting  · Severe constipation, nausea, or vomiting  · Trouble breathing or slow breathing  If you notice these less serious side effects, talk with your doctor:   · Headache  · Mild constipation, nausea, or vomiting  · Mild sleepiness or drowsiness  If you notice other side effects that you think are caused by this medicine, tell your doctor. Call your doctor for medical advice about side effects. You may report side effects to FDA at 7-241-FDA-2534  © 2017 [employer] Information is for End User's use only and may not be sold, redistributed or otherwise used for commercial purposes. The above information is an  only.  It is not intended as medical advice for individual conditions or treatments. Talk to your doctor, nurse or pharmacist before following any medical regimen to see if it is safe and effective for you.

## 2017-12-06 NOTE — DISCHARGE SUMMARY
Discharge  Summary     Patient: Mejia Toledo MRN: [id number]  SSN: [id number]    YOB: 1950  Age: 74 y.o. Sex: male       Admit Date: 12/4/2017    Discharge Date: 12/6/2017      Admission Diagnoses: Spinal stenosis of lumbar region, unspecified whether neurogenic claudication present [M48.061]  Spondylolysis, site unspecified [M43.00]    Discharge Diagnoses:   Problem List as of 12/6/2017  Date Reviewed: 12/4/2017          Codes Class Noted - Resolved    Spondylolisthesis of lumbar region ICD-10-CM: M43.16  ICD-9-CM: 738.4  12/4/2017 - Present        Pars defect with spondylolisthesis ICD-10-CM: M43.00, M43.10  ICD-9-CM: 756.11, 756.12  8/25/2017 - Present        Spondylolisthesis at L4-L5 level ICD-10-CM: M43.16  ICD-9-CM: 756.12  7/7/2017 - Present        Lumbar facet arthropathy ICD-10-CM: M12.88  ICD-9-CM: 721.3  7/7/2017 - Present        Left leg weakness ICD-10-CM: R29.898  ICD-9-CM: 729.89  7/7/2017 - Present        Neuritis ICD-10-CM: M79.2  ICD-9-CM: 729.2  7/7/2017 - Present        HNP (herniated nucleus pulposus), lumbar ICD-10-CM: M51.26  ICD-9-CM: 722.10  9/8/2016 - Present        Lumbar spinal stenosis ICD-10-CM: M48.061  ICD-9-CM: 724.02  9/8/2016 - Present        Facet arthritis of lumbar region [facility]) ICD-10-CM: M46.96  ICD-9-CM: 721.3  12/11/2015 - Present        DDD (degenerative disc disease), cervical ICD-10-CM: M50.30  ICD-9-CM: 722.4  12/11/2015 - Present        DDD (degenerative disc disease), lumbar ICD-10-CM: M51.36  ICD-9-CM: 722.52  12/11/2015 - Present        Neuritis of left lower extremity ICD-10-CM: G57.92  ICD-9-CM: 355.8  12/11/2015 - Present               Discharge Condition: Good    Procedure: 1. Redo laminectomy L4-L5. 2. Redo laminectomy left L5-S1.  3. Diskectomy on left L4-L5. 4. Posterolateral fusion, segmental spinal instrumentation, NuVasive  type L4-5.       Hospital Course: Normal hospital course for this procedure.   Tolerated surgical intervention well.  Incision dry and intact. Some mild  bleeding from percutaneous screw site, dermabond applied and bleeding controlled. Ambulatory. Disposition: home    Discharge Medications:   Current Discharge Medication List      START taking these medications    Details   oxyCODONE-acetaminophen (PERCOCET 10)  mg per tablet Take 1 Tab by mouth every six (6) hours as needed. Max Daily Amount: 4 Tabs. Qty: 40 Tab, Refills: 0      cephALEXin (KEFLEX) 500 mg capsule Take 1 Cap by mouth four (4) times daily. Qty: 28 Cap, Refills: 0         CONTINUE these medications which have NOT CHANGED    Details   topiramate (TOPAMAX) 25 mg tablet Take 3 Tabs by mouth nightly. Indications: neuritis  Qty: 90 Tab, Refills: 5    Associated Diagnoses: Neuritis of left lower extremity      lisinopril-hydroCHLOROthiazide (PRINZIDE, ZESTORETIC) 20-12.5 mg per tablet TAKE ONE TABLET BY MOUTH ONCE A DAY      cholecalciferol (VITAMIN D3) 1,000 unit tablet Take 1,000 Units by mouth daily. clobetasol (TEMOVATE) 0.05 % topical cream Use 1 Application to affected area Twice Daily. desonide (TRIDESILON) 0.05 % topical lotion Use 1 Application to affected area Twice Daily. zolpidem (AMBIEN) 5 mg tablet Take 5 mg by mouth nightly as needed. doxycycline (MONODOX) 100 mg capsule Take 100 mg by mouth daily. STOP taking these medications       naproxen (NAPROSYN) 500 mg tablet Comments:   Reason for Stopping: Follow-up Appointments   Procedures    FOLLOW UP VISIT Appointment in: Two Weeks     Standing Status:   Standing     Number of Occurrences:   1     Order Specific Question:   Appointment in     Answer:    Two Weeks       Signed By: Doris Wilburn NP     December 6, 2017

## 2017-12-06 NOTE — PROGRESS NOTES
Discussed HH orders with pt. Signed FOC on chart for [facility]. Referral submitted to [facility] and Annelle Soulier made aware. Pt declines offer for walker stating that he has been ambulating with PT without the use of a walker. No recommendation for walker in PT note. Pt plans to have wife, Alysia Hernándezr, provide transportation home.

## 2017-12-06 NOTE — PROGRESS NOTES
Mobility Intervention:       [] Pt dangled at edge of bed    [] Pt assisted OOB to bedside commode    [] Pt assisted OOB to chair    [x] Pt ambulated to bathroom    [] Patient was ambulated in room/hallway    Assistive Device Utilized:       [] Rolling walker   [] Crutches   [] Straight Cane   [] Knee immobilizer   [] IV pole    After Mobilization:     [] Patient left in no apparent distress sitting up in chair  [x] Patient left in no apparent distress in bed  [] Call bell left within reach  [] SCDs on & machine turned on  [] Ice applied  [] RN notified  [] Caregiver present  [] Bed alarm activated    Reason patient not mobilized:      [] Patient refused   [] Nausea/vomiting   [] Low blood pressure   [] Drowsy/lethargic    Pain Rating:     [] 0  [] 1  Assistive Device:        [] 2  [] 3  [] 4  [x] 5  [] 6  Assistive Device:        [] 7  [] 8  [] 9  [] 10    Comments:   A little greater than a quarter size amount of drainage on dressing.

## 2017-12-07 ENCOUNTER — HOME CARE VISIT (OUTPATIENT)
Dept: SCHEDULING | Facility: HOME HEALTH | Age: 67
End: 2017-12-07

## 2017-12-07 ENCOUNTER — HOME CARE VISIT (OUTPATIENT)
Dept: SCHEDULING | Facility: HOME HEALTH | Age: 67
End: 2017-12-07
Payer: COMMERCIAL

## 2017-12-07 PROCEDURE — 400013 HH SOC

## 2017-12-07 PROCEDURE — G0151 HHCP-SERV OF PT,EA 15 MIN: HCPCS

## 2017-12-12 ENCOUNTER — OFFICE VISIT (OUTPATIENT)
Dept: ORTHOPEDIC SURGERY | Age: 67
End: 2017-12-12

## 2017-12-12 VITALS
HEART RATE: 84 BPM | TEMPERATURE: 97.8 F | DIASTOLIC BLOOD PRESSURE: 70 MMHG | WEIGHT: 145 LBS | RESPIRATION RATE: 18 BRPM | SYSTOLIC BLOOD PRESSURE: 110 MMHG | HEIGHT: 71 IN | BODY MASS INDEX: 20.3 KG/M2

## 2017-12-12 DIAGNOSIS — Z98.1 S/P LUMBAR FUSION: Primary | ICD-10-CM

## 2017-12-12 RX ORDER — OXYCODONE AND ACETAMINOPHEN 10; 325 MG/1; MG/1
1 TABLET ORAL
Qty: 40 TAB | Refills: 0 | Status: ON HOLD | OUTPATIENT
Start: 2017-12-15 | End: 2017-12-19

## 2017-12-12 NOTE — PATIENT INSTRUCTIONS
Lumbar Spinal Fusion: What to Expect at Home  Your Recovery    After surgery, you can expect your back to feel stiff and sore. You may have trouble sitting or standing in one position for very long and may need pain medicine in the weeks after your surgery. It may take 4 to 6 weeks to get back to doing simple activities, such as light housework. It may take 6 months to a year for your back to get better completely. You may need to wear a back brace while your back heals. And your doctor may have you go to physical therapy. If your job doesn't require physical labor, you will probably be able to go back to work after 1 or 2 months. If your job involves light physical labor, it may take 3 to 6 months. Most people whose jobs involved heavy labor can never return to those jobs. This care sheet gives you a general idea about how long it will take for you to recover. But each person recovers at a different pace. Follow the steps below to get better as quickly as possible. How can you care for yourself at home? Activity  ? · Rest when you feel tired. Getting enough sleep will help you recover. ? · Try to walk each day. Start by walking a little more than you did the day before. Bit by bit, increase the amount you walk. Walking boosts blood flow and helps prevent pneumonia and constipation. Walking may also decrease your muscle soreness after surgery. ? · If advised by your doctor, you may need to avoid lifting anything that would cause excessive strain on your back. This may include a child, heavy grocery bags and milk containers, a heavy briefcase or backpack, cat litter or dog food bags, or a vacuum . ? · Avoid strenuous activities, such as bicycle riding, jogging, weight lifting, or aerobic exercise, until your doctor says it is okay. ? · Do not drive for 2 to 4 weeks after your surgery or until your doctor says it is okay.    ? · Avoid riding in a car for more than 30 minutes at a time for 2 to 4 weeks after surgery. If you must ride in a car for a longer distance, stop often to walk and stretch your legs. ? · Try to change your position about every 30 minutes while sitting or standing. This will help decrease your back pain while you are healing. ? · You will probably need to take at least 4 to 6 weeks off from work. It depends on the type of work you do and how you feel. ? · You may have sex as soon as you feel able, but avoid positions that put stress on your back or cause pain. Diet  ? · You can eat your normal diet. If your stomach is upset, try bland, low-fat foods like plain rice, broiled chicken, toast, and yogurt. ? · Drink plenty of fluids (unless your doctor tells you not to). ? · You may notice that your bowel movements are not regular right after your surgery. This is common. Try to avoid constipation and straining with bowel movements. You may want to take a fiber supplement every day. If you have not had a bowel movement after a couple of days, ask your doctor about taking a mild laxative. Medicines  ? · Be safe with medicines. Take pain medicines exactly as directed. ¨ If the doctor gave you a prescription medicine for pain, take it as prescribed. ¨ If you are not taking a prescription pain medicine, ask your doctor if you can take an over-the-counter medicine. ? · If your doctor prescribed antibiotics, take them as directed. Do not stop taking them just because you feel better. You need to take the full course of antibiotics. ? · If you think your pain medicine is making you sick to your stomach:  ¨ Take your medicine after meals (unless your doctor has told you not to). ¨ Ask your doctor for a different pain medicine. Incision care  ? · You will be given specific instructions about how to care for the cuts (incisions) the doctor made. The instructions will depend on the type of materials used to close the cut. Exercise  ?  · Do back exercises as instructed by your doctor. ? · Your doctor may advise you to work with a physical therapist to improve the strength and flexibility of your back. Other instructions  ? · To reduce stiffness and help sore muscles, use a warm water bottle, a heating pad set on low, or a warm cloth on your back. Do not put heat right over the incision. Do not go to sleep with a heating pad on your skin. Follow-up care is a key part of your treatment and safety. Be sure to make and go to all appointments, and call your doctor if you are having problems. It's also a good idea to know your test results and keep a list of the medicines you take. When should you call for help? Call 911 anytime you think you may need emergency care. For example, call if:  ? · You passed out (lost consciousness). ? · You have sudden chest pain and shortness of breath, or you cough up blood. ? · You are unable to move a leg at all. ?Call your doctor now or seek immediate medical care if:  ? · You have pain that does not get better after you take pain pills. ? · You have new or worse symptoms in your legs or buttocks. Symptoms may include:  ¨ Numbness or tingling. ¨ Weakness. ¨ Pain. ? · You lose bladder or bowel control. ? · You have loose stitches, or your incision comes open. ? · You have blood or fluid draining from the incision. ? · You have signs of infection, such as:  ¨ Increased pain, swelling, warmth, or redness. ¨ Pus draining from the incision. ¨ A fever. ? Watch closely for any changes in your health, and be sure to contact your doctor if:  ? · You do not have a bowel movement after taking a laxative. ? · You are not getting better as expected. Where can you learn more? Go to http://jorge-steve.info/. Enter X300 in the search box to learn more about \"Lumbar Spinal Fusion: What to Expect at Home. \"  Current as of: March 21, 2017  Content Version: 11.4  © 6597-8888 [employer].  Care instructions adapted under license by [employer] (which disclaims liability or warranty for this information). If you have questions about a medical condition or this instruction, always ask your healthcare professional. [address] any warranty or liability for your use of this information. Lumbar Spinal Fusion: What to Expect at Home  Your Recovery    After surgery, you can expect your back to feel stiff and sore. You may have trouble sitting or standing in one position for very long and may need pain medicine in the weeks after your surgery. It may take 4 to 6 weeks to get back to doing simple activities, such as light housework. It may take 6 months to a year for your back to get better completely. You may need to wear a back brace while your back heals. And your doctor may have you go to physical therapy. If your job doesn't require physical labor, you will probably be able to go back to work after 1 or 2 months. If your job involves light physical labor, it may take 3 to 6 months. Most people whose jobs involved heavy labor can never return to those jobs. This care sheet gives you a general idea about how long it will take for you to recover. But each person recovers at a different pace. Follow the steps below to get better as quickly as possible. How can you care for yourself at home? Activity  ? · Rest when you feel tired. Getting enough sleep will help you recover. ? · Try to walk each day. Start by walking a little more than you did the day before. Bit by bit, increase the amount you walk. Walking boosts blood flow and helps prevent pneumonia and constipation. Walking may also decrease your muscle soreness after surgery. ? · If advised by your doctor, you may need to avoid lifting anything that would cause excessive strain on your back.  This may include a child, heavy grocery bags and milk containers, a heavy briefcase or backpack, cat litter or dog food bags, or a vacuum . ? · Avoid strenuous activities, such as bicycle riding, jogging, weight lifting, or aerobic exercise, until your doctor says it is okay. ? · Do not drive for 2 to 4 weeks after your surgery or until your doctor says it is okay. ? · Avoid riding in a car for more than 30 minutes at a time for 2 to 4 weeks after surgery. If you must ride in a car for a longer distance, stop often to walk and stretch your legs. ? · Try to change your position about every 30 minutes while sitting or standing. This will help decrease your back pain while you are healing. ? · You will probably need to take at least 4 to 6 weeks off from work. It depends on the type of work you do and how you feel. ? · You may have sex as soon as you feel able, but avoid positions that put stress on your back or cause pain. Diet  ? · You can eat your normal diet. If your stomach is upset, try bland, low-fat foods like plain rice, broiled chicken, toast, and yogurt. ? · Drink plenty of fluids (unless your doctor tells you not to). ? · You may notice that your bowel movements are not regular right after your surgery. This is common. Try to avoid constipation and straining with bowel movements. You may want to take a fiber supplement every day. If you have not had a bowel movement after a couple of days, ask your doctor about taking a mild laxative. Medicines  ? · Be safe with medicines. Take pain medicines exactly as directed. ¨ If the doctor gave you a prescription medicine for pain, take it as prescribed. ¨ If you are not taking a prescription pain medicine, ask your doctor if you can take an over-the-counter medicine. ? · If your doctor prescribed antibiotics, take them as directed. Do not stop taking them just because you feel better. You need to take the full course of antibiotics. ?  · If you think your pain medicine is making you sick to your stomach:  ¨ Take your medicine after meals (unless your doctor has told you not to). ¨ Ask your doctor for a different pain medicine. Incision care  ? · You will be given specific instructions about how to care for the cuts (incisions) the doctor made. The instructions will depend on the type of materials used to close the cut. Exercise  ? · Do back exercises as instructed by your doctor. ? · Your doctor may advise you to work with a physical therapist to improve the strength and flexibility of your back. Other instructions  ? · To reduce stiffness and help sore muscles, use a warm water bottle, a heating pad set on low, or a warm cloth on your back. Do not put heat right over the incision. Do not go to sleep with a heating pad on your skin. Follow-up care is a key part of your treatment and safety. Be sure to make and go to all appointments, and call your doctor if you are having problems. It's also a good idea to know your test results and keep a list of the medicines you take. When should you call for help? Call 911 anytime you think you may need emergency care. For example, call if:  ? · You passed out (lost consciousness). ? · You have sudden chest pain and shortness of breath, or you cough up blood. ? · You are unable to move a leg at all. ?Call your doctor now or seek immediate medical care if:  ? · You have pain that does not get better after you take pain pills. ? · You have new or worse symptoms in your legs or buttocks. Symptoms may include:  ¨ Numbness or tingling. ¨ Weakness. ¨ Pain. ? · You lose bladder or bowel control. ? · You have loose stitches, or your incision comes open. ? · You have blood or fluid draining from the incision. ? · You have signs of infection, such as:  ¨ Increased pain, swelling, warmth, or redness. ¨ Pus draining from the incision. ¨ A fever. ? Watch closely for any changes in your health, and be sure to contact your doctor if:  ? · You do not have a bowel movement after taking a laxative.    ? · You are not getting better as expected. Where can you learn more? Go to http://jorge-steve.info/. Enter G183 in the search box to learn more about \"Lumbar Spinal Fusion: What to Expect at Home. \"  Current as of: March 21, 2017  Content Version: 11.4  © 0226-5476 [employer], [employer]. Care instructions adapted under license by [employer] (which disclaims liability or warranty for this information). If you have questions about a medical condition or this instruction, always ask your healthcare professional. [address] any warranty or liability for your use of this information.

## 2017-12-12 NOTE — MR AVS SNAPSHOT
Visit Information Date & Time Provider Department Dept. Phone Encounter #  
 12/12/2017  9:30 AM Andrés Segovia NP [facility] [phone] [id number] Follow-up Instructions Return in about 4 weeks (around 1/9/2018) for with Dr. Chaim Avina. Your Appointments 1/16/2018  8:00 AM  
POST OP with Jessica Jaffe MD  
[facility] ([facility]) Appt Note: 6 wk fu sx 12-4-17  
 [address] [facility] [address]  
[phone]  
  
   
 [address] [facility] [address] Upcoming Health Maintenance Date Due Hepatitis C Screening 1950 DTaP/Tdap/Td series (1 - Tdap) 2/26/1973 FOBT Q 1 YEAR AGE 50-75 2/26/2002 ZOSTER VACCINE AGE 60> 12/26/2011 GLAUCOMA SCREENING Q2Y 2/26/2017 Pneumococcal 65+ Low/Medium Risk (1 of 2 - PCV13) 2/26/2017 MEDICARE YEARLY EXAM 2/26/2017 Influenza Age 5 to Adult 8/1/2017 Allergies as of 12/12/2017  Review Complete On: 12/12/2017 By: Lam Tran LPN Severity Noted Reaction Type Reactions Bee Sting  [Sting, Bee] High 04/06/2009    Swelling DIZZINESS Current Immunizations  Never Reviewed No immunizations on file. Not reviewed this visit You Were Diagnosed With   
  
 Codes Comments S/P lumbar fusion    -  Primary ICD-10-CM: Z98.1 ICD-9-CM: V45.4 Vitals BP Pulse Temp Resp Height(growth percentile) Weight(growth percentile) 110/70 84 97.8 °F (36.6 °C) (Oral) 18 5' 11\" (1.803 m) 145 lb (65.8 kg) BMI Smoking Status 20.22 kg/m2 Former Smoker Vitals History BMI and BSA Data Body Mass Index Body Surface Area  
 20.22 kg/m 2 1.82 m 2 Preferred Pharmacy Pharmacy Name Phone [facility] [address] [employer] [address] [facility] Your Updated Medication List  
  
   
 This list is accurate as of: 12/12/17 10:34 AM.  Always use your most recent med list.  
  
  
  
  
 cephALEXin 500 mg capsule Commonly known as:  Tarjavi Vasyl Take 1 Cap by mouth four (4) times daily. clobetasol 0.05 % topical cream  
Commonly known as:  Marilee Dame Use 1 Application to affected area Twice Daily. desonide 0.05 % topical lotion Commonly known as:  Foster Bridegroom Use 1 Application to affected area Twice Daily. doxycycline 100 mg capsule Commonly known as:  Mary Cabot Take 100 mg by mouth daily. lisinopril-hydroCHLOROthiazide 20-12.5 mg per tablet Commonly known as:  PRINZIDE, ZESTORETIC  
TAKE ONE TABLET BY MOUTH ONCE A DAY  
  
 oxyCODONE-acetaminophen  mg per tablet Commonly known as:  PERCOCET 10 Take 1 Tab by mouth every six (6) hours as needed. Max Daily Amount: 4 Tabs. Indications: Pain Start taking on:  12/15/2017  
  
 topiramate 25 mg tablet Commonly known as:  TOPAMAX Take 3 Tabs by mouth nightly. Indications: neuritis VITAMIN D3 1,000 unit tablet Generic drug:  cholecalciferol Take 1,000 Units by mouth daily. zolpidem 5 mg tablet Commonly known as:  AMBIEN Take 5 mg by mouth nightly as needed. Prescriptions Printed Refills  
 oxyCODONE-acetaminophen (PERCOCET 10)  mg per tablet 0 Starting on: 12/15/2017 Sig: Take 1 Tab by mouth every six (6) hours as needed. Max Daily Amount: 4 Tabs. Indications: Pain Class: Print Route: Oral  
  
Follow-up Instructions Return in about 4 weeks (around 1/9/2018) for with Dr. Marce Gordillo. Patient Instructions Lumbar Spinal Fusion: What to Expect at Home Your Recovery After surgery, you can expect your back to feel stiff and sore. You may have trouble sitting or standing in one position for very long and may need pain medicine in the weeks after your surgery.  It may take 4 to 6 weeks to get back to doing simple activities, such as light housework. It may take 6 months to a year for your back to get better completely. You may need to wear a back brace while your back heals. And your doctor may have you go to physical therapy. If your job doesn't require physical labor, you will probably be able to go back to work after 1 or 2 months. If your job involves light physical labor, it may take 3 to 6 months. Most people whose jobs involved heavy labor can never return to those jobs. This care sheet gives you a general idea about how long it will take for you to recover. But each person recovers at a different pace. Follow the steps below to get better as quickly as possible. How can you care for yourself at home? Activity ? · Rest when you feel tired. Getting enough sleep will help you recover. ? · Try to walk each day. Start by walking a little more than you did the day before. Bit by bit, increase the amount you walk. Walking boosts blood flow and helps prevent pneumonia and constipation. Walking may also decrease your muscle soreness after surgery. ? · If advised by your doctor, you may need to avoid lifting anything that would cause excessive strain on your back. This may include a child, heavy grocery bags and milk containers, a heavy briefcase or backpack, cat litter or dog food bags, or a vacuum . ? · Avoid strenuous activities, such as bicycle riding, jogging, weight lifting, or aerobic exercise, until your doctor says it is okay. ? · Do not drive for 2 to 4 weeks after your surgery or until your doctor says it is okay. ? · Avoid riding in a car for more than 30 minutes at a time for 2 to 4 weeks after surgery. If you must ride in a car for a longer distance, stop often to walk and stretch your legs. ? · Try to change your position about every 30 minutes while sitting or standing. This will help decrease your back pain while you are healing. ? · You will probably need to take at least 4 to 6 weeks off from work. It depends on the type of work you do and how you feel. ? · You may have sex as soon as you feel able, but avoid positions that put stress on your back or cause pain. Diet ? · You can eat your normal diet. If your stomach is upset, try bland, low-fat foods like plain rice, broiled chicken, toast, and yogurt. ? · Drink plenty of fluids (unless your doctor tells you not to). ? · You may notice that your bowel movements are not regular right after your surgery. This is common. Try to avoid constipation and straining with bowel movements. You may want to take a fiber supplement every day. If you have not had a bowel movement after a couple of days, ask your doctor about taking a mild laxative. Medicines ? · Be safe with medicines. Take pain medicines exactly as directed. ¨ If the doctor gave you a prescription medicine for pain, take it as prescribed. ¨ If you are not taking a prescription pain medicine, ask your doctor if you can take an over-the-counter medicine. ? · If your doctor prescribed antibiotics, take them as directed. Do not stop taking them just because you feel better. You need to take the full course of antibiotics. ? · If you think your pain medicine is making you sick to your stomach: 
¨ Take your medicine after meals (unless your doctor has told you not to). ¨ Ask your doctor for a different pain medicine. Incision care ? · You will be given specific instructions about how to care for the cuts (incisions) the doctor made. The instructions will depend on the type of materials used to close the cut. Exercise ? · Do back exercises as instructed by your doctor. ? · Your doctor may advise you to work with a physical therapist to improve the strength and flexibility of your back. Other instructions ?  · To reduce stiffness and help sore muscles, use a warm water bottle, a heating pad set on low, or a warm cloth on your back. Do not put heat right over the incision. Do not go to sleep with a heating pad on your skin. Follow-up care is a key part of your treatment and safety. Be sure to make and go to all appointments, and call your doctor if you are having problems. It's also a good idea to know your test results and keep a list of the medicines you take. When should you call for help? Call 911 anytime you think you may need emergency care. For example, call if: 
? · You passed out (lost consciousness). ? · You have sudden chest pain and shortness of breath, or you cough up blood. ? · You are unable to move a leg at all. ?Call your doctor now or seek immediate medical care if: 
? · You have pain that does not get better after you take pain pills. ? · You have new or worse symptoms in your legs or buttocks. Symptoms may include: ¨ Numbness or tingling. ¨ Weakness. ¨ Pain. ? · You lose bladder or bowel control. ? · You have loose stitches, or your incision comes open. ? · You have blood or fluid draining from the incision. ? · You have signs of infection, such as: 
¨ Increased pain, swelling, warmth, or redness. ¨ Pus draining from the incision. ¨ A fever. ? Watch closely for any changes in your health, and be sure to contact your doctor if: 
? · You do not have a bowel movement after taking a laxative. ? · You are not getting better as expected. Where can you learn more? Go to http://jorge-steve.info/. Enter V814 in the search box to learn more about \"Lumbar Spinal Fusion: What to Expect at Home. \" Current as of: March 21, 2017 Content Version: 11.4 © 7441-4030 Send the Trend. Care instructions adapted under license by [employer] (which disclaims liability or warranty for this information).  If you have questions about a medical condition or this instruction, always ask your healthcare professional. [employer] disclaims any warranty or liability for your use of this information. Lumbar Spinal Fusion: What to Expect at Home Your Recovery After surgery, you can expect your back to feel stiff and sore. You may have trouble sitting or standing in one position for very long and may need pain medicine in the weeks after your surgery. It may take 4 to 6 weeks to get back to doing simple activities, such as light housework. It may take 6 months to a year for your back to get better completely. You may need to wear a back brace while your back heals. And your doctor may have you go to physical therapy. If your job doesn't require physical labor, you will probably be able to go back to work after 1 or 2 months. If your job involves light physical labor, it may take 3 to 6 months. Most people whose jobs involved heavy labor can never return to those jobs. This care sheet gives you a general idea about how long it will take for you to recover. But each person recovers at a different pace. Follow the steps below to get better as quickly as possible. How can you care for yourself at home? Activity ? · Rest when you feel tired. Getting enough sleep will help you recover. ? · Try to walk each day. Start by walking a little more than you did the day before. Bit by bit, increase the amount you walk. Walking boosts blood flow and helps prevent pneumonia and constipation. Walking may also decrease your muscle soreness after surgery. ? · If advised by your doctor, you may need to avoid lifting anything that would cause excessive strain on your back. This may include a child, heavy grocery bags and milk containers, a heavy briefcase or backpack, cat litter or dog food bags, or a vacuum . ? · Avoid strenuous activities, such as bicycle riding, jogging, weight lifting, or aerobic exercise, until your doctor says it is okay.   
? · Do not drive for 2 to 4 weeks after your surgery or until your doctor says it is okay. ? · Avoid riding in a car for more than 30 minutes at a time for 2 to 4 weeks after surgery. If you must ride in a car for a longer distance, stop often to walk and stretch your legs. ? · Try to change your position about every 30 minutes while sitting or standing. This will help decrease your back pain while you are healing. ? · You will probably need to take at least 4 to 6 weeks off from work. It depends on the type of work you do and how you feel. ? · You may have sex as soon as you feel able, but avoid positions that put stress on your back or cause pain. Diet ? · You can eat your normal diet. If your stomach is upset, try bland, low-fat foods like plain rice, broiled chicken, toast, and yogurt. ? · Drink plenty of fluids (unless your doctor tells you not to). ? · You may notice that your bowel movements are not regular right after your surgery. This is common. Try to avoid constipation and straining with bowel movements. You may want to take a fiber supplement every day. If you have not had a bowel movement after a couple of days, ask your doctor about taking a mild laxative. Medicines ? · Be safe with medicines. Take pain medicines exactly as directed. ¨ If the doctor gave you a prescription medicine for pain, take it as prescribed. ¨ If you are not taking a prescription pain medicine, ask your doctor if you can take an over-the-counter medicine. ? · If your doctor prescribed antibiotics, take them as directed. Do not stop taking them just because you feel better. You need to take the full course of antibiotics. ? · If you think your pain medicine is making you sick to your stomach: 
¨ Take your medicine after meals (unless your doctor has told you not to). ¨ Ask your doctor for a different pain medicine. Incision care ? · You will be given specific instructions about how to care for the cuts (incisions) the doctor made.  The instructions will depend on the type of materials used to close the cut. Exercise ? · Do back exercises as instructed by your doctor. ? · Your doctor may advise you to work with a physical therapist to improve the strength and flexibility of your back. Other instructions ? · To reduce stiffness and help sore muscles, use a warm water bottle, a heating pad set on low, or a warm cloth on your back. Do not put heat right over the incision. Do not go to sleep with a heating pad on your skin. Follow-up care is a key part of your treatment and safety. Be sure to make and go to all appointments, and call your doctor if you are having problems. It's also a good idea to know your test results and keep a list of the medicines you take. When should you call for help? Call 911 anytime you think you may need emergency care. For example, call if: 
? · You passed out (lost consciousness). ? · You have sudden chest pain and shortness of breath, or you cough up blood. ? · You are unable to move a leg at all. ?Call your doctor now or seek immediate medical care if: 
? · You have pain that does not get better after you take pain pills. ? · You have new or worse symptoms in your legs or buttocks. Symptoms may include: ¨ Numbness or tingling. ¨ Weakness. ¨ Pain. ? · You lose bladder or bowel control. ? · You have loose stitches, or your incision comes open. ? · You have blood or fluid draining from the incision. ? · You have signs of infection, such as: 
¨ Increased pain, swelling, warmth, or redness. ¨ Pus draining from the incision. ¨ A fever. ? Watch closely for any changes in your health, and be sure to contact your doctor if: 
? · You do not have a bowel movement after taking a laxative. ? · You are not getting better as expected. Where can you learn more? Go to http://jorge-steve.info/. Enter B308 in the search box to learn more about \"Lumbar Spinal Fusion: What to Expect at Home. \" 
 Current as of: March 21, 2017 Content Version: 11.4 © 5261-0439 Healthwise, [employer]. Care instructions adapted under license by [employer] (which disclaims liability or warranty for this information). If you have questions about a medical condition or this instruction, always ask your healthcare professional. [address] any warranty or liability for your use of this information. Introducing [facility] HE[facility]! Dear Kylah Calderon: Thank you for requesting a [employer] account. Our records indicate that you already have an active [employer] account. You can access your account anytime at [email or web address]. The Trade Desk/Cutting Edge Information Did you know that you can access your hospital and ER discharge instructions at any time in Qu[employer] Energie? You can also review all of your test results from your hospital stay or ER visit. Additional Information If you have questions, please visit the Frequently Asked Questions section of the [employer] website at [email or web address] Remember, [employer] is NOT to be used for urgent needs. For medical emergencies, dial 911. Now available from your iPhone and Android! Please provide this summary of care documentation to your next provider. Your primary care clinician is listed as Jonathan Lim. If you have any questions after today's visit, please call [phone].

## 2017-12-12 NOTE — PROGRESS NOTES
Chief complaint/History of Present Illness:  Chief Complaint   Patient presents with    Back Pain     PO f/U     HPI  Mejia Toledo is a  74 y.o.  male      HISTORY OF PRESENT ILLNESS:  The patient comes in today eight days out from his redo L4-5, L5-S1 laminectomy with a discectomy at L4-5 and a PLIF at L4-5. He states his left leg pain is much improved. The feeling in it has come back. He is still having back pain, but it is more of a discomfort related to surgical pain. He is taking Indocet 10/325 mg four times a day. He states about an hour and a half before the six hours, the pain starts to come back, and he is waiting until the six-hour shalini to take another one. It does cause him some constipation. He has increased his Ducolax, and that has helped. He is still taking the Keflex. He has about three days left of that. He denies fever and bowel and bladder dysfunction. He is a nonsmoker. 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 He does not plan on going back to work until his six weeks is up. He denies fever and bowel and bladder dysfunction. PHYSICAL EXAM:  Mr. Robb Edwards is a 79-year-old male. He is alert and oriented. He has a full weightbearing, non-antalgic gait. He has 4/5 strength of his bilateral lower extremities and negative straight leg raise. His posterior lumbar incision is healing nicely. The edge is well approximated. There is no erythema, warmth, drainage, or signs of infection. He also has four stab incisions, two on each side of the main incision that are also healing up nicely. ASSESSENT/PLAN:  This is a patient eight days out from his L4-5 PLIF. He is doing well. We went over wound care and activity level. We talked about being able to decrease his pain medication as he heals. We will give him one more script of the Indocet, #40, to get filled this Friday, as he has about three days worth left. Hopefully, he will be able to wean off of it with this script. If not, he did have a fusion. We may have to give him a little more. We will see him back in four to five weeks with Dr. Brent Simmons, at which time, he will get a lumbar AP and lateral x-ray. Review of systems:    Past Medical History:   Diagnosis Date    Back pain     Chronic pain     Headache(784.0)     HTN (hypertension)     Joint pain     hx of joint pain     Past Surgical History:   Procedure Laterality Date    HX LUMBAR LAMINECTOMY  2002    L4/L5    HX ORTHOPAEDIC  12/04/2017    Lumbar surgery (Dr. Brent Simmons)   [address]    \"open lung bx\"    NEUROLOGICAL PROCEDURE UNLISTED  2003    pt spinal fracture T12 and L3     Social History     Social History    Marital status:      Spouse name: N/A    Number of children: N/A    Years of education: N/A     Occupational History    Not on file. Social History Main Topics    Smoking status: Former Smoker     Quit date: 10/17/2012    Smokeless tobacco: Former User    Alcohol use Yes      Comment: daily-beer and wine    Drug use: No    Sexual activity: Yes     Other Topics Concern    Not on file     Social History Narrative     Family History   Problem Relation Age of Onset    Colon Cancer Mother     Hypertension Mother     Hypertension Father     Other Brother     Cancer Brother        Physical Exam:  Visit Vitals    /70    Pulse 84    Temp 97.8 °F (36.6 °C) (Oral)    Resp 18    Ht 5' 11\" (1.803 m)    Wt 145 lb (65.8 kg)    BMI 20.22 kg/m2     Pain Scale: 4/10          has been . reviewed and is appropriate          Diagnoses and all orders for this visit:    1. S/P lumbar fusion    Other orders  -     oxyCODONE-acetaminophen (PERCOCET 10)  mg per tablet; Take 1 Tab by mouth every six (6) hours as needed. Max Daily Amount: 4 Tabs. Indications: Pain            Follow-up Disposition:  Return in about 4 weeks (around 1/9/2018) for with Dr. Brent Simmons.         We have informed Timur Gonsales to notify us for immediate appointment if he has any worsening neurogical symptoms or if an emergency situation presents, then call 911

## 2017-12-15 VITALS — DIASTOLIC BLOOD PRESSURE: 70 MMHG | OXYGEN SATURATION: 98 % | SYSTOLIC BLOOD PRESSURE: 110 MMHG | HEART RATE: 72 BPM

## 2017-12-18 ENCOUNTER — APPOINTMENT (OUTPATIENT)
Dept: CT IMAGING | Age: 67
DRG: 176 | End: 2017-12-18
Attending: EMERGENCY MEDICINE
Payer: COMMERCIAL

## 2017-12-18 ENCOUNTER — APPOINTMENT (OUTPATIENT)
Dept: GENERAL RADIOLOGY | Age: 67
DRG: 176 | End: 2017-12-18
Attending: EMERGENCY MEDICINE
Payer: COMMERCIAL

## 2017-12-18 ENCOUNTER — DOCUMENTATION ONLY (OUTPATIENT)
Dept: ORTHOPEDIC SURGERY | Age: 67
End: 2017-12-18

## 2017-12-18 ENCOUNTER — HOSPITAL ENCOUNTER (INPATIENT)
Age: 67
LOS: 2 days | Discharge: HOME | DRG: 176 | End: 2017-12-20
Attending: EMERGENCY MEDICINE | Admitting: INTERNAL MEDICINE
Payer: COMMERCIAL

## 2017-12-18 DIAGNOSIS — I26.99 OTHER ACUTE PULMONARY EMBOLISM WITHOUT ACUTE COR PULMONALE (HCC): Primary | ICD-10-CM

## 2017-12-18 DIAGNOSIS — R07.9 CHEST PAIN, UNSPECIFIED TYPE: ICD-10-CM

## 2017-12-18 PROBLEM — M96.1 POST LAMINECTOMY SYNDROME: Status: ACTIVE | Noted: 2017-12-18

## 2017-12-18 PROBLEM — I10 HYPERTENSION: Status: ACTIVE | Noted: 2017-12-18

## 2017-12-18 LAB
ANION GAP SERPL CALC-SCNC: 8 MMOL/L (ref 3–18)
APTT PPP: 95.3 SEC (ref 23–36.4)
APTT PPP: >180 SEC (ref 23–36.4)
ATRIAL RATE: 60 BPM
BASOPHILS # BLD: 0 K/UL (ref 0–0.06)
BASOPHILS NFR BLD: 0 % (ref 0–2)
BUN SERPL-MCNC: 29 MG/DL (ref 7–18)
BUN/CREAT SERPL: 40 (ref 12–20)
CALCIUM SERPL-MCNC: 8.9 MG/DL (ref 8.5–10.1)
CALCULATED P AXIS, ECG09: 77 DEGREES
CALCULATED R AXIS, ECG10: 57 DEGREES
CALCULATED T AXIS, ECG11: 43 DEGREES
CHLORIDE SERPL-SCNC: 101 MMOL/L (ref 100–108)
CK MB CFR SERPL CALC: 3.2 % (ref 0–4)
CK MB SERPL-MCNC: 1.5 NG/ML (ref 5–25)
CK SERPL-CCNC: 47 U/L (ref 39–308)
CO2 SERPL-SCNC: 27 MMOL/L (ref 21–32)
CREAT SERPL-MCNC: 0.73 MG/DL (ref 0.6–1.3)
D DIMER PPP FEU-MCNC: 1.82 UG/ML(FEU)
DIAGNOSIS, 93000: NORMAL
DIFFERENTIAL METHOD BLD: ABNORMAL
EOSINOPHIL # BLD: 0.1 K/UL (ref 0–0.4)
EOSINOPHIL NFR BLD: 1 % (ref 0–5)
ERYTHROCYTE [DISTWIDTH] IN BLOOD BY AUTOMATED COUNT: 12.7 % (ref 11.6–14.5)
GLUCOSE SERPL-MCNC: 101 MG/DL (ref 74–99)
HCT VFR BLD AUTO: 35.1 % (ref 36–48)
HGB BLD-MCNC: 11.4 G/DL (ref 13–16)
INR PPP: 0.9 (ref 0.8–1.2)
INR PPP: 1.1 (ref 0.8–1.2)
LYMPHOCYTES # BLD: 1.3 K/UL (ref 0.9–3.6)
LYMPHOCYTES NFR BLD: 13 % (ref 21–52)
MCH RBC QN AUTO: 29.2 PG (ref 24–34)
MCHC RBC AUTO-ENTMCNC: 32.5 G/DL (ref 31–37)
MCV RBC AUTO: 90 FL (ref 74–97)
MONOCYTES # BLD: 0.6 K/UL (ref 0.05–1.2)
MONOCYTES NFR BLD: 6 % (ref 3–10)
NEUTS SEG # BLD: 7.9 K/UL (ref 1.8–8)
NEUTS SEG NFR BLD: 80 % (ref 40–73)
P-R INTERVAL, ECG05: 146 MS
PLATELET # BLD AUTO: 487 K/UL (ref 135–420)
PMV BLD AUTO: 8.3 FL (ref 9.2–11.8)
POTASSIUM SERPL-SCNC: 4.1 MMOL/L (ref 3.5–5.5)
PROTHROMBIN TIME: 12.1 SEC (ref 11.5–15.2)
PROTHROMBIN TIME: 13.3 SEC (ref 11.5–15.2)
Q-T INTERVAL, ECG07: 440 MS
QRS DURATION, ECG06: 106 MS
QTC CALCULATION (BEZET), ECG08: 440 MS
RBC # BLD AUTO: 3.9 M/UL (ref 4.7–5.5)
SODIUM SERPL-SCNC: 136 MMOL/L (ref 136–145)
TROPONIN I SERPL-MCNC: <0.02 NG/ML (ref 0–0.04)
TROPONIN I SERPL-MCNC: <0.02 NG/ML (ref 0–0.06)
VENTRICULAR RATE, ECG03: 60 BPM
WBC # BLD AUTO: 9.9 K/UL (ref 4.6–13.2)

## 2017-12-18 PROCEDURE — 71275 CT ANGIOGRAPHY CHEST: CPT

## 2017-12-18 PROCEDURE — 74011250637 HC RX REV CODE- 250/637: Performed by: INTERNAL MEDICINE

## 2017-12-18 PROCEDURE — 85730 THROMBOPLASTIN TIME PARTIAL: CPT | Performed by: INTERNAL MEDICINE

## 2017-12-18 PROCEDURE — 86148 ANTI-PHOSPHOLIPID ANTIBODY: CPT | Performed by: INTERNAL MEDICINE

## 2017-12-18 PROCEDURE — 65660000000 HC RM CCU STEPDOWN

## 2017-12-18 PROCEDURE — 85306 CLOT INHIBIT PROT S FREE: CPT | Performed by: INTERNAL MEDICINE

## 2017-12-18 PROCEDURE — 99285 EMERGENCY DEPT VISIT HI MDM: CPT

## 2017-12-18 PROCEDURE — 74011636320 HC RX REV CODE- 636/320: Performed by: EMERGENCY MEDICINE

## 2017-12-18 PROCEDURE — 85303 CLOT INHIBIT PROT C ACTIVITY: CPT | Performed by: INTERNAL MEDICINE

## 2017-12-18 PROCEDURE — 36415 COLL VENOUS BLD VENIPUNCTURE: CPT | Performed by: INTERNAL MEDICINE

## 2017-12-18 PROCEDURE — 85379 FIBRIN DEGRADATION QUANT: CPT | Performed by: EMERGENCY MEDICINE

## 2017-12-18 PROCEDURE — 85025 COMPLETE CBC W/AUTO DIFF WBC: CPT | Performed by: EMERGENCY MEDICINE

## 2017-12-18 PROCEDURE — 82550 ASSAY OF CK (CPK): CPT | Performed by: INTERNAL MEDICINE

## 2017-12-18 PROCEDURE — 85610 PROTHROMBIN TIME: CPT | Performed by: EMERGENCY MEDICINE

## 2017-12-18 PROCEDURE — 74011250636 HC RX REV CODE- 250/636: Performed by: EMERGENCY MEDICINE

## 2017-12-18 PROCEDURE — 71020 XR CHEST PA LAT: CPT

## 2017-12-18 PROCEDURE — 93005 ELECTROCARDIOGRAM TRACING: CPT

## 2017-12-18 PROCEDURE — 80048 BASIC METABOLIC PNL TOTAL CA: CPT | Performed by: EMERGENCY MEDICINE

## 2017-12-18 PROCEDURE — 85730 THROMBOPLASTIN TIME PARTIAL: CPT | Performed by: EMERGENCY MEDICINE

## 2017-12-18 PROCEDURE — 84484 ASSAY OF TROPONIN QUANT: CPT | Performed by: EMERGENCY MEDICINE

## 2017-12-18 PROCEDURE — 85220 BLOOC CLOT FACTOR V TEST: CPT | Performed by: INTERNAL MEDICINE

## 2017-12-18 RX ORDER — NALOXONE HYDROCHLORIDE 0.4 MG/ML
0.4 INJECTION, SOLUTION INTRAMUSCULAR; INTRAVENOUS; SUBCUTANEOUS AS NEEDED
Status: DISCONTINUED | OUTPATIENT
Start: 2017-12-18 | End: 2017-12-20 | Stop reason: HOSPADM

## 2017-12-18 RX ORDER — OXYCODONE AND ACETAMINOPHEN 5; 325 MG/1; MG/1
1 TABLET ORAL
Status: DISCONTINUED | OUTPATIENT
Start: 2017-12-18 | End: 2017-12-20 | Stop reason: HOSPADM

## 2017-12-18 RX ORDER — HEPARIN SODIUM 10000 [USP'U]/100ML
18 INJECTION, SOLUTION INTRAVENOUS
Status: DISCONTINUED | OUTPATIENT
Start: 2017-12-18 | End: 2017-12-18

## 2017-12-18 RX ORDER — HEPARIN SODIUM 1000 [USP'U]/ML
5000 INJECTION, SOLUTION INTRAVENOUS; SUBCUTANEOUS
Status: COMPLETED | OUTPATIENT
Start: 2017-12-18 | End: 2017-12-18

## 2017-12-18 RX ORDER — DIPHENHYDRAMINE HYDROCHLORIDE 50 MG/ML
12.5 INJECTION, SOLUTION INTRAMUSCULAR; INTRAVENOUS
Status: DISCONTINUED | OUTPATIENT
Start: 2017-12-18 | End: 2017-12-20 | Stop reason: HOSPADM

## 2017-12-18 RX ORDER — LISINOPRIL AND HYDROCHLOROTHIAZIDE 12.5; 2 MG/1; MG/1
1 TABLET ORAL DAILY
Status: CANCELLED | OUTPATIENT
Start: 2017-12-19

## 2017-12-18 RX ORDER — ONDANSETRON 2 MG/ML
4 INJECTION INTRAMUSCULAR; INTRAVENOUS
Status: DISCONTINUED | OUTPATIENT
Start: 2017-12-18 | End: 2017-12-20 | Stop reason: HOSPADM

## 2017-12-18 RX ORDER — HEPARIN SODIUM 10000 [USP'U]/100ML
15 INJECTION, SOLUTION INTRAVENOUS
Status: DISCONTINUED | OUTPATIENT
Start: 2017-12-18 | End: 2017-12-20

## 2017-12-18 RX ORDER — HYDROMORPHONE HYDROCHLORIDE 1 MG/ML
0.5 INJECTION, SOLUTION INTRAMUSCULAR; INTRAVENOUS; SUBCUTANEOUS
Status: DISCONTINUED | OUTPATIENT
Start: 2017-12-18 | End: 2017-12-20 | Stop reason: HOSPADM

## 2017-12-18 RX ADMIN — HEPARIN SODIUM AND DEXTROSE 18 UNITS/KG/HR: 10000; 5 INJECTION INTRAVENOUS at 15:06

## 2017-12-18 RX ADMIN — OXYCODONE HYDROCHLORIDE AND ACETAMINOPHEN 1 TABLET: 5; 325 TABLET ORAL at 18:13

## 2017-12-18 RX ADMIN — OXYCODONE HYDROCHLORIDE AND ACETAMINOPHEN 1 TABLET: 5; 325 TABLET ORAL at 22:13

## 2017-12-18 RX ADMIN — HEPARIN SODIUM AND DEXTROSE 18 UNITS/KG/HR: 10000; 5 INJECTION INTRAVENOUS at 19:31

## 2017-12-18 RX ADMIN — IOPAMIDOL 80 ML: 755 INJECTION, SOLUTION INTRAVENOUS at 11:34

## 2017-12-18 RX ADMIN — HEPARIN SODIUM 5000 UNITS: 1000 INJECTION, SOLUTION INTRAVENOUS; SUBCUTANEOUS at 15:08

## 2017-12-18 NOTE — H&P
History & Physical    Patient: Mitchell Gonzalez MRN: [id number]  CSN: [id number]    YOB: 1950  Age: 74 y.o. Sex: male      DOA: 12/18/2017    Chief Complaint:   Chief Complaint   Patient presents with    Chest Pain          HPI:     Mitchell Gonzalez is a 74 y.o.  male who has hx of HTN presents to ER with one day hx of chest pain described as sharp in nature reproducible to left breast. Pain slightly worse with inspiration   Patient had low back fusion 2 weeks ago without any complications; He has been ambulating regularly and infact has been waling a mile a day since surgery; Patient also travels over 20hrs with his job his last trip was in October to [facility]. He has no previous hx of blood clots but did have an elderly Father who had dvt  Patient is also up to date  on all cancer screening( due for colon check next year FH of colon cancer)    In ER found to have elevated D dimer and CT scan performed. Patient currently chest pain free. Currently complains of right leg tingling and numbness post op   Left leg numbness improved after surgery .  Has not noticed any swelling of lower legs     Past Medical History:   Diagnosis Date    Back pain     Chronic pain     Headache(784.0)     HTN (hypertension)     Joint pain     hx of joint pain       Past Surgical History:   Procedure Laterality Date    HX LUMBAR LAMINECTOMY  2002    L4/L5    HX ORTHOPAEDIC  12/04/2017    Lumbar surgery (Dr. Day )    HX OTHER SURGICAL  1978    \"open lung bx\"    HX OTHER SURGICAL      laminectomy/fusion L4-5  S1    NEUROLOGICAL PROCEDURE UNLISTED  2003    pt spinal fracture T12 and L3       Family History   Problem Relation Age of Onset    Colon Cancer Mother     Hypertension Mother     Hypertension Father     Other Brother     Cancer Brother        Social History     Social History    Marital status:      Spouse name: N/A    Number of children: N/A    Years of education: N/A     Social History Main Topics    Smoking status: Former Smoker     Quit date: 10/17/2012    Smokeless tobacco: Former User    Alcohol use Yes      Comment: daily-beer and wine    Drug use: No    Sexual activity: Yes     Other Topics Concern    None     Social History Narrative       Prior to Admission medications    Medication Sig Start Date End Date Taking? Authorizing Provider   oxyCODONE-acetaminophen (PERCOCET 10)  mg per tablet Take 1 Tab by mouth every six (6) hours as needed. Max Daily Amount: 4 Tabs. Indications: Pain 12/15/17  Yes Leena Samano, NP   topiramate (TOPAMAX) 25 mg tablet Take 3 Tabs by mouth nightly. Indications: neuritis 10/24/17  Yes Leena Samano, NP   clobetasol (TEMOVATE) 0.05 % topical cream Use 1 Application to affected area Twice Daily. 10/21/16  Yes Historical Provider   desonide (TRIDESILON) 0.05 % topical lotion Use 1 Application to affected area Twice Daily. 12/21/16  Yes Historical Provider   lisinopril-hydroCHLOROthiazide (PRINZIDE, ZESTORETIC) 20-12.5 mg per tablet TAKE ONE TABLET BY MOUTH ONCE A DAY 11/29/16  Yes Historical Provider   zolpidem (AMBIEN) 5 mg tablet Take 5 mg by mouth nightly as needed. 10/21/16  Yes Historical Provider       Allergies   Allergen Reactions    Bee Sting  [Sting, Bee] Swelling     DIZZINESS         Review of Systems  GENERAL: Patient alert, awake and oriented times 3, able to communicate full sentences and not in distress. HEENT: No change in vision, no earache, tinnitus, sore throat or sinus congestion. NECK: No pain or stiffness. PULMONARY: No shortness of breath, cough or wheeze. Cardiovascular: no pnd or orthopnea, no CP  GASTROINTESTINAL: No abdominal pain, nausea, vomiting or diarrhea, melena or bright red blood per rectum. GENITOURINARY: No urinary frequency, urgency, hesitancy or dysuria. MUSCULOSKELETAL: No joint or muscle pain, no back pain, no recent trauma. DERMATOLOGIC: No rash, no itching, no lesions.    ENDOCRINE: No polyuria, polydipsia, no heat or cold intolerance. No recent change in weight. HEMATOLOGICAL: No anemia or easy bruising or bleeding. NEUROLOGIC: No headache, seizures, numbness, tingling or weakness. Physical Exam:     Physical Exam:  Visit Vitals    /75    Pulse 64    Temp 97.5 °F (36.4 °C)    Resp 17    Ht 5' 11\" (1.803 m)    Wt 65.8 kg (145 lb)    SpO2 99%    BMI 20.22 kg/m2      O2 Device: Room air    Temp (24hrs), Av.5 °F (36.4 °C), Min:97.5 °F (36.4 °C), Max:97.5 °F (36.4 °C)             General:  Alert, cooperative, no distress, appears stated age. Head: Normocephalic, without obvious abnormality, atraumatic. Eyes:  Conjunctivae/corneas clear. PERRL, EOMs intact. Nose: Nares normal. No drainage or sinus tenderness. Neck: Supple, symmetrical, trachea midline, no adenopathy, thyroid: no enlargement, no carotid bruit and no JVD. Lungs:   Clear to auscultation bilaterally. Heart:  Regular rate and rhythm, S1, S2 normal.     Abdomen: Soft, non-tender. Bowel sounds normal.    Extremities: Extremities normal, atraumatic, no cyanosis or edema. Pulses: 2+ and symmetric all extremities. Skin:  No rashes or lesions   Neurologic: AAOx3, No focal motor or sensory deficit.        Labs Reviewed:  Recent Results (from the past 24 hour(s))   EKG, 12 LEAD, INITIAL    Collection Time: 17 10:10 AM   Result Value Ref Range    Ventricular Rate 60 BPM    Atrial Rate 60 BPM    P-R Interval 146 ms    QRS Duration 106 ms    Q-T Interval 440 ms    QTC Calculation (Bezet) 440 ms    Calculated P Axis 77 degrees    Calculated R Axis 57 degrees    Calculated T Axis 43 degrees    Diagnosis       Normal sinus rhythm  Incomplete right bundle branch block  Borderline ECG  When compared with ECG of 2017 08:35,  No significant change was found  Confirmed by Em Gan ([id number]) on 2017 1:18:08 PM     CBC WITH AUTOMATED DIFF    Collection Time: 17 10:15 AM   Result Value Ref Range    WBC 9.9 4.6 - 13.2 K/uL    RBC 3.90 (L) 4.70 - 5.50 M/uL    HGB 11.4 (L) 13.0 - 16.0 g/dL    HCT 35.1 (L) 36.0 - 48.0 %    MCV 90.0 74.0 - 97.0 FL    MCH 29.2 24.0 - 34.0 PG    MCHC 32.5 31.0 - 37.0 g/dL    RDW 12.7 11.6 - 14.5 %    PLATELET 468 (H) 386 - 420 K/uL    MPV 8.3 (L) 9.2 - 11.8 FL    NEUTROPHILS 80 (H) 40 - 73 %    LYMPHOCYTES 13 (L) 21 - 52 %    MONOCYTES 6 3 - 10 %    EOSINOPHILS 1 0 - 5 %    BASOPHILS 0 0 - 2 %    ABS. NEUTROPHILS 7.9 1.8 - 8.0 K/UL    ABS. LYMPHOCYTES 1.3 0.9 - 3.6 K/UL    ABS. MONOCYTES 0.6 0.05 - 1.2 K/UL    ABS. EOSINOPHILS 0.1 0.0 - 0.4 K/UL    ABS. BASOPHILS 0.0 0.0 - 0.06 K/UL    DF AUTOMATED     METABOLIC PANEL, BASIC    Collection Time: 12/18/17 10:15 AM   Result Value Ref Range    Sodium 136 136 - 145 mmol/L    Potassium 4.1 3.5 - 5.5 mmol/L    Chloride 101 100 - 108 mmol/L    CO2 27 21 - 32 mmol/L    Anion gap 8 3.0 - 18 mmol/L    Glucose 101 (H) 74 - 99 mg/dL    BUN 29 (H) 7.0 - 18 MG/DL    Creatinine 0.73 0.6 - 1.3 MG/DL    BUN/Creatinine ratio 40 (H) 12 - 20      GFR est AA >60 >60 ml/min/1.73m2    GFR est non-AA >60 >60 ml/min/1.73m2    Calcium 8.9 8.5 - 10.1 MG/DL   D DIMER    Collection Time: 12/18/17 10:15 AM   Result Value Ref Range    D DIMER 1.82 (H) <0.46 ug/ml(FEU)   TROPONIN I    Collection Time: 12/18/17 10:15 AM   Result Value Ref Range    Troponin-I, Qt. <0.02 0.00 - 0.06 NG/ML   PROTHROMBIN TIME + INR    Collection Time: 12/18/17 10:15 AM   Result Value Ref Range    Prothrombin time 12.1 11.5 - 15.2 sec    INR 0.9 0.8 - 1.2     PTT    Collection Time: 12/18/17 10:15 AM   Result Value Ref Range    aPTT 95.3 (H) 23.0 - 36.4 SEC     All lab results for the last 24 hours reviewed. and EKG  CT Results  (Last 48 hours)               12/18/17 1212  CTA CHEST W OR W WO CONT Final result    Impression:  IMPRESSION:       1.  Small nonocclusive filling defects within an anterior right lower lobe   subsegmental pulmonary artery branch and a left upper lobe subsegmental branch   as above. Findings called to Dr. Oz Torres at [phone] hours. 2. COPD. Postsurgical change of the right lung with several staple lines present   as above. 3. Scattered areas of presumed chronic scarring due to morphology and regional   minor or bronchial dilatation. There is no prior CT for comparison. 4. No pleural effusion or consolidation. Narrative:  CT chest pulmonary embolism protocol        CPT code: 10093       INDICATION: Pleuritic chest pain. Shortness of breath. Recent lumbar surgery. Question pulmonary embolism. TECHNIQUE: 8.5MR collimation helical images obtained through the level of the   pulmonary arteries with additional imaging through the chest following the   uneventful administration of 80 cc's nonionic intravenous contrast.  Images   reconstructed into three dimensional coronal and sagittal projections for   complete evaluation of the tortuous and overlapping pulmonary vascular   structures and to reduce patient radiation dose. All CT scans at this facility are performed using dose optimization technique as   appropriate to this specific exam, to include automated exposure control,   adjustment of the mA and/or KP according to patient size or use of iterative   reconstruction techniques. COMPARISON: Chest x-ray earlier the same day       FINDINGS:   Opacification of the pulmonary arteries is adequate. Study is mildly degraded by   patient respiratory motion artifact. There is a tiny nonocclusive filling defect within a subsegmental inferior right   upper lobe pulmonary artery branch on image 119. There is a subsegmental filling   defect within right lower lobe anterior segment branches, image 197 through 201. No other intraluminal filling defects identified elsewhere. Heart size is normal. There is no pericardial effusion.  There is concave   contours of the free wall right ventricle apparently due to impression of right   lobe liver adjacent. No bowing of the septum. The thoracic aorta is normal caliber. Inadequate contrast opacification for   assessment otherwise. There is no pneumothorax. There is a 1 cm rounded cystic lucency at the anterior   right apex adjacent to some presumed bronchial dilatation and scarring. There is   a surgical staple line in the lateral right midlung as well as in the anterior   medial right lung adjacent to the middle lobe fissure. There are some mild   bronchial wall thickening and peribronchial groundglass change in the lateral   right lower lobe on image 42. Only available history is that of \"open lung   biopsy\" remotely. There is some curvilinear atelectasis or scarring in the   anterior lateral left lower lobe along the fissure. There is some mild bronchial   dilatation peripherally in the lateral left upper lobe associated with some   platelike atelectasis or scarring. Lungs are hyperinflated. Partially included liver and spleen are normal size. No adrenal mass to the   extent included. There is a superior endplate compression deformity of T12,   present on lumbar spine CT dating back to August 2017.                  Procedures/imaging: see electronic medical records for all procedures/Xrays and details which were not copied into this note but were reviewed prior to creation of Plan      Assessment/Plan     Principal Problem:    Pulmonary emboli ([address] (12/18/2017)  Starting heparin drip   Start coumadin in am   Check cardiac enzymes  Check for factor v protein C and S defienc as well antipholipid ab   Active Problems:    Facet arthritis of lumbar region [facility]) (12/11/2015)  Discussed with ortho surgeon Dr. Poncho Estrada he will see in consultation    HTN   Cont lisinopril     Hx of  Sarcoid lung bx   Can obtain Pulmonary consult     DVT/GI Prophylaxis: Hep SQ    Discussed with patient at bedside about hospital admission and my plan care, who understood and agree with my plan care.     Vivi Lopez MD  12/18/2017 3:02 PM

## 2017-12-18 NOTE — ROUTINE PROCESS
TRANSFER - OUT REPORT:    Verbal report given to Gaetano Rooney RN(name) on Miguelina Jarrett  being transferred to [facility] ER room 361(unit) for routine progression of care       Report consisted of patients Situation, Background, Assessment and   Recommendations(SBAR). Information from the following report(s) SBAR, ED Summary, [facility] and Recent Results was reviewed with the receiving nurse. Opportunity for questions and clarification was provided.

## 2017-12-18 NOTE — ED TRIAGE NOTES
Pt c/o sharp left side chest pain that started yesterday. Happened about 5 times. Has happened 3 times again this morning. Pt was advised to come to ED by his surgeon. Had laminectomy/fusion on 12/4.  Denies any CP while at triage

## 2017-12-18 NOTE — ED PROVIDER NOTES
EMERGENCY DEPARTMENT HISTORY AND PHYSICAL EXAM    10:05 AM      Date: 12/18/2017  Patient Name: Oziel Tolbert    History of Presenting Illness     Chief Complaint   Patient presents with    Chest Pain         History Provided By: Patient    Chief Complaint: CP  Duration:  Hours  Timing:  Acute and Intermittent  Location: left chest  Quality: Sharp  Severity: Mild  Modifying Factors: n/a  Associated Symptoms: denies any other associated signs or symptoms      Additional History (Context): 10:05 AM Oziel Tolbert is a 74 y.o. male with a h/o HTN, Back pain, Chronic pain, Lumbar Laminectomy, and Smoking presents to the ED with c/o sharp intermittent left sided CP onset last night s/p having a redo L4-5, L5-S1 laminectomy with a discectomy at L4-5 and a PLIF at L4-5 by Dr. Kadie Ibrahim 2 weeks ago. Pt states he was reaching for something yesterday when his sx's occurred 4x last night and 2x this morning. Pt states that the pain lasts <1sec. No associated symptoms. Denies any known complications related to his recent sx. Has had 2 post op checks since procedure. Pt denied n/v/d, SOB, fever, urinary sx's, numbness, weakness, or cough. Pt noted he called Dr. Nieto Generous office this morning who recommended he come to the ED. All other sx denied. No other complaints at this time.            PCP: Scott Chacon MD        Past History     Past Medical History:  Past Medical History:   Diagnosis Date    Back pain     Chronic pain     Headache(784.0)     HTN (hypertension)     Joint pain     hx of joint pain       Past Surgical History:  Past Surgical History:   Procedure Laterality Date    HX LUMBAR LAMINECTOMY  2002    L4/L5    HX ORTHOPAEDIC  12/04/2017    Lumbar surgery (Dr. Kadie Ibrahim)    [address]    \"open lung bx\"    HX OTHER SURGICAL      laminectomy/fusion L4-5  S1    NEUROLOGICAL PROCEDURE UNLISTED  2003    pt spinal fracture T12 and L3       Family History:  Family History   Problem Relation Age of Onset    Colon Cancer Mother     Hypertension Mother     Hypertension Father     Other Brother     Cancer Brother        Social History:  Social History   Substance Use Topics    Smoking status: Former Smoker     Quit date: 10/17/2012    Smokeless tobacco: Former User    Alcohol use Yes      Comment: daily-beer and wine       Allergies: Allergies   Allergen Reactions    Bee Sting  [Sting, Bee] Swelling     DIZZINESS         Review of Systems     Review of Systems   Constitutional: Negative for fever. HENT: Negative for sore throat. Eyes: Negative for redness and visual disturbance. Respiratory: Negative for shortness of breath and wheezing. Cardiovascular: Positive for chest pain. Gastrointestinal: Negative for abdominal pain, diarrhea, nausea and vomiting. Endocrine: Negative for polyuria. Genitourinary: Negative for dysuria. Musculoskeletal: Negative for arthralgias and neck stiffness. Skin: Negative for rash. Neurological: Negative for headaches. All other systems reviewed and are negative. Physical Exam     Visit Vitals    /77    Pulse (!) 58    Temp 97.5 °F (36.4 °C)    Resp 13    Ht 5' 11\" (1.803 m)    Wt 65.8 kg (145 lb)    SpO2 100%    BMI 20.22 kg/m2       Physical Exam   Constitutional: He appears well-developed and well-nourished. No distress. HENT:   Head: Normocephalic and atraumatic. Eyes: Conjunctivae are normal. Right eye exhibits no discharge. Left eye exhibits no discharge. Neck: Normal range of motion. Neck supple. Cardiovascular: Normal rate and regular rhythm. Pulmonary/Chest: Effort normal and breath sounds normal. No respiratory distress. He exhibits tenderness. TTP with deep palpation in left chest wall in area of left nipple   Abdominal: Soft. Bowel sounds are normal. There is no tenderness. There is no rebound and no guarding. Musculoskeletal: Normal range of motion. He exhibits no edema, tenderness or deformity.    No palpable cords   Neurological: He is alert. He exhibits normal muscle tone. Skin: Skin is warm and dry. He is not diaphoretic. Psychiatric: He has a normal mood and affect. His behavior is normal.   Nursing note and vitals reviewed. Diagnostic Study Results     Labs -  Recent Results (from the past 12 hour(s))   EKG, 12 LEAD, INITIAL    Collection Time: 12/18/17 10:10 AM   Result Value Ref Range    Ventricular Rate 60 BPM    Atrial Rate 60 BPM    P-R Interval 146 ms    QRS Duration 106 ms    Q-T Interval 440 ms    QTC Calculation (Bezet) 440 ms    Calculated P Axis 77 degrees    Calculated R Axis 57 degrees    Calculated T Axis 43 degrees    Diagnosis       Normal sinus rhythm  Incomplete right bundle branch block  Borderline ECG  When compared with ECG of 24-NOV-2017 08:35,  No significant change was found  Confirmed by David Floyd ([id number]) on 12/18/2017 1:18:08 PM     CBC WITH AUTOMATED DIFF    Collection Time: 12/18/17 10:15 AM   Result Value Ref Range    WBC 9.9 4.6 - 13.2 K/uL    RBC 3.90 (L) 4.70 - 5.50 M/uL    HGB 11.4 (L) 13.0 - 16.0 g/dL    HCT 35.1 (L) 36.0 - 48.0 %    MCV 90.0 74.0 - 97.0 FL    MCH 29.2 24.0 - 34.0 PG    MCHC 32.5 31.0 - 37.0 g/dL    RDW 12.7 11.6 - 14.5 %    PLATELET 190 (H) 112 - 420 K/uL    MPV 8.3 (L) 9.2 - 11.8 FL    NEUTROPHILS 80 (H) 40 - 73 %    LYMPHOCYTES 13 (L) 21 - 52 %    MONOCYTES 6 3 - 10 %    EOSINOPHILS 1 0 - 5 %    BASOPHILS 0 0 - 2 %    ABS. NEUTROPHILS 7.9 1.8 - 8.0 K/UL    ABS. LYMPHOCYTES 1.3 0.9 - 3.6 K/UL    ABS. MONOCYTES 0.6 0.05 - 1.2 K/UL    ABS. EOSINOPHILS 0.1 0.0 - 0.4 K/UL    ABS.  BASOPHILS 0.0 0.0 - 0.06 K/UL    DF AUTOMATED     METABOLIC PANEL, BASIC    Collection Time: 12/18/17 10:15 AM   Result Value Ref Range    Sodium 136 136 - 145 mmol/L    Potassium 4.1 3.5 - 5.5 mmol/L    Chloride 101 100 - 108 mmol/L    CO2 27 21 - 32 mmol/L    Anion gap 8 3.0 - 18 mmol/L    Glucose 101 (H) 74 - 99 mg/dL    BUN 29 (H) 7.0 - 18 MG/DL    Creatinine 0.73 0.6 - 1.3 MG/DL    BUN/Creatinine ratio 40 (H) 12 - 20      GFR est AA >60 >60 ml/min/1.73m2    GFR est non-AA >60 >60 ml/min/1.73m2    Calcium 8.9 8.5 - 10.1 MG/DL   D DIMER    Collection Time: 12/18/17 10:15 AM   Result Value Ref Range    D DIMER 1.82 (H) <0.46 ug/ml(FEU)   TROPONIN I    Collection Time: 12/18/17 10:15 AM   Result Value Ref Range    Troponin-I, Qt. <0.02 0.00 - 0.06 NG/ML   PROTHROMBIN TIME + INR    Collection Time: 12/18/17 10:15 AM   Result Value Ref Range    Prothrombin time 12.1 11.5 - 15.2 sec    INR 0.9 0.8 - 1.2     PTT    Collection Time: 12/18/17 10:15 AM   Result Value Ref Range    aPTT 95.3 (H) 23.0 - 36.4 SEC       Radiologic Studies -   CTA CHEST W OR W WO CONT   Final Result   IMPRESSION:     1. Small nonocclusive filling defects within an anterior right lower lobe  subsegmental pulmonary artery branch and a left upper lobe subsegmental branch  as above.     Findings called to Dr. Lizbeth Dubose at 1252 hours.     2. COPD. Postsurgical change of the right lung with several staple lines present  as above.     3. Scattered areas of presumed chronic scarring due to morphology and regional  minor or bronchial dilatation. There is no prior CT for comparison.     4. No pleural effusion or consolidation   XR CHEST PA LAT   Final Result   IMPRESSION  Impression: No acute finding         Medical Decision Making   I am the first provider for this patient. I reviewed the vital signs, available nursing notes, past medical history, past surgical history, family history and social history. Vital Signs-Reviewed the patient's vital signs. Pulse Oximetry Analysis -  100 on room air (Interpretation)NL. EKG: Interpreted by the EP.   SR at 60bpm, nml axis, incomplete rbbb, no stemi, sim to prior ekg from 11/24/17 (incomplete RBBB present on prior ekg)    Records Reviewed: Nursing Notes and Old Medical Records (Time of Review: 10:05 AM)    ED Course: Progress Notes, Reevaluation, and Consults:  Consult: Discussed care with NP-Joshe  Standard discussion; including history of patients chief complaint, available diagnostic results, and treatment course. regarding anticoagulant given pt being 2 weeks post op shew will check with Dr. Cesar Chavarria who she thinks will be fine with anticoagulant and will speak with Dr. Brent Simmons regarding admission/observation. 1:23 PM    Consult:  Discussed care with Dr. Carlin Zimmerman. Standard discussion; including history of patients chief complaint, available diagnostic results, and treatment course. She accepted the pt and stated if it is ok with surgery she would prefer the pt to be started on Heparin for anti coagulation. 1:59 PM    Consult:  Discussed care with NP-Derrick. Standard discussion; including history of patients chief complaint, available diagnostic results, and treatment course. She will discuss case with surgeon Dr. Brent Simmons and is fine with anticoagulation for pts PE and has no preference for any specific anticoagulant. She will also differ to the hospitalist as well. 2:18 PM        Provider Notes (Medical Decision Making):   MDM  Small bilateral PEs. Recently post op. Hemodynamically stable. Heparin bolus and gtt. Admit for further management. For Hospitalized Patients:    1. Hospitalization Decision Time:  The decision to hospitalize the patient was made by Dr. Carlin Zimmerman at 1:59PM on 12/18/2017    Medications   heparin 25,000 units in D5W 250 ml infusion (18 Units/kg/hr × 65.8 kg IntraVENous New Bag 12/18/17 1506)   iopamidol (ISOVUE-370) 76 % injection 80 mL (80 mL IntraVENous Given 12/18/17 1134)   heparin (porcine) 1,000 unit/mL injection 5,000 Units (5,000 Units IntraVENous Given 12/18/17 1508)         Diagnosis     Clinical Impression:   1.  Other acute pulmonary embolism without acute cor pulmonale (HCC)    2. Chest pain, unspecified type        Disposition: admit    Follow-up Information     None          Patient's Medications   Start Taking    No medications on file   Continue Taking    CLOBETASOL (TEMOVATE) 0.05 % TOPICAL CREAM    Use 1 Application to affected area Twice Daily. DESONIDE (TRIDESILON) 0.05 % TOPICAL LOTION    Use 1 Application to affected area Twice Daily. LISINOPRIL-HYDROCHLOROTHIAZIDE (PRINZIDE, ZESTORETIC) 20-12.5 MG PER TABLET    TAKE ONE TABLET BY MOUTH ONCE A DAY    OXYCODONE-ACETAMINOPHEN (PERCOCET 10)  MG PER TABLET    Take 1 Tab by mouth every six (6) hours as needed. Max Daily Amount: 4 Tabs. Indications: Pain    TOPIRAMATE (TOPAMAX) 25 MG TABLET    Take 3 Tabs by mouth nightly. Indications: neuritis    ZOLPIDEM (AMBIEN) 5 MG TABLET    Take 5 mg by mouth nightly as needed. These Medications have changed    No medications on file   Stop Taking    CEPHALEXIN (KEFLEX) 500 MG CAPSULE    Take 1 Cap by mouth four (4) times daily. CHOLECALCIFEROL (VITAMIN D3) 1,000 UNIT TABLET    Take 1,000 Units by mouth daily. DOXYCYCLINE (MONODOX) 100 MG CAPSULE    Take 100 mg by mouth daily. _______________________________    Attestations:  Jace [address] [place] [address] acting as a scribe for and in the presence of Juan Erwin,       December 18, 2017 at 4:27 PM       Provider Attestation:      I personally performed the services described in the documentation, reviewed the documentation, as recorded by the scribe in my presence, and it accurately and completely records my words and actions.  December 18, 2017 at 4:27 PM - [facility], DO    _______________________________

## 2017-12-18 NOTE — PROGRESS NOTES
Received phone call from [facility] ER. Pt has acute SOB and chest pain with 2-small non-occlusive Pulmonary Embolisms per ER physician Dr. Erma Cai. Pt had PLF L4/5 on 12/04/17, Dr. Erma Cai wanted to know if it was ok to start anti-coagulation? Yes, confirmed with Dr. Kelly Tinoco this is ok.

## 2017-12-18 NOTE — IP AVS SNAPSHOT
Thom Hung 
 
 
 [facility] [address] Patient: Tracie Townsend MRN: [id number] SCR:[id number] My Medications TAKE these medications as instructed Instructions Each Dose to Equal  
 Morning Noon Evening Bedtime  
 clobetasol 0.05 % topical cream  
Commonly known as:  Cheryl Lunch Your last dose was: Your next dose is:    
   
   
 Use 1 Application to affected area Twice Daily. desonide 0.05 % topical lotion Commonly known as:  Auther Card Your last dose was: Your next dose is:    
   
   
 Use 1 Application to affected area Twice Daily. lisinopril-hydroCHLOROthiazide 20-12.5 mg per tablet Commonly known as:  Catarina Davies Your last dose was: Your next dose is: TAKE ONE TABLET BY MOUTH ONCE A DAY  
     
   
   
   
  
 oxyCODONE-acetaminophen  mg per tablet Commonly known as:  PERCOCET 10 Your last dose was: Your next dose is: Take 1 Tab by mouth every six (6) hours as needed. Max Daily Amount: 4 Tabs. Indications: Pain 1 Tab * rivaroxaban 15 mg (42)- 20 mg (9) Dspk Commonly known as:  Shanna Bowie Your last dose was: Your next dose is: Take one 15 mg tablet twice a day with food for the first 21 days. Then, take one 20 mg tablet once a day with food for 9 days. contact PCP for further refills * rivaroxaban 15 mg Tab tablet Commonly known as:  Shanna Bowie Your last dose was: Your next dose is: Take 1 Tab by mouth two (2) times daily (with meals) for 21 days. Indications: pulmonary thromboembolism 15 mg  
    
   
   
   
  
 * rivaroxaban 20 mg Tab tablet Commonly known as:  Shanna Bowie Start taking on:  1/11/2018 Your last dose was: Your next dose is: Take 1 Tab by mouth daily (with breakfast). Indications: pulmonary thromboembolism 20 mg  
    
   
   
   
  
 topiramate 25 mg tablet Commonly known as:  TOPAMAX Your last dose was: Your next dose is: Take 3 Tabs by mouth nightly. Indications: neuritis 75 mg  
    
   
   
   
  
 zolpidem 5 mg tablet Commonly known as:  AMBIEN Your last dose was: Your next dose is: Take 5 mg by mouth nightly as needed. 5 mg * Notice: This list has 3 medication(s) that are the same as other medications prescribed for you. Read the directions carefully, and ask your doctor or other care provider to review them with you. Where to Get Your Medications Information on where to get these meds will be given to you by the nurse or doctor. ! Ask your nurse or doctor about these medications  
  oxyCODONE-acetaminophen  mg per tablet  
 rivaroxaban 15 mg (42)- 20 mg (9) Dspk  
 rivaroxaban 15 mg Tab tablet  
 rivaroxaban 20 mg Tab tablet

## 2017-12-19 LAB
ALBUMIN SERPL-MCNC: 3.6 G/DL (ref 3.4–5)
ALBUMIN/GLOB SERPL: 1.2 {RATIO} (ref 0.8–1.7)
ALP SERPL-CCNC: 83 U/L (ref 45–117)
ALT SERPL-CCNC: 20 U/L (ref 16–61)
ANION GAP SERPL CALC-SCNC: 5 MMOL/L (ref 3–18)
APTT PPP: 143.1 SEC (ref 23–36.4)
APTT PPP: >180 SEC (ref 23–36.4)
APTT PPP: >180 SEC (ref 23–36.4)
AST SERPL-CCNC: 12 U/L (ref 15–37)
BASOPHILS # BLD: 0.1 K/UL (ref 0–0.1)
BASOPHILS NFR BLD: 1 % (ref 0–2)
BILIRUB DIRECT SERPL-MCNC: 0.1 MG/DL (ref 0–0.2)
BILIRUB SERPL-MCNC: 0.3 MG/DL (ref 0.2–1)
BUN SERPL-MCNC: 16 MG/DL (ref 7–18)
BUN/CREAT SERPL: 26 (ref 12–20)
CALCIUM SERPL-MCNC: 8.5 MG/DL (ref 8.5–10.1)
CHLORIDE SERPL-SCNC: 102 MMOL/L (ref 100–108)
CK MB CFR SERPL CALC: 2.8 % (ref 0–4)
CK MB SERPL-MCNC: 1.3 NG/ML (ref 5–25)
CK SERPL-CCNC: 46 U/L (ref 39–308)
CO2 SERPL-SCNC: 30 MMOL/L (ref 21–32)
CREAT SERPL-MCNC: 0.62 MG/DL (ref 0.6–1.3)
DIFFERENTIAL METHOD BLD: ABNORMAL
EOSINOPHIL # BLD: 0.1 K/UL (ref 0–0.4)
EOSINOPHIL NFR BLD: 2 % (ref 0–5)
ERYTHROCYTE [DISTWIDTH] IN BLOOD BY AUTOMATED COUNT: 12.8 % (ref 11.6–14.5)
GLOBULIN SER CALC-MCNC: 2.9 G/DL (ref 2–4)
GLUCOSE SERPL-MCNC: 86 MG/DL (ref 74–99)
HCT VFR BLD AUTO: 34.4 % (ref 36–48)
HGB BLD-MCNC: 11.3 G/DL (ref 13–16)
INR PPP: 1.1 (ref 0.8–1.2)
LYMPHOCYTES # BLD: 1.2 K/UL (ref 0.9–3.6)
LYMPHOCYTES NFR BLD: 21 % (ref 21–52)
MAGNESIUM SERPL-MCNC: 2.6 MG/DL (ref 1.6–2.6)
MCH RBC QN AUTO: 29.3 PG (ref 24–34)
MCHC RBC AUTO-ENTMCNC: 32.8 G/DL (ref 31–37)
MCV RBC AUTO: 89.1 FL (ref 74–97)
MONOCYTES # BLD: 0.6 K/UL (ref 0.05–1.2)
MONOCYTES NFR BLD: 10 % (ref 3–10)
NEUTS SEG # BLD: 3.7 K/UL (ref 1.8–8)
NEUTS SEG NFR BLD: 66 % (ref 40–73)
PHOSPHATE SERPL-MCNC: 3.8 MG/DL (ref 2.5–4.9)
PLATELET # BLD AUTO: 435 K/UL (ref 135–420)
PMV BLD AUTO: 8.8 FL (ref 9.2–11.8)
POTASSIUM SERPL-SCNC: 4.6 MMOL/L (ref 3.5–5.5)
PROT SERPL-MCNC: 6.5 G/DL (ref 6.4–8.2)
PROTHROMBIN TIME: 13.4 SEC (ref 11.5–15.2)
RBC # BLD AUTO: 3.86 M/UL (ref 4.7–5.5)
SODIUM SERPL-SCNC: 137 MMOL/L (ref 136–145)
TROPONIN I SERPL-MCNC: <0.02 NG/ML (ref 0–0.04)
WBC # BLD AUTO: 5.7 K/UL (ref 4.6–13.2)

## 2017-12-19 PROCEDURE — 85730 THROMBOPLASTIN TIME PARTIAL: CPT | Performed by: INTERNAL MEDICINE

## 2017-12-19 PROCEDURE — 82550 ASSAY OF CK (CPK): CPT | Performed by: INTERNAL MEDICINE

## 2017-12-19 PROCEDURE — 80076 HEPATIC FUNCTION PANEL: CPT | Performed by: PHYSICIAN ASSISTANT

## 2017-12-19 PROCEDURE — 80048 BASIC METABOLIC PNL TOTAL CA: CPT | Performed by: PHYSICIAN ASSISTANT

## 2017-12-19 PROCEDURE — 85025 COMPLETE CBC W/AUTO DIFF WBC: CPT | Performed by: PHYSICIAN ASSISTANT

## 2017-12-19 PROCEDURE — 65660000000 HC RM CCU STEPDOWN

## 2017-12-19 PROCEDURE — 36415 COLL VENOUS BLD VENIPUNCTURE: CPT | Performed by: INTERNAL MEDICINE

## 2017-12-19 PROCEDURE — 84100 ASSAY OF PHOSPHORUS: CPT | Performed by: PHYSICIAN ASSISTANT

## 2017-12-19 PROCEDURE — 74011250637 HC RX REV CODE- 250/637: Performed by: INTERNAL MEDICINE

## 2017-12-19 PROCEDURE — 74011250636 HC RX REV CODE- 250/636: Performed by: INTERNAL MEDICINE

## 2017-12-19 PROCEDURE — 83735 ASSAY OF MAGNESIUM: CPT | Performed by: PHYSICIAN ASSISTANT

## 2017-12-19 PROCEDURE — 85610 PROTHROMBIN TIME: CPT | Performed by: INTERNAL MEDICINE

## 2017-12-19 PROCEDURE — 93970 EXTREMITY STUDY: CPT

## 2017-12-19 RX ORDER — POLYETHYLENE GLYCOL 3350 17 G/17G
17 POWDER, FOR SOLUTION ORAL DAILY
Status: DISCONTINUED | OUTPATIENT
Start: 2017-12-19 | End: 2017-12-20 | Stop reason: HOSPADM

## 2017-12-19 RX ORDER — OXYCODONE AND ACETAMINOPHEN 10; 325 MG/1; MG/1
1 TABLET ORAL
Qty: 10 TAB | Refills: 0 | Status: SHIPPED | OUTPATIENT
Start: 2017-12-19 | End: 2018-01-09 | Stop reason: SDUPTHER

## 2017-12-19 RX ADMIN — OXYCODONE HYDROCHLORIDE AND ACETAMINOPHEN 1 TABLET: 5; 325 TABLET ORAL at 06:09

## 2017-12-19 RX ADMIN — OXYCODONE HYDROCHLORIDE AND ACETAMINOPHEN 1 TABLET: 5; 325 TABLET ORAL at 23:49

## 2017-12-19 RX ADMIN — HEPARIN SODIUM AND DEXTROSE 15 UNITS/KG/HR: 10000; 5 INJECTION INTRAVENOUS at 07:28

## 2017-12-19 RX ADMIN — HEPARIN SODIUM AND DEXTROSE 12 UNITS/KG/HR: 10000; 5 INJECTION INTRAVENOUS at 12:41

## 2017-12-19 RX ADMIN — POLYETHYLENE GLYCOL 3350 17 G: 17 POWDER, FOR SOLUTION ORAL at 11:32

## 2017-12-19 RX ADMIN — OXYCODONE HYDROCHLORIDE AND ACETAMINOPHEN 1 TABLET: 5; 325 TABLET ORAL at 15:27

## 2017-12-19 RX ADMIN — OXYCODONE HYDROCHLORIDE AND ACETAMINOPHEN 1 TABLET: 5; 325 TABLET ORAL at 19:23

## 2017-12-19 RX ADMIN — OXYCODONE HYDROCHLORIDE AND ACETAMINOPHEN 1 TABLET: 5; 325 TABLET ORAL at 10:24

## 2017-12-19 NOTE — PROGRESS NOTES
Spoke to Dr. Isabella Ruvalcaba about pt's concern for bowel movement and scheduled home meds. Dr. Isabella Ruvalcaba stated to place one time daily order for miralax and he will be up to schedule meds. Will place order and update pt.

## 2017-12-19 NOTE — PROGRESS NOTES
Care Management Interventions  Current Support Network: Lives with Spouse Alexandra Graham [phone])  Confirm Follow Up Transport: Family (spouse)      74 yr old male admit with pulmonary embolism. Patient stated that he lives at home with his spouse and address was verified. Patient stated that he was independent prior to this admission and denies any DME/home 02. Patient wants to go home with family when ready for discharge and verbalized no needs at this time, will remain available.

## 2017-12-19 NOTE — PROGRESS NOTES
[facility]ist [facility]  Progress Note    Patient: Sridhar Reyna Age: 74 y.o. : 1950 MR#: [id number]: [id number]  Date/Time: 2017     Subjective:     Review of systems  General: No fevers or chills. Cardiovascular: No chest pain or pressure. No palpitations. Pulmonary: No shortness of breath, cough or wheeze. Gastrointestinal: No abdominal pain, nausea, vomiting or diarrhea. Genitourinary: No urinary frequency, urgency, hesitancy or dysuria. Musculoskeletal: No joint or muscle pain, no back pain, no recent trauma. Neurologic: No headache, numbness, tingling or weakness. Assessment/Plan:   1.  Acute Bilateral PE   2 Recent Lumbar surgery     PLAN   - On Heaprin   - Follow DVT study   - Possible dc in Am with NOAC         Case discussed with:  [x]Patient  []Family  [x]Nursing  []Case Management  DVT Prophylaxis:  []Lovenox  []Hep SQ  []SCDs  []Coumadin   [x]On Heparin gtt    Patient Active Problem List   Diagnosis Code    Facet arthritis of lumbar region ([facility] [address] M46.96    DDD (degenerative disc disease), cervical M50.30    DDD (degenerative disc disease), lumbar M51.36    Neuritis of left lower extremity G57.92    HNP (herniated nucleus pulposus), lumbar M51.26    Lumbar spinal stenosis M48.061    Spondylolisthesis at L4-L5 level M43.16    Lumbar facet arthropathy M12.88    Left leg weakness R29.898    Neuritis M79.2    Pars defect with spondylolisthesis M43.00, M43.10    Spondylolisthesis of lumbar region M43.16    S/P lumbar fusion Z98.1    Pulmonary emboli (HCC) I26.99    Hypertension I10    Pulmonary embolism (HCC) I26.99    Post laminectomy syndrome M96.1         Objective:   VS:   Visit Vitals    /69 (BP 1 Location: Right arm, BP Patient Position: At rest)    Pulse 63    Temp 98 °F (36.7 °C)    Resp 16    Ht 5' 11\" (1.803 m)    Wt 62.8 kg (138 lb 8 oz)    SpO2 96%    BMI 19.32 kg/m2      Tmax/24hrs: Temp (24hrs), Av.6 °F (36.4 °C), Min:97.3 °F (36.3 °C), Max:98 °F (36.7 °C)  IOBRIEF  Intake/Output Summary (Last 24 hours) at 12/19/17 1437  Last data filed at 12/18/17 2000   Gross per 24 hour   Intake            82.42 ml   Output                0 ml   Net            82.42 ml       General:  Alert, cooperative, no acute distress   HEENT:  NC, Atraumatic. PERRLA, anicteric sclerae. Pulmonary:  Bilateral air entry present . No Wheezing/Rhonchi/Rales. Cardiovascular: Regular rate and Rhythm. GI:  Soft, Non distended, Non tender. + Bowel sounds. Extremities:  No edema, cyanosis, clubbing. No calf tenderness. Psych:  Not anxious or agitated. Neurologic: Grossly - Motor and Sensory functions are intact .       Medications:   Current Facility-Administered Medications   Medication Dose Route Frequency    polyethylene glycol (MIRALAX) packet 17 g  17 g Oral DAILY    heparin 25,000 units in D5W 250 ml infusion  15 Units/kg/hr IntraVENous TITRATE    oxyCODONE-acetaminophen (PERCOCET) 5-325 mg per tablet 1 Tab  1 Tab Oral Q4H PRN    HYDROmorphone (DILAUDID) syringe 0.5 mg  0.5 mg IntraVENous Q3H PRN    naloxone (NARCAN) injection 0.4 mg  0.4 mg IntraVENous PRN    diphenhydrAMINE (BENADRYL) injection 12.5 mg  12.5 mg IntraVENous Q4H PRN    ondansetron (ZOFRAN) injection 4 mg  4 mg IntraVENous Q4H PRN       Labs:    Recent Results (from the past 24 hour(s))   PROTHROMBIN TIME + INR    Collection Time: 12/18/17  9:05 PM   Result Value Ref Range    Prothrombin time 13.3 11.5 - 15.2 sec    INR 1.1 0.8 - 1.2     CARDIAC PANEL,(CK, CKMB & TROPONIN)    Collection Time: 12/18/17  9:05 PM   Result Value Ref Range    CK 47 39 - 308 U/L    CK - MB 1.5 <3.6 ng/ml    CK-MB Index 3.2 0.0 - 4.0 %    Troponin-I, Qt. <0.02 0.0 - 0.045 NG/ML   PTT    Collection Time: 12/18/17  9:05 PM   Result Value Ref Range    aPTT >180.0 (HH) 23.0 - 36.4 SEC   PTT    Collection Time: 12/19/17  2:32 AM   Result Value Ref Range    aPTT >180.0 (HH) 23.0 - 36.4 SEC PROTHROMBIN TIME + INR    Collection Time: 12/19/17  2:32 AM   Result Value Ref Range    Prothrombin time 13.4 11.5 - 15.2 sec    INR 1.1 0.8 - 1.2     CARDIAC PANEL,(CK, CKMB & TROPONIN)    Collection Time: 12/19/17  2:32 AM   Result Value Ref Range    CK 46 39 - 308 U/L    CK - MB 1.3 <3.6 ng/ml    CK-MB Index 2.8 0.0 - 4.0 %    Troponin-I, Qt. <0.02 0.0 - 0.045 NG/ML   PTT    Collection Time: 12/19/17 10:15 AM   Result Value Ref Range    aPTT >180.0 (HH) 23.0 - 36.4 SEC   CBC WITH AUTOMATED DIFF    Collection Time: 12/19/17  1:03 PM   Result Value Ref Range    WBC 5.7 4.6 - 13.2 K/uL    RBC 3.86 (L) 4.70 - 5.50 M/uL    HGB 11.3 (L) 13.0 - 16.0 g/dL    HCT 34.4 (L) 36.0 - 48.0 %    MCV 89.1 74.0 - 97.0 FL    MCH 29.3 24.0 - 34.0 PG    MCHC 32.8 31.0 - 37.0 g/dL    RDW 12.8 11.6 - 14.5 %    PLATELET 689 (H) 993 - 420 K/uL    MPV 8.8 (L) 9.2 - 11.8 FL    NEUTROPHILS 66 40 - 73 %    LYMPHOCYTES 21 21 - 52 %    MONOCYTES 10 3 - 10 %    EOSINOPHILS 2 0 - 5 %    BASOPHILS 1 0 - 2 %    ABS. NEUTROPHILS 3.7 1.8 - 8.0 K/UL    ABS. LYMPHOCYTES 1.2 0.9 - 3.6 K/UL    ABS. MONOCYTES 0.6 0.05 - 1.2 K/UL    ABS. EOSINOPHILS 0.1 0.0 - 0.4 K/UL    ABS.  BASOPHILS 0.1 0.0 - 0.1 K/UL    DF AUTOMATED     METABOLIC PANEL, BASIC    Collection Time: 12/19/17  1:03 PM   Result Value Ref Range    Sodium 137 136 - 145 mmol/L    Potassium 4.6 3.5 - 5.5 mmol/L    Chloride 102 100 - 108 mmol/L    CO2 30 21 - 32 mmol/L    Anion gap 5 3.0 - 18 mmol/L    Glucose 86 74 - 99 mg/dL    BUN 16 7.0 - 18 MG/DL    Creatinine 0.62 0.6 - 1.3 MG/DL    BUN/Creatinine ratio 26 (H) 12 - 20      GFR est AA >60 >60 ml/min/1.73m2    GFR est non-AA >60 >60 ml/min/1.73m2    Calcium 8.5 8.5 - 10.1 MG/DL   MAGNESIUM    Collection Time: 12/19/17  1:03 PM   Result Value Ref Range    Magnesium 2.6 1.6 - 2.6 mg/dL   PHOSPHORUS    Collection Time: 12/19/17  1:03 PM   Result Value Ref Range    Phosphorus 3.8 2.5 - 4.9 MG/DL   HEPATIC FUNCTION PANEL    Collection Time: 12/19/17  1:03 PM   Result Value Ref Range    Protein, total 6.5 6.4 - 8.2 g/dL    Albumin 3.6 3.4 - 5.0 g/dL    Globulin 2.9 2.0 - 4.0 g/dL    A-G Ratio 1.2 0.8 - 1.7      Bilirubin, total 0.3 0.2 - 1.0 MG/DL    Bilirubin, direct 0.1 0.0 - 0.2 MG/DL    Alk.  phosphatase 83 45 - 117 U/L    AST (SGOT) 12 (L) 15 - 37 U/L    ALT (SGPT) 20 16 - 61 U/L       Signed By: Lazarus Bland, MD     December 19, 2017

## 2017-12-19 NOTE — PROCEDURES
[facility]  *** FINAL REPORT ***    Name: Chris Matthews  MRN: [id number]    Inpatient  : 1950  HIS Order #: [id number]  [address] [facility] Visit #: [id number]  Date: 19 Dec 2017    TYPE OF TEST: Peripheral Venous Testing    REASON FOR TEST    Right Leg:-  Deep venous thrombosis:           No  Superficial venous thrombosis:    No  Deep venous insufficiency:        Not examined  Superficial venous insufficiency: Not examined    Left Leg:-  Deep venous thrombosis:           No  Superficial venous thrombosis:    No  Deep venous insufficiency:        Not examined  Superficial venous insufficiency: Not examined      INTERPRETATION/FINDINGS  Duplex images were obtained using 2-D gray scale, color flow, and  spectral Doppler analysis. Right le. No evidence of deep venous thrombosis detected in the veins  visualized. 2. Deep veins visualized include the common femoral, femoral,  popliteal, posterior tibial and peroneal veins. 3. No evidence of superficial thrombosis detected. 4. Superficial veins visualized include the proximal great saphenous  vein. Left le. No evidence of deep venous thrombosis detected in the veins  visualized. 2. Deep veins visualized include the common femoral, femoral,  popliteal, posterior tibial and peroneal veins. 3. No evidence of superficial thrombosis detected. 4. Superficial veins visualized include the proximal great saphenous  vein. ADDITIONAL COMMENTS    I have personally reviewed the data relevant to the interpretation of  this  study. TECHNOLOGIST: Brendolyn Cabot, RVT  Signed: 2017 02:33 PM    PHYSICIAN: Susie Reeves MD  Signed: 2017 10:54 AM

## 2017-12-19 NOTE — PROGRESS NOTES
Received critical lab level PTT >180. Heparin was held and decrease as per protocol. No distress noted. Patient tolerated well. Monitoring ongoing.

## 2017-12-19 NOTE — CONSULTS
Consult    Patient: Frannie Quintana MRN: [id number]  [id number]: [id number]    YOB: 1950  Age: 74 y.o. Sex: male      Subjective:solution of sy     Frannie Quintana is a 74 y.o. male who is being seen for DVT. Patient is 2 weeks sp revision lumbar surgery. Was doing well. Ambulating a mile a day. No leg pain or swelling. Surgery considered low risk for DVT PE requiring no prophylaxis.     Past Medical History:   Diagnosis Date    Back pain     Chronic pain     Headache(784.0)     HTN (hypertension)     Joint pain     hx of joint pain    Pulmonary emboli [facility])      Past Surgical History:   Procedure Laterality Date    HX LUMBAR LAMINECTOMY  2002    L4/L5    HX ORTHOPAEDIC  12/04/2017    Lumbar surgery (Dr. Cameron Johnson)    [address]    \"open lung bx\"    HX OTHER SURGICAL      laminectomy/fusion L4-5  S1    NEUROLOGICAL PROCEDURE UNLISTED  2003    pt spinal fracture T12 and L3      Family History   Problem Relation Age of Onset    Colon Cancer Mother     Hypertension Mother     Hypertension Father     Clotting Disorder Father     Other Brother     Cancer Brother      Social History   Substance Use Topics    Smoking status: Former Smoker     Quit date: 10/17/2012    Smokeless tobacco: Former User    Alcohol use Yes      Comment: daily-beer and wine      Current Facility-Administered Medications   Medication Dose Route Frequency Provider Last Rate Last Dose    heparin 25,000 units in D5W 250 ml infusion  15 Units/kg/hr IntraVENous TITRATE Kristy Cheney MD 9.9 mL/hr at 12/19/17 0728 15 Units/kg/hr at 12/19/17 0728    oxyCODONE-acetaminophen (PERCOCET) 5-325 mg per tablet 1 Tab  1 Tab Oral Q4H PRN Kristy Cheney MD   1 Tab at 12/19/17 7958    HYDROmorphone (DILAUDID) syringe 0.5 mg  0.5 mg IntraVENous Q3H PRN Kristy Cheney MD        naloxone [facility]) injection 0.4 mg  0.4 mg IntraVENous PRN Kristy Cheney MD        diphenhydrAMINE (BENADRYL) injection 12.5 mg  12.5 mg IntraVENous Q4H PRN Ute Capps MD        ondansetron [facility]) injection 4 mg  4 mg IntraVENous Q4H PRN Ute Capps MD            Allergies   Allergen Reactions    Bee Sting  [Sting, Bee] Swelling     DIZZINESS       Review of Systems:  A comprehensive review of systems was negative except for that written in the History of Present Illness. Objective:     Vitals:    12/18/17 1500 12/18/17 1938 12/18/17 2337 12/19/17 0314   BP: 120/77 108/66 95/55 103/66   Pulse: (!) 58 66 63 62   Resp: 13 16 16 16   Temp:  97.3 °F (36.3 °C) 97.7 °F (36.5 °C) 97.5 °F (36.4 °C)   SpO2: 100% 96% 97% 99%   Weight:    138 lb 8 oz (62.8 kg)   Height:            Physical Exam:  General:  Alert, cooperative, no distress, appears stated age. Eyes:  Conjunctivae/corneas clear. PERRL, EOMs intact. Fundi benign   Ears:  Normal TMs and external ear canals both ears. Nose: Nares normal. Septum midline. Mucosa normal. No drainage or sinus tenderness. Mouth/Throat: Lips, mucosa, and tongue normal. Teeth and gums normal.   Neck: Supple, symmetrical, trachea midline, no adenopathy, thyroid: no enlargment/tenderness/nodules, no carotid bruit and no JVD. Back:   Symmetric, no curvature. ROM normal. No CVA tenderness. Lungs:   Clear to auscultation bilaterally. Heart:  Regular rate and rhythm, S1, S2 normal, no murmur, click, rub or gallop. Abdomen:   Soft, non-tender. Bowel sounds normal. No masses,  No organomegaly. Extremities: Extremities normal, atraumatic, no cyanosis or edema. Pulses: 2+ and symmetric all extremities. Skin: Skin color, texture, turgor normal. No rashes or lesions   Lymph nodes: Cervical, supraclavicular, and axillary nodes normal.   Neurologic: CNII-XII intact. Normal strength, sensation and reflexes throughout. Assessment:     Unusual presentation of PE in ambulatory patient    Plan:     Would check for DVT, coagulopathy ,etc.    Restart PT ASAP .     [facility] as indicated.     Signed By: Masoud Neli MD     December 19, 2017

## 2017-12-19 NOTE — PROGRESS NOTES
NUTRITION    Nutrition Screen      RECOMMENDATIONS / PLAN:     - Add supplements: Ensure Enlive BID.   - Continue RD inpatient monitoring and evaluation. NUTRITION INTERVENTIONS & DIAGNOSIS:     [x] Meals/Snacks: modified diet  [x] Medical food supplementation: initiate      Nutrition Diagnosis: Unintentional weight loss related to inadequate energy intake as evidenced by 12 lb, 8% weight loss x 2 weeks. ASSESSMENT:     Pt tolerating diet with good appetite and good to fair meal intake. Agreeable to supplements. Average po intake adequate to meet patients estimated nutritional needs:   [] Yes     [] No   [] Unable to determine at this time    Diet: DIET CARDIAC Regular      Food Allergies: NKFA  Current Appetite:   [x] Good     [] Fair     [] Poor     [] Other:  Appetite/meal intake prior to admission:   [x] Good     [] Fair     [] Poor     [] Other:  Feeding Limitations:  [] Swallowing difficulty    [] Chewing difficulty    [] Other:  Current Meal Intake: No data found. BM: 12/16   Skin Integrity: WDL   Edema: none  Pertinent Medications: Reviewed    Recent Labs      12/19/17   1303  12/18/17   1015   NA  137  136   K  4.6  4.1   CL  102  101   CO2  30  27   GLU  86  101*   BUN  16  29*   CREA  0.62  0.73   CA  8.5  8.9   MG  2.6   --    PHOS  3.8   --    ALB  3.6   --    SGOT  12*   --    ALT  20   --        Intake/Output Summary (Last 24 hours) at 12/19/17 1458  Last data filed at 12/18/17 2000   Gross per 24 hour   Intake            82.42 ml   Output                0 ml   Net            82.42 ml       Anthropometrics:  Ht Readings from Last 1 Encounters:   12/18/17 5' 11\" (1.803 m)     Last 3 Recorded Weights in this Encounter    12/18/17 0953 12/19/17 0314   Weight: 65.8 kg (145 lb) 62.8 kg (138 lb 8 oz)     Body mass index is 19.32 kg/(m^2).       Borderline Underweight     Weight History: patient reports weight loss over the past 2 weeks PTA, down from usual weight of 150 lb (12 lb, 8% weight loss x 2 weeks)     Weight Metrics 12/19/2017 12/18/2017 12/12/2017 12/4/2017 8/25/2017 8/11/2017 7/7/2017   Weight 138 lb 8 oz - 145 lb 148 lb 4 oz 145 lb 143 lb 12.8 oz 146 lb 3.2 oz   BMI - 19.32 kg/m2 20.22 kg/m2 20.68 kg/m2 20.22 kg/m2 20.06 kg/m2 20.39 kg/m2        Admitting Diagnosis: Pulmonary emboli (HCC)  Pulmonary embolism (HCC)  Post laminectomy syndrome  Hypertension  Pertinent PMHx: HTN, chronic pain    Education Needs:        [x] None identified  [] Identified - Not appropriate at this time  []  Identified and addressed - refer to education log  Learning Limitations:   [x] None identified  [] Identified    Cultural, Mandaeism & ethnic food preferences:  [x] None identified    [] Identified and addressed     ESTIMATED NUTRITION NEEDS:     Calories: 7224-2253 kcal (MSJx1.2-1.3) based on  [x] Actual BW 63 kg     [] IBW   Protein: 50-63 gm (0.8-1 gm/kg) based on  [x] Actual BW      [] IBW   Fluid: 1 mL/kcal     MONITORING & EVALUATION:     Nutrition Goal(s):   1. Po intake of meals will meet >75% of patient estimated nutritional needs within the next 7 days.   Outcome:  [] Met/Ongoing    []  Not Met    [x] New/Initial Goal     Monitoring:   [x] Diet tolerance   [x] Meal intake   [x] Supplement intake   [] GI symptoms/ability to tolerate po diet   [] Respiratory status   [] Plan of care      Previous Recommendations (for follow-up assessments only):     []   Implemented       []   Not Implemented (RD to address)      [] No Longer Appropriate     [] No Recommendation Made     Discharge Planning: cardiac diet   [x] Participated in care planning, discharge planning, & interdisciplinary rounds as appropriate      Kevin Saul, [address], [address] [place] [address]    Pager: [phone]

## 2017-12-19 NOTE — CONSULTS
[facility]  Pulmonary, Critical Care, and Sleep Medicine    Name: India Robertson MRN: [id number]   : 1950 Hospital: [facility]   Date: 2017        Pulmonary Initial In-Patient Consult                                              Consult requesting physician: Dr. Rafael Christy  Reason for Consult: PE    IMPRESSION:   · Small bilateral subsegmental PE with risk factors to include recent surgery and travel hx, on heparin anticoagulation  · Recent lumbar surgery (laminectomy and fusion to L4-S1) on 17  · Hx HTN  · Remote hx of open lung biopsy (), diagnosed with \"probable\" sarcoidosis        RECOMMENDATIONS:   · Agree with anticoagulation -may even do therapeutic lovenox for PE to facilitate discharge. Recommend transition to NOAC such as xeralto for 3 months (for provoked PE) - dosing: 15 mg twice a day with food x 21 days followed by 20 mg daily. Pt will need follow-up with PCP after 3 months anticoagulation. · Follow pending PVLs of b/l LEs (ordered)  · Monitor hemodynamics closely  · BP control per primary  · Further recommendations will be based on the patient's response to recommended treatment and results of the investigation ordered. Thank you for the consult. Will follow along. Subjective/History:     India Robertson is a 74 y.o. male with PMHx significant for HTN, admitted to the hospital for PE. Pt recently underwent a lumbar surgery on  and was discharged to home without problems. Pt has been moving around the house and being active, and last Saturday was able to walk a mile as recommended by surgery. On , pt started to experience chest pain to his left breast under the nipple area and was reproducible by palpation. This occurred about 2-3x that day and recurred the following day, prompting him to call his surgeon and was advised to go to the ED for evaluation. In ED, CTA chest was done which showed small bilateral subsegmental PE.  Pt denies any palpitations, lightheadedness, dizziness, leg swelling, abdominal pain, nausea, vomiting. States that prior to surgery, he was experiencing pain on his left leg and was part of the reason why he wanted the lumbar surgery done. This time around, his RLE appears to be more sensitive with intermittent tingling throughout which appears to be new post-surgery since his RLE pain is improving. Pt works with the [employer] and does a lot of international travel for training. He recently traveled to [facility] this past Sept-Oct, and the longest duration of flight was around 18 hours. Pt denies smoking however admits to drinking alcohol daily (1 beer and a few glasses of wine). Denies recreational drug use. Pt also states that his father was diagnosed with \"blood clots\" when he was in his [de-identified]; mother had colon cancer however no blood clots; and siblings have no hx of clots as well. Pt has not been diagnosed with any form of cancer; and he states that he had the open lung biopsy with a path finding of probable sarcoidosis back in 1978 after an incidental finding on CXR which was worked-up extensively by the [employer] and subsequently not pursued as he was asymptomatic. Review of Systems:  A comprehensive review of systems was negative except for that written in the HPI. Review of Systems:   HEENT: No epistaxis, no nasal drainage, no difficulty in swallowing, no redness in eyes  Respiratory: as above  Cardiovascular: no chest pain, no palpitations, no chronic leg edema, no syncope  Gastrointestinal: no abd pain, no vomiting, no diarrhea, no bleeding symptoms  Genitourinary: No urinary symptoms or hematuria  Integument/breast: No ulcers or rashes  Musculoskeletal:Neg  Neurological: No focal weakness, no seizures, no headaches  Behvioral/Psych: No anxiety, no depression  Constitutional: No fever, no chills, no weight loss, no night sweats       Immunization status:      There is no immunization history on file for this patient. Allergies   Allergen Reactions    Bee Sting  [Sting, Bee] Swelling     DIZZINESS      Past Medical History:   Diagnosis Date    Back pain     Chronic pain     Headache(784.0)     HTN (hypertension)     Joint pain     hx of joint pain    Pulmonary emboli [facility])         Past Surgical History:   Procedure Laterality Date    HX LUMBAR LAMINECTOMY  2002    L4/L5    HX ORTHOPAEDIC  12/04/2017    Lumbar surgery (Dr. Jennifer Aguilar)    HX OTHER SURGICAL  1978    \"open lung bx\"    HX OTHER SURGICAL      laminectomy/fusion L4-5  S1    NEUROLOGICAL PROCEDURE UNLISTED  2003    pt spinal fracture T12 and L3        Family History   Problem Relation Age of Onset    Colon Cancer Mother     Hypertension Mother     Hypertension Father     Clotting Disorder Father     Other Brother     Cancer Brother       Social History   Substance Use Topics    Smoking status: Former Smoker     Quit date: 10/17/2012    Smokeless tobacco: Former User    Alcohol use Yes      Comment: daily-beer and wine        Prior to Admission medications    Medication Sig Start Date End Date Taking? Authorizing Provider   oxyCODONE-acetaminophen (PERCOCET 10)  mg per tablet Take 1 Tab by mouth every six (6) hours as needed. Max Daily Amount: 4 Tabs. Indications: Pain 12/15/17  Yes Lyndsay Later, NP   topiramate (TOPAMAX) 25 mg tablet Take 3 Tabs by mouth nightly. Indications: neuritis 10/24/17  Yes Lyndsay Later, NP   clobetasol (TEMOVATE) 0.05 % topical cream Use 1 Application to affected area Twice Daily. 10/21/16  Yes Historical Provider   desonide (TRIDESILON) 0.05 % topical lotion Use 1 Application to affected area Twice Daily. 12/21/16  Yes Historical Provider   lisinopril-hydroCHLOROthiazide (PRINZIDE, ZESTORETIC) 20-12.5 mg per tablet TAKE ONE TABLET BY MOUTH ONCE A DAY 11/29/16  Yes Historical Provider   zolpidem (AMBIEN) 5 mg tablet Take 5 mg by mouth nightly as needed.  10/21/16  Yes Historical Provider       Current Facility-Administered Medications   Medication Dose Route Frequency    polyethylene glycol (MIRALAX) packet 17 g  17 g Oral DAILY    heparin 25,000 units in D5W 250 ml infusion  15 Units/kg/hr IntraVENous TITRATE    oxyCODONE-acetaminophen (PERCOCET) 5-325 mg per tablet 1 Tab  1 Tab Oral Q4H PRN    HYDROmorphone (DILAUDID) syringe 0.5 mg  0.5 mg IntraVENous Q3H PRN    naloxone (NARCAN) injection 0.4 mg  0.4 mg IntraVENous PRN    diphenhydrAMINE (BENADRYL) injection 12.5 mg  12.5 mg IntraVENous Q4H PRN    ondansetron (ZOFRAN) injection 4 mg  4 mg IntraVENous Q4H PRN       Objective:   Vital Signs:    Visit Vitals    /69 (BP 1 Location: Right arm, BP Patient Position: At rest)    Pulse 63    Temp 98 °F (36.7 °C)    Resp 16    Ht 5' 11\" (1.803 m)    Wt 62.8 kg (138 lb 8 oz)    SpO2 96%    BMI 19.32 kg/m2       O2 Device: Room air       Temp (24hrs), Av.6 °F (36.4 °C), Min:97.3 °F (36.3 °C), Max:98 °F (36.7 °C)       Intake/Output:   Last shift:         Last 3 shifts:  1901 -  0700  In: 82.4 [P.O.:60; I.V.:22.4]  Out: -     Intake/Output Summary (Last 24 hours) at 17 1109  Last data filed at 17 2000   Gross per 24 hour   Intake            82.42 ml   Output                0 ml   Net            82.42 ml       Physical Exam:     General/Neurology: Alert, Awake, NAD. Head:   Normocephalic, without obvious abnormality, atraumatic. Eye:   PERRL, EOM intact, no scleral icterus, no pallor, no cyanosis  Nose:   No sinus tenderness, no erythema, no induration, no discharge  Throat:  Lips, mucosa, and tongue normal. Teeth and gums normal. No tonsillar enlargement, no erythema, no exudates. No oral thrush  Neck:   Supple, symmetric. Thyroid: no enlargement/tenderness/nodule. No carotid bruit. No lymphadenopathy. Trachea midline  Back & spine: Symmetric, no curvature. Chest wall: No tenderness or deformity.  No rash  Lung:   Symmetrical chest expansion, no accessory muscle use; no rales/ wheezing/ rhonchi  Heart:   Regular rate & rhythm. S1 S2 present. Abdomen   Soft. NT. ND. +BS. Extremities:  No pedal edema. No cyanosis. No clubbing  Pulses: 2+ and symmetric in DP  Lymphatic:  No cervical, supraclavicular palpable lymphadenopathy. Musculoskeletal: No joint swelling. No tenderness. Skin:   Color, texture, turgor normal. No rashes or lesions      Data:       Recent Results (from the past 24 hour(s))   PROTHROMBIN TIME + INR    Collection Time: 12/18/17  9:05 PM   Result Value Ref Range    Prothrombin time 13.3 11.5 - 15.2 sec    INR 1.1 0.8 - 1.2     CARDIAC PANEL,(CK, CKMB & TROPONIN)    Collection Time: 12/18/17  9:05 PM   Result Value Ref Range    CK 47 39 - 308 U/L    CK - MB 1.5 <3.6 ng/ml    CK-MB Index 3.2 0.0 - 4.0 %    Troponin-I, Qt. <0.02 0.0 - 0.045 NG/ML   PTT    Collection Time: 12/18/17  9:05 PM   Result Value Ref Range    aPTT >180.0 (HH) 23.0 - 36.4 SEC   PTT    Collection Time: 12/19/17  2:32 AM   Result Value Ref Range    aPTT >180.0 (HH) 23.0 - 36.4 SEC   PROTHROMBIN TIME + INR    Collection Time: 12/19/17  2:32 AM   Result Value Ref Range    Prothrombin time 13.4 11.5 - 15.2 sec    INR 1.1 0.8 - 1.2     CARDIAC PANEL,(CK, CKMB & TROPONIN)    Collection Time: 12/19/17  2:32 AM   Result Value Ref Range    CK 46 39 - 308 U/L    CK - MB 1.3 <3.6 ng/ml    CK-MB Index 2.8 0.0 - 4.0 %    Troponin-I, Qt. <0.02 0.0 - 0.045 NG/ML         Chemistry Recent Labs      12/18/17   1015   GLU  101*   NA  136   K  4.1   CL  101   CO2  27   BUN  29*   CREA  0.73   CA  8.9   AGAP  8   BUCR  40*        Lactic Acid No results found for: LAC  No results for input(s): LAC in the last 72 hours.      Liver Enzymes Protein, total   Date Value Ref Range Status   11/24/2017 6.9 6.4 - 8.2 g/dL Final     Albumin   Date Value Ref Range Status   11/24/2017 3.9 3.4 - 5.0 g/dL Final     Globulin   Date Value Ref Range Status   11/24/2017 3.0 2.0 - 4.0 g/dL Final     A-G Ratio   Date Value Ref Range Status   11/24/2017 1.3 0.8 - 1.7   Final     AST (SGOT)   Date Value Ref Range Status   11/24/2017 19 15 - 37 U/L Final     Alk. phosphatase   Date Value Ref Range Status   11/24/2017 73 45 - 117 U/L Final     No results for input(s): TP, ALB, GLOB, AGRAT, SGOT, GPT, AP, TBIL in the last 72 hours. No lab exists for component: DBIL     CBC w/Diff Recent Labs      12/18/17   1015   WBC  9.9   RBC  3.90*   HGB  11.4*   HCT  35.1*   PLT  487*   GRANS  80*   LYMPH  13*   EOS  1        Cardiac Enzymes Lab Results   Component Value Date/Time    CPK 46 12/19/2017 02:32 AM    CPK 47 12/18/2017 09:05 PM    CKMB 1.3 12/19/2017 02:32 AM    CKMB 1.5 12/18/2017 09:05 PM    CKND1 2.8 12/19/2017 02:32 AM    CKND1 3.2 12/18/2017 09:05 PM    TROIQ <0.02 12/19/2017 02:32 AM    TROIQ <0.02 12/18/2017 09:05 PM        BNP No results found for: BNP, BNPP, XBNPT     Coagulation Recent Labs      12/19/17   0232  12/18/17   2105  12/18/17   1015   PTP  13.4  13.3  12.1   INR  1.1  1.1  0.9   APTT  >180.0*  >180.0*  95.3*         Thyroid  No results found for: T4, T3U, TSH, TSHEXT    No results found for: T4     Lipid Panel No results found for: CHOL, CHOLPOCT, CHOLX, CHLST, CHOLV, 847280, HDL, LDL, LDLC, DLDLP, 763727, VLDLC, VLDL, TGLX, TRIGL, TRIGP, TGLPOCT, CHHD, CHHDX     ABG No results for input(s): PHI, PHI, POC2, PCO2I, PO2, PO2I, HCO3, HCO3I, FIO2, FIO2I in the last 72 hours. Urinalysis No results found for: COLOR, APPRN, SPGRU, REFSG, RAY, PROTU, GLUCU, KETU, BILU, UROU, TERESA, LEUKU, GLUKE, EPSU, BACTU, WBCU, RBCU, CASTS, UCRY     Micro  No results for input(s): SDES, CULT in the last 72 hours. No results for input(s): CULT in the last 72 hours. EKG No results found for this or any previous visit. PFT No flowsheet data found.      Other ASA reactivity:   Pre-albumin:   Ionized Calcium:   NH4:   T3, FT4:  Cortisol:  Urine Osm:  Urine Lytes:   HbA1c:      Ultrasound      LE Doppler      ECHO No results found for this or any previous visit. CT (Most Recent)   Results from Hospital Encounter encounter on 12/18/17   CTA CHEST W OR W WO CONT   Narrative CT chest pulmonary embolism protocol     CPT code: 71185    INDICATION: Pleuritic chest pain. Shortness of breath. Recent lumbar surgery. Question pulmonary embolism. TECHNIQUE: 0.8EO collimation helical images obtained through the level of the  pulmonary arteries with additional imaging through the chest following the  uneventful administration of 80 cc's nonionic intravenous contrast.  Images  reconstructed into three dimensional coronal and sagittal projections for  complete evaluation of the tortuous and overlapping pulmonary vascular  structures and to reduce patient radiation dose. All CT scans at this facility are performed using dose optimization technique as  appropriate to this specific exam, to include automated exposure control,  adjustment of the mA and/or KP according to patient size or use of iterative  reconstruction techniques. COMPARISON: Chest x-ray earlier the same day    FINDINGS:  Opacification of the pulmonary arteries is adequate. Study is mildly degraded by  patient respiratory motion artifact. There is a tiny nonocclusive filling defect within a subsegmental inferior right  upper lobe pulmonary artery branch on image 119. There is a subsegmental filling  defect within right lower lobe anterior segment branches, image 197 through 201. No other intraluminal filling defects identified elsewhere. Heart size is normal. There is no pericardial effusion. There is concave  contours of the free wall right ventricle apparently due to impression of right  lobe liver adjacent. No bowing of the septum. The thoracic aorta is normal caliber. Inadequate contrast opacification for  assessment otherwise. There is no pneumothorax.  There is a 1 cm rounded cystic lucency at the anterior  right apex adjacent to some presumed bronchial dilatation and scarring. There is  a surgical staple line in the lateral right midlung as well as in the anterior  medial right lung adjacent to the middle lobe fissure. There are some mild  bronchial wall thickening and peribronchial groundglass change in the lateral  right lower lobe on image 42. Only available history is that of \"open lung  biopsy\" remotely. There is some curvilinear atelectasis or scarring in the  anterior lateral left lower lobe along the fissure. There is some mild bronchial  dilatation peripherally in the lateral left upper lobe associated with some  platelike atelectasis or scarring. Lungs are hyperinflated. Partially included liver and spleen are normal size. No adrenal mass to the  extent included. There is a superior endplate compression deformity of T12,  present on lumbar spine CT dating back to August 2017. Impression IMPRESSION:    1. Small nonocclusive filling defects within an anterior right lower lobe  subsegmental pulmonary artery branch and a left upper lobe subsegmental branch  as above. Findings called to Dr. Christopher Parrish at [phone] hours. 2. COPD. Postsurgical change of the right lung with several staple lines present  as above. 3. Scattered areas of presumed chronic scarring due to morphology and regional  minor or bronchial dilatation. There is no prior CT for comparison. 4. No pleural effusion or consolidation. XR (Most Recent). CXR reviewed by me and compared with previous CXR   Results from Hospital Encounter encounter on 12/18/17   XR CHEST PA LAT   Narrative Chest    Indication: cp     Comparison: June 28, 2006    Findings: Two views. No pneumothorax. No pleural effusion. Bilateral lower lung  zone atelectasis. Cardiomediastinal silhouette and osseous structures grossly  unchanged. Postsurgical changes of the right hemithorax.          Impression Impression: No acute finding    -             Norbert Kaye PA-C  12/19/2017       [facility] Staff Addendum     I have independently evaluated the patient and reviewed the patient's chart. I have discussed the findings and care plan with EWA Olmedo and Patient    I agree with the above evaluation, assessment and recommendations along with the following comments and observations. Contact patient's spine surgeon- no preference for oral anticoagulation. Patient with bilateral subsegmental PE's. Possibly related to recent spine surgery. Has a history of prolonged airplane flight in [place] [facility])   Would consider this a provoked event from surgery at this point. Patient should undergo anticoagulation for 3 months. Patient can follow up with PCP and consider hematology evaluation after completing 3 month therapy given his frequent travel history and given that this event occurred 2 weeks post op with no findings of DVT of Doppler study. Can use either coumadin or NOAC such as Xarelto- whatever patient wishes to proceed with.         Clinical Care and time spent coordinating care, minus procedure time: 45 min    Cyn Mace DO, FCCP  Pulmonary, Sleep and Critical Care Medicine  5:19 PM

## 2017-12-20 VITALS
RESPIRATION RATE: 20 BRPM | TEMPERATURE: 98.1 F | OXYGEN SATURATION: 97 % | DIASTOLIC BLOOD PRESSURE: 65 MMHG | HEIGHT: 71 IN | HEART RATE: 62 BPM | SYSTOLIC BLOOD PRESSURE: 109 MMHG | BODY MASS INDEX: 19.25 KG/M2 | WEIGHT: 137.5 LBS

## 2017-12-20 LAB
ANION GAP SERPL CALC-SCNC: 7 MMOL/L (ref 3–18)
APTT PPP: 105.1 SEC (ref 23–36.4)
APTT PPP: 87.4 SEC (ref 23–36.4)
BASOPHILS # BLD: 0.1 K/UL (ref 0–0.1)
BASOPHILS NFR BLD: 1 % (ref 0–2)
BUN SERPL-MCNC: 15 MG/DL (ref 7–18)
BUN/CREAT SERPL: 26 (ref 12–20)
CALCIUM SERPL-MCNC: 8.4 MG/DL (ref 8.5–10.1)
CHLORIDE SERPL-SCNC: 105 MMOL/L (ref 100–108)
CO2 SERPL-SCNC: 28 MMOL/L (ref 21–32)
CREAT SERPL-MCNC: 0.58 MG/DL (ref 0.6–1.3)
DIFFERENTIAL METHOD BLD: ABNORMAL
EOSINOPHIL # BLD: 0.1 K/UL (ref 0–0.4)
EOSINOPHIL NFR BLD: 3 % (ref 0–5)
ERYTHROCYTE [DISTWIDTH] IN BLOOD BY AUTOMATED COUNT: 12.7 % (ref 11.6–14.5)
FACT V ACT/NOR PPP: 91 % (ref 70–150)
GLUCOSE SERPL-MCNC: 96 MG/DL (ref 74–99)
HCT VFR BLD AUTO: 32.1 % (ref 36–48)
HGB BLD-MCNC: 10.5 G/DL (ref 13–16)
INR PPP: 1 (ref 0.8–1.2)
LYMPHOCYTES # BLD: 1.4 K/UL (ref 0.9–3.6)
LYMPHOCYTES NFR BLD: 31 % (ref 21–52)
MAGNESIUM SERPL-MCNC: 2.4 MG/DL (ref 1.6–2.6)
MCH RBC QN AUTO: 28.8 PG (ref 24–34)
MCHC RBC AUTO-ENTMCNC: 32.7 G/DL (ref 31–37)
MCV RBC AUTO: 88.2 FL (ref 74–97)
MONOCYTES # BLD: 0.5 K/UL (ref 0.05–1.2)
MONOCYTES NFR BLD: 12 % (ref 3–10)
NEUTS SEG # BLD: 2.4 K/UL (ref 1.8–8)
NEUTS SEG NFR BLD: 53 % (ref 40–73)
PHOSPHATE SERPL-MCNC: 4 MG/DL (ref 2.5–4.9)
PLATELET # BLD AUTO: 391 K/UL (ref 135–420)
PMV BLD AUTO: 8.8 FL (ref 9.2–11.8)
POTASSIUM SERPL-SCNC: 4.1 MMOL/L (ref 3.5–5.5)
PROT C PPP-ACNC: 103 % (ref 73–180)
PROT S ACT/NOR PPP: 116 % (ref 63–140)
PROTHROMBIN TIME: 13.1 SEC (ref 11.5–15.2)
RBC # BLD AUTO: 3.64 M/UL (ref 4.7–5.5)
SODIUM SERPL-SCNC: 140 MMOL/L (ref 136–145)
WBC # BLD AUTO: 4.5 K/UL (ref 4.6–13.2)

## 2017-12-20 PROCEDURE — 36415 COLL VENOUS BLD VENIPUNCTURE: CPT | Performed by: INTERNAL MEDICINE

## 2017-12-20 PROCEDURE — 85730 THROMBOPLASTIN TIME PARTIAL: CPT | Performed by: INTERNAL MEDICINE

## 2017-12-20 PROCEDURE — 74011250637 HC RX REV CODE- 250/637: Performed by: INTERNAL MEDICINE

## 2017-12-20 PROCEDURE — 84100 ASSAY OF PHOSPHORUS: CPT | Performed by: INTERNAL MEDICINE

## 2017-12-20 PROCEDURE — 83735 ASSAY OF MAGNESIUM: CPT | Performed by: INTERNAL MEDICINE

## 2017-12-20 PROCEDURE — 80048 BASIC METABOLIC PNL TOTAL CA: CPT | Performed by: INTERNAL MEDICINE

## 2017-12-20 PROCEDURE — 85610 PROTHROMBIN TIME: CPT | Performed by: INTERNAL MEDICINE

## 2017-12-20 PROCEDURE — 85025 COMPLETE CBC W/AUTO DIFF WBC: CPT | Performed by: INTERNAL MEDICINE

## 2017-12-20 RX ADMIN — RIVAROXABAN 15 MG: 15 TABLET, FILM COATED ORAL at 15:55

## 2017-12-20 RX ADMIN — POLYETHYLENE GLYCOL 3350 17 G: 17 POWDER, FOR SOLUTION ORAL at 08:46

## 2017-12-20 RX ADMIN — OXYCODONE HYDROCHLORIDE AND ACETAMINOPHEN 1 TABLET: 5; 325 TABLET ORAL at 04:15

## 2017-12-20 RX ADMIN — OXYCODONE HYDROCHLORIDE AND ACETAMINOPHEN 1 TABLET: 5; 325 TABLET ORAL at 08:46

## 2017-12-20 NOTE — PROGRESS NOTES
Dr. Charity Hahn stated to give first dose of xarelto 15 mg now and dc heparin drip. Will place order and update pt.

## 2017-12-20 NOTE — PROGRESS NOTES
conducted an initial consultation and Spiritual Assessment for Christy Esparza, who is a 74 y. o.,male. Patients Primary Language is: [place]. According to the patients EMR Christianity Affiliation is: Kishore Cortes. The reason the Patient came to the hospital is:   Patient Active Problem List    Diagnosis Date Noted    Pulmonary emboli ([facility] [address] 12/18/2017    Hypertension 12/18/2017    Pulmonary embolism ([facility] [address] 12/18/2017    Post laminectomy syndrome 12/18/2017    S/P lumbar fusion 12/12/2017    Spondylolisthesis of lumbar region 12/04/2017    Pars defect with spondylolisthesis 08/25/2017    Spondylolisthesis at L4-L5 level 07/07/2017    Lumbar facet arthropathy 07/07/2017    Left leg weakness 07/07/2017    Neuritis 07/07/2017    HNP (herniated nucleus pulposus), lumbar 09/08/2016    Lumbar spinal stenosis 09/08/2016    Facet arthritis of lumbar region [facility]) 12/11/2015    DDD (degenerative disc disease), cervical 12/11/2015    DDD (degenerative disc disease), lumbar 12/11/2015    Neuritis of left lower extremity 12/11/2015        The  provided the following Interventions:  Initiated a relationship of care and support. Provided information about Spiritual Care Services. Chart reviewed. The following outcomes where achieved:  Patient expressed gratitude for 's visit. Assessment:  Patient does not have any Faith/cultural needs that will affect patients preferences in health care. There are no spiritual or Faith issues which require intervention at this time. Plan:  Chaplains will continue to follow and will provide pastoral care on an as needed/requested basis.  recommends bedside caregivers page  on duty if patient shows signs of acute spiritual or emotional distress.     [address]  [phone])

## 2017-12-20 NOTE — PROGRESS NOTES
I have reviewed discharge instructions with the patient. The patient verbalized understanding. Pt wrist band removed. IV removed. Tele monitor removed. Pt wheeled to the main entrance.

## 2017-12-20 NOTE — ROUTINE PROCESS
1925: Received bedside report on patient from offgoing RN. Pt is sitting up in bed with eyes open. A&Ox4 to person, place, time and situation. Pt is on RA, no distress noted at this time. Tele box #54 is on and leads are in place. Pt complains of 3/10 pain currently and medicated per MAR at 1925. Bed alarm is on and call bell is within reach, bed is in the lowest position. Told patient to notify me if they had any requests, questions or concerns. Patient verbalized understanding at this time. Detailed assessment to follow. 2035: D/t Ptt of 143.1 heparin gtt was held. 2132: Heparin gtt restarted @ 9 units/kg/hr    0529: No rate change to heparin gtt. PTT at 105. 1.    5554: Report given to oncoming RN.

## 2017-12-20 NOTE — PROGRESS NOTES
Problem: Pulmonary Embolism Care Plan (Adult)  Goal: *Absence of medication errors  Outcome: Progressing Towards Goal  Pt will have no s/s of PE by discharge and no medication rate errors for his medication during this admission.

## 2017-12-20 NOTE — PROGRESS NOTES
[facility]  Pulmonary, Critical Care, and Sleep Medicine    Name: Eugenio Shankar MRN: [id number]   : 1950 Hospital: [address] [place] [address]   Date: 2017        Pulmonary Medicine -Progress Note       IMPRESSION:   · Small bilateral subsegmental PE with risk factors to include recent surgery and travel hx, on heparin anticoagulation  · Recent lumbar surgery (laminectomy and fusion to L4-S1) on 17  · Hx HTN  · Remote hx of open lung biopsy (), diagnosed with \"probable\" sarcoidosis        RECOMMENDATIONS:   · DVT studies of b/l LEs negative. Pt does not have preference over anticoagulation -NOAC vs coumadin. · Start Xarelto today -15 mg PO BID with food x 21 days then 20 mg PO once daily. Recommend follow-up with PCP/Hematologist in 3 months to determine need for further hypercoagulable   · Monitor hemodynamics closely  · BP control per primary  · Further recommendations will be based on the patient's response to recommended treatment and results of the investigation ordered. Subjective/History:   17  No new events overnight. Still has recurring isolated chest pain to left breast (under nipple area) more pronounced on exertion however no radiation; lasts only a few seconds. Otherwise breathing appears fine. No palpitations, nausea, vomiting. Oxygenation stable on room air. HPI:  Eugenio Shankar is a 74 y.o. male with PMHx significant for HTN, admitted to the hospital for PE. Pt recently underwent a lumbar surgery on  and was discharged to home without problems. Pt has been moving around the house and being active, and last Saturday was able to walk a mile as recommended by surgery. On , pt started to experience chest pain to his left breast under the nipple area and was reproducible by palpation. This occurred about 2-3x that day and recurred the following day, prompting him to call his surgeon and was advised to go to the ED for evaluation.  In ED, CTA chest was done which showed small bilateral subsegmental PE. Pt denies any palpitations, lightheadedness, dizziness, leg swelling, abdominal pain, nausea, vomiting. States that prior to surgery, he was experiencing pain on his left leg and was part of the reason why he wanted the lumbar surgery done. This time around, his RLE appears to be more sensitive with intermittent tingling throughout which appears to be new post-surgery since his RLE pain is improving. Pt works with the [employer] and does a lot of international travel for training. He recently traveled to [facility] this past Sept-Oct, and the longest duration of flight was around 18 hours. Pt denies smoking however admits to drinking alcohol daily (1 beer and a few glasses of wine). Denies recreational drug use. Pt also states that his father was diagnosed with \"blood clots\" when he was in his [de-identified]; mother had colon cancer however no blood clots; and siblings have no hx of clots as well. Pt has not been diagnosed with any form of cancer; and he states that he had the open lung biopsy with a path finding of probable sarcoidosis back in 1978 after an incidental finding on CXR which was worked-up extensively by the [employer] and subsequently not pursued as he was asymptomatic. Review of Systems:  A comprehensive review of systems was negative except for that written in the HPI. Immunization status: There is no immunization history on file for this patient.        Allergies   Allergen Reactions    Bee Sting  [Sting, Bee] Swelling     DIZZINESS      Past Medical History:   Diagnosis Date    Back pain     Chronic pain     Headache(784.0)     HTN (hypertension)     Joint pain     hx of joint pain    Pulmonary emboli [facility])         Past Surgical History:   Procedure Laterality Date    HX LUMBAR LAMINECTOMY  2002    L4/L5    HX ORTHOPAEDIC  12/04/2017    Lumbar surgery (Dr. Satish Hernandez)   [address]    \"open lung bx\"    HX OTHER SURGICAL      laminectomy/fusion L4-5  S1    NEUROLOGICAL PROCEDURE UNLISTED  2003    pt spinal fracture T12 and L3        Family History   Problem Relation Age of Onset    Colon Cancer Mother     Hypertension Mother     Hypertension Father     Clotting Disorder Father     Other Brother     Cancer Brother       Social History   Substance Use Topics    Smoking status: Former Smoker     Quit date: 10/17/2012    Smokeless tobacco: Former User    Alcohol use Yes      Comment: daily-beer and wine        Prior to Admission medications    Medication Sig Start Date End Date Taking? Authorizing Provider   oxyCODONE-acetaminophen (PERCOCET 10)  mg per tablet Take 1 Tab by mouth every six (6) hours as needed. Max Daily Amount: 4 Tabs. Indications: Pain 12/19/17  Yes Sheryl Matias MD   rivaroxaban (XARELTO) 15 mg (42)- 20 mg (9) DsPk Take one 15 mg tablet twice a day with food for the first 21 days. Then, take one 20 mg tablet once a day with food for 9 days. contact PCP for further refills 12/19/17  Yes Sheryl Matias MD   topiramate (TOPAMAX) 25 mg tablet Take 3 Tabs by mouth nightly. Indications: neuritis 10/24/17  Yes Kokoor Budkodak, NP   clobetasol (TEMOVATE) 0.05 % topical cream Use 1 Application to affected area Twice Daily. 10/21/16  Yes Historical Provider   desonide (TRIDESILON) 0.05 % topical lotion Use 1 Application to affected area Twice Daily. 12/21/16  Yes Historical Provider   lisinopril-hydroCHLOROthiazide (PRINZIDE, ZESTORETIC) 20-12.5 mg per tablet TAKE ONE TABLET BY MOUTH ONCE A DAY 11/29/16  Yes Historical Provider   zolpidem (AMBIEN) 5 mg tablet Take 5 mg by mouth nightly as needed.  10/21/16  Yes Historical Provider       Current Facility-Administered Medications   Medication Dose Route Frequency    polyethylene glycol (MIRALAX) packet 17 g  17 g Oral DAILY    heparin 25,000 units in D5W 250 ml infusion  15 Units/kg/hr IntraVENous TITRATE    oxyCODONE-acetaminophen (PERCOCET) 5-325 mg per tablet 1 Tab  1 Tab Oral Q4H PRN    HYDROmorphone (DILAUDID) syringe 0.5 mg  0.5 mg IntraVENous Q3H PRN    naloxone (NARCAN) injection 0.4 mg  0.4 mg IntraVENous PRN    diphenhydrAMINE (BENADRYL) injection 12.5 mg  12.5 mg IntraVENous Q4H PRN    ondansetron (ZOFRAN) injection 4 mg  4 mg IntraVENous Q4H PRN       Objective:   Vital Signs:    Visit Vitals    /79 (BP 1 Location: Left arm, BP Patient Position: Sitting)    Pulse (!) 56    Temp 97.6 °F (36.4 °C)    Resp 20    Ht 5' 11\" (1.803 m)    Wt 62.4 kg (137 lb 8 oz)    SpO2 99%    BMI 19.18 kg/m2       O2 Device: Room air       Temp (24hrs), Av.6 °F (36.4 °C), Min:97.4 °F (36.3 °C), Max:97.7 °F (36.5 °C)       Intake/Output:   Last shift:      701 - 1900  In: 360 [P.O.:360]  Out: -   Last 3 shifts: 1901 -  0700  In: 1075.4 [P.O.:780; I.V.:295.4]  Out: -     Intake/Output Summary (Last 24 hours) at 17 1124  Last data filed at 17 0855   Gross per 24 hour   Intake           1375.4 ml   Output                0 ml   Net           1375.4 ml       Physical Exam:     General/Neurology: Alert, Awake, NAD. Head:   Normocephalic, without obvious abnormality, atraumatic. Eye:   PERRL, EOM intact, no scleral icterus, no pallor, no cyanosis  Nose:   No sinus tenderness, no erythema, no induration, no discharge  Throat:  Lips, mucosa, and tongue normal. Teeth and gums normal. No tonsillar enlargement, no erythema, no exudates. No oral thrush  Neck:   Supple, symmetric. Thyroid: no enlargement/tenderness/nodule. No carotid bruit. No lymphadenopathy. Trachea midline  Back & spine: Symmetric, no curvature. Chest wall: No tenderness or deformity. No rash  Lung:   Symmetrical chest expansion, no accessory muscle use; no rales/ wheezing/ rhonchi  Heart:   Regular rate & rhythm. S1 S2 present. Abdomen   Soft. NT. ND. +BS. Extremities:  No pedal edema. No cyanosis.  No clubbing  Pulses: 2+ and symmetric in DP  Lymphatic:  No cervical, supraclavicular palpable lymphadenopathy. Musculoskeletal: No joint swelling. No tenderness. Skin:   Color, texture, turgor normal. No rashes or lesions      Data:       Recent Results (from the past 24 hour(s))   CBC WITH AUTOMATED DIFF    Collection Time: 12/19/17  1:03 PM   Result Value Ref Range    WBC 5.7 4.6 - 13.2 K/uL    RBC 3.86 (L) 4.70 - 5.50 M/uL    HGB 11.3 (L) 13.0 - 16.0 g/dL    HCT 34.4 (L) 36.0 - 48.0 %    MCV 89.1 74.0 - 97.0 FL    MCH 29.3 24.0 - 34.0 PG    MCHC 32.8 31.0 - 37.0 g/dL    RDW 12.8 11.6 - 14.5 %    PLATELET 374 (H) 746 - 420 K/uL    MPV 8.8 (L) 9.2 - 11.8 FL    NEUTROPHILS 66 40 - 73 %    LYMPHOCYTES 21 21 - 52 %    MONOCYTES 10 3 - 10 %    EOSINOPHILS 2 0 - 5 %    BASOPHILS 1 0 - 2 %    ABS. NEUTROPHILS 3.7 1.8 - 8.0 K/UL    ABS. LYMPHOCYTES 1.2 0.9 - 3.6 K/UL    ABS. MONOCYTES 0.6 0.05 - 1.2 K/UL    ABS. EOSINOPHILS 0.1 0.0 - 0.4 K/UL    ABS.  BASOPHILS 0.1 0.0 - 0.1 K/UL    DF AUTOMATED     METABOLIC PANEL, BASIC    Collection Time: 12/19/17  1:03 PM   Result Value Ref Range    Sodium 137 136 - 145 mmol/L    Potassium 4.6 3.5 - 5.5 mmol/L    Chloride 102 100 - 108 mmol/L    CO2 30 21 - 32 mmol/L    Anion gap 5 3.0 - 18 mmol/L    Glucose 86 74 - 99 mg/dL    BUN 16 7.0 - 18 MG/DL    Creatinine 0.62 0.6 - 1.3 MG/DL    BUN/Creatinine ratio 26 (H) 12 - 20      GFR est AA >60 >60 ml/min/1.73m2    GFR est non-AA >60 >60 ml/min/1.73m2    Calcium 8.5 8.5 - 10.1 MG/DL   MAGNESIUM    Collection Time: 12/19/17  1:03 PM   Result Value Ref Range    Magnesium 2.6 1.6 - 2.6 mg/dL   PHOSPHORUS    Collection Time: 12/19/17  1:03 PM   Result Value Ref Range    Phosphorus 3.8 2.5 - 4.9 MG/DL   HEPATIC FUNCTION PANEL    Collection Time: 12/19/17  1:03 PM   Result Value Ref Range    Protein, total 6.5 6.4 - 8.2 g/dL    Albumin 3.6 3.4 - 5.0 g/dL    Globulin 2.9 2.0 - 4.0 g/dL    A-G Ratio 1.2 0.8 - 1.7      Bilirubin, total 0.3 0.2 - 1.0 MG/DL    Bilirubin, direct 0.1 0.0 - 0.2 MG/DL    Alk. phosphatase 83 45 - 117 U/L    AST (SGOT) 12 (L) 15 - 37 U/L    ALT (SGPT) 20 16 - 61 U/L   PTT    Collection Time: 12/19/17  7:15 PM   Result Value Ref Range    aPTT 143.1 (H) 23.0 - 36.4 SEC   PROTHROMBIN TIME + INR    Collection Time: 12/20/17  2:11 AM   Result Value Ref Range    Prothrombin time 13.1 11.5 - 15.2 sec    INR 1.0 0.8 - 1.2     CBC WITH AUTOMATED DIFF    Collection Time: 12/20/17  2:11 AM   Result Value Ref Range    WBC 4.5 (L) 4.6 - 13.2 K/uL    RBC 3.64 (L) 4.70 - 5.50 M/uL    HGB 10.5 (L) 13.0 - 16.0 g/dL    HCT 32.1 (L) 36.0 - 48.0 %    MCV 88.2 74.0 - 97.0 FL    MCH 28.8 24.0 - 34.0 PG    MCHC 32.7 31.0 - 37.0 g/dL    RDW 12.7 11.6 - 14.5 %    PLATELET 809 289 - 420 K/uL    MPV 8.8 (L) 9.2 - 11.8 FL    NEUTROPHILS 53 40 - 73 %    LYMPHOCYTES 31 21 - 52 %    MONOCYTES 12 (H) 3 - 10 %    EOSINOPHILS 3 0 - 5 %    BASOPHILS 1 0 - 2 %    ABS. NEUTROPHILS 2.4 1.8 - 8.0 K/UL    ABS. LYMPHOCYTES 1.4 0.9 - 3.6 K/UL    ABS. MONOCYTES 0.5 0.05 - 1.2 K/UL    ABS. EOSINOPHILS 0.1 0.0 - 0.4 K/UL    ABS.  BASOPHILS 0.1 0.0 - 0.1 K/UL    DF AUTOMATED     METABOLIC PANEL, BASIC    Collection Time: 12/20/17  2:11 AM   Result Value Ref Range    Sodium 140 136 - 145 mmol/L    Potassium 4.1 3.5 - 5.5 mmol/L    Chloride 105 100 - 108 mmol/L    CO2 28 21 - 32 mmol/L    Anion gap 7 3.0 - 18 mmol/L    Glucose 96 74 - 99 mg/dL    BUN 15 7.0 - 18 MG/DL    Creatinine 0.58 (L) 0.6 - 1.3 MG/DL    BUN/Creatinine ratio 26 (H) 12 - 20      GFR est AA >60 >60 ml/min/1.73m2    GFR est non-AA >60 >60 ml/min/1.73m2    Calcium 8.4 (L) 8.5 - 10.1 MG/DL   MAGNESIUM    Collection Time: 12/20/17  2:11 AM   Result Value Ref Range    Magnesium 2.4 1.6 - 2.6 mg/dL   PHOSPHORUS    Collection Time: 12/20/17  2:11 AM   Result Value Ref Range    Phosphorus 4.0 2.5 - 4.9 MG/DL   PTT    Collection Time: 12/20/17  2:11 AM   Result Value Ref Range    aPTT 105.1 (H) 23.0 - 36.4 SEC   PTT    Collection Time: 12/20/17  9:45 AM Result Value Ref Range    aPTT 87.4 (H) 23.0 - 36.4 SEC         Chemistry Recent Labs      12/20/17   0211  12/19/17   1303  12/18/17   1015   GLU  96  86  101*   NA  140  137  136   K  4.1  4.6  4.1   CL  105  102  101   CO2  28  30  27   BUN  15  16  29*   CREA  0.58*  0.62  0.73   CA  8.4*  8.5  8.9   MG  2.4  2.6   --    PHOS  4.0  3.8   --    AGAP  7  5  8   BUCR  26*  26*  40*   AP   --   83   --    TP   --   6.5   --    ALB   --   3.6   --    GLOB   --   2.9   --    AGRAT   --   1.2   --         Lactic Acid No results found for: LAC  No results for input(s): LAC in the last 72 hours. Liver Enzymes Protein, total   Date Value Ref Range Status   12/19/2017 6.5 6.4 - 8.2 g/dL Final     Albumin   Date Value Ref Range Status   12/19/2017 3.6 3.4 - 5.0 g/dL Final     Globulin   Date Value Ref Range Status   12/19/2017 2.9 2.0 - 4.0 g/dL Final     A-G Ratio   Date Value Ref Range Status   12/19/2017 1.2 0.8 - 1.7   Final     AST (SGOT)   Date Value Ref Range Status   12/19/2017 12 (L) 15 - 37 U/L Final     Alk.  phosphatase   Date Value Ref Range Status   12/19/2017 83 45 - 117 U/L Final     Recent Labs      12/19/17   1303   TP  6.5   ALB  3.6   GLOB  2.9   AGRAT  1.2   SGOT  12*   AP  83        CBC w/Diff Recent Labs      12/20/17   0211  12/19/17   1303  12/18/17   1015   WBC  4.5*  5.7  9.9   RBC  3.64*  3.86*  3.90*   HGB  10.5*  11.3*  11.4*   HCT  32.1*  34.4*  35.1*   PLT  391  435*  487*   GRANS  53  66  80*   LYMPH  31  21  13*   EOS  3  2  1        Cardiac Enzymes No results found for: CPK, CK, CKMMB, CKMB, RCK3, CKMBT, CKNDX, CKND1, NICKIE, TROPT, TROIQ, PATEL, TROPT, TNIPOC, BNP, BNPP     BNP No results found for: BNP, BNPP, XBNPT     Coagulation Recent Labs      12/20/17   0945  12/20/17   0211  12/19/17   1915   12/19/17   0232  12/18/17   2105   PTP   --   13.1   --    --   13.4  13.3   INR   --   1.0   --    --   1.1  1.1   APTT  87.4*  105.1*  143.1*   < >  >180.0*  >180.0*    < > = values in this interval not displayed. Thyroid  No results found for: T4, T3U, TSH, TSHEXT, TSHEXT    No results found for: T4     Lipid Panel No results found for: CHOL, CHOLPOCT, CHOLX, CHLST, CHOLV, 218031, HDL, LDL, LDLC, DLDLP, 583294, VLDLC, VLDL, TGLX, TRIGL, TRIGP, TGLPOCT, CHHD, CHHDX     ABG No results for input(s): PHI, PHI, POC2, PCO2I, PO2, PO2I, HCO3, HCO3I, FIO2, FIO2I in the last 72 hours. Urinalysis No results found for: COLOR, APPRN, SPGRU, REFSG, RAY, PROTU, GLUCU, KETU, BILU, UROU, TERESA, LEUKU, GLUKE, EPSU, BACTU, WBCU, RBCU, CASTS, UCRY     Micro  No results for input(s): SDES, CULT in the last 72 hours. No results for input(s): CULT in the last 72 hours. EKG No results found for this or any previous visit. PFT No flowsheet data found. Other ASA reactivity:   Pre-albumin:   Ionized Calcium:   NH4:   T3, FT4:  Cortisol:  Urine Osm:  Urine Lytes:   HbA1c:      Ultrasound      LE Doppler      ECHO No results found for this or any previous visit. CT (Most Recent)   Results from Hospital Encounter encounter on 12/18/17   CTA CHEST W OR W WO CONT   Narrative CT chest pulmonary embolism protocol     CPT code: 86694    INDICATION: Pleuritic chest pain. Shortness of breath. Recent lumbar surgery. Question pulmonary embolism. TECHNIQUE: 2.1RT collimation helical images obtained through the level of the  pulmonary arteries with additional imaging through the chest following the  uneventful administration of 80 cc's nonionic intravenous contrast.  Images  reconstructed into three dimensional coronal and sagittal projections for  complete evaluation of the tortuous and overlapping pulmonary vascular  structures and to reduce patient radiation dose.      All CT scans at this facility are performed using dose optimization technique as  appropriate to this specific exam, to include automated exposure control,  adjustment of the mA and/or KP according to patient size or use of iterative  reconstruction techniques. COMPARISON: Chest x-ray earlier the same day    FINDINGS:  Opacification of the pulmonary arteries is adequate. Study is mildly degraded by  patient respiratory motion artifact. There is a tiny nonocclusive filling defect within a subsegmental inferior right  upper lobe pulmonary artery branch on image 119. There is a subsegmental filling  defect within right lower lobe anterior segment branches, image 197 through 201. No other intraluminal filling defects identified elsewhere. Heart size is normal. There is no pericardial effusion. There is concave  contours of the free wall right ventricle apparently due to impression of right  lobe liver adjacent. No bowing of the septum. The thoracic aorta is normal caliber. Inadequate contrast opacification for  assessment otherwise. There is no pneumothorax. There is a 1 cm rounded cystic lucency at the anterior  right apex adjacent to some presumed bronchial dilatation and scarring. There is  a surgical staple line in the lateral right midlung as well as in the anterior  medial right lung adjacent to the middle lobe fissure. There are some mild  bronchial wall thickening and peribronchial groundglass change in the lateral  right lower lobe on image 42. Only available history is that of \"open lung  biopsy\" remotely. There is some curvilinear atelectasis or scarring in the  anterior lateral left lower lobe along the fissure. There is some mild bronchial  dilatation peripherally in the lateral left upper lobe associated with some  platelike atelectasis or scarring. Lungs are hyperinflated. Partially included liver and spleen are normal size. No adrenal mass to the  extent included. There is a superior endplate compression deformity of T12,  present on lumbar spine CT dating back to August 2017. Impression IMPRESSION:    1.  Small nonocclusive filling defects within an anterior right lower lobe  subsegmental pulmonary artery branch and a left upper lobe subsegmental branch  as above. Findings called to Dr. Oz Torres at [phone] hours. 2. COPD. Postsurgical change of the right lung with several staple lines present  as above. 3. Scattered areas of presumed chronic scarring due to morphology and regional  minor or bronchial dilatation. There is no prior CT for comparison. 4. No pleural effusion or consolidation. XR (Most Recent). CXR reviewed by me and compared with previous CXR   Results from Hospital Encounter encounter on 12/18/17   XR CHEST PA LAT   Narrative Chest    Indication: cp     Comparison: June 28, 2006    Findings: Two views. No pneumothorax. No pleural effusion. Bilateral lower lung  zone atelectasis. Cardiomediastinal silhouette and osseous structures grossly  unchanged. Postsurgical changes of the right hemithorax. Impression Impression: No acute finding    -             Genita Gutting  12/20/2017       [facility] Staff Addendum     I have independently evaluated the patient and reviewed the patient's chart. I have discussed the findings and care plan with    I agree with the above evaluation, assessment and recommendations along with the following comments and observations. The patient has continued to do well. After discussion with patient and spine surgeon will proceed with Xarelto therapy. Patient should be able to discharge home. Follow up with PCP. Plan to treat for 3 months with hematology follow up to discuss possible hypercoag evaluation. Patient also has my card.        Clinical Care and time spent coordinating care, minus procedure time: 25 min    Aurora Harding DO, [facility]  Pulmonary, Sleep and Critical Care Medicine  2:53 PM

## 2017-12-20 NOTE — DISCHARGE SUMMARY
Discharge Summary     Patient ID:  Ashly Rankin  [id number]  74 y.o.  1950  Body mass index is 19.18 kg/(m^2). PCP on record: Will Newsome MD    Admit date: 12/18/2017  Discharge date and time: 12/20/2017    Discharge Diagnoses:                                           1) Bilateral PE   2) Recent lumbar surgery   3 ) HTN        Consults: Pulmonary/Intensive care and Spine surgery           Hospital Course by problems:    Admitted with chest pain , high D dimer , CTA positive for bilateral small PE , negative for DVT LL   Started on heparin now on Xarelto - tolerating well   Still having occasional chest pain   Follow up with pulmonary and Spine surgery       Patient seen and examined by me on discharge day. Pertinent Findings:  Patient is Alert Awake and oriented   HEENT - NAD    RS - Clear , no rales no rhonchi   CVS - regular rhythm and rate acceptable    abd - benign, BS present , no Distension   EXT - no edema , no calf tenderness   Neuro - alert and awake , grossly motor and sensory intact       Significant Diagnostic Studies:  CT chest pulmonary embolism protocol      CPT code: 95647     INDICATION: Pleuritic chest pain. Shortness of breath. Recent lumbar surgery.   Question pulmonary embolism.     TECHNIQUE: 7.1NS collimation helical images obtained through the level of the  pulmonary arteries with additional imaging through the chest following the  uneventful administration of 80 cc's nonionic intravenous contrast.  Images  reconstructed into three dimensional coronal and sagittal projections for  complete evaluation of the tortuous and overlapping pulmonary vascular  structures and to reduce patient radiation dose.      All CT scans at this facility are performed using dose optimization technique as  appropriate to this specific exam, to include automated exposure control,  adjustment of the mA and/or KP according to patient size or use of iterative  reconstruction techniques.     COMPARISON: Chest x-ray earlier the same day     FINDINGS:  Opacification of the pulmonary arteries is adequate. Study is mildly degraded by  patient respiratory motion artifact.      There is a tiny nonocclusive filling defect within a subsegmental inferior right  upper lobe pulmonary artery branch on image 119. There is a subsegmental filling  defect within right lower lobe anterior segment branches, image 197 through 201. No other intraluminal filling defects identified elsewhere.     Heart size is normal. There is no pericardial effusion. There is concave  contours of the free wall right ventricle apparently due to impression of right  lobe liver adjacent. No bowing of the septum.     The thoracic aorta is normal caliber. Inadequate contrast opacification for  assessment otherwise.     There is no pneumothorax. There is a 1 cm rounded cystic lucency at the anterior  right apex adjacent to some presumed bronchial dilatation and scarring. There is  a surgical staple line in the lateral right midlung as well as in the anterior  medial right lung adjacent to the middle lobe fissure. There are some mild  bronchial wall thickening and peribronchial groundglass change in the lateral  right lower lobe on image 42. Only available history is that of \"open lung  biopsy\" remotely. There is some curvilinear atelectasis or scarring in the  anterior lateral left lower lobe along the fissure. There is some mild bronchial  dilatation peripherally in the lateral left upper lobe associated with some  platelike atelectasis or scarring. Lungs are hyperinflated.     Partially included liver and spleen are normal size. No adrenal mass to the  extent included. There is a superior endplate compression deformity of T12,  present on lumbar spine CT dating back to August 2017.        IMPRESSION  IMPRESSION:     1.  Small nonocclusive filling defects within an anterior right lower lobe  subsegmental pulmonary artery branch and a left upper lobe subsegmental branch  as above.     Findings called to Dr. Milagros Fried at 1252 hours.     2. COPD. Postsurgical change of the right lung with several staple lines present  as above.     3. Scattered areas of presumed chronic scarring due to morphology and regional  minor or bronchial dilatation. There is no prior CT for comparison.     4. No pleural effusion or consolidation. DR. [facility]  FINAL REPORT     Name: Vj Richard  MRN: [id number]    Inpatient  : 1950  HIS Order #: [id number]  [address] [facility] Visit #: [id number]  Date: 19 Dec 2017     TYPE OF TEST: Peripheral Venous Testing     REASON FOR TEST     Right Leg:-  Deep venous thrombosis:           No  Superficial venous thrombosis:    No  Deep venous insufficiency:        Not examined  Superficial venous insufficiency: Not examined     Left Leg:-  Deep venous thrombosis:           No  Superficial venous thrombosis:    No  Deep venous insufficiency:        Not examined  Superficial venous insufficiency: Not examined        INTERPRETATION/FINDINGS  Duplex images were obtained using 2-D gray scale, color flow, and  spectral Doppler analysis. Right le. No evidence of deep venous thrombosis detected in the veins  visualized. 2. Deep veins visualized include the common femoral, femoral,  popliteal, posterior tibial and peroneal veins. 3. No evidence of superficial thrombosis detected. 4. Superficial veins visualized include the proximal great saphenous  vein. Left le. No evidence of deep venous thrombosis detected in the veins  visualized. 2. Deep veins visualized include the common femoral, femoral,  popliteal, posterior tibial and peroneal veins. 3. No evidence of superficial thrombosis detected.   4. Superficial veins visualized include the proximal great saphenous  vein.     ADDITIONAL COMMENTS     I have personally reviewed the data relevant to the interpretation of  this  study.     TECHNOLOGIST: Kevin Bajwa RAHUL Crane  Signed: 12/19/2017 02:33 PM     PHYSICIAN: Dashawn Trujillo MD  Signed: 12/20/2017 10:54 AM       Pertinent Lab Data:  Recent Labs      12/20/17   0211  12/19/17   1303  12/18/17   1015   WBC  4.5*  5.7  9.9   HGB  10.5*  11.3*  11.4*   HCT  32.1*  34.4*  35.1*   PLT  391  435*  487*     Recent Labs      12/20/17   0211  12/19/17   1303  12/19/17   0232  12/18/17   2105  12/18/17   1015   NA  140  137   --    --   136   K  4.1  4.6   --    --   4.1   CL  105  102   --    --   101   CO2  28  30   --    --   27   GLU  96  86   --    --   101*   BUN  15  16   --    --   29*   CREA  0.58*  0.62   --    --   0.73   CA  8.4*  8.5   --    --   8.9   MG  2.4  2.6   --    --    --    PHOS  4.0  3.8   --    --    --    ALB   --   3.6   --    --    --    SGOT   --   12*   --    --    --    ALT   --   20   --    --    --    INR  1.0   --   1.1  1.1  0.9       DISCHARGE MEDICATIONS:   @  Current Discharge Medication List      START taking these medications    Details   !! rivaroxaban (XARELTO) 15 mg tab tablet Take 1 Tab by mouth two (2) times daily (with meals) for 21 days. Indications: pulmonary thromboembolism  Qty: 42 Tab, Refills: 0      !! rivaroxaban (XARELTO) 20 mg tab tablet Take 1 Tab by mouth daily (with breakfast). Indications: pulmonary thromboembolism  Qty: 30 Tab, Refills: 0      rivaroxaban (XARELTO) 15 mg (42)- 20 mg (9) DsPk Take one 15 mg tablet twice a day with food for the first 21 days. Then, take one 20 mg tablet once a day with food for 9 days. contact PCP for further refills  Qty: 1 Dose Pack, Refills: 0       !! - Potential duplicate medications found. Please discuss with provider. CONTINUE these medications which have CHANGED    Details   oxyCODONE-acetaminophen (PERCOCET 10)  mg per tablet Take 1 Tab by mouth every six (6) hours as needed. Max Daily Amount: 4 Tabs.  Indications: Pain  Qty: 10 Tab, Refills: 0         CONTINUE these medications which have NOT CHANGED    Details topiramate (TOPAMAX) 25 mg tablet Take 3 Tabs by mouth nightly. Indications: neuritis  Qty: 90 Tab, Refills: 5    Associated Diagnoses: Neuritis of left lower extremity      clobetasol (TEMOVATE) 0.05 % topical cream Use 1 Application to affected area Twice Daily. desonide (TRIDESILON) 0.05 % topical lotion Use 1 Application to affected area Twice Daily. lisinopril-hydroCHLOROthiazide (PRINZIDE, ZESTORETIC) 20-12.5 mg per tablet TAKE ONE TABLET BY MOUTH ONCE A DAY      zolpidem (AMBIEN) 5 mg tablet Take 5 mg by mouth nightly as needed. My Recommended Diet, Activity, Wound Care, and follow-up labs are listed in the patient's Discharge Insturctions which I have personally completed and reviewed. Disposition:     [x] Home with family     [] [facility] PT/RN   [] SNF/NH   [] Inpatient Rehab/[facility]NN  Condition at Discharge:  Stable    Follow up with:   PCP : Jodie Borjas MD      Please follow-up tests/labs that are still pendin.  None  2.    >30 minutes spent coordinating this discharge (review instructions/follow-up, prescriptions, preparing report for sign off)    Signed:  Demetrios Landau, MD  2017  2:29 PM

## 2017-12-20 NOTE — DISCHARGE INSTRUCTIONS
Pulmonary Embolism: Care Instructions  Your Care Instructions    Pulmonary embolism is the sudden blockage of an artery in the lung. Blood clots in the deep veins of the leg or pelvis (deep vein thrombosis, or DVT) are the most common cause. These blood clots can travel to the lungs. Pulmonary embolism can be very serious. Because you have had one pulmonary embolism, you are at greater risk for having another one. But you can take steps to prevent another pulmonary embolism by following your doctor's instructions. You will probably take a prescription blood-thinning medicine to prevent blood clots. A blood thinner can stop a blood clot from growing larger and prevent new clots from forming. Follow-up care is a key part of your treatment and safety. Be sure to make and go to all appointments, and call your doctor if you are having problems. It's also a good idea to know your test results and keep a list of the medicines you take. How can you care for yourself at home? · Take your medicines exactly as prescribed. Call your doctor if you think you are having a problem with your medicine. You will get more details on the specific medicines your doctor prescribes. · If you are taking a blood thinner, be sure you get instructions about how to take your medicine safely. Blood thinners can cause serious bleeding problems. Preventing future pulmonary embolisms  · Exercise. Keep blood moving in your legs to keep clots from forming. If you are traveling by car, stop every hour or so. Get out and walk around for a few minutes. If you are traveling by bus, train, or plane, get out of your seat and walk up and down the aisles every hour or so. You also can do leg exercises while you are seated. Pump your feet up and down by pulling your toes up toward your knees then pointing them down. · Get up out of bed as soon as possible after an illness or surgery. · Do not smoke.  If you need help quitting, talk to your doctor about stop-smoking programs and medicines. These can increase your chances of quitting for good. · Check with your doctor before taking hormone or birth control pills. These may increase your risk of blot clots. · Ask your doctor about wearing compression stockings to help prevent blood clots in your legs. There are different types of stockings, and they need to fit right. So your doctor will recommend what you need. When should you call for help? Call 911 anytime you think you may need emergency care. For example, call if:  ? · You have shortness of breath. ? · You have chest pain. ? · You passed out (lost consciousness). ? · You cough up blood. ?Call your doctor now or seek immediate medical care if:  ? · You have new or worsening pain or swelling in your leg. ? Watch closely for changes in your health, and be sure to contact your doctor if:  ? · You do not get better as expected. Where can you learn more? Go to http://jorge-steve.info/. Enter X906 in the search box to learn more about \"Pulmonary Embolism: Care Instructions. \"  Current as of: March 20, 2017  Content Version: 11.4  © 9815-4144 [employer]. Care instructions adapted under license by [employer] (which disclaims liability or warranty for this information). If you have questions about a medical condition or this instruction, always ask your healthcare professional. [address] any warranty or liability for your use of this information. High Blood Pressure: Care Instructions  Your Care Instructions    If your blood pressure is usually above 140/90, you have high blood pressure, or hypertension. That means the top number is 140 or higher or the bottom number is 90 or higher, or both. Despite what a lot of people think, high blood pressure usually doesn't cause headaches or make you feel dizzy or lightheaded. It usually has no symptoms.  But it does increase your risk for heart attack, stroke, and kidney or eye damage. The higher your blood pressure, the more your risk increases. Your doctor will give you a goal for your blood pressure. Your goal will be based on your health and your age. An example of a goal is to keep your blood pressure below 140/90. Lifestyle changes, such as eating healthy and being active, are always important to help lower blood pressure. You might also take medicine to reach your blood pressure goal.  Follow-up care is a key part of your treatment and safety. Be sure to make and go to all appointments, and call your doctor if you are having problems. It's also a good idea to know your test results and keep a list of the medicines you take. How can you care for yourself at home? Medical treatment  · If you stop taking your medicine, your blood pressure will go back up. You may take one or more types of medicine to lower your blood pressure. Be safe with medicines. Take your medicine exactly as prescribed. Call your doctor if you think you are having a problem with your medicine. · Talk to your doctor before you start taking aspirin every day. Aspirin can help certain people lower their risk of a heart attack or stroke. But taking aspirin isn't right for everyone, because it can cause serious bleeding. · See your doctor regularly. You may need to see the doctor more often at first or until your blood pressure comes down. · If you are taking blood pressure medicine, talk to your doctor before you take decongestants or anti-inflammatory medicine, such as ibuprofen. Some of these medicines can raise blood pressure. · Learn how to check your blood pressure at home. Lifestyle changes  · Stay at a healthy weight. This is especially important if you put on weight around the waist. Losing even 10 pounds can help you lower your blood pressure. · If your doctor recommends it, get more exercise. Walking is a good choice.  Bit by bit, increase the amount you walk every day. Try for at least 30 minutes on most days of the week. You also may want to swim, bike, or do other activities. · Avoid or limit alcohol. Talk to your doctor about whether you can drink any alcohol. · Try to limit how much sodium you eat to less than 2,300 milligrams (mg) a day. Your doctor may ask you to try to eat less than 1,500 mg a day. · Eat plenty of fruits (such as bananas and oranges), vegetables, legumes, whole grains, and low-fat dairy products. · Lower the amount of saturated fat in your diet. Saturated fat is found in animal products such as milk, cheese, and meat. Limiting these foods may help you lose weight and also lower your risk for heart disease. · Do not smoke. Smoking increases your risk for heart attack and stroke. If you need help quitting, talk to your doctor about stop-smoking programs and medicines. These can increase your chances of quitting for good. When should you call for help? Call 911 anytime you think you may need emergency care. This may mean having symptoms that suggest that your blood pressure is causing a serious heart or blood vessel problem. Your blood pressure may be over 180/110. ? For example, call 911 if:  ? · You have symptoms of a heart attack. These may include:  ¨ Chest pain or pressure, or a strange feeling in the chest.  ¨ Sweating. ¨ Shortness of breath. ¨ Nausea or vomiting. ¨ Pain, pressure, or a strange feeling in the back, neck, jaw, or upper belly or in one or both shoulders or arms. ¨ Lightheadedness or sudden weakness. ¨ A fast or irregular heartbeat. ? · You have symptoms of a stroke. These may include:  ¨ Sudden numbness, tingling, weakness, or loss of movement in your face, arm, or leg, especially on only one side of your body. ¨ Sudden vision changes. ¨ Sudden trouble speaking. ¨ Sudden confusion or trouble understanding simple statements. ¨ Sudden problems with walking or balance.   ¨ A sudden, severe headache that is different from past headaches. ? · You have severe back or belly pain. ?Do not wait until your blood pressure comes down on its own. Get help right away. ?Call your doctor now or seek immediate care if:  ? · Your blood pressure is much higher than normal (such as 180/110 or higher), but you don't have symptoms. ? · You think high blood pressure is causing symptoms, such as:  ¨ Severe headache. ¨ Blurry vision. ? Watch closely for changes in your health, and be sure to contact your doctor if:  ? · Your blood pressure measures 140/90 or higher at least 2 times. That means the top number is 140 or higher or the bottom number is 90 or higher, or both. ? · You think you may be having side effects from your blood pressure medicine. ? · Your blood pressure is usually normal, but it goes above normal at least 2 times. Where can you learn more? Go to http://jorge-steve.info/. Enter P919 in the search box to learn more about \"High Blood Pressure: Care Instructions. \"  Current as of: September 21, 2016  Content Version: 11.4  © 5659-3052 [employer]. Care instructions adapted under license by [employer] (which disclaims liability or warranty for this information). If you have questions about a medical condition or this instruction, always ask your healthcare professional. [place] [address] any warranty or liability for your use of this information. Low Sodium Diet (2,000 Milligram): Care Instructions  Your Care Instructions    Too much sodium causes your body to hold on to extra water. This can raise your blood pressure and force your heart and kidneys to work harder. In very serious cases, this could cause you to be put in the hospital. It might even be life-threatening. By limiting sodium, you will feel better and lower your risk of serious problems. The most common source of sodium is salt.  People get most of the salt in their diet from canned, prepared, and packaged foods. Fast food and restaurant meals also are very high in sodium. Your doctor will probably limit your sodium to less than 2,000 milligrams (mg) a day. This limit counts all the sodium in prepared and packaged foods and any salt you add to your food. Follow-up care is a key part of your treatment and safety. Be sure to make and go to all appointments, and call your doctor if you are having problems. It's also a good idea to know your test results and keep a list of the medicines you take. How can you care for yourself at home? Read food labels  · Read labels on cans and food packages. The labels tell you how much sodium is in each serving. Make sure that you look at the serving size. If you eat more than the serving size, you have eaten more sodium. · Food labels also tell you the Percent Daily Value for sodium. Choose products with low Percent Daily Values for sodium. · Be aware that sodium can come in forms other than salt, including monosodium glutamate (MSG), sodium citrate, and sodium bicarbonate (baking soda). MSG is often added to Asian food. When you eat out, you can sometimes ask for food without MSG or added salt. Buy low-sodium foods  · Buy foods that are labeled \"unsalted\" (no salt added), \"sodium-free\" (less than 5 mg of sodium per serving), or \"low-sodium\" (less than 140 mg of sodium per serving). Foods labeled \"reduced-sodium\" and \"light sodium\" may still have too much sodium. Be sure to read the label to see how much sodium you are getting. · Buy fresh vegetables, or frozen vegetables without added sauces. Buy low-sodium versions of canned vegetables, soups, and other canned goods. Prepare low-sodium meals  · Cut back on the amount of salt you use in cooking. This will help you adjust to the taste. Do not add salt after cooking. One teaspoon of salt has about 2,300 mg of sodium. · Take the salt shaker off the table.   · Flavor your food with garlic, lemon juice, onion, vinegar, herbs, and spices. Do not use soy sauce, lite soy sauce, steak sauce, onion salt, garlic salt, celery salt, mustard, or ketchup on your food. · Use low-sodium salad dressings, sauces, and ketchup. Or make your own salad dressings and sauces without adding salt. · Use less salt (or none) when recipes call for it. You can often use half the salt a recipe calls for without losing flavor. Other foods such as rice, pasta, and grains do not need added salt. · Rinse canned vegetables, and cook them in fresh water. This removes some-but not all-of the salt. · Avoid water that is naturally high in sodium or that has been treated with water softeners, which add sodium. Call your local water company to find out the sodium content of your water supply. If you buy bottled water, read the label and choose a sodium-free brand. Avoid high-sodium foods  · Avoid eating:  ¨ Smoked, cured, salted, and canned meat, fish, and poultry. ¨ Ham, maguire, hot dogs, and luncheon meats. ¨ Regular, hard, and processed cheese and regular peanut butter. ¨ Crackers with salted tops, and other salted snack foods such as pretzels, chips, and salted popcorn. ¨ Frozen prepared meals, unless labeled low-sodium. ¨ Canned and dried soups, broths, and bouillon, unless labeled sodium-free or low-sodium. ¨ Canned vegetables, unless labeled sodium-free or low-sodium. ¨ [place]a fries, pizza, tacos, and other fast foods. ¨ Pickles, olives, ketchup, and other condiments, especially soy sauce, unless labeled sodium-free or low-sodium. Where can you learn more? Go to http://jorge-steve.info/. Enter Q681 in the search box to learn more about \"Low Sodium Diet (2,000 Milligram): Care Instructions. \"  Current as of: May 12, 2017  Content Version: 11.4  © 3910-0432 [employer]  Care instructions adapted under license by [employer] (which disclaims liability or warranty for this information). If you have questions about a medical condition or this instruction, always ask your healthcare professional. [address] any warranty or liability for your use of this information. Patient armband removed and shredded      DISCHARGE SUMMARY from Nurse    PATIENT INSTRUCTIONS:    After general anesthesia or intravenous sedation, for 24 hours or while taking prescription Narcotics:  · Limit your activities  · Do not drive and operate hazardous machinery  · Do not make important personal or business decisions  · Do  not drink alcoholic beverages  · If you have not urinated within 8 hours after discharge, please contact your surgeon on call. Report the following to your surgeon:  · Excessive pain, swelling, redness or odor of or around the surgical area  · Temperature over 100.5  · Nausea and vomiting lasting longer than 4 hours or if unable to take medications  · Any signs of decreased circulation or nerve impairment to extremity: change in color, persistent  numbness, tingling, coldness or increase pain  · Any questions    What to do at Home:  Recommended activity: Activity as tolerated    If you experience any of the following symptoms Nausea, vomiting, diarrhea, fever greater than 100.5, dizziness, severe headache, shortness of breath, chest pain, increased pain, please follow up with PCP. *  Please give a list of your current medications to your Primary Care Provider. *  Please update this list whenever your medications are discontinued, doses are      changed, or new medications (including over-the-counter products) are added. *  Please carry medication information at all times in case of emergency situations. These are general instructions for a healthy lifestyle:    No smoking/ No tobacco products/ Avoid exposure to second hand smoke  Surgeon General's Warning:  Quitting smoking now greatly reduces serious risk to your health.     Obesity, smoking, and sedentary lifestyle greatly increases your risk for illness    A healthy diet, regular physical exercise & weight monitoring are important for maintaining a healthy lifestyle    You may be retaining fluid if you have a history of heart failure or if you experience any of the following symptoms:  Weight gain of 3 pounds or more overnight or 5 pounds in a week, increased swelling in our hands or feet or shortness of breath while lying flat in bed. Please call your doctor as soon as you notice any of these symptoms; do not wait until your next office visit. Recognize signs and symptoms of STROKE:    F-face looks uneven    A-arms unable to move or move unevenly    S-speech slurred or non-existent    T-time-call 911 as soon as signs and symptoms begin-DO NOT go       Back to bed or wait to see if you get better-TIME IS BRAIN. Warning Signs of HEART ATTACK     Call 911 if you have these symptoms:   Chest discomfort. Most heart attacks involve discomfort in the center of the chest that lasts more than a few minutes, or that goes away and comes back. It can feel like uncomfortable pressure, squeezing, fullness, or pain.  Discomfort in other areas of the upper body. Symptoms can include pain or discomfort in one or both arms, the back, neck, jaw, or stomach.  Shortness of breath with or without chest discomfort.  Other signs may include breaking out in a cold sweat, nausea, or lightheadedness. Don't wait more than five minutes to call 911 - MINUTES MATTER! Fast action can save your life. Calling 911 is almost always the fastest way to get lifesaving treatment. Emergency Medical Services staff can begin treatment when they arrive -- up to an hour sooner than if someone gets to the hospital by car. The discharge information has been reviewed with the patient. The patient verbalized understanding.   Discharge medications reviewed with the patient and appropriate educational materials and side effects teaching were provided.   ___________________________________________________________________________________________________________________________________

## 2017-12-21 ENCOUNTER — TELEPHONE (OUTPATIENT)
Dept: OTHER | Age: 67
End: 2017-12-21

## 2017-12-21 LAB
PS IGA SER-ACNC: 3 APS IGA (ref 0–20)
PS IGG SER-ACNC: 2 GPS IGG (ref 0–11)
PS IGM SER-ACNC: 4 MPS IGM (ref 0–25)

## 2017-12-21 NOTE — TELEPHONE ENCOUNTER
Called pt to follow up on recent hospitalization here at [facility]. Patient was discharged yesterday and states he is doing well this am.  Pt got his rx filled and is taking as directed. Pt has f/u appt with Dr. Albin Klinefelter on 12/26/17 and with Dr. Saima Crowley (pulmonology ) in January.     Jaylen Valverde RN

## 2018-01-09 ENCOUNTER — PATIENT MESSAGE (OUTPATIENT)
Dept: ORTHOPEDIC SURGERY | Age: 68
End: 2018-01-09

## 2018-01-09 DIAGNOSIS — Z98.1 S/P LUMBAR FUSION: Primary | ICD-10-CM

## 2018-01-09 RX ORDER — OXYCODONE AND ACETAMINOPHEN 10; 325 MG/1; MG/1
1 TABLET ORAL
Qty: 7 TAB | Refills: 0 | Status: SHIPPED | OUTPATIENT
Start: 2018-01-09 | End: 2018-02-27 | Stop reason: ALTCHOICE

## 2018-01-16 ENCOUNTER — OFFICE VISIT (OUTPATIENT)
Dept: ORTHOPEDIC SURGERY | Age: 68
End: 2018-01-16

## 2018-01-16 VITALS
OXYGEN SATURATION: 100 % | DIASTOLIC BLOOD PRESSURE: 87 MMHG | SYSTOLIC BLOOD PRESSURE: 136 MMHG | RESPIRATION RATE: 17 BRPM | WEIGHT: 139.8 LBS | TEMPERATURE: 97.9 F | BODY MASS INDEX: 19.5 KG/M2

## 2018-01-16 DIAGNOSIS — G57.91 NEURITIS OF RIGHT LOWER EXTREMITY: ICD-10-CM

## 2018-01-16 DIAGNOSIS — M48.062 SPINAL STENOSIS OF LUMBAR REGION WITH NEUROGENIC CLAUDICATION: ICD-10-CM

## 2018-01-16 DIAGNOSIS — Z98.1 S/P LUMBAR FUSION: Primary | ICD-10-CM

## 2018-01-16 DIAGNOSIS — M43.16 SPONDYLOLISTHESIS OF LUMBAR REGION: ICD-10-CM

## 2018-01-16 RX ORDER — DULOXETIN HYDROCHLORIDE 30 MG/1
30 CAPSULE, DELAYED RELEASE ORAL DAILY
Qty: 60 CAP | Refills: 2 | Status: SHIPPED | OUTPATIENT
Start: 2018-01-16 | End: 2018-02-01

## 2018-01-16 NOTE — MR AVS SNAPSHOT
[facility] 
 
 
 [address] [facility] [address] 
[phone] Patient: Linda Pierre MRN: [id number] Visit Information Date & Time Provider Department Dept. Phone Encounter #  
 1/16/2018  8:00 AM Veronica Ray  [facility], [address] [facility] [phone] [id number] Follow-up Instructions Return in about 6 weeks (around 2/27/2018). Your Appointments 1/17/2018  9:00 AM  
Follow Up with Savanah Stewart DO [address] ([facility] Appt Note: [address] [facility], [address] Tiff[place] Mcdermott [address] [phone]  
  
   
 [address] [place] [address], [address] [place] [address],[address] [place] [address] Upcoming Health Maintenance Date Due Hepatitis C Screening 1950 DTaP/Tdap/Td series (1 - Tdap) 2/26/1973 FOBT Q 1 YEAR AGE 50-75 2/26/2002 ZOSTER VACCINE AGE 60> 12/26/2011 GLAUCOMA SCREENING Q2Y 2/26/2017 Pneumococcal 65+ Low/Medium Risk (1 of 2 - PCV13) 2/26/2017 MEDICARE YEARLY EXAM 2/26/2017 Influenza Age 5 to Adult 8/1/2017 Allergies as of 1/16/2018  Review Complete On: 1/16/2018 By: Mart Hendrickson Severity Noted Reaction Type Reactions Bee Sting  [Sting, Bee] High 04/06/2009    Swelling DIZZINESS Current Immunizations  Never Reviewed No immunizations on file. Not reviewed this visit You Were Diagnosed With   
  
 Codes Comments S/P lumbar fusion    -  Primary ICD-10-CM: Z98.1 ICD-9-CM: V45.4 Spondylolisthesis of lumbar region     ICD-10-CM: M43.16 
ICD-9-CM: 738.4 Spinal stenosis of lumbar region with neurogenic claudication     ICD-10-CM: M48.062 
ICD-9-CM: 724.03 Neuritis of right lower extremity     ICD-10-CM: G57.91 
ICD-9-CM: 355.8 Vitals  BP Temp Resp Weight(growth percentile) SpO2 BMI  
 136/87 (BP 1 Location: Left arm, BP Patient Position: Sitting) 97.9 °F (36.6 °C) (Oral) 17 139 lb 12.8 oz (63.4 kg) 100% 19.5 kg/m2 Smoking Status Former Smoker BMI and BSA Data Body Mass Index Body Surface Area 19.5 kg/m 2 1.78 m 2 Preferred Pharmacy Pharmacy Name [facility] Your Updated Medication List  
  
   
This list is accurate as of: 1/16/18  8:34 AM.  Always use your most recent med list.  
  
  
  
  
 clobetasol 0.05 % topical cream  
Commonly known as:  [place] Dilling Use 1 Application to affected area Twice Daily. desonide 0.05 % topical lotion Commonly known as:  Trevor Loll Use 1 Application to affected area Twice Daily. DULoxetine 30 mg capsule Commonly known as:  CYMBALTA Take 1 Cap by mouth daily. Take 1 tab x2weeks, then increase to 2tab daily  
  
 lisinopril-hydroCHLOROthiazide 20-12.5 mg per tablet Commonly known as:  PRINZIDE, ZESTORETIC  
TAKE ONE TABLET BY MOUTH ONCE A DAY  
  
 oxyCODONE-acetaminophen  mg per tablet Commonly known as:  PERCOCET 10 Take 1 Tab by mouth daily as needed. Indications: Pain, post op pain * rivaroxaban 15 mg (42)- 20 mg (9) Dspk Commonly known as:  Francenia Asa Take one 15 mg tablet twice a day with food for the first 21 days. Then, take one 20 mg tablet once a day with food for 9 days. contact PCP for further refills * rivaroxaban 20 mg Tab tablet Commonly known as:  Francenia Asa Take 1 Tab by mouth daily (with breakfast). Indications: pulmonary thromboembolism  
  
 topiramate 25 mg tablet Commonly known as:  TOPAMAX Take 3 Tabs by mouth nightly. Indications: neuritis  
  
 zolpidem 5 mg tablet Commonly known as:  AMBIEN Take 5 mg by mouth nightly as needed. * Notice: This list has 2 medication(s) that are the same as other medications prescribed for you. Read the directions carefully, and ask your doctor or other care provider to review them with you. Prescriptions Sent to Pharmacy Refills DULoxetine (CYMBALTA) 30 mg capsule 2 Sig: Take 1 Cap by mouth daily. Take 1 tab x2weeks, then increase to 2tab daily Class: Normal  
 Pharmacy: [facility]  #: [phone] Route: Oral  
  
We Performed the Following AMB POC XRAY, SPINE, LUMBOSACRAL; 2 O [29375 CPT(R)] REFERRAL TO PHYSICAL THERAPY [QBV48 Custom] Comments:  
 Evaluate and treat s/p lumbar lami and RLE sciatica Follow-up Instructions Return in about 6 weeks (around 2/27/2018). Referral Information Referral ID Referred By Referred To  
  
 [id number] Kristian Go Not Available Visits Status Start Date End Date 1 New Request 1/16/18 1/16/19 If your referral has a status of pending review or denied, additional information will be sent to support the outcome of this decision. Patient Instructions Neuropathic Pain: Care Instructions Your Care Instructions Neuropathic pain is caused by pressure on or damage to your nerves. It's often simply called nerve pain. Some people feel this type of pain all the time. For others, it comes and goes. Diabetes, shingles, or an injury can cause nerve pain. Many people say the pain feels sharp, burning, or stabbing. But some people feel it as a dull ache. In some cases, it makes your skin very sensitive. So touch, pressure, and other sensations that did not hurt before may now cause pain. It's important to know that this kind of pain is real and can affect your quality of life. It's also important to know that treatment can help. Treatment includes pain medicines, exercise, and physical therapy. Medicines can help reduce the number of pain signals that travel over the nerves. This can make the painful areas less sensitive. It can also help you sleep better and improve your mood. But medicines are only one part of successful treatment. Most people do best with more than one kind of treatment. Your doctor may recommend that you try cognitive-behavioral therapy and stress management. Or, if needed, you may decide to try to quit smoking, lower your blood pressure, or better control blood sugar. These kinds of healthy changes can also make a difference. If you feel that your treatment is not working, talk to your doctor. And be sure to tell your doctor if you think you might be depressed or anxious. These are common problems that can also be treated. Follow-up care is a key part of your treatment and safety. Be sure to make and go to all appointments, and call your doctor if you are having problems. It's also a good idea to know your test results and keep a list of the medicines you take. How can you care for yourself at home? · Be safe with medicines. Read and follow all instructions on the label. ¨ If the doctor gave you a prescription medicine for pain, take it as prescribed. ¨ If you are not taking a prescription pain medicine, ask your doctor if you can take an over-the-counter medicine. · Save hard tasks for days when you have less pain. Follow a hard task with an easy task. And remember to take breaks. · Relax, and reduce stress. You may want to try deep breathing or meditation. These can help. · Keep moving. Gentle, daily exercise can help reduce pain. Your doctor or physical therapist can tell you what type of exercise is best for you. This may include walking, swimming, and stationary biking. It may also include stretches and range-of-motion exercises. · Try heat, cold packs, and massage. · Get enough sleep. Constant pain can make you more tired. If the pain makes it hard to sleep, talk with your doctor. · Think positively. Your thoughts can affect your pain. Do fun things to distract yourself from the pain. See a movie, read a book, listen to music, or spend time with a friend. · Keep a pain diary. Try to write down how strong your pain is and what it feels like. Also try to notice and write down how your moods, thoughts, sleep, activities, and medicine affect your pain. These notes can help you and your doctor find the best ways to treat your pain. Reducing constipation caused by pain medicine Pain medicines often cause constipation. To reduce constipation: 
· Include fruits, vegetables, beans, and whole grains in your diet each day. These foods are high in fiber. · Drink plenty of fluids, enough so that your urine is light yellow or clear like water. If you have kidney, heart, or liver disease and have to limit fluids, talk with your doctor before you increase the amount of fluids you drink. · Get some exercise every day. Build up slowly to 30 to 60 minutes a day on 5 or more days of the week. · Take a fiber supplement, such as Citrucel or Metamucil, every day if needed. Read and follow all instructions on the label. · Schedule time each day for a bowel movement. Having a daily routine may help. Take your time and do not strain when having a bowel movement. · Ask your doctor about a laxative. The goal is to have one easy bowel movement every 1 to 2 days. Do not let constipation go untreated for more than 3 days. When should you call for help? Call your doctor now or seek immediate medical care if: 
? · You feel sad, anxious, or hopeless for more than a few days. This could mean you are depressed. Depression is common in people who have a lot of pain. But it can be treated. ? · You have trouble with bowel movements, such as: 
¨ No bowel movement in 3 days. ¨ Blood in the anal area, in your stool, or on the toilet paper. ¨ Diarrhea for more than 24 hours. ? Watch closely for changes in your health, and be sure to contact your doctor if: 
? · Your pain is getting worse. ? · You can't sleep because of pain. ? · You are very worried or anxious about your pain. ? · You have trouble taking your pain medicine. ? · You have any concerns about your pain medicine or its side effects. ? · You have vomiting or cramps for more than 2 hours. Where can you learn more? Go to http://jorge-steve.info/. Enter X401 in the search box to learn more about \"Neuropathic Pain: Care Instructions. \" Current as of: October 14, 2016 Content Version: 11.4 © 7351-6925 [employer]. Care instructions adapted under license by [employer] (which disclaims liability or warranty for this information). If you have questions about a medical condition or this instruction, always ask your healthcare professional. [address] any warranty or liability for your use of this information. Introducing \[facility] [place] [facility]\"" & HEALTH SERVICES! Dear Jaime Shepard: Thank you for requesting a [employer] account. Our records indicate that you already have an active [employer] account. You can access your account anytime at [email or web address] Did you know that you can access your hospital and ER discharge instructions at any time in [employer]? You can also review all of your test results from your hospital stay or ER visit. Additional Information If you have questions, please visit the Frequently Asked Questions section of the [employer] website at [email or web address] Remember, [employer] is NOT to be used for urgent needs. For medical emergencies, dial 911. Now available from your iPhone and Android! Please provide this summary of care documentation to your next provider. Your primary care clinician is listed as Padilla Florze. If you have any questions after today's visit, please call [phone].

## 2018-01-16 NOTE — PATIENT INSTRUCTIONS
Neuropathic Pain: Care Instructions  Your Care Instructions    Neuropathic pain is caused by pressure on or damage to your nerves. It's often simply called nerve pain. Some people feel this type of pain all the time. For others, it comes and goes. Diabetes, shingles, or an injury can cause nerve pain. Many people say the pain feels sharp, burning, or stabbing. But some people feel it as a dull ache. In some cases, it makes your skin very sensitive. So touch, pressure, and other sensations that did not hurt before may now cause pain. It's important to know that this kind of pain is real and can affect your quality of life. It's also important to know that treatment can help. Treatment includes pain medicines, exercise, and physical therapy. Medicines can help reduce the number of pain signals that travel over the nerves. This can make the painful areas less sensitive. It can also help you sleep better and improve your mood. But medicines are only one part of successful treatment. Most people do best with more than one kind of treatment. Your doctor may recommend that you try cognitive-behavioral therapy and stress management. Or, if needed, you may decide to try to quit smoking, lower your blood pressure, or better control blood sugar. These kinds of healthy changes can also make a difference. If you feel that your treatment is not working, talk to your doctor. And be sure to tell your doctor if you think you might be depressed or anxious. These are common problems that can also be treated. Follow-up care is a key part of your treatment and safety. Be sure to make and go to all appointments, and call your doctor if you are having problems. It's also a good idea to know your test results and keep a list of the medicines you take. How can you care for yourself at home? · Be safe with medicines. Read and follow all instructions on the label.   ¨ If the doctor gave you a prescription medicine for pain, take it as prescribed. ¨ If you are not taking a prescription pain medicine, ask your doctor if you can take an over-the-counter medicine. · Save hard tasks for days when you have less pain. Follow a hard task with an easy task. And remember to take breaks. · Relax, and reduce stress. You may want to try deep breathing or meditation. These can help. · Keep moving. Gentle, daily exercise can help reduce pain. Your doctor or physical therapist can tell you what type of exercise is best for you. This may include walking, swimming, and stationary biking. It may also include stretches and range-of-motion exercises. · Try heat, cold packs, and massage. · Get enough sleep. Constant pain can make you more tired. If the pain makes it hard to sleep, talk with your doctor. · Think positively. Your thoughts can affect your pain. Do fun things to distract yourself from the pain. See a movie, read a book, listen to music, or spend time with a friend. · Keep a pain diary. Try to write down how strong your pain is and what it feels like. Also try to notice and write down how your moods, thoughts, sleep, activities, and medicine affect your pain. These notes can help you and your doctor find the best ways to treat your pain. Reducing constipation caused by pain medicine  Pain medicines often cause constipation. To reduce constipation:  · Include fruits, vegetables, beans, and whole grains in your diet each day. These foods are high in fiber. · Drink plenty of fluids, enough so that your urine is light yellow or clear like water. If you have kidney, heart, or liver disease and have to limit fluids, talk with your doctor before you increase the amount of fluids you drink. · Get some exercise every day. Build up slowly to 30 to 60 minutes a day on 5 or more days of the week. · Take a fiber supplement, such as Citrucel or Metamucil, every day if needed. Read and follow all instructions on the label.   · Schedule time each day for a bowel movement. Having a daily routine may help. Take your time and do not strain when having a bowel movement. · Ask your doctor about a laxative. The goal is to have one easy bowel movement every 1 to 2 days. Do not let constipation go untreated for more than 3 days. When should you call for help? Call your doctor now or seek immediate medical care if:  ? · You feel sad, anxious, or hopeless for more than a few days. This could mean you are depressed. Depression is common in people who have a lot of pain. But it can be treated. ? · You have trouble with bowel movements, such as:  ¨ No bowel movement in 3 days. ¨ Blood in the anal area, in your stool, or on the toilet paper. ¨ Diarrhea for more than 24 hours. ? Watch closely for changes in your health, and be sure to contact your doctor if:  ? · Your pain is getting worse. ? · You can't sleep because of pain. ? · You are very worried or anxious about your pain. ? · You have trouble taking your pain medicine. ? · You have any concerns about your pain medicine or its side effects. ? · You have vomiting or cramps for more than 2 hours. Where can you learn more? Go to http://jorge-steve.info/. Enter F892 in the search box to learn more about \"Neuropathic Pain: Care Instructions. \"  Current as of: October 14, 2016  Content Version: 11.4  © 9412-7951 [employer]. Care instructions adapted under license by [employer] (which disclaims liability or warranty for this information). If you have questions about a medical condition or this instruction, always ask your healthcare professional. [address] any warranty or liability for your use of this information.

## 2018-01-16 NOTE — PROGRESS NOTES
[address]  [address]  [address]  Phone: [phone]  Fax: [phone]  PROGRESS NOTE  Patient: Aurelio Honeycutt                MRN: [id number]       SSN: [id number]  YOB: 1950        AGE: 74 y.o. SEX: male  Body mass index is 19.5 kg/(m^2). PCP: Guy Camacho MD  01/16/18    Chief Complaint   Patient presents with    Back Pain       HISTORY OF PRESENT ILLNESS, RADIOGRAPHS, and PLAN:     HISTORY:  Mr. Katina Meza returns today. He is six weeks out from his revision decompression and fusion. His preoperative left leg pain is gone, and his back pain is much better. His right leg, which did not seem to be much of an issue preoperatively, is not having the pain that is an issue. On transition from sitting to standing, he gets a sudden jolt of pain in his leg, and it takes a while for it to calm, and once he is up and stable, he has really no pain, and he can walk. He walks two miles a day or more. RADIOGRAPHS:  X-rays demonstrate appropriate fixation. He still has a listhesis in terms of his alignment, which we never really could address because of the scarification across the segment. There appears to be incorporation of an area of bone graft. ASSESSMENT/PLAN: At this time, I am going to get him going in some physical therapy and see him back in about six weeks and see if we can get that right leg to improve. The scarification I found at the time of surgery and the minimal pain he had in his right leg prior to surgery made me not really want to aggressively work on decompressing his right side. Now that we have eradicated what was his primarily left leg pain, it appears that these right leg symptoms are more paramount to him. Hopefully, we will be able to train him out of it.     cc: Caitlin Bradshaw M.D.          Past Medical History:   Diagnosis Date    Back pain     Chronic pain     Headache(784.0)     HTN (hypertension)     Joint pain     hx of joint pain    Pulmonary emboli (HCC)        Family History   Problem Relation Age of Onset    Colon Cancer Mother     Hypertension Mother     Hypertension Father     Clotting Disorder Father     Other Brother     Cancer Brother        Current Outpatient Prescriptions   Medication Sig Dispense Refill    oxyCODONE-acetaminophen (PERCOCET 10)  mg per tablet Take 1 Tab by mouth daily as needed. Indications: Pain, post op pain 7 Tab 0    rivaroxaban (XARELTO) 20 mg tab tablet Take 1 Tab by mouth daily (with breakfast). Indications: pulmonary thromboembolism 30 Tab 0    topiramate (TOPAMAX) 25 mg tablet Take 3 Tabs by mouth nightly. Indications: neuritis 90 Tab 5    clobetasol (TEMOVATE) 0.05 % topical cream Use 1 Application to affected area Twice Daily.  desonide (TRIDESILON) 0.05 % topical lotion Use 1 Application to affected area Twice Daily.  lisinopril-hydroCHLOROthiazide (PRINZIDE, ZESTORETIC) 20-12.5 mg per tablet TAKE ONE TABLET BY MOUTH ONCE A DAY      zolpidem (AMBIEN) 5 mg tablet Take 5 mg by mouth nightly as needed.  rivaroxaban (XARELTO) 15 mg (42)- 20 mg (9) DsPk Take one 15 mg tablet twice a day with food for the first 21 days. Then, take one 20 mg tablet once a day with food for 9 days. contact PCP for further refills 1 Dose Pack 0       Allergies   Allergen Reactions    Bee Sting  [Sting, Bee] Swelling     DIZZINESS       Past Surgical History:   Procedure Laterality Date    HX LUMBAR LAMINECTOMY  2002    L4/L5    HX ORTHOPAEDIC  12/04/2017    Lumbar surgery (Dr. Sam Willard)   [address]    \"open lung bx\"    HX OTHER SURGICAL      laminectomy/fusion L4-5  S1    NEUROLOGICAL PROCEDURE UNLISTED  2003    pt spinal fracture T12 and L3       Past Medical History:   Diagnosis Date    Back pain     Chronic pain     Headache(784.0)     HTN (hypertension)     Joint pain     hx of joint pain    Pulmonary emboli ([address] Social History     Social History    Marital status:      Spouse name: N/A    Number of children: N/A    Years of education: N/A     Occupational History    Not on file. Social History Main Topics    Smoking status: Former Smoker     Quit date: 10/17/2012    Smokeless tobacco: Former User    Alcohol use Yes      Comment: daily-beer and wine    Drug use: No    Sexual activity: Yes     Other Topics Concern    Not on file     Social History Narrative         REVIEW OF SYSTEMS:   CONSTITUTIONAL SYMPTOMS:  Negative. EYES:  Negative. EARS, NOSE, THROAT AND MOUTH:  Negative. CARDIOVASCULAR:  Negative. RESPIRATORY:  Negative. GENITOURINARY: Negative. GASTROINTESTINAL:  Negative. INTEGUMENTARY (SKIN AND/OR BREAST):  Negative. MUSCULOSKELETAL: Per HPI.   ENDOCRINE/RHEUMATOLOGIC:  Negative. NEUROLOGICAL:  Per HPI. HEMATOLOGIC/LYMPHATIC:  Negative. ALLERGIC/IMMUNOLOGIC:  Negative. PSYCHIATRIC:  Negative. PHYSICAL EXAMINATION:   Visit Vitals    /87 (BP 1 Location: Left arm, BP Patient Position: Sitting)    Temp 97.9 °F (36.6 °C) (Oral)    Resp 17    Wt 139 lb 12.8 oz (63.4 kg)    SpO2 100%    BMI 19.5 kg/m2    PAIN SCALE: 2/10    CONSTITUTIONAL: The patient is in no apparent distress and is alert and oriented x 3. HEENT: Normocephalic. Hearing grossly intact. NECK: Supple and symmetric. no tenderness, or masses were felt. RESPIRATORY: No labored breathing. CARDIOVASCULAR: The carotid pulses were normal. Peripheral pulses were 2+. CHEST: Normal AP diameter and normal contour without any kyphoscoliosis. LYMPHATIC: No lymphadenopathy was appreciated in the neck, axillae or groin. SKIN:  Incision healing well, no drainage, no erythema, no hernia, no seroma, no swelling, no dehiscence, incision well approximated. Negative for scars, rashes, lesions, or ulcers on the right upper, right lower, left upper, left lower and trunk.    NEUROLOGICAL: Alert and oriented x 3.  Ambulation without assistive device. FWB. EXTREMITIES: See musculoskeletal.  MUSCULOSKELETAL:   Head and Neck:  Negative for misalignment, asymmetry, crepitation, defects, tenderness masses or effusions.  Left Upper Extremity: Inspection, percussion and palpation preformed. Sauls sign is negative.  Right Upper Extremity: Inspection, percussion and palpation preformed. Sauls sign is negative.  Spine, Ribs and Pelvis: Inspection, percussion and palpation preformed. Negative for misalignment, asymmetry, crepitation, defects, tenderness masses or effusions.  Left Lower Extremity: Inspection, percussion and palpation preformed. Negative straight leg raise.  Right Lower Extremity: Pain. Weakness with standing. Inspection, percussion and palpation preformed. Negative straight leg raise. SPINE EXAM:     Lumbar spine: No rash, ecchymosis, or gross obliquity. No focal atrophy is noted. ASSESSMENT    ICD-10-CM ICD-9-CM    1. S/P lumbar fusion Z98.1 V45.4 AMB POC XRAY, SPINE, LUMBOSACRAL; 2 O      REFERRAL TO PHYSICAL THERAPY   2. Spondylolisthesis of lumbar region M43.16 738.4 AMB POC XRAY, SPINE, LUMBOSACRAL; 2 O      REFERRAL TO PHYSICAL THERAPY   3. Spinal stenosis of lumbar region with neurogenic claudication M48.062 724.03 REFERRAL TO PHYSICAL THERAPY   4. Neuritis of right lower extremity G57.91 355.8 REFERRAL TO PHYSICAL THERAPY      DULoxetine (CYMBALTA) 30 mg capsule       Written by Naresh Pedroza, as dictated by Silvestre Habermann, MD.    I, Dr. Silvestre Habermann, MD, confirm that all documentation is accurate.

## 2018-01-17 ENCOUNTER — OFFICE VISIT (OUTPATIENT)
Dept: PULMONOLOGY | Age: 68
End: 2018-01-17

## 2018-01-17 VITALS
RESPIRATION RATE: 16 BRPM | OXYGEN SATURATION: 99 % | BODY MASS INDEX: 19.46 KG/M2 | TEMPERATURE: 97 F | HEART RATE: 80 BPM | DIASTOLIC BLOOD PRESSURE: 80 MMHG | SYSTOLIC BLOOD PRESSURE: 110 MMHG | HEIGHT: 71 IN | WEIGHT: 139 LBS

## 2018-01-17 DIAGNOSIS — I26.99 OTHER ACUTE PULMONARY EMBOLISM WITHOUT ACUTE COR PULMONALE (HCC): ICD-10-CM

## 2018-01-17 DIAGNOSIS — Z23 ENCOUNTER FOR IMMUNIZATION: ICD-10-CM

## 2018-01-17 DIAGNOSIS — R06.09 DYSPNEA ON EXERTION: Primary | ICD-10-CM

## 2018-01-17 NOTE — PROGRESS NOTES
Chief Complaint   Patient presents with   [facility] Follow Up     pulmonary emboli     Patient here for hospital  follow up visit. Veronika Price is a 74 y.o. male who presents for routine immunizations. He denies any symptoms , reactions or allergies that would exclude them from being immunized today. Risks and adverse reactions were discussed and the VIS was given to them. All questions were addressed. He was observed for 5 min post injection. There were no reactions observed.     Cheng Serrano LPN
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
## 2018-01-17 NOTE — MR AVS SNAPSHOT
[address], [address] [facility] [address] 
[phone] Patient: Asher Gallardo MRN: [id number] LUB:8/78/1990 Visit Information Date & Time Provider Department Dept. Phone Encounter #  
 1/17/2018  9:00 AM DO [facility] Mrai Smith [id number] Follow-up Instructions Return in about 2 months (around 3/17/2018). Your Appointments 2/27/2018  8:00 AM  
Follow Up with Roman Schuler MD  
[facility] ONE [address] [place] [address]) Appt Note: 6 wk f/u Dr Adan Carrillo [address] [facility] [address] [phone]  
  
   
 [address] [facility] [address] Upcoming Health Maintenance Date Due Hepatitis C Screening 1950 DTaP/Tdap/Td series (1 - Tdap) 2/26/1973 FOBT Q 1 YEAR AGE 50-75 2/26/2002 ZOSTER VACCINE AGE 60> 12/26/2011 GLAUCOMA SCREENING Q2Y 2/26/2017 Pneumococcal 65+ Low/Medium Risk (1 of 2 - PCV13) 2/26/2017 MEDICARE YEARLY EXAM 2/26/2017 Influenza Age 5 to Adult 8/1/2017 Allergies as of 1/17/2018  Review Complete On: 1/17/2018 By: Stevie Molina LPN Severity Noted Reaction Type Reactions Bee Sting  [Sting, Bee] High 04/06/2009    Swelling DIZZINESS Current Immunizations  Never Reviewed Name Date Pneumococcal Conjugate (PCV-13) 1/17/2018 Not reviewed this visit You Were Diagnosed With   
  
 Codes Comments Dyspnea on exertion    -  Primary ICD-10-CM: R06.09 
ICD-9-CM: 786.09 Other acute pulmonary embolism without acute cor pulmonale (HCC)     ICD-10-CM: I26.99 
ICD-9-CM: 415.19 Encounter for immunization     ICD-10-CM: Z33 ICD-9-CM: V03.89 Vitals BP Pulse Temp Resp Height(growth percentile) Weight(growth percentile)  110/80 (BP 1 Location: Left arm, BP Patient Position: Sitting) 80 97 °F (36.1 °C) (Oral) 16 5' 11\" (1.803 m) 139 lb (63 kg) SpO2 BMI Smoking Status 99% 19.39 kg/m2 Former Smoker BMI and BSA Data Body Mass Index Body Surface Area  
 19.39 kg/m 2 1.78 m 2 Preferred Pharmacy Pharmacy Name Phone [facility] Your Updated Medication List  
  
   
This list is accurate as of: 1/17/18 10:02 AM.  Always use your most recent med list.  
  
  
  
  
 clobetasol 0.05 % topical cream  
Commonly known as:  Alicia Camp Nelson Use 1 Application to affected area Twice Daily. desonide 0.05 % topical lotion Commonly known as:  Manual Dom Use 1 Application to affected area Twice Daily. DULoxetine 30 mg capsule Commonly known as:  CYMBALTA Take 1 Cap by mouth daily. Take 1 tab x2weeks, then increase to 2tab daily  
  
 lisinopril-hydroCHLOROthiazide 20-12.5 mg per tablet Commonly known as:  PRINZIDE, ZESTORETIC  
TAKE ONE TABLET BY MOUTH ONCE A DAY  
  
 oxyCODONE-acetaminophen  mg per tablet Commonly known as:  PERCOCET 10 Take 1 Tab by mouth daily as needed. Indications: Pain, post op pain * rivaroxaban 15 mg (42)- 20 mg (9) Dspk Commonly known as:  Graciela Manus Take one 15 mg tablet twice a day with food for the first 21 days. Then, take one 20 mg tablet once a day with food for 9 days. contact PCP for further refills * rivaroxaban 20 mg Tab tablet Commonly known as:  Graciela Manus Take 1 Tab by mouth daily (with breakfast). Indications: pulmonary thromboembolism  
  
 topiramate 25 mg tablet Commonly known as:  TOPAMAX Take 3 Tabs by mouth nightly. Indications: neuritis  
  
 zolpidem 5 mg tablet Commonly known as:  AMBIEN Take 5 mg by mouth nightly as needed. * Notice: This list has 2 medication(s) that are the same as other medications prescribed for you.  Read the directions carefully, and ask your doctor or other care provider to review them with you. We Performed the Following ADMIN INFLUENZA VIRUS VAC [ [facility]] ADMIN PNEUMOCOCCAL VACCINE [ [facility]] PNEUMOCOCCAL CONJ VACCINE 13 VALENT IM [id number] CPT(R)] Follow-up Instructions Return in about 2 months (around 3/17/2018). To-Do List   
 01/18/2018 ECHO:  2D ECHO COMPLETE ADULT (TTE) W OR WO CONTR   
  
 01/18/2018 Procedures: AMB POC PFT COMPLETE W/BRONCHODILATOR   
  
 01/18/2018 Procedures: AMB POC PFT COMPLETE W/O BRONCHODILATOR   
  
 01/18/2018 Procedures:  DIFFUSING CAPACITY   
  
 01/18/2018 Procedures:  GAS DILUT/WASHOUT LUNG VOL W/WO DISTRIB VENT&VOL   
  
 01/18/2018 4:00 PM  
  Appointment with Jammie Burk PT at [facility]  [facility] ([phone]) [facility]! Dear Klarissa [place]: Thank you for requesting a [employer] account. Our records indicate that you already have an active [employer] account. You can access your account anytime at [email or web address] Did you know that you can access your hospital and ER discharge instructions at any time in [employer]? You can also review all of your test results from your hospital stay or ER visit. Additional Information If you have questions, please visit the Frequently Asked Questions section of the [employer] website at [email or web address] Remember, [employer] is NOT to be used for urgent needs. For medical emergencies, dial 911. Now available from your iPhone and Android! Please provide this summary of care documentation to your next provider. Your primary care clinician is listed as Jefry Ashford. If you have any questions after today's visit, please call [phone].

## 2018-01-18 ENCOUNTER — HOSPITAL ENCOUNTER (OUTPATIENT)
Dept: PHYSICAL THERAPY | Age: 68
Discharge: HOME | End: 2018-01-18
Payer: COMMERCIAL

## 2018-01-18 PROCEDURE — 97161 PT EVAL LOW COMPLEX 20 MIN: CPT

## 2018-01-18 PROCEDURE — G8978 MOBILITY CURRENT STATUS: HCPCS

## 2018-01-18 PROCEDURE — 97110 THERAPEUTIC EXERCISES: CPT

## 2018-01-18 PROCEDURE — 97530 THERAPEUTIC ACTIVITIES: CPT

## 2018-01-18 PROCEDURE — G8979 MOBILITY GOAL STATUS: HCPCS

## 2018-01-18 NOTE — PROGRESS NOTES
[facility]  [address]  [place], [address]  [phone] fax    Plan of Care/ Statement of Necessity for Physical Therapy Services    Patient name: Chicho Jefferson Start of Care: 2018   Referral source: Marcella Hayden MD : 1950    Medical Diagnosis: Arthrodesis status [Z98.1]  Spondylolisthesis, lumbar region [M43.16]  Spinal stenosis, lumbar region with neurogenic claudication [M48.062]  Unspecified mononeuropathy of right lower limb [G57.91]   Onset Date:2017 DOS    Treatment Diagnosis: s/p L4-5 laminectomy and fusion,    Prior Hospitalization: see medical history Provider#: [id number]   Medications: Verified on Patient summary List    Comorbidities: Open lung biopsy, L4-5 laminectomy and fusion, arthritis, HBP, h/o compression fractures in thoracic and lumbar spine   Prior Level of Function: Works full time (lot of sitting required), walks 5 miles/day, callisthenic exercises      The Plan of Care and following information is based on the information from the initial evaluation. Assessment/ key information: Pt is a 74 y.o. Male presenting s/p lumbar laminectomy and L4-5 fusion, with a secondary hospital stay due to bilateral PE; limitations noted in trunk ext and flex AROM, trunk flex/B SB/B rot MMT, TTP about R piriformis and sacral border, B ankle EV MMT 4/5, B hip ext MMT 3/5, R hip flex 3/5, L hip flex 4-/5, R ankle DF MMT 4-/5, R hallux ext 4/5, (+) R KAL and FAIR special tests, and FOTO score of 42. Pt would benefit from skilled PT intervention to address the above listed limitations and allow return to functional task completion.     Evaluation Complexity History MEDIUM  Complexity : 1-2 comorbidities / personal factors will impact the outcome/ POC ; Examination LOW Complexity : 1-2 Standardized tests and measures addressing body structure, function, activity limitation and / or participation in recreation  ;Presentation LOW Complexity : Stable, uncomplicated  ;Clinical Decision Making MEDIUM Complexity : FOTO score of 26-74  Overall Complexity Rating: LOW   Problem List: pain affecting function, decrease ROM, decrease strength, impaired gait/ balance, decrease ADL/ functional abilitiies, decrease activity tolerance and decrease flexibility/ joint mobility   Treatment Plan may include any combination of the following: Therapeutic exercise, Therapeutic activities, Neuromuscular re-education, Physical agent/modality, Gait/balance training, Manual therapy, Patient education, Self Care training, Functional mobility training, Home safety training and Stair training  Patient / Family readiness to learn indicated by: asking questions, trying to perform skills and interest  Persons(s) to be included in education: patient (P)  Barriers to Learning/Limitations: None  Patient Goal (s): \"Strengthen back, get feeling back in right leg. \"  Patient Self Reported Health Status: good  Rehabilitation Potential: good    Short Term Goals: To be accomplished in two weeks:   1. Pt will demonstrate 4+/5 trunk flex/B rot/B SB MMT to improve functional strength during prolonged positions. 2.  Pt will demonstrate 4/5 R hip flex MMT to improve position changes. 3.  Pt will demonstrate (-) R KAL/FAIR tests to decrease R LE symptoms during daily tasks. Long Term Goals: To be accomplished in four weeks:   1. Pt will report \"a little bit of difficulty\" with going up or down two flights of steps to improve community ambulation. 2.  Pt will report \"a little bit of difficulty\" with lifting a box of groceries from the floor to improve functional lifting tasks. 3.  Pt will report ability to ambulate 5 miles without increased pain to return to health-related exercise. Frequency / Duration: Patient to be seen 2 times per week for 4 weeks.     Patient/ Caregiver education and instruction: Diagnosis, prognosis, self care, activity modification and exercises [x]  Plan of care has been reviewed with PTA    G-Codes (GP)  Mobility   Current  CK= 40-59%   Goal  CK= 40-59%  The severity rating is based on clinical judgment and the FOTO score. Certification Period: 60 days (01/18/2018 - 03/15/2018)  Baldemar Thomas, PT 1/18/2018 4:52 PM    ________________________________________________________________________    I certify that the above Therapy Services are being furnished while the patient is under my care. I agree with the treatment plan and certify that this therapy is necessary.     [address] [place] [address] Signature:____________________  Date:____________Time: _________    Please sign and return to [facility]  [address]  [place], [address]  [phone] fax

## 2018-01-18 NOTE — PROGRESS NOTES
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 Reports forward flex would take the numbness away. He saw a neurosurgeon, and after injections did not help, he had a laminectomy done in 2002.  2003, pt reports [place] was [place] when he suffered a compression fracture (around T1-3 and lumbar spine) and he believes it aggravated his spondy. He spent 8 months in a [employer] brace, no further surgery. He reports \"things were going pretty well\" until about three or four years ago, when the numbness in his left leg increased. MRI showed compression of the nerve and worsening spondy. He tried conservative treatment, however the symptoms continued to worsen to the point that it affected his QOL and ability to do his job. After surgery, pt was in the hospital for three days, and then on 12/17/2017, pt reports he was getting ready to go walking when he felt a sharp pain in his L breast; he was able to continue walking, but the pain recurred several times. Pt called his nurse practitioner, who referred him to the ED; blood clots were found in B lungs, and pt was back to [facility] for four days for treatment. He saw Dr. Sheela Adan on 01/16/2018, and told him the L leg is good, better strength, no recurring symptoms; but the R side is having problems now. Pt reports weight loss, and nausea.     Current symptoms/Complaints: Pain and numbness in lower back and R leg, discomfort with prolonged sitting  Previous Treatment/Compliance: PT after compression fractures and for neck  PMHx/Surgical Hx: Open lung biopsy, L4-5 laminectomy and fusion, arthritis, HBP  Work Hx: [id number] ([employer], [place] a [place] of travel [place] - returned to work 1/18/2017; no travel for the time being (usually 20+ hours in the air)  Living Situation: two story home, reports no difficulty with stairs other than fatigue  Pt Goals: \"Strengthen back, get feeling back in right leg. \"  Barriers: []pain []financial []time []transportation []other  Motivation: Good  Substance use: []Alcohol []Tobacco []other:   FABQ Score: [x]low []elevate  Cognition: A & O x 4    Other: Good health status    OBJECTIVE/EXAMINATION    Symptoms:  Aggravated by:   [] Bending [x] Sitting [] Standing [] Walking   [] Moving [] Cough [] Sneeze [] Valsalva   [] AM  [x] PM  Lying:  [x] sup   [x] pro   [x] sidelying   [] Other:     Eased by:    [] Bending [] Sitting [x] Standing [x] Walking   [] Moving [] AM  [] PM  Lying: [] sup  [] pro  [] sidelying   [] Other:     General Health:  Red Flags Indicated? [] Yes    [] No  [x] Yes [] No Recent weight change (If yes, due to dieting?  [] Yes  [] No)   [] Yes [x] No Weakness in legs during walking  [x] Yes [] No Unremitting pain at night  [x] Yes [] No Abdominal pain or problems - nausea  [] Yes [x] No Rectal bleeding  [] Yes [x] No Feet more cold or painful in cold weather  [] Yes [x] No Menstrual irregularities  [] Yes [x] No Blood or pain with urination  [] Yes [x] No Dysfunction of bowel or bladder  [] Yes [x] No Recent illness within past 3 weeks (i.e, cold, flu)  [] Yes [x] No Numbness/tingling in buttock/genitalia region    OBJECTIVE  Posture:  Lateral Shift: [] R    [] L     [] +  [] -  Kyphosis: [] Increased [] Decreased   []  WNL  Lordosis:  [] Increased [] Decreased   [] WNL  Pelvic symmetry: [] WNL    [] Other:    Gait:  [] Normal     [] Abnormal:    Active Movements: [] N/A   [] Too acute   [] Other:  ROM % AROM MMT Comments:pain, area   Forward flexion 40-60 75 4-    Extension 20-30 50 5    SB right 20-30 100 4-    SB left 20-30 100 4-    Rotation right 5-10 100 4-    Rotation left 5-10 100 4-      Repeated Movements   Effects on present pain: produces (HI), abolishes (A), increases (incr), decreases (decr), centralizes (C), peripheral (PH), no effect (NE)   Pre-Test Sx Flexion Repeated Flexion Extension Repeated Extension Repeated SBL Repeated SBR   Sitting          Standing          Lying      N/A N/A   Comments:  Side Glide:  Sustained passive positioning test:    Neuro Screen [] WNL  Myotome/Dermatome/Reflexes:  Comments: pt with difficulty relaxing, unable to assess    Dural Mobility:  SLR Sitting: [] R    [] L    [] +    [] -  @ (degrees):           Supine: [x] R    [x] L    [] +    [x] -  @ (degrees):   Slump Test: [x] R    [] L    [] +    [x] -  @ (degrees):   Prone Knee Bend: [x] R    [x] L    [] +    [x] -     Palpation  [] Min  [x] Mod  [] Severe    Location: R piriformis and sacral border  [] Min  [] Mod  [] Severe    Location:  [] Min  [] Mod  [] Severe    Location:    Strength   R(0-5) l (0-5) N/T   Hip Flexion (L1,2) 3 4- []   Knee Extension (L3,4) 5 5 []   Ankle Dorsiflexion (L4) 4- 5 []   Great Toe Extension (L5) 4 5 []   Ankle Plantarflexion (S1) 5 5 []   Knee Flexion (S1,2) 5 5 []   Ankle eversion 4 4 []   Hip ext 3 3 []   Paraspinals   []   Back Rotators   []   Gluteus Jamshid   []   Other   []     Special Tests  Lumbar:  Lumb.  Compression: [] Pos  [] Neg               Lumbar Distraction:   [] Pos  [] Neg    Quadrant:  [] Pos  [] Neg   [] Flex  [] Ext    Sacroilliac:  Gaenslen's: [] R    [] L    [] +    [] -     Compression: [] +    [] -     Gapping:  [] +    [] -     Thigh Thrust: [] R    [] L    [] +    [] -     Leg Length: [] +    [] -   Position:    Crests:    ASIS:    PSIS:    Sacral Sulcus:    Mobility: Standing flex:     Sitting flex:     Supine to sit:     Prone knee bend:         Hip: Euel Humbles:  [x] R    [x] L    [x] +    [] -     Scour:  [] R    [] L    [] +    [] -     Piriformis: [x] R    [] L    [x] +    [] -          Deficits: Stephanie's: [] R    [] L    [] +    [] -     Sherial Brittany: [] R    [] L    [] +    [] -     Hamstrings 90/90: (+) B    Gastrocsoleus (to neutral): Right: Left:       Global Muscular Weakness:  Abdominals:  Quadratus Lumborum:  Paraspinals:   Other:    Other tests/comments:

## 2018-01-18 NOTE — PROGRESS NOTES
PT DAILY TREATMENT NOTE - [facility]-16    Patient Name: Vasu aYdav  Date:2018  : 1950  [x]  Patient  Verified  Payor: BLUE CROSS / Plan: [facility] / Product Type: PPO /    In time:1610  Out time:170  Total Treatment Time (min): 55  Total Timed Codes (min): 23  1:1 Treatment Time ( [address] only): 55   Visit #: 1 of 8    Treatment Area: Arthrodesis status [Z98.1]  Spondylolisthesis, lumbar region [M43.16]  Spinal stenosis, lumbar region with neurogenic claudication [M48.062]  Unspecified mononeuropathy of right lower limb [G57.91]    SUBJECTIVE  Pain Level (0-10 scale): 2  Any medication changes, allergies to medications, adverse drug reactions, diagnosis change, or new procedure performed?: [x] No    [] Yes (see summary sheet for update)  Subjective functional status/changes:   [] No changes reported  Mechanism of Injury: DOS 2017 - post laminectomy and fusion at L4-5; pt reports symptoms started in [address] [place] [address] when 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 Reports forward flex would take the numbness away. He saw a neurosurgeon, and after injections did not help, he had a laminectomy done in .  , pt reports he was [place] a [place] when he suffered a compression fracture (around T1-3 and lumbar spine) and he believes it aggravated his spondy. He spent 8 months in a [facility] brace, no further surgery. He reports \"things were going pretty well\" until about three or four years ago, when the numbness in his left leg increased. MRI showed compression of the nerve and worsening spondy. He tried conservative treatment, however the symptoms continued to worsen to the point that it affected his QOL and ability to do his job. After surgery, pt was in the hospital for three days, and then on 2017, pt reports he was getting ready to go walking when he felt a sharp pain in his L breast; he was able to continue walking, but the pain recurred several times.   Pt called his nurse practitioner, who referred him to the ED; blood clots were found in B lungs, and pt was back to [facility] for four days for treatment. He saw Dr. Jeffery Goss on 01/16/2018, and told him the L leg is good, better strength, no recurring symptoms; but the R side is having problems now.     Pt reports weight loss, and nausea. OBJECTIVE    39 min [x]Eval                  []Re-Eval     15 min Therapeutic Exercise:  [x] See flow sheet :   Rationale: increase ROM and increase strength to improve the patients ability to perform ADLs    8 min Therapeutic Activity:  []  See flow sheet :   Rationale: Reviewed with pt involved anatomy and pathology, treatment plan, and goals. With   [] TE   [] TA   [] neuro   [] other: Patient Education: [x] Review HEP    [] Progressed/Changed HEP based on:   [] positioning   [] body mechanics   [] transfers   [] heat/ice application    [] other:      Other Objective/Functional Measures: see eval.     Pain Level (0-10 scale) post treatment: 2    ASSESSMENT/Changes in Function:   Pt is a 74 y.o. Male presenting s/p lumbar laminectomy and L4-5 fusion, with a secondary hospital stay due to bilateral PE; limitations noted in trunk ext and flex AROM, trunk flex/B SB/B rot MMT, TTP about R piriformis and sacral border, B ankle EV MMT 4/5, B hip ext MMT 3/5, R hip flex 3/5, L hip flex 4-/5, R ankle DF MMT 4-/5, R hallux ext 4/5, (+) R KAL and FAIR special tests, and FOTO score of 42. Pt would benefit from skilled PT intervention to address the above listed limitations and allow return to functional task completion.     Patient will continue to benefit from skilled PT services to modify and progress therapeutic interventions, address functional mobility deficits, address ROM deficits, address strength deficits, analyze and address soft tissue restrictions, analyze and cue movement patterns, analyze and modify body mechanics/ergonomics, assess and modify postural abnormalities and instruct in home and community integration to attain remaining goals. [x]  See Plan of Care  []  See progress note/recertification  []  See Discharge Summary         Progress towards goals / Updated goals:  Short Term Goals: To be accomplished in two weeks:                        1.  Pt will demonstrate 4+/5 trunk flex/B rot/B SB MMT to improve functional strength during prolonged positions. 2.  Pt will demonstrate 4/5 R hip flex MMT to improve position changes. 3.  Pt will demonstrate (-) R KAL/FAIR tests to decrease R LE symptoms during daily tasks. Long Term Goals: To be accomplished in four weeks:                        1.  Pt will report \"a little bit of difficulty\" with going up or down two flights of steps to improve community ambulation. 2.  Pt will report \"a little bit of difficulty\" with lifting a box of groceries from the floor to improve functional lifting tasks. 3.  Pt will report ability to ambulate 5 miles without increased pain to return to health-related exercise.     PLAN  [x]  Upgrade activities as tolerated     [x]  Continue plan of care  [x]  Update interventions per flow sheet       []  Discharge due to:_  []  Other:_      Bonnie Portillo, PT 1/18/2018  6:04 PM    Future Appointments  Date Time Provider Mari Katiuska   1/22/2018 8:30 AM PPA [address] [place] [address]   1/23/2018 3:00 PM Herscher Brantyme, PT [facility]   1/26/2018 9:00 AM [facility]- [address] MACHINE (WT ) [facility]   1/26/2018 3:30 PM Herscher Rhyme, PT [facility]   1/29/2018 3:30 PM Herscher Rhyme, PT [facility]   2/1/2018 4:00 PM Herscher Rhyme, PT [facility]   2/6/2018 4:00 PM Herscher Rhyme, PT [facility]   2/8/2018 4:00 PM Herscher Rhyme, PT [facility]   2/13/2018 4:30 PM Herscher Rhyme, PT [facility]   2/27/2018 8:00 AM Lizbeth Colorado  [address]

## 2018-01-22 ENCOUNTER — CLINICAL SUPPORT (OUTPATIENT)
Dept: PULMONOLOGY | Age: 68
End: 2018-01-22

## 2018-01-22 VITALS — BODY MASS INDEX: 19.18 KG/M2 | WEIGHT: 137 LBS | HEIGHT: 71 IN

## 2018-01-22 DIAGNOSIS — R06.09 DYSPNEA ON EXERTION: ICD-10-CM

## 2018-01-23 ENCOUNTER — HOSPITAL ENCOUNTER (OUTPATIENT)
Dept: PHYSICAL THERAPY | Age: 68
Discharge: HOME | End: 2018-01-23
Payer: COMMERCIAL

## 2018-01-23 PROCEDURE — 97112 NEUROMUSCULAR REEDUCATION: CPT

## 2018-01-23 PROCEDURE — 97110 THERAPEUTIC EXERCISES: CPT

## 2018-01-23 NOTE — PROGRESS NOTES
PT DAILY TREATMENT NOTE - [facility]    Patient Name: Mireille Parra  Date:2018  : 1950  [x]  Patient  Verified  Payor: BLUE CROSS / Plan: [facility] / Product Type: PPO /    In time:1500  Out time:1547  Total Treatment Time (min): 47  Total Timed Codes (min): 47  1:1 Treatment Time ( only): 37   Visit #: 2 of 8    Treatment Area: Spondylolisthesis, lumbar region [M43.16]  Spinal stenosis, lumbar region with neurogenic claudication [M48.062]  Unspecified mononeuropathy of right lower limb [G57.91]  Low back pain [M54.5]    SUBJECTIVE  Pain Level (0-10 scale): 1.5  Any medication changes, allergies to medications, adverse drug reactions, diagnosis change, or new procedure performed?: [x] No    [] Yes (see summary sheet for update)  Subjective functional status/changes:   [] No changes reported  Pt reports he has some questions/comments on the the exercises, but he can \"feel them. \"    OBJECTIVE     min []Eval                  []Re-Eval     23 min Therapeutic Exercise:  [x] See flow sheet :   Rationale: increase ROM and increase strength to improve the patients ability to perform ADLs    24 min Neuromuscular Re-education:  [x]  See flow sheet :   Rationale: increase strength, improve coordination and increase proprioception  to improve the patients ability to improve core engagement for ADLs           With   [] TE   [] TA   [] neuro   [] other: Patient Education: [x] Review HEP    [] Progressed/Changed HEP based on:   [] positioning   [] body mechanics   [] transfers   [] heat/ice application    [] other:      Other Objective/Functional Measures: reviewed HEP and answered questions on completion     Pain Level (0-10 scale) post treatment: 1.5    ASSESSMENT/Changes in Function:   Pt reports soreness along R glutes with exercises, demonstrating quick fatigue with strengthening exercises. Challenged with progressed TA supine exercises.   No symptom reproduction in sitting, but symptom reproduction as soon as he stands. Patient will continue to benefit from skilled PT services to modify and progress therapeutic interventions, address functional mobility deficits, address ROM deficits, address strength deficits, analyze and address soft tissue restrictions, analyze and cue movement patterns, analyze and modify body mechanics/ergonomics, assess and modify postural abnormalities and instruct in home and community integration to attain remaining goals. []  See Plan of Care  []  See progress note/recertification  []  See Discharge Summary         Progress towards goals / Updated goals:  Short Term Goals: To be accomplished in two weeks:                        1.  Pt will demonstrate 4+/5 trunk flex/B rot/B SB MMT to improve functional strength during prolonged positions.                        9.  Pt will demonstrate 4/5 R hip flex MMT to improve position changes.                       2.  Pt will demonstrate (-) R KAL/FAIR tests to decrease R LE symptoms during daily tasks. Not met 1/23/2018  Long Term Goals: To be accomplished in [address]:                        7.  Pt will report \"a little bit of difficulty\" with going up or down two flights of steps to improve community ambulation.                        8.  Pt will report \"a little bit of difficulty\" with lifting a box of groceries from the floor to improve functional lifting tasks.                        3.  Pt will report ability to ambulate 5 miles without increased pain to return to health-related exercise.     PLAN  [x]  Upgrade activities as tolerated     [x]  Continue plan of care  [x]  Update interventions per flow sheet       []  Discharge due to:_  []  Other:_      Bonnie Portillo PT 1/23/2018  2:59 PM    Future Appointments  Date Time Provider Mari Harp   1/23/2018 3:00 PM Bonnie Portillo PT [facility]   1/26/2018 9:00 AM HBV- [address] MACHINE (WT ) [facility]   1/26/2018 3:30 PM DONALD MagallanesHV [facility] 1/29/2018 3:30 PM Almon Bouquet, PT [facility]   2/1/2018 4:00 PM Almon Bouquet, PT [facility]   2/6/2018 4:00 PM Almon Bouquet, PT [facility]   2/8/2018 4:00 PM Almon Bouquet, PT [facility]   2/13/2018 4:30 PM Almon Bouquet, PT [facility]   2/27/2018 8:00 AM Christine Hampton  [address]

## 2018-01-26 ENCOUNTER — HOSPITAL ENCOUNTER (OUTPATIENT)
Dept: PHYSICAL THERAPY | Age: 68
Discharge: HOME | End: 2018-01-26
Payer: COMMERCIAL

## 2018-01-26 ENCOUNTER — HOSPITAL ENCOUNTER (OUTPATIENT)
Dept: NON INVASIVE DIAGNOSTICS | Age: 68
Discharge: HOME | End: 2018-01-26
Attending: INTERNAL MEDICINE
Payer: COMMERCIAL

## 2018-01-26 DIAGNOSIS — R06.09 DYSPNEA ON EXERTION: ICD-10-CM

## 2018-01-26 PROCEDURE — 97112 NEUROMUSCULAR REEDUCATION: CPT

## 2018-01-26 PROCEDURE — 93306 TTE W/DOPPLER COMPLETE: CPT

## 2018-01-26 NOTE — PROGRESS NOTES
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 OBJECTIVE     min []Eval                  []Re-Eval     15 min Therapeutic Exercise:  [x] See flow sheet :   Rationale: increase ROM and increase strength to improve the patients ability to perform ADLs    34 min Neuromuscular Re-education:  [x]  See flow sheet :   Rationale: increase strength, improve coordination and increase proprioception  to improve the patients ability to improve core engagement           With   [] TE   [] TA   [] neuro   [] other: Patient Education: [x] Review HEP    [] Progressed/Changed HEP based on:   [] positioning   [] body mechanics   [] transfers   [] heat/ice application    [] other:      Other Objective/Functional Measures: reports less R radicular symptoms when walking 2-3 miles     Pain Level (0-10 scale) post treatment: 1    ASSESSMENT/Changes in Function:   Pt demonstrates much improved hip flexor and core engagement with supine exercises. Continues to fatigue quickly with glute activation exercises. Pt to try adding 0.25 to 0.5 miles to his daily walk.     Patient will continue to benefit from skilled PT services to modify and progress therapeutic interventions, address functional mobility deficits, address ROM deficits, address strength deficits, analyze and address soft tissue restrictions, analyze and cue movement patterns, analyze and modify body mechanics/ergonomics, assess and modify postural abnormalities and instruct in home and community integration to attain remaining goals. []  See Plan of Care  []  See progress note/recertification  []  See Discharge Summary         Progress towards goals / Updated goals:  Short Term Goals: To be accomplished in two weeks:                        2.  Pt will demonstrate 4+/5 trunk flex/B rot/B SB MMT to improve functional strength during prolonged positions.                        0.  Pt will demonstrate 4/5 R hip flex MMT to improve position changes.                       8.  Pt will demonstrate (-) R KAL/FAIR tests to decrease R LE symptoms during daily tasks. Not met 1/23/2018  Long Term Goals: To be accomplished in [address]:                        9.  Pt will report \"a little bit of difficulty\" with going up or down two flights of steps to improve community ambulation.                        6.  Pt will report \"a little bit of difficulty\" with lifting a box of groceries from the floor to improve functional lifting tasks.                        8.  Pt will report ability to ambulate 5 miles without increased pain to return to health-related exercise.  Not met, 3 miles at max right now, but symptoms present 1/26/2018    PLAN  [x]  Upgrade activities as tolerated     [x]  Continue plan of care  [x]  Update interventions per flow sheet       []  Discharge due to:_  []  Other:_      Claribel Diaz PT 1/26/2018  3:51 PM    Future Appointments  Date Time Provider Mari Harp   1/29/2018 3:30 PM [facility]   2/1/2018 4:00 PM [facility]   2/6/2018 4:00 PM [facility]   2/8/2018 4:00 PM [facility]   2/13/2018 4:30 PM [facility]   2/27/2018 8:00 AM Kristine Chakraborty  [address]

## 2018-01-29 ENCOUNTER — HOSPITAL ENCOUNTER (OUTPATIENT)
Dept: PHYSICAL THERAPY | Age: 68
Discharge: HOME | End: 2018-01-29
Payer: COMMERCIAL

## 2018-01-29 PROCEDURE — 97112 NEUROMUSCULAR REEDUCATION: CPT

## 2018-01-29 PROCEDURE — 97110 THERAPEUTIC EXERCISES: CPT

## 2018-01-29 NOTE — PROGRESS NOTES
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 OBJECTIVE     min []Eval                  []Re-Eval     15 min Therapeutic Exercise:  [x] See flow sheet :   Rationale: increase ROM and increase strength to improve the patients ability to perform ADLs    26 min Neuromuscular Re-education:  [x]  See flow sheet :   Rationale: increase strength, improve coordination and increase proprioception  to improve the patients ability to improve core engagement           With   [] TE   [] TA   [] neuro   [] other: Patient Education: [x] Review HEP    [] Progressed/Changed HEP based on:   [] positioning   [] body mechanics   [] transfers   [] heat/ice application    [] other:      Other Objective/Functional Measures:   R hip flex MMT 4 /5  Trunk flex 5/5  R trunk rot 5/5  L trunk rot 5/5  R trunk SB 5/5  L trunk SB 5/5     Pain Level (0-10 scale) post treatment: 0    ASSESSMENT/Changes in Function:   Pt reports no pain with exercises, especially during walking. Pt demonstrates improved R hip flex MMT to 4/5. He continues to demonstrate B glute weakness, R worse than L.   He reports he does not feel as confident with pushing off of his R leg. Patient will continue to benefit from skilled PT services to modify and progress therapeutic interventions, address functional mobility deficits, address ROM deficits, address strength deficits, analyze and address soft tissue restrictions, analyze and cue movement patterns, analyze and modify body mechanics/ergonomics, assess and modify postural abnormalities and instruct in home and community integration to attain remaining goals. []  See Plan of Care  []  See progress note/recertification  []  See Discharge Summary         Progress towards goals / Updated goals:  Short Term Goals: To be accomplished in two weeks:                        9.  Pt will demonstrate 4+/5 trunk flex/B rot/B SB MMT to improve functional strength during prolonged positions. met 1/29/2018                        7. Valeunice Farrisa will demonstrate 4/5 R hip flex MMT to improve position changes. met 1/29/2018                        3.  Pt will demonstrate (-) R KAL/FAIR tests to decrease R LE symptoms during daily tasks. Not met 1/23/2018  Long Term Goals: To be accomplished in [address]:                        0.  Pt will report \"a little bit of difficulty\" with going up or down two flights of steps to improve community ambulation.                        0.  Pt will report \"a little bit of difficulty\" with lifting a box of groceries from the floor to improve functional lifting tasks.                        6.  Pt will report ability to ambulate 5 miles without increased pain to return to health-related exercise.  Not met, 3 miles at max right now, but symptoms present 1/26/2018    PLAN  [x]  Upgrade activities as tolerated     [x]  Continue plan of care  [x]  Update interventions per flow sheet       []  Discharge due to:_  []  Other:_      Yogi Aquino, PT 1/29/2018  3:35 PM    Future Appointments  Date Time Provider Mari Harp   2/1/2018 4:00 PM Tima Moran [facility]   2/6/2018 4:00 PM Dolores Vaughn, PT [facility]   2/8/2018 4:00 PM Dolores Vaughn, PT [facility]   2/13/2018 4:30 PM Dolores Vaughn, PT [facility]   2/27/2018 8:00 AM Antoine Salvador  [address]

## 2018-01-30 DIAGNOSIS — R93.1 ECHOCARDIOGRAM ABNORMAL: Primary | ICD-10-CM

## 2018-02-01 ENCOUNTER — OFFICE VISIT (OUTPATIENT)
Dept: CARDIOLOGY CLINIC | Age: 68
End: 2018-02-01

## 2018-02-01 ENCOUNTER — HOSPITAL ENCOUNTER (OUTPATIENT)
Dept: PHYSICAL THERAPY | Age: 68
Discharge: HOME | End: 2018-02-01
Payer: COMMERCIAL

## 2018-02-01 VITALS
DIASTOLIC BLOOD PRESSURE: 68 MMHG | HEART RATE: 77 BPM | BODY MASS INDEX: 19.46 KG/M2 | WEIGHT: 139 LBS | HEIGHT: 71 IN | SYSTOLIC BLOOD PRESSURE: 105 MMHG

## 2018-02-01 DIAGNOSIS — I10 ESSENTIAL HYPERTENSION: ICD-10-CM

## 2018-02-01 DIAGNOSIS — R93.1 ABNORMAL ECHOCARDIOGRAM: ICD-10-CM

## 2018-02-01 DIAGNOSIS — Z98.1 S/P LUMBAR FUSION: ICD-10-CM

## 2018-02-01 DIAGNOSIS — I26.99 OTHER PULMONARY EMBOLISM WITHOUT ACUTE COR PULMONALE, UNSPECIFIED CHRONICITY (HCC): Primary | ICD-10-CM

## 2018-02-01 PROCEDURE — 97112 NEUROMUSCULAR REEDUCATION: CPT

## 2018-02-01 PROCEDURE — 97110 THERAPEUTIC EXERCISES: CPT

## 2018-02-01 NOTE — MR AVS SNAPSHOT
[facility] 
 
 
 [address] [place] 
[phone] Patient: Bud Hanks MRN: [id number] KCV:[birth year] Visit Information Date & Time Provider Department Dept. Phone Encounter #  
 2/1/2018 12:30 PM Ovidio Groves MD [facility] [id number] Follow-up Instructions Return for F/u after tests. Your Appointments 2/13/2018  7:00 AM  
PROCEDURE with [facility] ([facility]) Appt Note: est/see [address] [facility] [address]  
  
   
 [address] [address] [address] [address]  
  
    
 2/20/2018  9:00 AM  
Follow Up with Ovidio Groves MD  
[facility] ([facility] Appt Note: post nuc follow up  
 [address] [facility] [address]  
  
   
 [address] [address] [address] [address]  
  
    
 2/27/2018  8:00 AM  
Follow Up with Ratna Martinez MD  
[facility]) Appt Note: 6 wk f/u Dr Cheyenne Valdivia [address] [facility] [address] [phone]  
  
   
 [address] [facility] [address] Upcoming Health Maintenance Date Due Hepatitis C Screening 1950 DTaP/Tdap/Td series (1 - Tdap) 2/26/1973 FOBT Q 1 YEAR AGE 50-75 2/26/2002 ZOSTER VACCINE AGE 60> 12/26/2011 GLAUCOMA SCREENING Q2Y 2/26/2017 MEDICARE YEARLY EXAM 2/26/2017 Influenza Age 5 to Adult 8/1/2017 Pneumococcal 65+ Low/Medium Risk (2 of 2 - PPSV23) 1/17/2019 Allergies as of 2/1/2018  Review Complete On: 2/1/2018 By: Ovidio Groves MD  
  
 Severity Noted Reaction Type Reactions Bee Sting  [Sting, Bee] High 04/06/2009    Swelling DIZZINESS Current Immunizations  Never Reviewed Name Date Pneumococcal Conjugate (PCV-13) 1/17/2018 Not reviewed this visit You Were Diagnosed With   
  
 Codes Comments Other pulmonary embolism without acute cor pulmonale, unspecified chronicity ([facility] [address]    -  Primary ICD-10-CM: I26.99 
ICD-9-CM: 415.19 12/2017 
on xarelto 
normal pap on recent echo Essential hypertension     ICD-10-CM: I10 
ICD-9-CM: 401.9 controlled Abnormal echocardiogram     ICD-10-CM: R93.1 ICD-9-CM: 793.2 wall motion abnormality and mildly reduced ef 
? ischemic etiology 
sob on exertion improving S/P lumbar fusion     ICD-10-CM: Z98.1 ICD-9-CM: V45.4 recent surgery 
improving Vitals BP Pulse Height(growth percentile) Weight(growth percentile) BMI Smoking Status 105/68 77 5' 11\" (1.803 m) 139 lb (63 kg) 19.39 kg/m2 Former Smoker Vitals History BMI and BSA Data Body Mass Index Body Surface Area  
 19.39 kg/m 2 1.78 m 2 Preferred Pharmacy Pharmacy Name [facility], [address] Your Updated Medication List  
  
   
This list is accurate as of: 2/1/18  1:02 PM.  Always use your most recent med list.  
  
  
  
  
 clobetasol 0.05 % topical cream  
Commonly known as:  Jaime Rivera Use 1 Application to affected area Twice Daily. desonide 0.05 % topical lotion Commonly known as:  Ramos Ramachandran Use 1 Application to affected area Twice Daily. lisinopril-hydroCHLOROthiazide 20-12.5 mg per tablet Commonly known as:  PRINZIDE, ZESTORETIC  
TAKE ONE TABLET BY MOUTH ONCE A DAY  
  
 oxyCODONE-acetaminophen  mg per tablet Commonly known as:  PERCOCET 10 Take 1 Tab by mouth daily as needed. Indications: Pain, post op pain  
  
 rivaroxaban 20 mg Tab tablet Commonly known as:  Erla Brothers Take 1 Tab by mouth daily (with breakfast). Indications: pulmonary thromboembolism  
  
 topiramate 25 mg tablet Commonly known as:  TOPAMAX Take 3 Tabs by mouth nightly. Indications: neuritis  
  
 zolpidem 5 mg tablet Commonly known as:  AMBIEN Take 5 mg by mouth nightly as needed. Follow-up Instructions Return for F/u after tests. To-Do List   
 02/01/2018 4:00 PM  
  Appointment with Ainsley Young, PT at [facility]  [address] ([phone]) 02/06/2018 4:00 PM  
  Appointment with Ainsley Young, PT at [facility]  [address] ([phone])  
  
 02/08/2018 Nursing:  SCHEDULE NUCLEAR STUDY   
  
 02/08/2018 4:00 PM  
  Appointment with Ainsley Young, PT at [facility]  [address] ([phone])  
  
 02/13/2018 4:30 PM  
  Appointment with Ainsley Young, PT at [facility]  [address] ([phone]) [facility]! Dear Melissa Vora: Thank you for requesting a [employer] account. Our records indicate that you already have an active [employer] account. You can access your account anytime at [email or web address] Did you know that you can access your hospital and ER discharge instructions at any time in [employer]? You can also review all of your test results from your hospital stay or ER visit. Additional Information If you have questions, please visit the Frequently Asked Questions section of the Simple-[employer] website at [email or web address] Remember, Simple-Fill is NOT to be used for urgent needs. For medical emergencies, dial 911. Now available from your iPhone and Android! Please provide this summary of care documentation to your next provider. Your primary care clinician is listed as Nai Linda. If you have any questions after today's visit, please call [phone].

## 2018-02-01 NOTE — PROGRESS NOTES
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 I get out of the car, and you know, after sitting there for an hour, there's still weakness, it takes me a while. And I have that, it's not a shot of pain, but it's a shot of weakness, uncertainty, on the right leg. \"    OBJECTIVE     min []Eval                  []Re-Eval     10 min Therapeutic Exercise:  [x] See flow sheet :   Rationale: increase ROM and increase strength to improve the patients ability to perform ADLs    22 min Neuromuscular Re-education:  [x]  See flow sheet :   Rationale: increase strength, improve coordination and increase proprioception  to improve the patients ability to improve core engagment           With   [] TE   [] TA   [] neuro   [] other: Patient Education: [x] Review HEP    [] Progressed/Changed HEP based on:   [] positioning   [] body mechanics   [] transfers   [] heat/ice application    [] other:      Other Objective/Functional Measures: challenged with progressed core exercises on pilates chair     Pain Level (0-10 scale) post treatment: 0    ASSESSMENT/Changes in Function:   Pt demonstrate more fatigue with supine exercises on the R today, stating he can feel fatigue on the R more than the L. Challenged with progressed core exercises on shuttle balance and pilates chair. Patient will continue to benefit from skilled PT services to modify and progress therapeutic interventions, address functional mobility deficits, address ROM deficits, address strength deficits, analyze and address soft tissue restrictions, analyze and cue movement patterns, analyze and modify body mechanics/ergonomics, assess and modify postural abnormalities and instruct in home and community integration to attain remaining goals. []  See Plan of Care  []  See progress note/recertification  []  See Discharge Summary         Progress towards goals / Updated goals:  Short Term Goals: To be accomplished in two weeks:                        4.  Pt will demonstrate 4+/5 trunk flex/B rot/B SB MMT to improve functional strength during prolonged positions. met 1/29/2018                        6. Yareli Moody will demonstrate 4/5 R hip flex MMT to improve position changes. met 1/29/2018                        3.  Pt will demonstrate (-) R KAL/FAIR tests to decrease R LE symptoms during daily tasks.  Not met 1/23/2018  Long Term Goals: To be accomplished in [address]:                        2.  Pt will report \"a little bit of difficulty\" with going up or down two flights of steps to improve community ambulation.                        2.  Pt will report \"a little bit of difficulty\" with lifting a box of groceries from the floor to improve functional lifting tasks.                        6.  Pt will report ability to ambulate 5 miles without increased pain to return to health-related exercise. Not met, 3 miles at max right now, but symptoms present 1/26/2018    PLAN  [x]  Upgrade activities as tolerated     [x]  Continue plan of care  [x]  Update interventions per flow sheet       []  Discharge due to:_  []  Other:_      Juliane Hernandez PT 2/1/2018  4:03 PM    Future Appointments  Date Time Provider Mari Vancei   2/6/2018 4:00 PM Viridiana Steele, PT [facility]   2/8/2018 4:00 PM Viridiana Steele, PT [facility]   2/13/2018 7:00 AM [facility]   2/13/2018 4:30 PM Viridiana Steele, PT [facility]   2/20/2018 9:00 AM Bakari Mayer MD [facility]   2/27/2018 8:00 AM Lavern Altman  [address]

## 2018-02-01 NOTE — PROGRESS NOTES
HISTORY OF PRESENT ILLNESS  Joshua Garcia is a 74 y.o. male. New Patient   The history is provided by the patient. Associated symptoms include shortness of breath. Pertinent negatives include no chest pain, no abdominal pain and no headaches. Hypertension   The history is provided by the patient. This is a chronic problem. The problem occurs constantly. The problem has not changed since onset. Associated symptoms include shortness of breath. Pertinent negatives include no chest pain, no abdominal pain and no headaches. Nothing aggravates the symptoms. Abnormal Cardiac Test   The history is provided by the patient. This is a new problem. The problem occurs constantly. The problem has not changed since onset. Associated symptoms include shortness of breath. Pertinent negatives include no chest pain, no abdominal pain and no headaches. Nothing aggravates the symptoms. Nothing relieves the symptoms. Shortness of Breath   The history is provided by the patient. This is a recurrent problem. The problem occurs intermittently. The problem has been gradually improving. Pertinent negatives include no fever, no headaches, no cough, no sputum production, no hemoptysis, no wheezing, no PND, no orthopnea, no chest pain, no vomiting, no abdominal pain, no rash, no leg swelling and no claudication. The problem's precipitants include exercise. Review of Systems   Constitutional: Negative for chills and fever. HENT: Negative for nosebleeds. Eyes: Negative for blurred vision and double vision. Respiratory: Positive for shortness of breath. Negative for cough, hemoptysis, sputum production and wheezing. Cardiovascular: Negative for chest pain, palpitations, orthopnea, claudication, leg swelling and PND. Gastrointestinal: Negative for abdominal pain, heartburn, nausea and vomiting. Musculoskeletal: Negative for myalgias. Skin: Negative for rash. Neurological: Negative for dizziness, weakness and headaches. Endo/Heme/Allergies: Does not bruise/bleed easily. Family History   Problem Relation Age of Onset    Colon Cancer Mother     Hypertension Mother     Hypertension Father     Clotting Disorder Father     Other Brother     Cancer Brother        Past Medical History:   Diagnosis Date    Back pain     Chronic pain     Headache(784.0)     HTN (hypertension)     Joint pain     hx of joint pain    Pulmonary emboli ([address]        Past Surgical History:   Procedure Laterality Date    HX LUMBAR LAMINECTOMY  2002    L4/L5    HX ORTHOPAEDIC  12/04/2017    Lumbar surgery (Dr. Harman Deshler)   [address]    \"open lung bx\"    HX OTHER SURGICAL      laminectomy/fusion L4-5  S1    NEUROLOGICAL PROCEDURE UNLISTED  2003    pt spinal fracture T12 and L3       Social History   Substance Use Topics    Smoking status: Former Smoker     Packs/day: 0.75     Years: 40.00     Quit date: 10/17/2012    Smokeless tobacco: Former User    Alcohol use 1.2 oz/week     2 Cans of beer per week      Comment: daily-beer and wine/ 2 drinks a day       Allergies   Allergen Reactions    Bee Sting  [Sting, Bee] Swelling     DIZZINESS       Prior to Admission medications    Medication Sig Start Date End Date Taking? Authorizing Provider   rivaroxaban (XARELTO) 20 mg tab tablet Take 1 Tab by mouth daily (with breakfast). Indications: pulmonary thromboembolism 1/11/18  Yes Dipika Salgado MD   topiramate (TOPAMAX) 25 mg tablet Take 3 Tabs by mouth nightly. Indications: neuritis 10/24/17  Yes Ruby Covarrubias NP   clobetasol (TEMOVATE) 0.05 % topical cream Use 1 Application to affected area Twice Daily. 10/21/16  Yes Historical Provider   desonide (TRIDESILON) 0.05 % topical lotion Use 1 Application to affected area Twice Daily.  12/21/16  Yes Historical Provider   lisinopril-hydroCHLOROthiazide (PRINZIDE, ZESTORETIC) 20-12.5 mg per tablet TAKE ONE TABLET BY MOUTH ONCE A DAY 11/29/16  Yes Historical Provider   zolpidem (AMBIEN) 5 mg tablet Take 5 mg by mouth nightly as needed. 10/21/16  Yes Historical Provider   oxyCODONE-acetaminophen (PERCOCET 10)  mg per tablet Take 1 Tab by mouth daily as needed. Indications: Pain, post op pain 1/9/18   Stephon Pang NP         Visit Vitals    /68    Pulse 77    Ht 5' 11\" (1.803 m)    Wt 63 kg (139 lb)    BMI 19.39 kg/m2         Physical Exam   Constitutional: He is oriented to person, place, and time. He appears well-developed and well-nourished. HENT:   Head: Normocephalic and atraumatic. Eyes: Conjunctivae are normal.   Neck: Neck supple. No JVD present. No tracheal deviation present. No thyromegaly present. Cardiovascular: Normal rate, regular rhythm and normal heart sounds. Exam reveals no gallop and no friction rub. No murmur heard. Pulmonary/Chest: Breath sounds normal. No respiratory distress. He has no wheezes. He has no rales. He exhibits no tenderness. Abdominal: Soft. There is no tenderness. Musculoskeletal: He exhibits no edema. Neurological: He is alert and oriented to person, place, and time. Skin: Skin is warm and dry. Psychiatric: He has a normal mood and affect. Mr. Linda Lagunas has a reminder for a \"due or due soon\" health maintenance. I have asked that he contact his primary care provider for follow-up on this health maintenance. I have personally reviewed patient's records available from hospital and other providers and incorporated findings in patient care. I have personally reviewed patients ekg done at other facility. Admit date: 12/18/2017  Discharge date and time: 12/20/2017     Discharge Diagnoses:                                           1) Bilateral PE   2) Recent lumbar surgery   3 ) HTN  Normal sinus rhythm   Incomplete right bundle branch block   Borderline ECG   SUMMARY:echo-1/2018  Left ventricle: There was hypokinesis of the basal-mid inferoseptal, apical   inferior, and apical septal wall(s). Wall  thickness was increased. Features were consistent with a pseudonormal left   ventricular filling pattern, with  concomitant abnormal relaxation and increased filling pressure (grade 2   diastolic dysfunction). Right ventricle: The size was at the upper limits of normal.    Mitral valve: There was mild regurgitation. Assessment         ICD-10-CM ICD-9-CM    1. Other pulmonary embolism without acute cor pulmonale, unspecified chronicity (HCC) I26.99 415.19 SCHEDULE NUCLEAR STUDY    12/2017  on xarelto  normal pap on recent echo   2. Essential hypertension I10 401.9     controlled   3. Abnormal echocardiogram R93.1 793.2 SCHEDULE NUCLEAR STUDY    wall motion abnormality and mildly reduced ef  ? ischemic etiology  sob on exertion improving   4. S/P lumbar fusion Z98.1 V45.4     recent surgery  improving       Medications Discontinued During This Encounter   Medication Reason    DULoxetine (CYMBALTA) 30 mg capsule Not A Current Medication       Orders Placed This Encounter    SCHEDULE NUCLEAR STUDY     Exercise stress test     Standing Status:   Future     Standing Expiration Date:   2/1/2019       Follow-up Disposition:  Return for F/u after tests.

## 2018-02-01 NOTE — LETTER
Risa Greene 1950 2/1/2018 Dear MD Ochoa Munoz, DO I had the pleasure of evaluating  Mr. Octavia Mg in office today. Below are the relevant portions of my assessment and plan of care. ICD-10-CM ICD-9-CM 1. Other pulmonary embolism without acute cor pulmonale, unspecified chronicity (HCC) I26.99 415.19 SCHEDULE NUCLEAR STUDY  
 12/2017 
on xarelto 
normal pap on recent echo 2. Essential hypertension I10 401.9   
 controlled 3. Abnormal echocardiogram R93.1 793.2 SCHEDULE NUCLEAR STUDY  
 wall motion abnormality and mildly reduced ef 
? ischemic etiology 
sob on exertion improving 4. S/P lumbar fusion Z98.1 V45.4   
 recent surgery 
improving Current Outpatient Prescriptions Medication Sig Dispense Refill  rivaroxaban (XARELTO) 20 mg tab tablet Take 1 Tab by mouth daily (with breakfast). Indications: pulmonary thromboembolism 30 Tab 0  
 topiramate (TOPAMAX) 25 mg tablet Take 3 Tabs by mouth nightly. Indications: neuritis 90 Tab 5  clobetasol (TEMOVATE) 0.05 % topical cream Use 1 Application to affected area Twice Daily.  desonide (TRIDESILON) 0.05 % topical lotion Use 1 Application to affected area Twice Daily.  lisinopril-hydroCHLOROthiazide (PRINZIDE, ZESTORETIC) 20-12.5 mg per tablet TAKE ONE TABLET BY MOUTH ONCE A DAY  zolpidem (AMBIEN) 5 mg tablet Take 5 mg by mouth nightly as needed.  oxyCODONE-acetaminophen (PERCOCET 10)  mg per tablet Take 1 Tab by mouth daily as needed. Indications: Pain, post op pain 7 Tab 0 Orders Placed This Encounter  SCHEDULE NUCLEAR STUDY Exercise stress test  
  Standing Status:   Future Standing Expiration Date:   2/1/2019 If you have questions, please do not hesitate to call me. I look forward to following Mr. Octavia Mg along with you. Sincerely, Mukesh Durham MD

## 2018-02-01 NOTE — PROGRESS NOTES
1. Have you been to the ER, urgent care clinic since your last visit? Hospitalized since your last visit? Yes, angela szymanski for chest pains    2. Have you seen or consulted any other health care providers outside of the [address] [place] [address] since your last visit? Include any pap smears or colon screening. Yes, pcp  3. Since your last visit, have you had any of the following symptoms? No cardiac symptoms    4. Have you had any blood work, X-rays or cardiac testing?      yes, stephon, echo, ekg              5.  Where do you normally have your labs drawn?   [facility]    6. Do you need any refills today?    no

## 2018-02-06 ENCOUNTER — HOSPITAL ENCOUNTER (OUTPATIENT)
Dept: PHYSICAL THERAPY | Age: 68
Discharge: HOME | End: 2018-02-06
Payer: COMMERCIAL

## 2018-02-06 PROCEDURE — 97110 THERAPEUTIC EXERCISES: CPT

## 2018-02-06 PROCEDURE — 97112 NEUROMUSCULAR REEDUCATION: CPT

## 2018-02-06 NOTE — PROGRESS NOTES
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 He reports continued R LE weakness with any length of sitting. OBJECTIVE     min []Eval                  []Re-Eval     10 min Therapeutic Exercise:  [x] See flow sheet :   Rationale: increase ROM and increase strength to improve the patients ability to perform ADLs    37 min Neuromuscular Re-education:  [x]  See flow sheet :   Rationale: increase strength, improve coordination and increase proprioception  to improve the patients ability to improve core support and stability           With   [] TE   [] TA   [] neuro   [] other: Patient Education: [x] Review HEP    [] Progressed/Changed HEP based on:   [] positioning   [] body mechanics   [] transfers   [] heat/ice application    [] other:      Other Objective/Functional Measures:   KAL (+) R  FAIR (-) R  FOTO 62     Pain Level (0-10 scale) post treatment: 0    ASSESSMENT/Changes in Function:   Pt demonstrates improved R FAIR mobility but continued restrictions along R KAL. Challenged with progressed pilates chair exercises, especially with eccentric control. Improved FOTO to 62.     Patient will continue to benefit from skilled PT services to modify and progress therapeutic interventions, address functional mobility deficits, address ROM deficits, address strength deficits, analyze and address soft tissue restrictions, analyze and cue movement patterns, analyze and modify body mechanics/ergonomics, assess and modify postural abnormalities and instruct in home and community integration to attain remaining goals. []  See Plan of Care  []  See progress note/recertification  []  See Discharge Summary         Progress towards goals / Updated goals:  Short Term Goals: To be accomplished in two weeks:                        1.  Pt will demonstrate 4+/5 trunk flex/B rot/B SB MMT to improve functional strength during prolonged positions. met 1/29/2018                        6. Hermes Covingtoner will demonstrate 4/5 R hip flex MMT to improve position changes. met 1/29/2018                        3.  Pt will demonstrate (-) R KAL/FAIR tests to decrease R LE symptoms during daily tasks. Not met 1/23/2018; Met for FAIR, not met for KAL 2/6/2018   Long Term Goals: To be accomplished in four weeks:                        2.  Pt will report \"a little bit of difficulty\" with going up or down two flights of steps to improve community ambulation. Met 2/6/2018                        5.  Pt will report \"a little bit of difficulty\" with lifting a box of groceries from the floor to improve functional lifting tasks.  Met 2/6/2018                        3.  Pt will report ability to ambulate 5 miles without increased pain to return to health-related exercise. Not met, 3 miles at max right now, but symptoms present 1/26/2018    PLAN  [x]  Upgrade activities as tolerated     [x]  Continue plan of care  [x]  Update interventions per flow sheet       []  Discharge due to:_  []  Other:_      Jesus Garcia PT 2/6/2018  4:09 PM    Future Appointments  Date Time Provider Mari Harp   2/8/2018 4:00 PM Jesus Garcia PT [id number] 2/13/2018 7:00 AM CA [facility] SCHED   2/13/2018 4:30 PM Bonnie Portillo, PT [facility]   2/20/2018 9:00 AM Ovidio Groves MD [facility]   2/27/2018 8:00 AM Lizbeth [place]  [address]

## 2018-02-08 ENCOUNTER — HOSPITAL ENCOUNTER (OUTPATIENT)
Dept: PHYSICAL THERAPY | Age: 68
Discharge: HOME | End: 2018-02-08
Payer: COMMERCIAL

## 2018-02-08 PROCEDURE — 97140 MANUAL THERAPY 1/> REGIONS: CPT

## 2018-02-08 NOTE — PROGRESS NOTES
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   He is able to reproduce with hip ext on the L.    OBJECTIVE     min []Eval                  []Re-Eval     10 min Therapeutic Exercise:  [x] See flow sheet :   Rationale: increase ROM and increase strength to improve the patients ability to perform ADLs    15 min Neuromuscular Re-education:  [x]  See flow sheet :   Rationale: increase strength, improve coordination and increase proprioception  to improve the patients ability to perform ADLs     8 min Manual Therapy:  MFR along L lumbar paraspinals   Rationale: decrease pain, increase ROM and increase tissue extensibility to perform ADLs          With   [] TE   [] TA   [] neuro   [] other: Patient Education: [x] Review HEP    [] Progressed/Changed HEP based on:   [] positioning   [] body mechanics   [] transfers   [] heat/ice application    [] other:      Other Objective/Functional Measures: decreased symptoms post manual     Pain Level (0-10 scale) post treatment: 1    ASSESSMENT/Changes in Function:   Pt reported reproduction of symptoms with MFR along L lumbar paraspinals, with decreased symptoms afterward. Reviewed muscular irritation and decreasing overcompensation with lumbar paraspinals. Pt is worried about the screw in the fusion pulling loose, however this is the first instance of any different feeling of dull pain in the back that was reproduced and improved with MFR. Will continue to monitor. Patient will continue to benefit from skilled PT services to modify and progress therapeutic interventions, address functional mobility deficits, address ROM deficits, address strength deficits, analyze and address soft tissue restrictions, analyze and cue movement patterns, analyze and modify body mechanics/ergonomics, assess and modify postural abnormalities and instruct in home and community integration to attain remaining goals. []  See Plan of Care  []  See progress note/recertification  []  See Discharge Summary         Progress towards goals / Updated goals:  Short Term Goals: To be accomplished in two weeks:                        3.  Pt will demonstrate 4+/5 trunk flex/B rot/B SB MMT to improve functional strength during prolonged positions. met 1/29/2018                        5. Mariela Smith will demonstrate 4/5 R hip flex MMT to improve position changes. met 1/29/2018                        3.  Pt will demonstrate (-) R KAL/FAIR tests to decrease R LE symptoms during daily tasks. Not met 1/23/2018; Met for FAIR, not met for KAL 2/6/2018   Long Term Goals: To be accomplished in four weeks:                        9.  Pt will report \"a little bit of difficulty\" with going up or down two flights of steps to improve community ambulation. Met 2/6/2018                        5.  Pt will report \"a little bit of difficulty\" with lifting a box of groceries from the floor to improve functional lifting tasks.  Met 2/6/2018                        3.  Pt will report ability to ambulate 5 miles without increased pain to return to health-related exercise. Not met, 3 miles at max right now, but symptoms present 1/26/2018    PLAN  [x]  Upgrade activities as tolerated     [x]  Continue plan of care  [x]  Update interventions per flow sheet       []  Discharge due to:_  []  Other:_      Dolores Vaughn, PT 2/8/2018  4:17 PM    Future Appointments  Date Time Provider Mari Harp   2/13/2018 7:00 AM [facility]   2/13/2018 4:30 PM Dolores Vaughn PT [id number]   2/20/2018 9:00 AM Nya Rios MD [facility]   2/27/2018 8:00 AM Antoine Salvador  [address]

## 2018-02-09 ENCOUNTER — TELEPHONE (OUTPATIENT)
Dept: PULMONOLOGY | Age: 68
End: 2018-02-09

## 2018-02-09 NOTE — TELEPHONE ENCOUNTER
Dr. Moni Perez,     I wanted to update you on what was going on with me and the cardio situation. Papi Calderón was seen by Dr. Maria E Davidson on 2/1/2018 and explained the irregularities that you noticed in the echo-cardiogram and ordered a nuclear stress test. Papi Calderón will have that on 2/13 and return to see him for the results on 2/20, and I guess we take that problem from there. [facility] told me not to change anything for now, and to continue my normal activities. Papi Calderón continue to think I am feeling better post surgery, and my activity level is almost up to pre-surgery.   I will need to continue this in another email due to character limits. Mercedes Perez,   Continuing. ... Mercedes Hutson I have about 30 days of the Xarelto regimen left.  I will make an appointment with you for mid-March to discuss a hematology workup to see if we can determine the why and how these PEs developed.  I have had no chest pain in well over a month.  Again many thanks for your superior care and concern, Sincerely, Emir Bill

## 2018-02-13 ENCOUNTER — HOSPITAL ENCOUNTER (OUTPATIENT)
Dept: PHYSICAL THERAPY | Age: 68
Discharge: HOME | End: 2018-02-13
Payer: COMMERCIAL

## 2018-02-13 ENCOUNTER — CLINICAL SUPPORT (OUTPATIENT)
Dept: CARDIOLOGY CLINIC | Age: 68
End: 2018-02-13

## 2018-02-13 DIAGNOSIS — I26.99 OTHER ACUTE PULMONARY EMBOLISM WITHOUT ACUTE COR PULMONALE (HCC): ICD-10-CM

## 2018-02-13 DIAGNOSIS — R93.1 ABNORMAL ECHOCARDIOGRAM: Primary | ICD-10-CM

## 2018-02-13 DIAGNOSIS — I10 ESSENTIAL HYPERTENSION: ICD-10-CM

## 2018-02-13 DIAGNOSIS — R07.89 OTHER CHEST PAIN: ICD-10-CM

## 2018-02-13 PROCEDURE — 97112 NEUROMUSCULAR REEDUCATION: CPT

## 2018-02-13 PROCEDURE — 97140 MANUAL THERAPY 1/> REGIONS: CPT

## 2018-02-13 PROCEDURE — G8979 MOBILITY GOAL STATUS: HCPCS

## 2018-02-13 PROCEDURE — 97110 THERAPEUTIC EXERCISES: CPT

## 2018-02-13 PROCEDURE — G8978 MOBILITY CURRENT STATUS: HCPCS

## 2018-02-13 NOTE — PROGRESS NOTES
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     OBJECTIVE     min []Eval                  []Re-Eval     10 min Therapeutic Exercise:  [x] See flow sheet :   Rationale: increase ROM and increase strength to improve the patients ability to perform ADLs    22 min Neuromuscular Re-education:  [x]  See flow sheet :   Rationale: increase strength, improve coordination and increase proprioception  to improve the patients ability to improve stability and core engagement       8 min Manual Therapy:  MFR along L lumbar paraspinals, and along R glutes with knee extended/knee bent/hip IR/hip ER   Rationale: decrease pain, increase ROM and increase tissue extensibility to improve ADLs            With   [] TE   [] TA   [] neuro   [] other: Patient Education: [x] Review HEP    [] Progressed/Changed HEP based on:   [] positioning   [] body mechanics   [] transfers   [] heat/ice application    [] other:      Other Objective/Functional Measures: continued R piriformis symptoms     Pain Level (0-10 scale) post treatment: 1    ASSESSMENT/Changes in Function:   Pt demonstrates excellent progress in PT thus far; he demonstrates much improved B hip and trunk strength, and (-) R FAIR flexibility test.  He is still (+) R KAL, with reports of continued R piriformis symptoms when transitioning from sit to stand. Pt is walking 5 miles without symptom aggravation. He has some discomfort along L lumbar paraspinals, most likely compensating with lumbar paraspinals when performing hip extension in standing. Will continue with PT for 2x/wk for 3 wks to improve stability and return back to normalized activity.       Patient will continue to benefit from skilled PT services to modify and progress therapeutic interventions, address functional mobility deficits, address ROM deficits, address strength deficits, analyze and address soft tissue restrictions, analyze and cue movement patterns, analyze and modify body mechanics/ergonomics, assess and modify postural abnormalities and instruct in home and community integration to attain remaining goals. []  See Plan of Care  [x]  See progress note/recertification  []  See Discharge Summary         Progress towards goals / Updated goals:  Short Term Goals: To be accomplished in two weeks:                        8.  Pt will demonstrate 4+/5 trunk flex/B rot/B SB MMT to improve functional strength during prolonged positions. met 1/29/2018                        5. Odalis Cardoso will demonstrate 4/5 R hip flex MMT to improve position changes. met 1/29/2018                        3.  Pt will demonstrate (-) R KAL/FAIR tests to decrease R LE symptoms during daily tasks. Not met 1/23/2018; Met for FAIR, not met for KAL 2/6/2018   Long Term Goals: To be accomplished in four weeks:                        7.  Pt will report \"a little bit of difficulty\" with going up or down two flights of steps to improve community ambulation.  Met 2/6/2018                        3.  Pt will report \"a little bit of difficulty\" with lifting a box of groceries from the floor to improve functional lifting tasks.  Met 2/6/2018                        3.  Pt will report ability to ambulate 5 miles without increased pain to return to health-related exercise. Not met, 3 miles at max right now, but symptoms present 1/26/2018; met 2/13/2018     PLAN  [x]  Upgrade activities as tolerated     [x]  Continue plan of care  [x]  Update interventions per flow sheet       []  Discharge due to:_  []  Other:_      Renato Barroso, PT 2/13/2018  4:44 PM    Future Appointments  Date Time Provider Mari Katiuska   2/20/2018 9:00 AM Jessee Austin [place] [id number] [facility]   2/27/2018 8:00 AM Anabelle Marshall  [address]

## 2018-02-13 NOTE — PROGRESS NOTES
[facility]  [address]  [place], [address]  [phone] fax    Medicare Progress Report    Patient name: Meredith Delgado Start of Care: 2018   Referral source: Suman Grant MD : 1950                         Medical Diagnosis: Arthrodesis status [Z98.1]  Spondylolisthesis, lumbar region [M43.16]  Spinal stenosis, lumbar region with neurogenic claudication [M48.062]  Unspecified mononeuropathy of right lower limb [G57.91] Onset Date:2017 DOS                         Treatment Diagnosis: s/p L4-5 laminectomy and fusion,    Prior Hospitalization: see medical history Provider#: [id number]   Medications: Verified on Patient summary List    Comorbidities: Open lung biopsy, L4-5 laminectomy and fusion, arthritis, HBP, h/o compression fractures in thoracic and lumbar spine   Prior Level of Function: Works full time (lot of sitting required), walks 5 miles/day, callisthenic exercises    Visits from Start of Care: 8    Missed Visits: 0    Reporting Period: 2018 to 2018    Subjective Reports: Pt reports feeling much better after last week, but was reaggravated along the L lumbar paraspinals with his nuclear treadmill stress test.    Goals:  Short Term Goals: To be accomplished in two weeks:                        8.  Pt will demonstrate 4+/5 trunk flex/B rot/B SB MMT to improve functional strength during prolonged positions. met 2018                        7. Michelle Beach will demonstrate 4/5 R hip flex MMT to improve position changes. met 2018                        3.  Pt will demonstrate (-) R KAL/FAIR tests to decrease R LE symptoms during daily tasks. Not met 2018; Met for FAIR, not met for KAL 2018   Long Term Goals: To be accomplished in four weeks:                        4.  Pt will report \"a little bit of difficulty\" with going up or down two flights of steps to improve community ambulation.  Met 2/6/2018                        2. Gianni Patel will report \"a little bit of difficulty\" with lifting a box of groceries from the floor to improve functional lifting tasks. Met 2/6/2018                        3.  Pt will report ability to ambulate 5 miles without increased pain to return to health-related exercise. Not met, 3 miles at max right now, but symptoms present 1/26/2018; met 2/13/2018    Key functional changes:   KAL (+) R  FAIR (-) R  FOTO 62   R hip flex MMT 4 /5  Trunk flex 5/5  R trunk rot 5/5  L trunk rot 5/5  R trunk SB 5/5  L trunk SB 5/5                    Problems/ barriers to goal attainment: None     Assessment / Recommendations:Pt demonstrates excellent progress in PT thus far; he demonstrates much improved B hip and trunk strength, and (-) R FAIR flexibility test.  He is still (+) R KAL, with reports of continued R piriformis symptoms when transitioning from sit to stand. Pt is walking 5 miles without symptom aggravation. He has some discomfort along L lumbar paraspinals, most likely compensating with lumbar paraspinals when performing hip extension in standing. Will continue with PT for 2x/wk for 3 wks to improve stability and return back to normalized activity. Problem List: pain affecting function, decrease ROM, decrease strength, decrease ADL/ functional abilitiies, decrease activity tolerance, decrease flexibility/ joint mobility and decrease transfer abilities   Treatment Plan: Therapeutic exercise, Therapeutic activities, Neuromuscular re-education, Physical agent/modality, Gait/balance training, Manual therapy, Patient education, Self Care training, Functional mobility training, Home safety training and Stair training    Patient Goal (s) has been updated and includes: Pt would like to progress toward original goals. Updated Goals to be accomplished in 3 weeks:  1. Pt will demonstrate (-) R KAL tests to decrease R LE symptoms during daily tasks.    2. Pt will demonstrate ability to transition from sit to stand without R LE symptoms to improve quality of life. Frequency / Duration: Patient to be seen 2 times per week for 3 weeks:    G-Codes (GP)  Mobility  [id number] Current  CJ= 20-39%   Goal  CK= 40-59%    The severity rating is based on clinical judgment and the FOTO score.       Dolores Vaughn, PT 2/13/2018 4:59 PM

## 2018-02-13 NOTE — PROGRESS NOTES
[facility]  [address], [address] [place] [address], [place], [address] [place] [address]  [phone] [phone]      Name: Cheryle Flores         MRN#: [id number]      YOB: 1950     Gender: male Ht:5'11 \" Wt:139 lbs            Date of Rest/Stress Images: 2/13/2018   Referring Provider: Nai Linda MD  Ordering Provider: Zaid Newby. Renetta Iraheta MD, [facility]  Technologist: Tash Deewy. Catrachito MERCEDES, C.N.M.T. Diagnosis:   1. Abnormal echocardiogram    2. Other acute pulmonary embolism without acute cor pulmonale ([address]    3. Essential hypertension    4. Other chest pain          Rest/Stress Myoview SPECT Myocardial Perfusion Imaging with  Exercise Stress and Gated SPECT Imaging      PROCEDURE:      Myocardial perfusion imaging was performed at rest approximately 30 mins following the intravenous injection,(Right hand ) of 11.9 mCi of Tc99m Myoview for evaluation of myocardial function and perfusion at rest.     Baseline Data:    Baseline EKG reveals sinus rhythm, incomplete right bundle-branch block. Baseline heart rate is 63. Baseline blood pressure is 110/78. Procedure: The patient was exercised using the standard Nish protocol for 10 minutes, 30 seconds. Exercise was stopped because of fatigue. The patient had no chest pain. Heart rate increased from baseline to a heart rate of 139, achieving 89% of the maximum predicted heart rate. Blood pressure response was appropriate. Peak blood pressure was 140/84. Electrocardiogram showed no significant ST-T changes with occasional PVC during the procedure. Diagnosis:   1. Negative EKG portion of exercise stress test at 89% of the maximum predicted heart rate. 2. Good exercise tolerance. 3. No symptoms of angina. 4. Nuclear imaging report to follow. Exercise:   At peak exercise, the patient was injected intravenously with 38.8 mCi of Tc99m Myoview and exercise was continued for 15 to 45 seconds. The stress images were obtained approximately 30 minutes post tracer injection. The stress SPECT study was gated to evaluate regional wall motion and calculate the left ventricular ejection fraction. The data was reconstructed in the short, horizontal long and vertical long axis views and tomographic slices were generated. NUCLEAR IMAGING:     Findings:   1. Stress images reveal normal Myoview distrubution in all the LV segments in short axis, vertical and horizontal long axis views. 2. Resting images have a normal uptake. 3. Gated images reveal normal wall motion and the ejection fraction is calculated to be 77%. Conclusion:   1. Normal perfusion scan. 2. Normal wall motion and ejection fraction. 3. No evidence of significant fixed or reversible defect suggesting ischemia or myocardial infarction noted from this nuclear study. 4. Low risk scan.    francisco you for the referral.    E-signed and Interpreting Physician:    Willis North.  Valente Meyer MD, [facility] [place]     Date of interpretation: 2/13/2018  Date of final report: 2/13/2018

## 2018-02-19 ENCOUNTER — HOSPITAL ENCOUNTER (OUTPATIENT)
Dept: PHYSICAL THERAPY | Age: 68
Discharge: HOME | End: 2018-02-19
Payer: COMMERCIAL

## 2018-02-19 PROCEDURE — 97140 MANUAL THERAPY 1/> REGIONS: CPT

## 2018-02-19 PROCEDURE — 97110 THERAPEUTIC EXERCISES: CPT

## 2018-02-19 NOTE — PROGRESS NOTES
PT DAILY TREATMENT NOTE - [facility] 3-16    Patient Name: Yanet Vaughn  Date:2018  : 1950  [x]  Patient  Verified  Payor: BLUE CROSS / Plan: [facility] [place] / Product Type: PPO /    In time:1057  Out time:1133  Total Treatment Time (min): 36  Total Timed Codes (min): 36  1:1 Treatment Time ( only): 23   Visit #: 1 of 6    Treatment Area: Spondylolisthesis, lumbar region [M43.16]  Spinal stenosis, lumbar region with neurogenic claudication [M48.062]  Unspecified mononeuropathy of right lower limb [G57.91]  Low back pain [M54.5]    SUBJECTIVE  Pain Level (0-10 scale): 0  Any medication changes, allergies to medications, adverse drug reactions, diagnosis change, or new procedure performed?: [x] No    [] Yes (see summary sheet for update)  Subjective functional status/changes:   [] No changes reported  Pt reports having some pain after sitting in the car for 4+ hours this weekend driving, but that the return trip was slightly better (minus L leg numbness during the drive that resolved on its own).     OBJECTIVE     min []Eval                  []Re-Eval     8 min Therapeutic Exercise:  [x] See flow sheet :   Rationale: increase ROM and increase strength to improve the patients ability to perform ADLs    20 min Neuromuscular Re-education:  [x]  See flow sheet :   Rationale: increase strength, improve coordination and increase proprioception  to improve the patients ability to improve core stability    8 min Manual Therapy:  Prone R glute MFR with contract relax   Rationale: decrease pain, increase ROM and increase tissue extensibility to perform ADLs             With   [] TE   [] TA   [] neuro   [] other: Patient Education: [x] Review HEP    [] Progressed/Changed HEP based on:   [] positioning   [] body mechanics   [] transfers   [] heat/ice application    [] other:      Other Objective/Functional Measures:   R LE symptoms when transitioning from sit to stand - slight decrease after manual     Pain Level (0-10 scale) post treatment: 0    ASSESSMENT/Changes in Function:   Pt demonstrates improving stability on the DD and pilates chair. He still has a larger trigger point along R glutes that reproduces his R LE symptoms. Patient will continue to benefit from skilled PT services to modify and progress therapeutic interventions, address functional mobility deficits, address ROM deficits, address strength deficits, analyze and address soft tissue restrictions, analyze and cue movement patterns, analyze and modify body mechanics/ergonomics, assess and modify postural abnormalities and instruct in home and community integration to attain remaining goals. []  See Plan of Care  []  See progress note/recertification  []  See Discharge Summary         Progress towards goals / Updated goals:  Updated Goals to be accomplished in 3 weeks:  1. Pt will demonstrate (-) R KAL tests to decrease R LE symptoms during daily tasks. 2. Pt will demonstrate ability to transition from sit to stand without R LE symptoms to improve quality of life.  Improved after manual 2/19/2018       PLAN  [x]  Upgrade activities as tolerated     [x]  Continue plan of care  [x]  Update interventions per flow sheet       []  Discharge due to:_  []  Other:_      Dolores Card, PT 2/19/2018  11:01 AM    Future Appointments  Date Time Provider Mari Vancei   2/20/2018 9:00 AM Lyn Hackett MD [id number] [facility]   2/26/2018 4:00 PM Dolores Card, PT [facility]   2/27/2018 8:00 AM Edel Lehman  [address]   2/28/2018 2:30 PM Dolores Card, PT [facility]   3/5/2018 3:30 PM Dolores Card, PT [facility]   3/7/2018 2:30 PM Dolores Card, PT [facility]   3/12/2018 5:00 PM Dolores Card, PT [facility]

## 2018-02-20 ENCOUNTER — OFFICE VISIT (OUTPATIENT)
Dept: CARDIOLOGY CLINIC | Age: 68
End: 2018-02-20

## 2018-02-20 VITALS
WEIGHT: 137 LBS | BODY MASS INDEX: 19.18 KG/M2 | HEART RATE: 71 BPM | SYSTOLIC BLOOD PRESSURE: 119 MMHG | DIASTOLIC BLOOD PRESSURE: 76 MMHG | HEIGHT: 71 IN

## 2018-02-20 DIAGNOSIS — I10 ESSENTIAL HYPERTENSION: ICD-10-CM

## 2018-02-20 DIAGNOSIS — R93.1 ABNORMAL ECHOCARDIOGRAM: Primary | ICD-10-CM

## 2018-02-20 DIAGNOSIS — I26.99 OTHER ACUTE PULMONARY EMBOLISM WITHOUT ACUTE COR PULMONALE (HCC): ICD-10-CM

## 2018-02-20 NOTE — MR AVS SNAPSHOT
[facility] [place] 
 
 
 [address] [facility] 
[phone] Patient: Samantha Shrestha MRN: [id number] SS Visit Information Date & Time Provider Department Dept. Phone Encounter #  
 2018  9:00 AM Ivonne Abreu MD [facility] [place] [phone] [id number] Follow-up Instructions Return in about 1 year (around 2019). Your Appointments 2018  8:00 AM  
Follow Up with Rocco Gardner MD  
[facility] [address] [facility]) Appt Note: 6 wk f/u Dr Veronica Mejia [address] [facility] [id number] [phone]  
  
   
 [address] [facility] [address] Upcoming Health Maintenance Date Due Hepatitis C Screening 1950 DTaP/Tdap/Td series (1 - Tdap) 1973 FOBT Q 1 YEAR AGE 50-75 2002 ZOSTER VACCINE AGE 60> 2011 GLAUCOMA SCREENING Q2Y 2017 MEDICARE YEARLY EXAM 2017 Influenza Age 5 to Adult 2017 Pneumococcal 65+ Low/Medium Risk (2 of 2 - PPSV23) 2019 Allergies as of 2018  Review Complete On: 2018 By: Ivonne Abreu MD  
  
 Severity Noted Reaction Type Reactions Bee Sting  [Sting, Bee] High 2009    Swelling DIZZINESS Current Immunizations  Never Reviewed Name Date Pneumococcal Conjugate (PCV-13) 2018 Not reviewed this visit You Were Diagnosed With   
  
 Codes Comments Abnormal echocardiogram    -  Primary ICD-10-CM: R93.1 ICD-9-CM: 793.2 negative est 
medical managment Other acute pulmonary embolism without acute cor pulmonale (HCC)     ICD-10-CM: I26.99 
ICD-9-CM: 415.19 stable Essential hypertension     ICD-10-CM: I10 
ICD-9-CM: 401.9 controlled Vitals BP Pulse Height(growth percentile) Weight(growth percentile) BMI Smoking Status 119/76 71 5' 11\" (1.803 m) 137 lb (62.1 kg) 19.11 kg/m2 Former Smoker Vitals History BMI and BSA Data Body Mass Index Body Surface Area  
 19.11 kg/m 2 1.76 m 2 Preferred Pharmacy Pharmacy Name [facility] Your Updated Medication List  
  
   
This list is accurate as of: 2/20/18  9:04 AM.  Always use your most recent med list.  
  
  
  
  
 clobetasol 0.05 % topical cream  
Commonly known as:  Jammie Lapine Use 1 Application to affected area Twice Daily. desonide 0.05 % topical lotion Commonly known as:  Chuck Couch Use 1 Application to affected area Twice Daily. lisinopril-hydroCHLOROthiazide 20-12.5 mg per tablet Commonly known as:  PRINZIDE, ZESTORETIC  
TAKE ONE TABLET BY MOUTH ONCE A DAY  
  
 oxyCODONE-acetaminophen  mg per tablet Commonly known as:  PERCOCET 10 Take 1 Tab by mouth daily as needed. Indications: Pain, post op pain  
  
 rivaroxaban 20 mg Tab tablet Commonly known as:  Henry Gens Take 1 Tab by mouth daily (with breakfast). Indications: pulmonary thromboembolism  
  
 topiramate 25 mg tablet Commonly known as:  TOPAMAX Take 3 Tabs by mouth nightly. Indications: neuritis  
  
 zolpidem 5 mg tablet Commonly known as:  AMBIEN Take 5 mg by mouth nightly as needed. Follow-up Instructions Return in about 1 year (around 2/20/2019). To-Do List   
 02/26/2018 4:00 PM  
  Appointment with Viridiana Steele PT at [facility] [address] [place] [address] ([phone])  
  
 02/28/2018 2:30 PM  
  Appointment with Viridiana Steele PT at [facility] PT [address] [place] [address] ([phone])  
  
 03/05/2018 3:30 PM  
  Appointment with Viridiana Steele PT at [facility] PT [address] [place] [address] ([phone])  
  
 03/07/2018 2:30 PM  
  Appointment with Viridiana Steele PT at [facility] PT [address] [place] [address] Road ([phone])  
  
 03/12/2018 5:00 PM  
  Appointment with Viridiana Steele PT at [address] ([phone]) Introducing [facility]! Dear Kya Ferreira: Thank you for requesting a [employer] account. Our records indicate that you already have an active [employer] account. You can access your account anytime at [email or web address]. [email or web address] Did you know that you can access your hospital and ER discharge instructions at any time in [employer]? You can also review all of your test results from your hospital stay or ER visit. Additional Information If you have questions, please visit the Frequently Asked Questions section of the [employer] website at [email or web address] Remember, [employer] is NOT to be used for urgent needs. For medical emergencies, dial 911. Now available from your iPhone and Android! Please provide this summary of care documentation to your next provider. Your primary care clinician is listed as Ian Mir. If you have any questions after today's visit, please call [phone].

## 2018-02-20 NOTE — PROGRESS NOTES
1. Have you been to the ER, urgent care clinic since your last visit? Hospitalized since your last visit?     no  2. Have you seen or consulted any other health care providers outside of the [address] [place] [address] since your last visit? Include any pap smears or colon screening. No     3. Since your last visit, have you had any of the following symptoms?  no

## 2018-02-20 NOTE — LETTER
Samantha Sep 1950 2/20/2018 Dear MD Rosita Agee, DO I had the pleasure of evaluating  Mr. Indira Centeno in office today. Below are the relevant portions of my assessment and plan of care. ICD-10-CM ICD-9-CM 1. Abnormal echocardiogram R93.1 793.2   
 negative est 
medical managment 2. Other acute pulmonary embolism without acute cor pulmonale (HCC) I26.99 415.19   
 stable 3. Essential hypertension I10 401.9   
 controlled Current Outpatient Prescriptions Medication Sig Dispense Refill  oxyCODONE-acetaminophen (PERCOCET 10)  mg per tablet Take 1 Tab by mouth daily as needed. Indications: Pain, post op pain 7 Tab 0  
 rivaroxaban (XARELTO) 20 mg tab tablet Take 1 Tab by mouth daily (with breakfast). Indications: pulmonary thromboembolism 30 Tab 0  
 topiramate (TOPAMAX) 25 mg tablet Take 3 Tabs by mouth nightly. Indications: neuritis 90 Tab 5  clobetasol (TEMOVATE) 0.05 % topical cream Use 1 Application to affected area Twice Daily.  desonide (TRIDESILON) 0.05 % topical lotion Use 1 Application to affected area Twice Daily.  lisinopril-hydroCHLOROthiazide (PRINZIDE, ZESTORETIC) 20-12.5 mg per tablet TAKE ONE TABLET BY MOUTH ONCE A DAY  zolpidem (AMBIEN) 5 mg tablet Take 5 mg by mouth nightly as needed. No orders of the defined types were placed in this encounter. If you have questions, please do not hesitate to call me. I look forward to following Mr. Indira Centeno along with you. Sincerely, Ivonne Abreu MD

## 2018-02-20 NOTE — PROGRESS NOTES
HISTORY OF PRESENT ILLNESS  Carmela De La Torre is a 74 y.o. male. HPI Comments: Patient is here for follow up of diagnostic tests. Results will be discussed. Hypertension   The history is provided by the patient. This is a chronic problem. The problem occurs constantly. The problem has not changed since onset. Associated symptoms include shortness of breath. Pertinent negatives include no chest pain, no abdominal pain and no headaches. Nothing aggravates the symptoms. Abnormal Cardiac Test   The history is provided by the patient. This is a new problem. The problem occurs constantly. The problem has not changed since onset. Associated symptoms include shortness of breath. Pertinent negatives include no chest pain, no abdominal pain and no headaches. Nothing aggravates the symptoms. Nothing relieves the symptoms. Shortness of Breath   The history is provided by the patient. This is a recurrent problem. The problem occurs intermittently. The problem has been gradually improving. Pertinent negatives include no fever, no headaches, no cough, no sputum production, no hemoptysis, no wheezing, no PND, no orthopnea, no chest pain, no vomiting, no abdominal pain, no rash, no leg swelling and no claudication. The problem's precipitants include exercise. Review of Systems   Constitutional: Negative for chills and fever. HENT: Negative for nosebleeds. Eyes: Negative for blurred vision and double vision. Respiratory: Positive for shortness of breath. Negative for cough, hemoptysis, sputum production and wheezing. Cardiovascular: Negative for chest pain, palpitations, orthopnea, claudication, leg swelling and PND. Gastrointestinal: Negative for abdominal pain, heartburn, nausea and vomiting. Musculoskeletal: Negative for myalgias. Skin: Negative for rash. Neurological: Negative for dizziness, weakness and headaches. Endo/Heme/Allergies: Does not bruise/bleed easily.      Family History   Problem Relation Age of Onset    Colon Cancer Mother     Hypertension Mother     Hypertension Father     Clotting Disorder Father     Other Brother     Cancer Brother        Past Medical History:   Diagnosis Date    Back pain     Chronic pain     Headache(784.0)     HTN (hypertension)     Joint pain     hx of joint pain    Pulmonary emboli ([address]        Past Surgical History:   Procedure Laterality Date    HX LUMBAR LAMINECTOMY  2002    L4/L5    HX ORTHOPAEDIC  12/04/2017    Lumbar surgery (Dr. Cristopher Daley)   [address]    \"open lung bx\"    HX OTHER SURGICAL      laminectomy/fusion L4-5  S1    NEUROLOGICAL PROCEDURE UNLISTED  2003    pt spinal fracture T12 and L3       Social History   Substance Use Topics    Smoking status: Former Smoker     Packs/day: 0.75     Years: 40.00     Quit date: 10/17/2012    Smokeless tobacco: Former User    Alcohol use 1.2 oz/week     2 Cans of beer per week      Comment: daily-beer and wine/ 2 drinks a day       Allergies   Allergen Reactions    Bee Sting  [Sting, Bee] Swelling     DIZZINESS       Prior to Admission medications    Medication Sig Start Date End Date Taking? Authorizing Provider   oxyCODONE-acetaminophen (PERCOCET 10)  mg per tablet Take 1 Tab by mouth daily as needed. Indications: Pain, post op pain 1/9/18  Yes Maximo Zavaleta NP   rivaroxaban (XARELTO) 20 mg tab tablet Take 1 Tab by mouth daily (with breakfast). Indications: pulmonary thromboembolism 1/11/18  Yes Magdy Chandra MD   topiramate (TOPAMAX) 25 mg tablet Take 3 Tabs by mouth nightly. Indications: neuritis 10/24/17  Yes Andreas Musa NP   clobetasol (TEMOVATE) 0.05 % topical cream Use 1 Application to affected area Twice Daily. 10/21/16  Yes Historical Provider   desonide (TRIDESILON) 0.05 % topical lotion Use 1 Application to affected area Twice Daily.  12/21/16  Yes Historical Provider   lisinopril-hydroCHLOROthiazide (PRINZIDE, ZESTORETIC) 20-12.5 mg per tablet TAKE ONE TABLET BY MOUTH ONCE A DAY 11/29/16  Yes Historical Provider   zolpidem (AMBIEN) 5 mg tablet Take 5 mg by mouth nightly as needed. 10/21/16  Yes Historical Provider         Visit Vitals    /76    Pulse 71    Ht 5' 11\" (1.803 m)    Wt 62.1 kg (137 lb)    BMI 19.11 kg/m2         Physical Exam   Constitutional: He is oriented to person, place, and time. He appears well-developed and well-nourished. HENT:   Head: Normocephalic and atraumatic. Eyes: Conjunctivae are normal.   Neck: Neck supple. No JVD present. No tracheal deviation present. No thyromegaly present. Cardiovascular: Normal rate, regular rhythm and normal heart sounds. Exam reveals no gallop and no friction rub. No murmur heard. Pulmonary/Chest: Breath sounds normal. No respiratory distress. He has no wheezes. He has no rales. He exhibits no tenderness. Abdominal: Soft. There is no tenderness. Musculoskeletal: He exhibits no edema. Neurological: He is alert and oriented to person, place, and time. Skin: Skin is warm and dry. Psychiatric: He has a normal mood and affect. Mr. Marty Garza has a reminder for a \"due or due soon\" health maintenance. I have asked that he contact his primary care provider for follow-up on this health maintenance. I have personally reviewed patient's records available from hospital and other providers and incorporated findings in patient care. I have personally reviewed patients ekg done at other facility. Admit date: 12/18/2017  Discharge date and time: 12/20/2017     Discharge Diagnoses:                                           1) Bilateral PE   2) Recent lumbar surgery   3 ) HTN  Normal sinus rhythm   Incomplete right bundle branch block   Borderline ECG   SUMMARY:echo-1/2018  Left ventricle: There was hypokinesis of the basal-mid inferoseptal, apical   inferior, and apical septal wall(s). Wall  thickness was increased.  Features were consistent with a pseudonormal left   ventricular filling pattern, with  concomitant abnormal relaxation and increased filling pressure (grade 2   diastolic dysfunction). Right ventricle: The size was at the upper limits of normal.    Mitral valve: There was mild regurgitation. Diagnosis: 2018  1. Negative EKG portion of exercise stress test at 89% of the maximum predicted heart rate. 2. Good exercise tolerance. 3. No symptoms of angina. 4. Nuclear imaging report to follow. NUCLEAR IMAGIN2018    Findings:   1. Stress images reveal normal Myoview distrubution in all the LV segments in short axis, vertical and horizontal long axis views. 2. Resting images have a normal uptake. 3. Gated images reveal normal wall motion and the ejection fraction is calculated to be 77%. Conclusion:   1. Normal perfusion scan. 2. Normal wall motion and ejection fraction. 3. No evidence of significant fixed or reversible defect suggesting ischemia or myocardial infarction noted from this nuclear study. 4. Low risk scan. Assessment         ICD-10-CM ICD-9-CM    1. Abnormal echocardiogram R93.1 793.2     negative est  medical managment   2. Other acute pulmonary embolism without acute cor pulmonale (HCC) I26.99 415.19     stable   3. Essential hypertension I10 401.9     controlled       There are no discontinued medications. No orders of the defined types were placed in this encounter. Follow-up Disposition:  Return in about 1 year (around 2019).

## 2018-02-26 ENCOUNTER — HOSPITAL ENCOUNTER (OUTPATIENT)
Dept: PHYSICAL THERAPY | Age: 68
Discharge: HOME | End: 2018-02-26
Payer: COMMERCIAL

## 2018-02-26 PROCEDURE — 97140 MANUAL THERAPY 1/> REGIONS: CPT

## 2018-02-26 NOTE — PROGRESS NOTES
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     OBJECTIVE     min []Eval                  []Re-Eval     10 min Therapeutic Exercise:  [x] See flow sheet :   Rationale: increase ROM and increase strength to improve the patients ability to perform ADLs    19 min Neuromuscular Re-education:  [x]  See flow sheet :   Rationale: increase strength, improve coordination and increase proprioception  to improve the patients ability to perform ADLs     8 min Manual Therapy:  MFR with rolling stick to R glutes and lateral hip in s/l   Rationale: decrease pain, increase ROM and increase tissue extensibility to perform ADLs          With   [] TE   [] TA   [] neuro   [] other: Patient Education: [x] Review HEP    [] Progressed/Changed HEP based on:   [] positioning   [] body mechanics   [] transfers   [] heat/ice application    [] other:      Other Objective/Functional Measures:   R KAL + but improving     Pain Level (0-10 scale) post treatment: 0    ASSESSMENT/Changes in Function:   Pt demonstrates better stability with pilates chair and DD core engagement; some R QL/upper glute pain when going from sit to stand, but does not radiate down the legs. Patient will continue to benefit from skilled PT services to modify and progress therapeutic interventions, address functional mobility deficits, address ROM deficits, address strength deficits, analyze and address soft tissue restrictions, analyze and cue movement patterns, analyze and modify body mechanics/ergonomics, assess and modify postural abnormalities and instruct in home and community integration to attain remaining goals. []  See Plan of Care  []  See progress note/recertification  []  See Discharge Summary         Progress towards goals / Updated goals:  Updated Goals to be accomplished in 3 weeks:  1. Pt will demonstrate (-) R KAL tests to decrease R LE symptoms during daily tasks. Not met 2/26/2018   2. Pt will demonstrate ability to transition from sit to stand without R LE symptoms to improve quality of life.  Improved after manual 2/19/2018    PLAN  [x]  Upgrade activities as tolerated     [x]  Continue plan of care  [x]  Update interventions per flow sheet       []  Discharge due to:_  []  Other:_      Rodrigue Alfaro, PT 2/26/2018  4:15 PM    Future Appointments  Date Time Provider Mari Harp   2/27/2018 8:00 AM Veronica Ray  [address]   2/28/2018 2:30 PM Rodrigue Alfaro, PT [facility]   3/5/2018 3:30 PM Rodrigue Alfaro, PT [facility]   3/7/2018 2:30 PM Rodrigue Alfaro PT [facility]   3/12/2018 5:00 PM Rodrigue Alfaro, PT [facility]

## 2018-02-27 ENCOUNTER — OFFICE VISIT (OUTPATIENT)
Dept: ORTHOPEDIC SURGERY | Age: 68
End: 2018-02-27

## 2018-02-27 VITALS
OXYGEN SATURATION: 100 % | BODY MASS INDEX: 19.85 KG/M2 | HEIGHT: 71 IN | TEMPERATURE: 98.1 F | WEIGHT: 141.8 LBS | RESPIRATION RATE: 18 BRPM | DIASTOLIC BLOOD PRESSURE: 75 MMHG | SYSTOLIC BLOOD PRESSURE: 117 MMHG | HEART RATE: 74 BPM

## 2018-02-27 DIAGNOSIS — M47.816 FACET ARTHRITIS OF LUMBAR REGION: ICD-10-CM

## 2018-02-27 DIAGNOSIS — R29.898 RIGHT LEG WEAKNESS: ICD-10-CM

## 2018-02-27 DIAGNOSIS — Z98.1 S/P LUMBAR FUSION: Primary | ICD-10-CM

## 2018-02-27 NOTE — PATIENT INSTRUCTIONS
Muscle Conditioning: Exercises  Your Care Instructions  Here are some examples of exercises for muscle conditioning. Start each exercise slowly. Ease off the exercise if you start to have pain. Your doctor or physical therapist will tell you when you can start these exercises and which ones will work best for you. How to do the exercises  Wall push-ups    When you can do this exercise against a wall comfortably (without your muscles feeling tired), you can try it against a counter. Start with 5 repetitions again and work up to 8 to 12. You can then slowly progress to the end of a couch or a sturdy chair, and finally to the floor. 1. Stand facing a wall, about 12 to 18 inches away. 2. Place your hands on the wall at shoulder height. 3. Slowly bend your elbows and bring your face toward the wall, moving your hips and shoulders forward together. 4. Push slowly back to the starting position. 5. Start with 5 repetitions and work up to 8 to 12. 6. Rest for a minute, and repeat the exercise. Knee extension    If this exercise becomes easy, you can add a light weight around your ankle or tie an elastic resistance band to a chair leg and one ankle. 1. While sitting in a chair, straighten one leg and hold while you slowly count to 5. Be sure you do not lock your knee. 2. Repeat 8 to 12 times. 3. Rest for a minute, and repeat the exercise. 4. Do the same exercise with the other leg. Side-lying leg lift    If this exercise becomes easy, you can add a light weight around your ankle or tie an elastic resistance band to both ankles. 1. Lie on your side, with your legs extended. Keep your hips straight up and down during this exercise. Do not let your top hip rock toward the back. Support your head with your hand, and place the other hand on the floor near your waist.  2. Slowly raise your upper leg until it is about in line with your shoulder. Keep your toes pointed forward.   3. Slowly lower your leg to the starting position. 4. Repeat 8 to 12 times. 5. Rest for a minute, and repeat the exercise. 6. Turn to your other side and do the same exercise with your other leg. Shallow standing knee bends    1. Stand with your hands lightly resting on a counter or chair in front of you with your feet shoulder-width apart. 2. Slowly bend your knees so that you squat down just like you were going to sit in a chair. Make sure your knees do not go in front of your toes. 3. Lower yourself about 6 inches. Your heels should remain on the floor at all times. 4. Rise slowly to a standing position. 5. Repeat 8 to 12 times. 6. Rest for a minute, and repeat the exercise. Follow-up care is a key part of your treatment and safety. Be sure to make and go to all appointments, and call your doctor if you are having problems. It's also a good idea to know your test results and keep a list of the medicines you take. Where can you learn more? Go to http://jorge-steve.info/. Enter W552 in the search box to learn more about \"Muscle Conditioning: Exercises. \"  Current as of: March 13, 2017  Content Version: 11.4  © 7296-3313 Healthwise, [employer]. Care instructions adapted under license by [employer] (which disclaims liability or warranty for this information). If you have questions about a medical condition or this instruction, always ask your healthcare professional. [place] [address] any warranty or liability for your use of this information.

## 2018-02-27 NOTE — MR AVS SNAPSHOT
[address] [place] [address] [address] [facility] [address] 
[phone] Patient: Lashell Montiel MRN: [id number] YRN:[id number] Visit Information Date & Time Provider Department Dept. Phone Encounter #  
 2/27/2018  8:00 AM Deuce Moreno MD [facility] [phone] [id number] Follow-up Instructions Return in about 3 months (around 5/27/2018) for with NP. Follow-up and Disposition History Upcoming Health Maintenance Date Due Hepatitis C Screening 1950 DTaP/Tdap/Td series (1 - Tdap) 2/26/1973 FOBT Q 1 YEAR AGE 50-75 2/26/2002 ZOSTER VACCINE AGE 60> 12/26/2011 GLAUCOMA SCREENING Q2Y 2/26/2017 MEDICARE YEARLY EXAM 2/26/2017 Influenza Age 5 to Adult 8/1/2017 Pneumococcal 65+ Low/Medium Risk (2 of 2 - PPSV23) 1/17/2019 Allergies as of 2/27/2018  Review Complete On: 2/27/2018 By: Deuce Moreno MD  
  
 Severity Noted Reaction Type Reactions Bee Sting  [Sting, Bee] High 04/06/2009    Swelling DIZZINESS Current Immunizations  Never Reviewed Name Date Pneumococcal Conjugate (PCV-13) 1/17/2018 Not reviewed this visit You Were Diagnosed With   
  
 Codes Comments S/P lumbar fusion    -  Primary ICD-10-CM: Z98.1 ICD-9-CM: V45.4 Facet arthritis of lumbar region [facility])     ICD-10-CM: M46.96 
ICD-9-CM: 721.3 Right leg weakness     ICD-10-CM: R29.898 ICD-9-CM: 729.89 Vitals BP Pulse Temp Resp Height(growth percentile) Weight(growth percentile) 117/75 74 98.1 °F (36.7 °C) (Oral) 18 5' 11\" (1.803 m) 141 lb 12.8 oz (64.3 kg) SpO2 BMI Smoking Status 100% 19.78 kg/m2 Former Smoker BMI and BSA Data Body Mass Index Body Surface Area 19.78 kg/m 2 1.79 m 2 Preferred Pharmacy Pharmacy Name Phone [facility] [place] [address] [facility] Your Updated Medication List  
  
 This list is accurate as of 2/27/18  8:23 AM.  Always use your most recent med list.  
  
  
  
  
 clobetasol 0.05 % topical cream  
Commonly known as:  Garrel Krueger Use 1 Application to affected area Twice Daily. desonide 0.05 % topical lotion Commonly known as:  Sheridan Cera Use 1 Application to affected area Twice Daily. lisinopril-hydroCHLOROthiazide 20-12.5 mg per tablet Commonly known as:  PRINZIDE, ZESTORETIC  
TAKE ONE TABLET BY MOUTH ONCE A DAY  
  
 rivaroxaban 20 mg Tab tablet Commonly known as:  Rutherford Conquest Take 1 Tab by mouth daily (with breakfast). Indications: pulmonary thromboembolism  
  
 topiramate 25 mg tablet Commonly known as:  TOPAMAX Take 3 Tabs by mouth nightly. Indications: neuritis  
  
 zolpidem 5 mg tablet Commonly known as:  AMBIEN Take 5 mg by mouth nightly as needed. We Performed the Following AMB POC XRAY, SPINE, LUMBOSACRAL; 2 O [96765 CPT(R)] REFERRAL TO PHYSICAL THERAPY [NVW72 Custom] Follow-up Instructions Return in about 3 months (around 5/27/2018) for with NP. To-Do List   
 02/28/2018 2:30 PM  
  Appointment with Meagan Zurita PT at [facility] [address] [place] [address] ([phone])  
  
 03/05/2018 3:30 PM  
  Appointment with Meagan Zurita PT at [facility] [address] [place] [address] ([phone])  
  
 03/07/2018 2:30 PM  
  Appointment with Meagan Zurita PT at [facility] [address] [place] [address] ([phone])  
  
 03/12/2018 5:00 PM  
  Appointment with Meagan Zurita PT at [address] [place] [address] ([phone]) Referral Information Referral ID Referred By Referred To  
  
 [id number] Jeff ZAMUDIO Not Available Visits Status Start Date End Date 1 New Request 2/27/18 2/27/19 If your referral has a status of pending review or denied, additional information will be sent to support the outcome of this decision. Patient Instructions Muscle Conditioning: Exercises Your Care Instructions Here are some examples of exercises for muscle conditioning. Start each exercise slowly. Ease off the exercise if you start to have pain. Your doctor or physical therapist will tell you when you can start these exercises and which ones will work best for you. How to do the exercises Wall push-ups When you can do this exercise against a wall comfortably (without your muscles feeling tired), you can try it against a counter. Start with 5 repetitions again and work up to 8 to 12. You can then slowly progress to the end of a couch or a sturdy chair, and finally to the floor. 1. Stand facing a wall, about 12 to 18 inches away. 2. Place your hands on the wall at shoulder height. 3. Slowly bend your elbows and bring your face toward the wall, moving your hips and shoulders forward together. 4. Push slowly back to the starting position. 5. Start with 5 repetitions and work up to 8 to 12. 6. Rest for a minute, and repeat the exercise. Knee extension If this exercise becomes easy, you can add a light weight around your ankle or tie an elastic resistance band to a chair leg and one ankle. 1. While sitting in a chair, straighten one leg and hold while you slowly count to 5. Be sure you do not lock your knee. 2. Repeat 8 to 12 times. 3. Rest for a minute, and repeat the exercise. 4. Do the same exercise with the other leg. Side-lying leg lift If this exercise becomes easy, you can add a light weight around your ankle or tie an elastic resistance band to both ankles. 1. Lie on your side, with your legs extended. Keep your hips straight up and down during this exercise. Do not let your top hip rock toward the back. Support your head with your hand, and place the other hand on the floor near your waist. 
2. Slowly raise your upper leg until it is about in line with your shoulder. Keep your toes pointed forward. 3. Slowly lower your leg to the starting position. 4. Repeat 8 to 12 times. 5. Rest for a minute, and repeat the exercise. 6. Turn to your other side and do the same exercise with your other leg. Shallow standing knee bends 1. Stand with your hands lightly resting on a counter or chair in front of you with your feet shoulder-width apart. 2. Slowly bend your knees so that you squat down just like you were going to sit in a chair. Make sure your knees do not go in front of your toes. 3. Lower yourself about 6 inches. Your heels should remain on the floor at all times. 4. Rise slowly to a standing position. 5. Repeat 8 to 12 times. 6. Rest for a minute, and repeat the exercise. Follow-up care is a key part of your treatment and safety. Be sure to make and go to all appointments, and call your doctor if you are having problems. It's also a good idea to know your test results and keep a list of the medicines you take. Where can you learn more? Go to http://jorge-steve.info/. Enter C912 in the search box to learn more about \"Muscle Conditioning: Exercises. \" Current as of: March 13, 2017 Content Version: 11.4 © 1831-6772 [employer]. Care instructions adapted under license by [employer] (which disclaims liability or warranty for this information). If you have questions about a medical condition or this instruction, always ask your healthcare professional. [place] [address] any warranty or liability for your use of this information. Introducing [facility]! Dear Rishabh Fallon: Thank you for requesting a [employer] account. Our records indicate that you already have an active [employer] account. You can access your account anytime at [email or web address] Did you know that you can access your hospital and ER discharge instructions at any time in [employer]? You can also review all of your test results from your hospital stay or ER visit. Additional Information If you have questions, please visit the Frequently Asked Questions section of the [employer] website at [email or web address] ([place]). [email or web address] Remember, [facility] is NOT to be used for urgent needs. For medical emergencies, dial 911. Now available from your iPhone and Android! Please provide this summary of care documentation to your next provider. Your primary care clinician is listed as Nikole Velez. If you have any questions after today's visit, please call [phone].

## 2018-02-27 NOTE — PROGRESS NOTES
[address]  [place]  Phone: [phone]  Fax: [phone]  PROGRESS NOTE  Patient: Chicho Jefferson                MRN: [id number]       SSN: [id number]  YOB: 1950        AGE: 79 y.o. SEX: male  Body mass index is 19.78 kg/(m^2). PCP: Kaylyn Banerjee MD  02/27/18    Chief Complaint   Patient presents with    Back Pain     3 month post op       HISTORY OF PRESENT ILLNESS, RADIOGRAPHS, and PLAN:     HISTORY:  Mr. Ricarda Peres returns today. He is about three months out from his lumbar fusion. He is doing exceedingly well. He rates his back pain as a 0/10. He feels the surgery has been a total success. He has some mild, residual weakness in his right leg that begins when he just starts his activity but will resolve. He says he can walk miles without a problem. Physical therapy feels it is a muscle weakness, not really a neurologic issue. I am not quite convinced of such. RADIOGRAPHS:  His x-rays demonstrate progression towards a solid fusion with consolidation of his bone graft with no evidence of instrumentation loosening and appropriate fixation. ASSESSMENT/PLAN: We would like to continue with his therapy program because he thinks it is helping him progress well. He would also like to start tapering off his Topamax, and we have instructed him to do such. I will have him follow up with my nurse practitioners in three months time. cc: Loreto Curtis M.D.          Past Medical History:   Diagnosis Date    Back pain     Chronic pain     Headache(784.0)     HTN (hypertension)     Joint pain     hx of joint pain    Pulmonary emboli (HCC)        Family History   Problem Relation Age of Onset    Colon Cancer Mother     Hypertension Mother     Hypertension Father     Clotting Disorder Father     Other Brother     Cancer Brother        Current Outpatient Prescriptions   Medication Sig Dispense Refill  rivaroxaban (XARELTO) 20 mg tab tablet Take 1 Tab by mouth daily (with breakfast). Indications: pulmonary thromboembolism 30 Tab 0    topiramate (TOPAMAX) 25 mg tablet Take 3 Tabs by mouth nightly. Indications: neuritis 90 Tab 5    clobetasol (TEMOVATE) 0.05 % topical cream Use 1 Application to affected area Twice Daily.  desonide (TRIDESILON) 0.05 % topical lotion Use 1 Application to affected area Twice Daily.  lisinopril-hydroCHLOROthiazide (PRINZIDE, ZESTORETIC) 20-12.5 mg per tablet TAKE ONE TABLET BY MOUTH ONCE A DAY      zolpidem (AMBIEN) 5 mg tablet Take 5 mg by mouth nightly as needed. Allergies   Allergen Reactions    Bee Sting  [Sting, Bee] Swelling     DIZZINESS       Past Surgical History:   Procedure Laterality Date    HX LUMBAR LAMINECTOMY  2002    L4/L5    HX ORTHOPAEDIC  12/04/2017    Lumbar surgery (Dr. Janeth Drake)    HX OTHER SURGICAL  1978    \"open lung bx\"    HX OTHER SURGICAL      laminectomy/fusion L4-5  S1    NEUROLOGICAL PROCEDURE UNLISTED  2003    pt spinal fracture T12 and L3       Past Medical History:   Diagnosis Date    Back pain     Chronic pain     Headache(784.0)     HTN (hypertension)     Joint pain     hx of joint pain    Pulmonary emboli (HCC)        Social History     Social History    Marital status:      Spouse name: N/A    Number of children: N/A    Years of education: N/A     Occupational History    Not on file. Social History Main Topics    Smoking status: Former Smoker     Packs/day: 0.75     Years: 40.00     Quit date: 10/17/2012    Smokeless tobacco: Former User    Alcohol use 1.2 oz/week     2 Cans of beer per week      Comment: daily-beer and wine/ 2 drinks a day    Drug use: No    Sexual activity: Yes     Other Topics Concern    Not on file     Social History Narrative         REVIEW OF SYSTEMS:   CONSTITUTIONAL SYMPTOMS:  Negative. EYES:  Negative. EARS, NOSE, THROAT AND MOUTH:  Negative.    CARDIOVASCULAR: Negative. RESPIRATORY:  Negative. GENITOURINARY: Per HPI. GASTROINTESTINAL:  Per HPI. INTEGUMENTARY (SKIN AND/OR BREAST):  Negative. MUSCULOSKELETAL: Per HPI.   ENDOCRINE/RHEUMATOLOGIC:  Negative. NEUROLOGICAL:  Per HPI. HEMATOLOGIC/LYMPHATIC:  Negative. ALLERGIC/IMMUNOLOGIC:  Negative. PSYCHIATRIC:  Negative. PHYSICAL EXAMINATION:   Visit Vitals    /75    Pulse 74    Temp 98.1 °F (36.7 °C) (Oral)    Resp 18    Ht 5' 11\" (1.803 m)    Wt 141 lb 12.8 oz (64.3 kg)    SpO2 100%    BMI 19.78 kg/m2    PAIN SCALE: 0 - No pain/10    CONSTITUTIONAL: The patient is in no apparent distress and is alert and oriented x 3. HEENT: Normocephalic. Hearing grossly intact. NECK: Supple and symmetric. no tenderness, or masses were felt. RESPIRATORY: No labored breathing. CARDIOVASCULAR: The carotid pulses were normal. Peripheral pulses were 2+. CHEST: Normal AP diameter and normal contour without any kyphoscoliosis. LYMPHATIC: No lymphadenopathy was appreciated in the neck, axillae or groin. SKIN:  Incision healing well, no drainage, no erythema, no hernia, no seroma, no swelling, no dehiscence, incision well approximated. Negative for scars, rashes, lesions, or ulcers on the right upper, right lower, left upper, left lower and trunk. NEUROLOGICAL: Alert and oriented x 3. Ambulation without assistive device. FWB. EXTREMITIES: See musculoskeletal.  MUSCULOSKELETAL:   Head and Neck:  Negative for misalignment, asymmetry, crepitation, defects, tenderness masses or effusions.  Left Upper Extremity: Inspection, percussion and palpation preformed. Sauls sign is negative.  Right Upper Extremity: Inspection, percussion and palpation preformed. Sauls sign is negative.  Spine, Ribs and Pelvis: Inspection, percussion and palpation preformed. Negative for misalignment, asymmetry, crepitation, defects, tenderness masses or effusions.     Left Lower Extremity: Inspection, percussion and palpation preformed. Negative straight leg raise.  Right Lower Extremity: some weakness. Inspection, percussion and palpation preformed. Negative straight leg raise. SPINE EXAM:       Lumbar spine: No rash, ecchymosis, or gross obliquity. No focal atrophy is noted. ASSESSMENT    ICD-10-CM ICD-9-CM    1. S/P lumbar fusion Z98.1 V45.4 AMB POC XRAY, SPINE, LUMBOSACRAL; 2 O      REFERRAL TO PHYSICAL THERAPY   2. [facility] M46.96 721.3 AMB POC XRAY, SPINE, LUMBOSACRAL; 2 O      REFERRAL TO PHYSICAL THERAPY   3. Right leg weakness R29.898 729.89 REFERRAL TO PHYSICAL THERAPY       Written by Helen Monteiro, as dictated by Brenda White MD.    I, Dr. Brenda White MD, confirm that all documentation is accurate.

## 2018-02-28 ENCOUNTER — HOSPITAL ENCOUNTER (OUTPATIENT)
Dept: PHYSICAL THERAPY | Age: 68
Discharge: HOME | End: 2018-02-28
Payer: COMMERCIAL

## 2018-02-28 PROCEDURE — 97112 NEUROMUSCULAR REEDUCATION: CPT

## 2018-02-28 PROCEDURE — 97140 MANUAL THERAPY 1/> REGIONS: CPT

## 2018-02-28 NOTE — PROGRESS NOTES
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 Tamir Klein that he felt PT was helping him quite a bit. He states that Dr. Tamir Klein told him Isabel Gordon it was still too early to tell. ..what is going on after surgery. \"  Pt to continue with PT and to f/u with Dr. Graeme Tenorio office in May.     OBJECTIVE     min []Eval                  []Re-Eval     8 min Therapeutic Exercise:  [x] See flow sheet :   Rationale: increase ROM and increase strength to improve the patients ability to perform ADLs    24 min Neuromuscular Re-education:  [x]  See flow sheet :   Rationale: increase strength, improve coordination and increase proprioception  to improve the patients ability to improve stability     8 min Manual Therapy:  MFR along R glutes and R lumbar paraspinals   Rationale: decrease pain, increase ROM and increase tissue extensibility to perform ADLs            With   [] TE   [] TA   [] neuro   [] other: Patient Education: [x] Review HEP    [] Progressed/Changed HEP based on:   [] positioning   [] body mechanics   [] transfers   [] heat/ice application    [] other:      Other Objective/Functional Measures: reproduced symptoms in low back with R lumbar paraspinals     Pain Level (0-10 scale) post treatment: 0    ASSESSMENT/Changes in Function:   Pt demonstrates good stability with DD exercises. He reports more soreness along the R glutes with reproduction of symptoms. He also reports less pain with standing after manual to R lumbar paraspinals. Patient will continue to benefit from skilled PT services to modify and progress therapeutic interventions, address functional mobility deficits, address ROM deficits, address strength deficits, analyze and address soft tissue restrictions, analyze and cue movement patterns, analyze and modify body mechanics/ergonomics, assess and modify postural abnormalities and instruct in home and community integration to attain remaining goals. []  See Plan of Care  []  See progress note/recertification  []  See Discharge Summary         Progress towards goals / Updated goals:  Updated Goals to be accomplished in 3 weeks:  1. Pt will demonstrate (-) R KAL tests to decrease R LE symptoms during daily tasks. Not met 2/26/2018   2. Pt will demonstrate ability to transition from sit to stand without R LE symptoms to improve quality of life.  Improved after manual 2/19/2018    PLAN  [x]  Upgrade activities as tolerated     [x]  Continue plan of care  [x]  Update interventions per flow sheet       []  Discharge due to:_  []  Other:_      Rachel Flaherty PT 2/28/2018  2:51 PM    Future Appointments  Date Time Provider Mari Harp   3/5/2018 3:30 PM [facility]   3/7/2018 2:30 PM Rachel Flaherty PT [facility]   3/12/2018 5:00 PM Rachel Flaherty PT [facility]   5/25/2018 8:30 AM Melida Johnson  [address]

## 2018-03-05 ENCOUNTER — HOSPITAL ENCOUNTER (OUTPATIENT)
Dept: PHYSICAL THERAPY | Age: 68
Discharge: HOME | End: 2018-03-05
Payer: COMMERCIAL

## 2018-03-05 PROCEDURE — 97112 NEUROMUSCULAR REEDUCATION: CPT

## 2018-03-05 PROCEDURE — 97140 MANUAL THERAPY 1/> REGIONS: CPT

## 2018-03-05 NOTE — PROGRESS NOTES
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     OBJECTIVE     min []Eval                  []Re-Eval     8 min Therapeutic Exercise:  [x] See flow sheet :   Rationale: increase ROM and increase strength to improve the patients ability to perform ADLs    18 min Neuromuscular Re-education:  [x]  See flow sheet :   Rationale: increase strength, improve coordination and increase proprioception  to improve the patients ability to improve core engagement     8 min Manual Therapy:  MFR along R lumbar paraspinals   Rationale: decrease pain, increase ROM and increase tissue extensibility to improve ADLs          With   [] TE   [] TA   [] neuro   [] other: Patient Education: [x] Review HEP    [] Progressed/Changed HEP based on:   [] positioning   [] body mechanics   [] transfers   [] heat/ice application    [] other:      Other Objective/Functional Measures: able to perform standing pilates chair press downs on R with 2R and 2 B     Pain Level (0-10 scale) post treatment: 0    ASSESSMENT/Changes in Function:   Pt demonstrates improving strength and stability with exercises in standing and supine. R lumbar paraspinals were more triggered today, most likely from work yesterday out in the yard. Momentary weakness in R leg when transitioning from sit to stand that abated immediately. Patient will continue to benefit from skilled PT services to modify and progress therapeutic interventions, address functional mobility deficits, address ROM deficits, address strength deficits, analyze and address soft tissue restrictions, analyze and cue movement patterns, analyze and modify body mechanics/ergonomics, assess and modify postural abnormalities and instruct in home and community integration to attain remaining goals. []  See Plan of Care  []  See progress note/recertification  []  See Discharge Summary         Progress towards goals / Updated goals:  Updated Goals to be accomplished in 3 weeks:  1. Pt will demonstrate (-) R KAL tests to decrease R LE symptoms during daily tasks. Not met 2/26/2018   2. Pt will demonstrate ability to transition from sit to stand without R LE symptoms to improve quality of life.  Improved after manual 2/19/2018; not met 3/5/2018    PLAN  [x]  Upgrade activities as tolerated     [x]  Continue plan of care  [x]  Update interventions per flow sheet       []  Discharge due to:_  []  Other:_      Manual Nurse, PT 3/5/2018  3:37 PM    Future Appointments  Date Time Provider Mari Harp   3/7/2018 2:30 PM Manual Nurse, PT [facility]   3/12/2018 5:00 PM Manual Nurse, PT [facility]   5/25/2018 8:30 AM Shelby Ugarte  [address]

## 2018-03-07 ENCOUNTER — HOSPITAL ENCOUNTER (OUTPATIENT)
Dept: PHYSICAL THERAPY | Age: 68
Discharge: HOME | End: 2018-03-07
Payer: COMMERCIAL

## 2018-03-07 PROCEDURE — 97112 NEUROMUSCULAR REEDUCATION: CPT

## 2018-03-07 PROCEDURE — 97140 MANUAL THERAPY 1/> REGIONS: CPT

## 2018-03-07 NOTE — PROGRESS NOTES
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 \"  Pt reports some L buttocks pain after last time.     OBJECTIVE     min []Eval                  []Re-Eval     8 min Therapeutic Exercise:  [x] See flow sheet :   Rationale: increase ROM and increase strength to improve the patients ability to perform ADLs    26 min Neuromuscular Re-education:  [x]  See flow sheet :   Rationale: increase strength, improve coordination and increase proprioception  to improve the patients ability to improve stability     8 min Manual Therapy:  MFR along B glutes, adding in hip IR and ER in prone   Rationale: decrease pain, increase ROM and increase tissue extensibility to perform ADLs          With   [] TE   [] TA   [] neuro   [] other: Patient Education: [x] Review HEP    [] Progressed/Changed HEP based on:   [] positioning   [] body mechanics   [] transfers   [] heat/ice application    [] other:      Other Objective/Functional Measures: large trigger points along B glutes     Pain Level (0-10 scale) post treatment: 0    ASSESSMENT/Changes in Function:   Pt reports reproduction of symptoms with manual, and that he was feeling less symptoms upon immediate standing afterward. Discussed with pt potential d/c next visit, and he is going to report how he is feeling overall, especially with ability to self manage symptoms. Instructed in tennis ball self MFR. Patient will continue to benefit from skilled PT services to modify and progress therapeutic interventions, address functional mobility deficits, address ROM deficits, address strength deficits, analyze and address soft tissue restrictions, analyze and cue movement patterns, analyze and modify body mechanics/ergonomics, assess and modify postural abnormalities and instruct in home and community integration to attain remaining goals. []  See Plan of Care  []  See progress note/recertification  []  See Discharge Summary         Progress towards goals / Updated goals:  Updated Goals to be accomplished in 3 weeks:  1. Pt will demonstrate (-) R KAL tests to decrease R LE symptoms during daily tasks. Not met 2/26/2018   2. Pt will demonstrate ability to transition from sit to stand without R LE symptoms to improve quality of life.  Improved after manual 2/19/2018; not met 3/5/2018; improved after manual today 3/7/2018    PLAN  [x]  Upgrade activities as tolerated     [x]  Continue plan of care  [x]  Update interventions per flow sheet       []  Discharge due to:_  []  Other:_      Dolores Vaughn PT 3/7/2018  2:46 PM    Future Appointments  Date Time Provider Mari Harp   3/12/2018 5:00 PM Dolores Vaughn PT [facility]   5/25/2018 8:30 AM Palmer Hashimoto,  [address]

## 2018-03-12 ENCOUNTER — HOSPITAL ENCOUNTER (OUTPATIENT)
Dept: PHYSICAL THERAPY | Age: 68
Discharge: HOME | End: 2018-03-12
Payer: COMMERCIAL

## 2018-03-12 PROCEDURE — 97112 NEUROMUSCULAR REEDUCATION: CPT

## 2018-03-12 PROCEDURE — 97110 THERAPEUTIC EXERCISES: CPT

## 2018-03-12 PROCEDURE — 97140 MANUAL THERAPY 1/> REGIONS: CPT

## 2018-03-12 PROCEDURE — G8980 MOBILITY D/C STATUS: HCPCS

## 2018-03-12 PROCEDURE — G8979 MOBILITY GOAL STATUS: HCPCS

## 2018-03-12 NOTE — PROGRESS NOTES
[facility]  [address]  [place], [address]  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] Onset Date:2017 DOS                         Treatment Diagnosis: s/p L4-5 laminectomy and fusion,    Prior Hospitalization: see medical history Provider#: [id number]   Medications: Verified on Patient summary List    Comorbidities: Open lung biopsy, L4-5 laminectomy and fusion, arthritis, HBP, h/o compression fractures in thoracic and lumbar spine   Prior Level of Function: Works full time (lot of sitting required), walks 5 miles/day, callisthenic exercises  Visits from Start of Care: 14    Missed Visits: 0  Reporting Period : 2018 to 2018    Summary of Care:  Updated Goals to be accomplished in 3 weeks:  1. Pt will demonstrate (-) R KAL tests to decrease R LE symptoms during daily tasks. Not met 2018; met 3/12/2018   2. Pt will demonstrate ability to transition from sit to stand without R LE symptoms to improve quality of life. Improved after manual 2018; not met 3/5/2018; improved after manual today 3/7/2018; improved but symptoms still present 3/12/2018    G-Codes (GP)  Mobility    Goal  CK= 40-59%  D/C  CJ= 20-39%    The severity rating is based on clinical judgment and the FOTO score. ASSESSMENT/RECOMMENDATIONS: Pt demonstrates excellent progress with PT - he demonstrates improved strength and control along R LE, especially with glute activation.   He reports lessened R LE symptoms upon standing with MFR to R glutes, and instructed in self MFR as well as massage therapy options through the [facility]. Updated HEP, and will discharge from skilled PT at this time.   [x]Discontinue therapy: [x]Patient has reached or is progressing toward set goals      []Patient is non-compliant or has abdicated      []Due to lack of appreciable progress towards set goals    Claribel Diaz, PT 3/12/2018 5:22 PM

## 2018-03-12 NOTE — PROGRESS NOTES
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 He reports feeling stronger with his R leg, especially with car transfers.     OBJECTIVE     min []Eval                  []Re-Eval     8 min Therapeutic Exercise:  [x] See flow sheet :   Rationale: increase ROM and increase strength to improve the patients ability to perform ADLs    30 min Neuromuscular Re-education:  [x]  See flow sheet :   Rationale: increase strength, improve coordination and increase proprioception  to improve the patients ability to improve core support and stability    8 min Manual Therapy:  MFR along B glutes   Rationale: decrease pain, increase ROM and increase tissue extensibility to perform ADLs             With   [] TE   [] TA   [] neuro   [] other: Patient Education: [x] Review HEP    [] Progressed/Changed HEP based on:   [] positioning   [] body mechanics   [] transfers   [] heat/ice application    [] other:      Other Objective/Functional Measures:   R KAL - met, (-)  Sit to stand transition: improved, with only slight symptoms reported     Pain Level (0-10 scale) post treatment: 0    ASSESSMENT/Changes in Function: Pt demonstrates excellent progress with PT - he demonstrates improved strength and control along R LE, especially with glute activation. He reports lessened R LE symptoms upon standing with MFR to R glutes, and instructed in self MFR as well as massage therapy options through the [facility] office. Updated HEP, and will discharge from skilled PT at this time. []  See Plan of Care  []  See progress note/recertification  [x]  See Discharge Summary         Progress towards goals / Updated goals:  Progress towards goals / Updated goals:  Updated Goals to be accomplished in 3 weeks:  1. Pt will demonstrate (-) R KAL tests to decrease R LE symptoms during daily tasks. Not met 2/26/2018; met 3/12/2018   2. Pt will demonstrate ability to transition from sit to stand without R LE symptoms to improve quality of life.  Improved after manual 2/19/2018; not met 3/5/2018; improved after manual today 3/7/2018; improved but symptoms still present 3/12/2018    PLAN  []  Upgrade activities as tolerated     []  Continue plan of care  []  Update interventions per flow sheet       [x]  Discharge due to completion of program  []  Other:_      Venice Ferro, PT 3/12/2018  5:18 PM    Future Appointments  Date Time Provider Mari Harp   5/25/2018 8:30 AM Belkis Ashby  [address]

## 2018-05-02 ENCOUNTER — OFFICE VISIT (OUTPATIENT)
Dept: PULMONOLOGY | Age: 68
End: 2018-05-02

## 2018-05-02 VITALS
RESPIRATION RATE: 16 BRPM | WEIGHT: 142.6 LBS | HEART RATE: 74 BPM | TEMPERATURE: 98.2 F | BODY MASS INDEX: 19.96 KG/M2 | OXYGEN SATURATION: 98 % | HEIGHT: 71 IN | DIASTOLIC BLOOD PRESSURE: 72 MMHG | SYSTOLIC BLOOD PRESSURE: 114 MMHG

## 2018-05-02 DIAGNOSIS — Z98.1 S/P LUMBAR FUSION: ICD-10-CM

## 2018-05-02 DIAGNOSIS — Z87.891 FORMER SMOKER, STOPPED SMOKING MANY YEARS AGO: ICD-10-CM

## 2018-05-02 DIAGNOSIS — Z86.711 HISTORY OF PULMONARY EMBOLISM: Primary | ICD-10-CM

## 2018-05-02 NOTE — PROGRESS NOTES
Mr. Davenport Cititamra has a reminder for a \"due or due soon\" health maintenance. I have asked that he contact his primary care provider for follow-up on this health maintenance. Chief Complaint   Patient presents with    Results     Echo 1/26/2018      1. Have you been to the ER, urgent care clinic since your last visit? Hospitalized since your last visit? No    2. Have you seen or consulted any other health care providers outside of the [facility] since your last visit? Include any pap smears or colon screening.  Yes When: 2/2018 Where: Dr Edel Mckenzie  Reason for visit: Follow-up
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
## 2018-05-02 NOTE — MR AVS SNAPSHOT
[address] [facility], [address] [place] [address] 
[phone] Patient: Troy Ríos MRN: [id number] GAC:1[birth year]2404 Visit Information Date & Time Provider Department Dept. Phone Encounter #  
 5/2/2018  9:30 AM DO [facility] Mari Smith [id number] Follow-up Instructions Return if symptoms worsen or fail to improve. Your Appointments 5/25/2018  8:30 AM  
Follow Up with Issac Whyte NP  
[facility] Appt Note: 3 mo f/u NP  
 [address] [facility] [id number] [phone]  
  
   
 [address] [facility] [address] Upcoming Health Maintenance Date Due Hepatitis C Screening 1950 DTaP/Tdap/Td series (1 - Tdap) 2/26/1973 FOBT Q 1 YEAR AGE 50-75 2/26/2002 ZOSTER VACCINE AGE 60> 12/26/2011 GLAUCOMA SCREENING Q2Y 2/26/2017 MEDICARE YEARLY EXAM 3/28/2018 Influenza Age 5 to Adult 8/1/2018 Pneumococcal 65+ Low/Medium Risk (2 of 2 - PPSV23) 1/17/2019 Allergies as of 5/2/2018  Review Complete On: 5/2/2018 By: Marc Paz LPN Severity Noted Reaction Type Reactions Bee Sting  [Sting, Bee] High 04/06/2009    Swelling DIZZINESS Current Immunizations  Never Reviewed Name Date Pneumococcal Conjugate (PCV-13) 1/17/2018 Not reviewed this visit You Were Diagnosed With   
  
 Codes Comments FHx: pulmonary embolism    -  Primary ICD-10-CM: Z82.49 
ICD-9-CM: V17.49 History of pulmonary embolism     ICD-10-CM: Z86.711 ICD-9-CM: V12.55 Vitals BP Pulse Temp Resp Height(growth percentile) Weight(growth percentile) 114/72 (BP 1 Location: Left arm, BP Patient Position: Sitting) 74 98.2 °F (36.8 °C) (Oral) 16 5' 11\" (1.803 m) 142 lb 9.6 oz (64.7 kg) SpO2 BMI Smoking Status 98% 19.89 kg/m2 Former Smoker BMI and BSA Data Body Mass Index Body Surface Area  
 19.89 kg/m 2 1.8 m 2 Preferred Pharmacy Pharmacy Name Phone [facility] Your Updated Medication List  
  
   
This list is accurate as of 5/2/18  9:55 AM.  Always use your most recent med list.  
  
  
  
  
 clobetasol 0.05 % topical cream  
Commonly known as:  Senait Torres Use 1 Application to affected area Twice Daily. desonide 0.05 % topical lotion Commonly known as:  Alfonso Babe Use 1 Application to affected area Twice Daily. lisinopril-hydroCHLOROthiazide 20-12.5 mg per tablet Commonly known as:  PRINZIDE, ZESTORETIC  
TAKE ONE TABLET BY MOUTH ONCE A DAY  
  
 rivaroxaban 20 mg Tab tablet Commonly known as:  Italia Span Take 1 Tab by mouth daily (with breakfast). Indications: pulmonary thromboembolism  
  
 topiramate 25 mg tablet Commonly known as:  TOPAMAX Take 3 Tabs by mouth nightly. Indications: neuritis  
  
 zolpidem 5 mg tablet Commonly known as:  AMBIEN Take 5 mg by mouth nightly as needed. We Performed the Following REFERRAL TO HEMATOLOGY [VMO29 Custom] Comments:  
 Please evaluate patient for [place]. Patient with  pulmonary embolism (PE)- 12/18/17. Possible provoking factors to include recent surgery and remote international travel. - Completed Xarelto March 20, 2018. Patient does fly long distances, internationally for work. Please evaluate role for hypercoag workup - thank you Follow-up Instructions Return if symptoms worsen or fail to improve. Referral Information Referral ID Referred By Referred To  
  
 [id number] [place], [address] [facility] MD [address] [place], [address] Phone: [phone] Fax: [phone] Visits Status Start Date End Date 1 New Request 5/2/18 5/2/19 If your referral has a status of pending review or denied, additional information will be sent to support the outcome of this decision. Introducing [facility] SERVICES! Dear Jorge Barnett: Thank you for requesting a [employer] account. Our records indicate that you already have an active [employer] account. You can access your account anytime at [email or web address] Did you know that you can access your hospital and ER discharge instructions at any time in [employer]? You can also review all of your test results from your hospital stay or ER visit. Additional Information If you have questions, please visit the Frequently Asked Questions section of the [employer] website at [email or web address] Remember, [employer] is NOT to be used for urgent needs. For medical emergencies, dial 911. Now available from your iPhone and Android! Please provide this summary of care documentation to your next provider. Your primary care clinician is listed as Stephanie Morales. If you have any questions after today's visit, please call [phone].

## 2018-05-25 ENCOUNTER — OFFICE VISIT (OUTPATIENT)
Dept: ORTHOPEDIC SURGERY | Age: 68
End: 2018-05-25

## 2018-05-25 VITALS
BODY MASS INDEX: 20.3 KG/M2 | HEIGHT: 71 IN | WEIGHT: 145 LBS | DIASTOLIC BLOOD PRESSURE: 73 MMHG | HEART RATE: 65 BPM | RESPIRATION RATE: 14 BRPM | SYSTOLIC BLOOD PRESSURE: 125 MMHG

## 2018-05-25 DIAGNOSIS — M54.42 CHRONIC BILATERAL LOW BACK PAIN WITH BILATERAL SCIATICA: Primary | ICD-10-CM

## 2018-05-25 DIAGNOSIS — Z87.81 HX OF COMPRESSION FRACTURE OF SPINE: ICD-10-CM

## 2018-05-25 DIAGNOSIS — M85.88 OTHER SPECIFIED DISORDERS OF BONE DENSITY AND STRUCTURE, OTHER SITE: ICD-10-CM

## 2018-05-25 DIAGNOSIS — G89.29 CHRONIC BILATERAL LOW BACK PAIN WITH BILATERAL SCIATICA: Primary | ICD-10-CM

## 2018-05-25 DIAGNOSIS — Z98.1 S/P LUMBAR FUSION: ICD-10-CM

## 2018-05-25 DIAGNOSIS — M43.16 SPONDYLOLISTHESIS AT L4-L5 LEVEL: ICD-10-CM

## 2018-05-25 DIAGNOSIS — M54.41 CHRONIC BILATERAL LOW BACK PAIN WITH BILATERAL SCIATICA: Primary | ICD-10-CM

## 2018-05-25 PROBLEM — M54.40 CHRONIC BILATERAL LOW BACK PAIN WITH SCIATICA: Status: ACTIVE | Noted: 2018-05-25

## 2018-05-25 NOTE — PROGRESS NOTES
[address], [address]  [place], [address] [place] [address] Street  Phone: [phone]  Fax: [phone]  PROGRESS NOTE  Patient: Troy Ríos                MRN: [id number]       SSN: [id number]  YOB: 1950        AGE: 79 y.o. SEX: male  Body mass index is 20.22 kg/(m^2). PCP: Ravinder Alvarez MD  05/25/18    Chief Complaint   Patient presents with    Back Pain     F/U       HISTORY OF PRESENT ILLNESS:  Troy Ríos is a 79 y.o.  male with history of progressive back and BLE pain L>R for 15 + years and radiation of pain to BLE L4, 5, S1 distribution. Prior history of back problems: recurrent self limited episodes of low back pain in the past, previous osteoarthritis of lumbar spine, previous herniated disc and previous spinal surgery - L4-5 PLF and redo Lami L4-S1 six months ago for spondy Grade II, stenosis. He had a Lami back in 2002. He had weakness in the BLE prior to his fusion. He had severe bilateral sciatica as well prior to his fusion. His sciatica pain has resolved and so has his weakness. He has an old T12 and L3 Compression Fx from an injurious event. His weight is on the lower side and he is an ex-smoker and he has evident osteopenia on his x-rays. He is at risk for osteoporosis and he has never had a DEXA scan. I will order that today. Pain is burning and throbbing. Pain is worse with manual/sedentary work, squatting, from sitting to standing and affects recreational activities. Pain is better with lying down. Today he has right greater than left low back pain radiating in the buttocks when he goes from a sitting to standing position. States he has been using Aleve OTC with moderate, complete, relief. . Denies bladder/bowel dysfunction, saddle paresthesia, weakness, gait disturbance, or other neurological deficits. Denies chills, fever,night sweats, unexplained weight loss/weight gain, chest pain, sob or anxiety.  Reports no new medical issues or hospitalizations since the last visit. Pt at this time desires to  continue with current care/proceed with medication evaluation/proceed with evaluation of LBP. ASSESSMENT   Diagnoses and all orders for this visit:    1. Chronic bilateral low back pain with bilateral sciatica  -     AMB POC XRAY, SPINE, LUMBOSACRAL; 2 O    2. Spondylolisthesis at L4-L5 level  -     AMB POC XRAY, SPINE, LUMBOSACRAL; 2 O    3. S/P lumbar fusion  -     AMB POC XRAY, SPINE, LUMBOSACRAL; 2 O    4. Hx of compression fracture of spine  -     DEXA BONE DENSITY STUDY AXIAL; Future    5. Other specified disorders of bone density and structure, other site   -     DEXA BONE DENSITY STUDY AXIAL; Future       IMPRESSION AND PLAN:  This is a pt with spondy, stenosis,  who is doing has improved since last OV. 1) Pt was given information on DXA   2) Continue HEP  3) DEXA Scan  4) Mr. Gisell Jenkins has a reminder for a \"due or due soon\" health maintenance. I have asked that he contact his primary care provider, Pippa Lam MD, for follow-up on this health maintenance. 5) We have informed patient to notify us for immediate appointment if he has any worsening neurogical symptoms or if an emergency situation presents, then call 911  6)  has been reviewed and is appropriate  7) Pt will follow-up in 3 mo w/ me. Subjective    Work [employer]    Smoking Status Non-smoker    Pain Scale: 10 - Worst pain ever/10    Pain Assessment  2/27/2018   Location of Pain Back   Location Modifiers -   Severity of Pain 0   Quality of Pain -   Quality of Pain Comment -   Duration of Pain -   Frequency of Pain -   Aggravating Factors -   Aggravating Factors Comment -   Relieving Factors -   Relieving Factors Comment -   Result of Injury -         REVIEW OF SYSTEMS  Constitutional: Negative for fever, chills, or weight change. Respiratory: Negative for cough or shortness of breath.      Cardiovascular: Negative for chest pain or palpitations. Gastrointestinal: Negative for incontinence, acid reflux, change in bowel habits, or constipation. Genitourinary: Negative for incontinence, dysuria and flank pain. Musculoskeletal: Positive for Right low back pain. See HPI. Skin: Negative for rash. Neurological:no  radiculopathy. See HPI. Endo/Heme/Allergies: Negative. Psychiatric/Behavioral: Negative. PHYSICAL EXAMINATION  Visit Vitals    /73    Pulse 65    Resp 14    Ht 5' 11\" (1.803 m)    Wt 145 lb (65.8 kg)    BMI 20.22 kg/m2         Accompanied by self. Constitutional:  Well developed, well nourished, in no acute distress. Psychiatric: Affect and mood are appropriate. Integumentary: No rashes or abrasions noted on exposed areas. Cardiovascular/Peripheral Vascular: +2 radial & pedal pulses. No peripheral edema is noted. Lymphatic:  No evidence of lymphedema. No cervical lymphadenopathy. SPINE/MUSCULOSKELETAL EXAM     Lumbar spine:  No rash, ecchymosis, or gross obliquity. No fasciculations. No focal atrophy is noted. Range of motion is intact with flexion, extension, turning right, turning left. Tenderness to palpation none. No tenderness to palpation at the sciatic notch. SI joints non-tender. Trochanters non tender. Straight leg raise negative    Sensation grossly intact to light touch. MOTOR:     Hip Flex Quads Hamstrings Ankle DF EHL Ankle PF   Right 5/5 5/5 5/5 5/5 5/5 5/5   Left 5/5 5/5 5/5 5/5 5/5 5/5     Ambulation without assistive device.  FWB.    normal gait and station        PAST MEDICAL HISTORY   Past Medical History:   Diagnosis Date    Back pain     Chronic pain     Headache(784.0)     HTN (hypertension)     Joint pain     hx of joint pain    Pulmonary emboli [facility])        Past Surgical History:   Procedure Laterality Date    HX LUMBAR LAMINECTOMY  2002    L4/L5    HX ORTHOPAEDIC  12/04/2017    Lumbar surgery (Dr. Damon Simmonds)   [address]    \"open lung bx\"    HX OTHER SURGICAL      laminectomy/fusion L4-5  S1    NEUROLOGICAL PROCEDURE UNLISTED  2003    pt spinal fracture T12 and L3   . MEDICATIONS      Current Outpatient Prescriptions   Medication Sig Dispense Refill    rivaroxaban (XARELTO) 20 mg tab tablet Take 1 Tab by mouth daily (with breakfast). Indications: pulmonary thromboembolism 30 Tab 0    topiramate (TOPAMAX) 25 mg tablet Take 3 Tabs by mouth nightly. Indications: neuritis 90 Tab 5    clobetasol (TEMOVATE) 0.05 % topical cream Use 1 Application to affected area Twice Daily.  desonide (TRIDESILON) 0.05 % topical lotion Use 1 Application to affected area Twice Daily.  lisinopril-hydroCHLOROthiazide (PRINZIDE, ZESTORETIC) 20-12.5 mg per tablet TAKE ONE TABLET BY MOUTH ONCE A DAY      zolpidem (AMBIEN) 5 mg tablet Take 5 mg by mouth nightly as needed. ALLERGIES    Allergies   Allergen Reactions    Bee Sting  [Sting, Bee] Swelling     DIZZINESS          SOCIAL HISTORY    Social History     Social History    Marital status:      Spouse name: N/A    Number of children: N/A    Years of education: N/A     Occupational History    Not on file.      Social History Main Topics    Smoking status: Former Smoker     Packs/day: 0.75     Years: 40.00     Quit date: 10/17/2012    Smokeless tobacco: Former User    Alcohol use 1.2 oz/week     2 Cans of beer per week      Comment: daily-beer and wine/ 2 drinks a day    Drug use: No    Sexual activity: Yes     Other Topics Concern    Not on file     Social History Narrative       FAMILY HISTORY    Family History   Problem Relation Age of Onset    Colon Cancer Mother     Hypertension Mother     Hypertension Father     Clotting Disorder Father     Other Brother     Cancer Brother          Kun Benitez, MARTA

## 2018-05-25 NOTE — MR AVS SNAPSHOT
[address] [place] [address] [address] [facility] [address] 
[phone] Patient: Chuy Stokes MRN: [id number] NAWAF:[id number] Visit Information Date & Time Provider Department Dept. Phone Encounter #  
 5/25/2018  8:30 AM Jackie Alves NP [facility] [place] [phone] [id number] Follow-up Instructions Return in about 3 months (around 8/25/2018). Follow-up and Disposition History Upcoming Health Maintenance Date Due Hepatitis C Screening 1950 DTaP/Tdap/Td series (1 - Tdap) 2/26/1973 FOBT Q 1 YEAR AGE 50-75 2/26/2002 ZOSTER VACCINE AGE 60> 12/26/2011 GLAUCOMA SCREENING Q2Y 2/26/2017 MEDICARE YEARLY EXAM 3/28/2018 Influenza Age 5 to Adult 8/1/2018 Pneumococcal 65+ Low/Medium Risk (2 of 2 - PPSV23) 1/17/2019 Allergies as of 5/25/2018  Review Complete On: 5/2/2018 By: Kristina Davidson LPN Severity Noted Reaction Type Reactions Bee Sting  [Sting, Bee] High 04/06/2009    Swelling DIZZINESS Current Immunizations  Never Reviewed Name Date Pneumococcal Conjugate (PCV-13) 1/17/2018 Not reviewed this visit You Were Diagnosed With   
  
 Codes Comments Chronic bilateral low back pain with bilateral sciatica    -  Primary ICD-10-CM: M54.42, M54.41, G89.29 ICD-9-CM: 724.2, 724.3, 338.29 Spondylolisthesis at L4-L5 level     ICD-10-CM: M43.16 
ICD-9-CM: 756.12 S/P lumbar fusion     ICD-10-CM: Z98.1 ICD-9-CM: V45.4 Hx of compression fracture of spine     ICD-10-CM: Z87.81 ICD-9-CM: V15.51 Other specified disorders of bone density and structure, other site     ICD-10-CM: M85.88 ICD-9-CM: 733.99 Vitals BP Pulse Resp Height(growth percentile) Weight(growth percentile) BMI  
 125/73 65 14 5' 11\" (1.803 m) 145 lb (65.8 kg) 20.22 kg/m2 Smoking Status Former Smoker BMI and BSA Data Body Mass Index Body Surface Area  
 20.22 kg/m 2 1.82 m 2 Preferred Pharmacy Pharmacy Name Phone [facility] [address] [facility] Your Updated Medication List  
  
   
This list is accurate as of 5/25/18  9:13 AM.  Always use your most recent med list.  
  
  
  
  
 clobetasol 0.05 % topical cream  
Commonly known as:  Lynne Nicolette Use 1 Application to affected area Twice Daily. desonide 0.05 % topical lotion Commonly known as:  Margaret Mangle Use 1 Application to affected area Twice Daily. lisinopril-hydroCHLOROthiazide 20-12.5 mg per tablet Commonly known as:  PRINZIDE, ZESTORETIC  
TAKE ONE TABLET BY MOUTH ONCE A DAY  
  
 rivaroxaban 20 mg Tab tablet Commonly known as:  Electa Dux Take 1 Tab by mouth daily (with breakfast). Indications: pulmonary thromboembolism  
  
 topiramate 25 mg tablet Commonly known as:  TOPAMAX Take 3 Tabs by mouth nightly. Indications: neuritis  
  
 zolpidem 5 mg tablet Commonly known as:  AMBIEN Take 5 mg by mouth nightly as needed. We Performed the Following AMB POC XRAY, SPINE, LUMBOSACRAL; 2 O [15912 CPT(R)] Follow-up Instructions Return in about 3 months (around 8/25/2018). To-Do List   
 05/29/2018 7:00 AM  
  Appointment with [facility] BONE DEXA RM 1 at [facility] RAD BONE DENSITY ([phone]) OUTSIDE FILMS  - Any outside films related to the study being scheduled should be brought with you on the day of the exam.  If this cannot be done there may be a delay in the reading of the study. MEDICATIONS  - Patient must bring a complete list of all medications currently taking to include prescriptions, over-the-counter meds, herbals, vitamins & any dietary supplements - Patient must discontinue use of calcium, vitamins, or calcium supplements including antacids (calcium based) 24 hours before scan.   GENERAL INSTRUCTIONS  - [facility] cannot accommodate patients on stretchers, patient must be able to walk into the room and be able to sit up for a portion of the exam.  
  
 06/01/2018 Imaging:  DEXA BONE DENSITY STUDY AXIAL Referral Information Referral ID Referred By Referred To  
  
 [id number] Adolph Pak Not Available Visits Status Start Date End Date 1 New Request 5/25/18 5/25/19 If your referral has a status of pending review or denied, additional information will be sent to support the outcome of this decision. Patient Instructions Dual-Energy X-Ray Absorptiometry (DXA) Test: About This Test 
What is it? Dual-energy X-ray absorptiometry (DXA) is a test that uses two different X-ray beams to check bone thickness (density) in your spine and hip. This information is used to estimate the strength of your bones. Why is this test done? A DXA test is done to check for bone thinning and weakness, which can make it easier for you to break a bone. It is often done for: 
· People who are at risk for osteoporosis, including: ¨ Women who are age 72 and older. ¨ Older men. ¨ People who take some medications, such as corticosteroids. ¨ People who have certain medical conditions, such as hyperparathyroidism. · People who have osteoporosis, to see how well treatment is working. What happens before the test? 
· Women who are pregnant should not have this test. Let your doctor know if you are or might be pregnant. · You may be able to leave your clothes on for the test, but you will remove any metal buttons or phi for the test. 
What happens during the test? 
· You will lie down on your back on a padded table. · You may need to lie with your legs straight or with your lower legs resting on a platform built into the table. · The machine will scan your bones and measure the amount of radiation they absorb. During this test you are exposed to a very low dose of radiation. How long does the test take? · The test will take about 20 minutes. What happens after the test? 
· You will probably be able to go home right away. · You can go back to your usual activities right away. Follow-up care is a key part of your treatment and safety. Be sure to make and go to all appointments, and call your doctor if you are having problems. It's also a good idea to keep a list of the medicines you take. Ask your doctor when you can expect to have your test results. Where can you learn more? Go to http://jorge-steve.info/. Enter H788 in the search box to learn more about \"Dual-Energy X-Ray Absorptiometry (DXA) Test: About This Test.\" Current as of: May 12, 2017 Content Version: 11.4 © 6157-9594 [employer]. Care instructions adapted under license by [employer] (which disclaims liability or warranty for this information). If you have questions about a medical condition or this instruction, always ask your healthcare professional. [place] [address] any warranty or liability for your use of this information. Introducing [facility]! Dear Bg Galeana: Thank you for requesting a DAXKO account. Our records indicate that you already have an active DAXKO account. You can access your account anytime at [email or web address] Did you know that you can access your hospital and ER discharge instructions at any time in DAXKO? You can also review all of your test results from your hospital stay or ER visit. Additional Information If you have questions, please visit the Frequently Asked Questions section of the DAXKO website at [email or web address] Remember, DAXKO is NOT to be used for urgent needs. For medical emergencies, dial 911. Now available from your iPhone and Android! Please provide this summary of care documentation to your next provider. Your primary care clinician is listed as Dea Motta. If you have any questions after today's visit, please call [phone].

## 2018-05-25 NOTE — PATIENT INSTRUCTIONS
Dual-Energy X-Ray Absorptiometry (DXA) Test: About This Test  What is it? Dual-energy X-ray absorptiometry (DXA) is a test that uses two different X-ray beams to check bone thickness (density) in your spine and hip. This information is used to estimate the strength of your bones. Why is this test done? A DXA test is done to check for bone thinning and weakness, which can make it easier for you to break a bone. It is often done for:  · People who are at risk for osteoporosis, including:  ¨ Women who are age 72 and older. ¨ Older men. ¨ People who take some medications, such as corticosteroids. ¨ People who have certain medical conditions, such as hyperparathyroidism. · People who have osteoporosis, to see how well treatment is working. What happens before the test?  · Women who are pregnant should not have this test. Let your doctor know if you are or might be pregnant. · You may be able to leave your clothes on for the test, but you will remove any metal buttons or phi for the test.  What happens during the test?  · You will lie down on your back on a padded table. · You may need to lie with your legs straight or with your lower legs resting on a platform built into the table. · The machine will scan your bones and measure the amount of radiation they absorb. During this test you are exposed to a very low dose of radiation. How long does the test take? · The test will take about 20 minutes. What happens after the test?  · You will probably be able to go home right away. · You can go back to your usual activities right away. Follow-up care is a key part of your treatment and safety. Be sure to make and go to all appointments, and call your doctor if you are having problems. It's also a good idea to keep a list of the medicines you take. Ask your doctor when you can expect to have your test results. Where can you learn more? Go to http://jorge-steve.info/.   Enter P384 in the search box to learn more about \"Dual-Energy X-Ray Absorptiometry (DXA) Test: About This Test.\"  Current as of: May 12, 2017  Content Version: 11.4  © 6657-7595 Healthwise, [employer]. Care instructions adapted under license by [employer] (which disclaims liability or warranty for this information). If you have questions about a medical condition or this instruction, always ask your healthcare professional. [place] [address] any warranty or liability for your use of this information.

## 2018-05-29 ENCOUNTER — HOSPITAL ENCOUNTER (OUTPATIENT)
Dept: BONE DENSITY | Age: 68
Discharge: HOME | End: 2018-05-29
Attending: NURSE PRACTITIONER
Payer: COMMERCIAL

## 2018-05-29 DIAGNOSIS — Z87.81 HX OF COMPRESSION FRACTURE OF SPINE: ICD-10-CM

## 2018-05-29 DIAGNOSIS — M85.88 OTHER SPECIFIED DISORDERS OF BONE DENSITY AND STRUCTURE, OTHER SITE: ICD-10-CM

## 2018-05-29 PROCEDURE — 77080 DXA BONE DENSITY AXIAL: CPT

## 2018-06-06 ENCOUNTER — TELEPHONE (OUTPATIENT)
Dept: ORTHOPEDIC SURGERY | Age: 68
End: 2018-06-06

## 2018-06-06 DIAGNOSIS — D50.9 IRON DEFICIENCY ANEMIA, UNSPECIFIED IRON DEFICIENCY ANEMIA TYPE: ICD-10-CM

## 2018-06-06 DIAGNOSIS — E55.9 VITAMIN D DEFICIENCY: Primary | ICD-10-CM

## 2018-06-06 DIAGNOSIS — M85.88 OSTEOPENIA OF SPINE: ICD-10-CM

## 2018-06-11 DIAGNOSIS — M85.88 OSTEOPENIA OF SPINE: ICD-10-CM

## 2018-06-11 DIAGNOSIS — D50.9 IRON DEFICIENCY ANEMIA, UNSPECIFIED IRON DEFICIENCY ANEMIA TYPE: ICD-10-CM

## 2018-06-11 DIAGNOSIS — E55.9 VITAMIN D DEFICIENCY: ICD-10-CM

## 2018-06-11 NOTE — TELEPHONE ENCOUNTER
Called pt back. I'm ordering a full osteoportic/osteoporosis work-up. He is going to go to [facility] to get those drawn later this week. I will call him back with those results.

## 2018-06-19 ENCOUNTER — HOSPITAL ENCOUNTER (OUTPATIENT)
Dept: LAB | Age: 68
Discharge: HOME | End: 2018-06-19
Payer: COMMERCIAL

## 2018-06-19 LAB
ALBUMIN SERPL-MCNC: 4.4 G/DL (ref 3.4–5)
ALBUMIN/GLOB SERPL: 1.5 {RATIO} (ref 0.8–1.7)
ALP SERPL-CCNC: 67 U/L (ref 45–117)
ALT SERPL-CCNC: 26 U/L (ref 16–61)
ANION GAP SERPL CALC-SCNC: 5 MMOL/L (ref 3–18)
AST SERPL-CCNC: 28 U/L (ref 15–37)
BASOPHILS # BLD: 0.1 K/UL (ref 0–0.06)
BASOPHILS NFR BLD: 2 % (ref 0–2)
BILIRUB SERPL-MCNC: 0.9 MG/DL (ref 0.2–1)
BUN SERPL-MCNC: 28 MG/DL (ref 7–18)
BUN/CREAT SERPL: 38 (ref 12–20)
CALCIUM SERPL-MCNC: 9.2 MG/DL (ref 8.5–10.1)
CHLORIDE SERPL-SCNC: 104 MMOL/L (ref 100–108)
CO2 SERPL-SCNC: 30 MMOL/L (ref 21–32)
CREAT SERPL-MCNC: 0.74 MG/DL (ref 0.6–1.3)
DIFFERENTIAL METHOD BLD: ABNORMAL
EOSINOPHIL # BLD: 0.1 K/UL (ref 0–0.4)
EOSINOPHIL NFR BLD: 3 % (ref 0–5)
ERYTHROCYTE [DISTWIDTH] IN BLOOD BY AUTOMATED COUNT: 13.9 % (ref 11.6–14.5)
GLOBULIN SER CALC-MCNC: 2.9 G/DL (ref 2–4)
GLUCOSE SERPL-MCNC: 86 MG/DL (ref 74–99)
HCT VFR BLD AUTO: 44.7 % (ref 36–48)
HGB BLD-MCNC: 14.8 G/DL (ref 13–16)
LYMPHOCYTES # BLD: 1.2 K/UL (ref 0.9–3.6)
LYMPHOCYTES NFR BLD: 26 % (ref 21–52)
MAGNESIUM SERPL-MCNC: 2.6 MG/DL (ref 1.6–2.6)
MCH RBC QN AUTO: 30.2 PG (ref 24–34)
MCHC RBC AUTO-ENTMCNC: 33.1 G/DL (ref 31–37)
MCV RBC AUTO: 91.2 FL (ref 74–97)
MONOCYTES # BLD: 0.7 K/UL (ref 0.05–1.2)
MONOCYTES NFR BLD: 14 % (ref 3–10)
NEUTS SEG # BLD: 2.6 K/UL (ref 1.8–8)
NEUTS SEG NFR BLD: 55 % (ref 40–73)
PHOSPHATE SERPL-MCNC: 3.3 MG/DL (ref 2.5–4.9)
PLATELET # BLD AUTO: 223 K/UL (ref 135–420)
PMV BLD AUTO: 9.8 FL (ref 9.2–11.8)
POTASSIUM SERPL-SCNC: 4.9 MMOL/L (ref 3.5–5.5)
PROT SERPL-MCNC: 7.3 G/DL (ref 6.4–8.2)
RBC # BLD AUTO: 4.9 M/UL (ref 4.7–5.5)
SODIUM SERPL-SCNC: 139 MMOL/L (ref 136–145)
WBC # BLD AUTO: 4.7 K/UL (ref 4.6–13.2)

## 2018-06-19 PROCEDURE — 84100 ASSAY OF PHOSPHORUS: CPT | Performed by: NURSE PRACTITIONER

## 2018-06-19 PROCEDURE — 82306 VITAMIN D 25 HYDROXY: CPT | Performed by: NURSE PRACTITIONER

## 2018-06-19 PROCEDURE — 85025 COMPLETE CBC W/AUTO DIFF WBC: CPT | Performed by: NURSE PRACTITIONER

## 2018-06-19 PROCEDURE — 83735 ASSAY OF MAGNESIUM: CPT | Performed by: NURSE PRACTITIONER

## 2018-06-19 PROCEDURE — 36415 COLL VENOUS BLD VENIPUNCTURE: CPT | Performed by: NURSE PRACTITIONER

## 2018-06-19 PROCEDURE — 80053 COMPREHEN METABOLIC PANEL: CPT | Performed by: NURSE PRACTITIONER

## 2018-06-22 LAB
25(OH)D2 SERPL-MCNC: <1 NG/ML
25(OH)D3 SERPL-MCNC: 37 NG/ML
25(OH)D3+25(OH)D2 SERPL-MCNC: 37 NG/ML

## 2018-06-25 RX ORDER — ALENDRONATE SODIUM 5 MG/1
5 TABLET ORAL
Qty: 30 TAB | Refills: 2 | Status: SHIPPED | OUTPATIENT
Start: 2018-06-25 | End: 2019-02-26

## 2018-06-25 NOTE — TELEPHONE ENCOUNTER
Called and spoke w/ pt regarding lab work. Vit D a little on the normal side at 37. He is on 2,000 international units  Of Vit D3 daily now. His BUN was slightly elevated indicating dehydration. All other Renal function WDL. I advised him to take OTC Catrate 500mg BID daily. We discussed Fosamax and instructions to take first thing in the morning on an empty stomach. The risks, benefits, and potential side effects of this medication were discussed. Patient understands and wishes to proceed. Patient advised to call the office if intolerant to new medication.   He will do that and f/o as scheduled in August.

## 2018-08-24 ENCOUNTER — OFFICE VISIT (OUTPATIENT)
Dept: ORTHOPEDIC SURGERY | Age: 68
End: 2018-08-24

## 2018-09-17 ENCOUNTER — OFFICE VISIT (OUTPATIENT)
Dept: ORTHOPEDIC SURGERY | Age: 68
End: 2018-09-17

## 2018-09-17 VITALS
HEART RATE: 70 BPM | TEMPERATURE: 97.7 F | DIASTOLIC BLOOD PRESSURE: 87 MMHG | SYSTOLIC BLOOD PRESSURE: 129 MMHG | OXYGEN SATURATION: 98 % | HEIGHT: 71 IN | BODY MASS INDEX: 20.3 KG/M2 | WEIGHT: 145 LBS | RESPIRATION RATE: 16 BRPM

## 2018-09-17 DIAGNOSIS — Z98.1 S/P LUMBAR FUSION: ICD-10-CM

## 2018-09-17 DIAGNOSIS — M48.061 SPINAL STENOSIS OF LUMBAR REGION WITHOUT NEUROGENIC CLAUDICATION: Primary | ICD-10-CM

## 2018-09-17 NOTE — MR AVS SNAPSHOT
[address] [address] [address] [place] [address] 
[phone] Patient: Dawson Woodward MRN: [id number] POJ:[id number] Visit Information Date & Time Provider Department Dept. Phone Encounter #  
 9/17/2018  2:40 PM David Luke NP [facility] [place] [phone] [id number] Follow-up Instructions Return in about 3 months (around 12/17/2018). Upcoming Health Maintenance Date Due Hepatitis C Screening 1950 DTaP/Tdap/Td series (1 - Tdap) 2/26/1973 FOBT Q 1 YEAR AGE 50-75 2/26/2002 ZOSTER VACCINE AGE 60> 12/26/2011 GLAUCOMA SCREENING Q2Y 2/26/2017 MEDICARE YEARLY EXAM 3/28/2018 Influenza Age 5 to Adult 8/1/2018 Pneumococcal 65+ Low/Medium Risk (2 of 2 - PPSV23) 1/17/2019 Allergies as of 9/17/2018  Review Complete On: 9/17/2018 By: Yen Amador Severity Noted Reaction Type Reactions Bee Sting  [Sting, Bee] High 04/06/2009    Swelling DIZZINESS Current Immunizations  Never Reviewed Name Date Pneumococcal Conjugate (PCV-13) 1/17/2018 Not reviewed this visit You Were Diagnosed With   
  
 Codes Comments Spinal stenosis of lumbar region without neurogenic claudication    -  Primary ICD-10-CM: M48.061 
ICD-9-CM: 724.02 S/P lumbar fusion     ICD-10-CM: Z98.1 ICD-9-CM: V45.4 Vitals BP Pulse Temp Resp Height(growth percentile) Weight(growth percentile) 129/87 70 97.7 °F (36.5 °C) 16 5' 11\" (1.803 m) 145 lb (65.8 kg) SpO2 BMI Smoking Status 98% 20.22 kg/m2 Former Smoker BMI and BSA Data Body Mass Index Body Surface Area  
 20.22 kg/m 2 1.82 m 2 Preferred Pharmacy Pharmacy Name Phone Sarah Brown [facility] Your Updated Medication List  
  
   
This list is accurate as of 9/17/18  3:39 PM.  Always use your most recent med list.  
  
  
  
  
 alendronate 5 mg tablet Commonly known as:  FOSAMAX Take 1 Tab by mouth Daily (before breakfast). clobetasol 0.05 % topical cream  
Commonly known as:  Kylie Plume Use 1 Application to affected area Twice Daily. desonide 0.05 % topical lotion Commonly known as:  Harsha Mulch Use 1 Application to affected area Twice Daily. lisinopril-hydroCHLOROthiazide 20-12.5 mg per tablet Commonly known as:  PRINZIDE, ZESTORETIC  
TAKE ONE TABLET BY MOUTH ONCE A DAY  
  
 rivaroxaban 20 mg Tab tablet Commonly known as:  Tapia Richard Take 1 Tab by mouth daily (with breakfast). Indications: pulmonary thromboembolism  
  
 topiramate 25 mg tablet Commonly known as:  TOPAMAX Take 3 Tabs by mouth nightly. Indications: neuritis  
  
 zolpidem 5 mg tablet Commonly known as:  AMBIEN Take 5 mg by mouth nightly as needed. We Performed the Following AMB POC XRAY, SPINE, LUMBOSACRAL; 2 O [16323 CPT(R)] Follow-up Instructions Return in about 3 months (around 12/17/2018). Patient Instructions Back Pain: Care Instructions Your Care Instructions Back pain has many possible causes. It is often related to problems with muscles and ligaments of the back. It may also be related to problems with the nerves, discs, or bones of the back. Moving, lifting, standing, sitting, or sleeping in an awkward way can strain the back. Sometimes you don't notice the injury until later. Arthritis is another common cause of back pain. Although it may hurt a lot, back pain usually improves on its own within several weeks. Most people recover in 12 weeks or less. Using good home treatment and being careful not to stress your back can help you feel better sooner. Follow-up care is a key part of your treatment and safety. Be sure to make and go to all appointments, and call your doctor if you are having problems.  It's also a good idea to know your test results and keep a list of the medicines you take. How can you care for yourself at home? · Sit or lie in positions that are most comfortable and reduce your pain. Try one of these positions when you lie down: ¨ Lie on your back with your knees bent and supported by large pillows. ¨ Lie on the floor with your legs on the seat of a sofa or chair. Lisa Needle on your side with your knees and hips bent and a pillow between your legs. ¨ Lie on your stomach if it does not make pain worse. · Do not sit up in bed, and avoid soft couches and twisted positions. Bed rest can help relieve pain at first, but it delays healing. Avoid bed rest after the first day of back pain. · Change positions every 30 minutes. If you must sit for long periods of time, take breaks from sitting. Get up and walk around, or lie in a comfortable position. · Try using a heating pad on a low or medium setting for 15 to 20 minutes every 2 or 3 hours. Try a warm shower in place of one session with the heating pad. · You can also try an ice pack for 10 to 15 minutes every 2 to 3 hours. Put a thin cloth between the ice pack and your skin. · Take pain medicines exactly as directed. ¨ If the doctor gave you a prescription medicine for pain, take it as prescribed. ¨ If you are not taking a prescription pain medicine, ask your doctor if you can take an over-the-counter medicine. · Take short walks several times a day. You can start with 5 to 10 minutes, 3 or 4 times a day, and work up to longer walks. Walk on level surfaces and avoid hills and stairs until your back is better. · Return to work and other activities as soon as you can. Continued rest without activity is usually not good for your back. · To prevent future back pain, do exercises to stretch and strengthen your back and stomach. Learn how to use good posture, safe lifting techniques, and proper body mechanics. When should you call for help? Call your doctor now or seek immediate medical care if:   · You have new or worsening numbness in your legs.  
  · You have new or worsening weakness in your legs. (This could make it hard to stand up.)  
  · You lose control of your bladder or bowels.  
 Watch closely for changes in your health, and be sure to contact your doctor if: 
  · You have a fever, lose weight, or don't feel well.  
  · You do not get better as expected. Where can you learn more? Go to http://jorge-steve.info/. Enter I800 in the search box to learn more about \"Back Pain: Care Instructions. \" Current as of: November 29, 2017 Content Version: 11.7 © 4891-8870 [employer]. Care instructions adapted under license by [employer] (which disclaims liability or warranty for this information). If you have questions about a medical condition or this instruction, always ask your healthcare professional. [address] any warranty or liability for your use of this information. Introducing [facility]! Dear Mindy Goodwin: Thank you for requesting a SiBEAM account. Our records indicate that you already have an active SiBEAM account. You can access your account anytime at [email or web address]. [employer] Did you know that you can access your hospital and ER discharge instructions at any time in SiBEAM? You can also review all of your test results from your hospital stay or ER visit. Additional Information If you have questions, please visit the Frequently Asked Questions section of the SiBEAM website at [email or web address]. [employer]/. Remember, SiBEAM is NOT to be used for urgent needs. For medical emergencies, dial 911. Now available from your iPhone and Android! Please provide this summary of care documentation to your next provider. Your primary care clinician is listed as [address]. If you have any questions after today's visit, please call [phone].
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
## 2018-09-17 NOTE — PATIENT INSTRUCTIONS
Back Pain: Care Instructions  Your Care Instructions    Back pain has many possible causes. It is often related to problems with muscles and ligaments of the back. It may also be related to problems with the nerves, discs, or bones of the back. Moving, lifting, standing, sitting, or sleeping in an awkward way can strain the back. Sometimes you don't notice the injury until later. Arthritis is another common cause of back pain. Although it may hurt a lot, back pain usually improves on its own within several weeks. Most people recover in 12 weeks or less. Using good home treatment and being careful not to stress your back can help you feel better sooner. Follow-up care is a key part of your treatment and safety. Be sure to make and go to all appointments, and call your doctor if you are having problems. It's also a good idea to know your test results and keep a list of the medicines you take. How can you care for yourself at home? · Sit or lie in positions that are most comfortable and reduce your pain. Try one of these positions when you lie down:  ¨ Lie on your back with your knees bent and supported by large pillows. ¨ Lie on the floor with your legs on the seat of a sofa or chair. Abdi Webster on your side with your knees and hips bent and a pillow between your legs. ¨ Lie on your stomach if it does not make pain worse. · Do not sit up in bed, and avoid soft couches and twisted positions. Bed rest can help relieve pain at first, but it delays healing. Avoid bed rest after the first day of back pain. · Change positions every 30 minutes. If you must sit for long periods of time, take breaks from sitting. Get up and walk around, or lie in a comfortable position. · Try using a heating pad on a low or medium setting for 15 to 20 minutes every 2 or 3 hours. Try a warm shower in place of one session with the heating pad. · You can also try an ice pack for 10 to 15 minutes every 2 to 3 hours.  Put a thin cloth between the ice pack and your skin. · Take pain medicines exactly as directed. ¨ If the doctor gave you a prescription medicine for pain, take it as prescribed. ¨ If you are not taking a prescription pain medicine, ask your doctor if you can take an over-the-counter medicine. · Take short walks several times a day. You can start with 5 to 10 minutes, 3 or 4 times a day, and work up to longer walks. Walk on level surfaces and avoid hills and stairs until your back is better. · Return to work and other activities as soon as you can. Continued rest without activity is usually not good for your back. · To prevent future back pain, do exercises to stretch and strengthen your back and stomach. Learn how to use good posture, safe lifting techniques, and proper body mechanics. When should you call for help? Call your doctor now or seek immediate medical care if:    · You have new or worsening numbness in your legs.     · You have new or worsening weakness in your legs. (This could make it hard to stand up.)     · You lose control of your bladder or bowels.    Watch closely for changes in your health, and be sure to contact your doctor if:    · You have a fever, lose weight, or don't feel well.     · You do not get better as expected. Where can you learn more? Go to http://jorge-steve.info/. Enter Q419 in the search box to learn more about \"Back Pain: Care Instructions. \"  Current as of: November 29, 2017  Content Version: 11.7  © 4074-0074 [employer]. Care instructions adapted under license by [employer] (which disclaims liability or warranty for this information). If you have questions about a medical condition or this instruction, always ask your healthcare professional. [place] [address] any warranty or liability for your use of this information.

## 2018-12-18 ENCOUNTER — OFFICE VISIT (OUTPATIENT)
Dept: ORTHOPEDIC SURGERY | Age: 68
End: 2018-12-18

## 2018-12-18 VITALS
DIASTOLIC BLOOD PRESSURE: 88 MMHG | OXYGEN SATURATION: 100 % | SYSTOLIC BLOOD PRESSURE: 143 MMHG | BODY MASS INDEX: 20.39 KG/M2 | WEIGHT: 146.2 LBS | RESPIRATION RATE: 21 BRPM | TEMPERATURE: 97.4 F | HEART RATE: 60 BPM

## 2018-12-18 DIAGNOSIS — M48.061 SPINAL STENOSIS OF LUMBAR REGION WITHOUT NEUROGENIC CLAUDICATION: ICD-10-CM

## 2018-12-18 DIAGNOSIS — Z98.1 S/P LUMBAR FUSION: Primary | ICD-10-CM

## 2018-12-18 DIAGNOSIS — M54.50 NONSPECIFIC PAIN IN THE LUMBAR REGION: ICD-10-CM

## 2018-12-18 NOTE — PROGRESS NOTES
[facility]  [address], [address]  [place]  Phone: [phone]  Fax: [phone]  PROGRESS NOTE  Patient: Miguelina Chavez                MRN: [id number]       SSN: [id number]  YOB: 1950        AGE: 79 y.o. SEX: male  Body mass index is 20.39 kg/m². PCP: Katy Asher MD  12/18/18    Chief Complaint   Patient presents with    Back Pain     1yr post       HISTORY OF PRESENT ILLNESS, RADIOGRAPHS, and PLAN:     HISTORY OF PRESENT ILLNESS:  Mr. Aurea Obrien returns today. He is a year out from his L4-5 fusion. He is asymptomatic. He describes no back or leg pain and is back to doing his full activities. RADIOGRAPHS:  X-rays demonstrate a solid posterolateral fusion with fixation. He has compression fracture deformities of the segment above and some osteopenia we have discussed previously. ASSESSMENT/PLAN:  I have recommended an exercise program, calcium supplementation, and range of his normal activities which are quite robust.  I will see him back again as needed. cc:  Liberty Lenó MD           Past Medical History:   Diagnosis Date    Back pain     Chronic pain     Headache(784.0)     HTN (hypertension)     Joint pain     hx of joint pain    Pulmonary emboli ([address]        Family History   Problem Relation Age of Onset    Colon Cancer Mother     Hypertension Mother     Hypertension Father     Clotting Disorder Father     Other Brother     Cancer Brother        Current Outpatient Medications   Medication Sig Dispense Refill    alendronate (FOSAMAX) 5 mg tablet Take 1 Tab by mouth Daily (before breakfast). 30 Tab 2    rivaroxaban (XARELTO) 20 mg tab tablet Take 1 Tab by mouth daily (with breakfast). Indications: pulmonary thromboembolism 30 Tab 0    topiramate (TOPAMAX) 25 mg tablet Take 3 Tabs by mouth nightly.  Indications: neuritis 90 Tab 5    clobetasol (TEMOVATE) 0.05 % topical cream Use 1 Application to affected area Twice Daily.  desonide (TRIDESILON) 0.05 % topical lotion Use 1 Application to affected area Twice Daily.  lisinopril-hydroCHLOROthiazide (PRINZIDE, ZESTORETIC) 20-12.5 mg per tablet TAKE ONE TABLET BY MOUTH ONCE A DAY      zolpidem (AMBIEN) 5 mg tablet Take 5 mg by mouth nightly as needed. Allergies   Allergen Reactions    Bee Sting  [Sting, Bee] Swelling     DIZZINESS       Past Surgical History:   Procedure Laterality Date    HX LUMBAR LAMINECTOMY      L4/L5    HX ORTHOPAEDIC  2017    Lumbar surgery (Dr. Kishore Yip)    HX OTHER SURGICAL      \"open lung bx\"    HX OTHER SURGICAL      laminectomy/fusion L4-5  S1    NEUROLOGICAL PROCEDURE UNLISTED      pt spinal fracture T12 and L3       Past Medical History:   Diagnosis Date    Back pain     Chronic pain     Headache(784.0)     HTN (hypertension)     Joint pain     hx of joint pain    Pulmonary emboli (HCC)        Social History     Socioeconomic History    Marital status:      Spouse name: Not on file    Number of children: Not on file    Years of education: Not on file    Highest education level: Not on file   Social Needs    Financial resource strain: Not on file    Food insecurity - worry: Not on file    Food insecurity - inability: Not on file   [employer] needs - medical: Not on file   [employer] needs - non-medical: Not on file   Occupational History    Not on file   Tobacco Use    Smoking status: Former Smoker     Packs/day: 0.75     Years: 40.00     Pack years: 30.00     Last attempt to quit: 10/17/2012     Years since quittin.1    Smokeless tobacco: Former User   Substance and Sexual Activity    Alcohol use:  Yes     Alcohol/week: 1.2 oz     Types: 2 Cans of beer per week     Comment: daily-beer and wine/ 2 drinks a day    Drug use: No    Sexual activity: Yes   Other Topics Concern    Not on file   Social History Narrative    Not on file         REVIEW OF SYSTEMS:   CONSTITUTIONAL SYMPTOMS:  Negative. EYES:  Negative. EARS, NOSE, THROAT AND MOUTH:  Negative. CARDIOVASCULAR:  Negative. RESPIRATORY:  Negative. GENITOURINARY: Per HPI. GASTROINTESTINAL:  Per HPI. INTEGUMENTARY (SKIN AND/OR BREAST):  Negative. MUSCULOSKELETAL: Per HPI.   ENDOCRINE/RHEUMATOLOGIC:  Negative. NEUROLOGICAL:  Per HPI. HEMATOLOGIC/LYMPHATIC:  Negative. ALLERGIC/IMMUNOLOGIC:  Negative. PSYCHIATRIC:  Negative. PHYSICAL EXAMINATION:   Visit Vitals  /88 (BP 1 Location: Left arm, BP Patient Position: Sitting)   Pulse 60   Temp 97.4 °F (36.3 °C) (Oral)   Resp 21   Wt 146 lb 3.2 oz (66.3 kg)   SpO2 100%   BMI 20.39 kg/m²    PAIN SCALE: 0 - No pain/10    CONSTITUTIONAL: The patient is in no apparent distress and is alert and oriented x 3. HEENT: Normocephalic. Hearing grossly intact. NECK: Supple and symmetric. no tenderness, or masses were felt. RESPIRATORY: No labored breathing. CARDIOVASCULAR: The carotid pulses were normal. Peripheral pulses were 2+. CHEST: Normal AP diameter and normal contour without any kyphoscoliosis. LYMPHATIC: No lymphadenopathy was appreciated in the neck, axillae or groin. SKIN: Negative for scars, rashes, lesions, or ulcers on the right upper, right lower, left upper, left lower and trunk. NEUROLOGICAL: Alert and oriented x 3. Ambulation without assistive device. FWB. EXTREMITIES: See musculoskeletal.  MUSCULOSKELETAL:   Head and Neck:  Negative for misalignment, asymmetry, crepitation, defects, tenderness masses or effusions.  Left Upper Extremity: Inspection, percussion and palpation performed. Sauls sign is negative.  Right Upper Extremity: Inspection, percussion and palpation performed. Sauls sign is negative.  Spine, Ribs and Pelvis: Inspection, percussion and palpation performed. Negative for misalignment, asymmetry, crepitation, defects, tenderness masses or effusions.     Left Lower Extremity: Inspection, percussion and palpation performed. Negative straight leg raise.  Right Lower Extremity: Inspection, percussion and palpation performed. Negative straight leg raise. SPINE EXAM:     Lumbar spine: No rash, ecchymosis, or gross obliquity. No focal atrophy is noted. ASSESSMENT    ICD-10-CM ICD-9-CM    1. S/P lumbar fusion Z98.1 V45.4    2. Spinal stenosis of lumbar region without neurogenic claudication M48.061 724.02    3. Nonspecific pain in the lumbar region M54.5 724.2 AMB POC XRAY, SPINE, LUMBOSACRAL; 2 O       Written by Petr Garcia, as dictated by Virginia Guallpa MD.    I, Dr. Virginia Guallpa MD, confirm that all documentation is accurate.

## 2019-02-18 ENCOUNTER — OFFICE VISIT (OUTPATIENT)
Dept: ORTHOPEDIC SURGERY | Age: 69
End: 2019-02-18

## 2019-02-18 VITALS
OXYGEN SATURATION: 97 % | HEART RATE: 72 BPM | RESPIRATION RATE: 18 BRPM | TEMPERATURE: 98.1 F | BODY MASS INDEX: 20.52 KG/M2 | SYSTOLIC BLOOD PRESSURE: 134 MMHG | WEIGHT: 146.6 LBS | HEIGHT: 71 IN | DIASTOLIC BLOOD PRESSURE: 81 MMHG

## 2019-02-18 DIAGNOSIS — Z98.1 S/P LUMBAR FUSION: ICD-10-CM

## 2019-02-18 DIAGNOSIS — G89.29 CHRONIC BILATERAL LOW BACK PAIN WITH BILATERAL SCIATICA: Primary | ICD-10-CM

## 2019-02-18 DIAGNOSIS — M51.36 DDD (DEGENERATIVE DISC DISEASE), LUMBAR: ICD-10-CM

## 2019-02-18 DIAGNOSIS — M54.42 CHRONIC BILATERAL LOW BACK PAIN WITH BILATERAL SCIATICA: Primary | ICD-10-CM

## 2019-02-18 DIAGNOSIS — M54.41 CHRONIC BILATERAL LOW BACK PAIN WITH BILATERAL SCIATICA: Primary | ICD-10-CM

## 2019-02-18 DIAGNOSIS — M51.26 HNP (HERNIATED NUCLEUS PULPOSUS), LUMBAR: ICD-10-CM

## 2019-02-18 RX ORDER — DULOXETIN HYDROCHLORIDE 20 MG/1
20 CAPSULE, DELAYED RELEASE ORAL DAILY
Qty: 30 CAP | Refills: 1 | Status: SHIPPED | OUTPATIENT
Start: 2019-02-18 | End: 2019-04-17 | Stop reason: DRUGHIGH

## 2019-02-18 RX ORDER — METHYLPREDNISOLONE 4 MG/1
TABLET ORAL
Qty: 1 DOSE PACK | Refills: 0 | Status: SHIPPED | OUTPATIENT
Start: 2019-02-18 | End: 2019-02-26 | Stop reason: ALTCHOICE

## 2019-02-18 RX ORDER — CHOLECALCIFEROL (VITAMIN D3) 125 MCG
2000 CAPSULE ORAL DAILY
COMMUNITY
Start: 2018-07-17

## 2019-02-18 NOTE — PATIENT INSTRUCTIONS
Low Back Pain: Exercises  Your Care Instructions  Here are some examples of typical rehabilitation exercises for your condition. Start each exercise slowly. Ease off the exercise if you start to have pain. Your doctor or physical therapist will tell you when you can start these exercises and which ones will work best for you. How to do the exercises  Press-up    1. Lie on your stomach, supporting your body with your forearms. 2. Press your elbows down into the floor to raise your upper back. As you do this, relax your stomach muscles and allow your back to arch without using your back muscles. As your press up, do not let your hips or pelvis come off the floor. 3. Hold for 15 to 30 seconds, then relax. 4. Repeat 2 to 4 times. Alternate arm and leg (bird dog) exercise    1. Start on the floor, on your hands and knees. 2. Tighten your belly muscles. 3. Raise one leg off the floor, and hold it straight out behind you. Be careful not to let your hip drop down, because that will twist your trunk. 4. Hold for about 6 seconds, then lower your leg and switch to the other leg. 5. Repeat 8 to 12 times on each leg. 6. Over time, work up to holding for 10 to 30 seconds each time. 7. If you feel stable and secure with your leg raised, try raising the opposite arm straight out in front of you at the same time. Knee-to-chest exercise    1. Lie on your back with your knees bent and your feet flat on the floor. 2. Bring one knee to your chest, keeping the other foot flat on the floor (or keeping the other leg straight, whichever feels better on your lower back). 3. Keep your lower back pressed to the floor. Hold for at least 15 to 30 seconds. 4. Relax, and lower the knee to the starting position. 5. Repeat with the other leg. Repeat 2 to 4 times with each leg. 6. To get more stretch, put your other leg flat on the floor while pulling your knee to your chest.    Curl-ups    1.  Lie on the floor on your back with your knees bent at a 90-degree angle. Your feet should be flat on the floor, about 12 inches from your buttocks. 2. Cross your arms over your chest. If this bothers your neck, try putting your hands behind your neck (not your head), with your elbows spread apart. 3. Slowly tighten your belly muscles and raise your shoulder blades off the floor. 4. Keep your head in line with your body, and do not press your chin to your chest.  5. Hold this position for 1 or 2 seconds, then slowly lower yourself back down to the floor. 6. Repeat 8 to 12 times. Pelvic tilt exercise    1. Lie on your back with your knees bent. 2. \"Brace\" your stomach. This means to tighten your muscles by pulling in and imagining your belly button moving toward your spine. You should feel like your back is pressing to the floor and your hips and pelvis are rocking back. 3. Hold for about 6 seconds while you breathe smoothly. 4. Repeat 8 to 12 times. Heel dig bridging    1. Lie on your back with both knees bent and your ankles bent so that only your heels are digging into the floor. Your knees should be bent about 90 degrees. 2. Then push your heels into the floor, squeeze your buttocks, and lift your hips off the floor until your shoulders, hips, and knees are all in a straight line. 3. Hold for about 6 seconds as you continue to breathe normally, and then slowly lower your hips back down to the floor and rest for up to 10 seconds. 4. Do 8 to 12 repetitions. Hamstring stretch in doorway    1. Lie on your back in a doorway, with one leg through the open door. 2. Slide your leg up the wall to straighten your knee. You should feel a gentle stretch down the back of your leg. 3. Hold the stretch for at least 15 to 30 seconds. Do not arch your back, point your toes, or bend either knee. Keep one heel touching the floor and the other heel touching the wall. 4. Repeat with your other leg. 5. Do 2 to 4 times for each leg.     Hip flexor stretch    1. Kneel on the floor with one knee bent and one leg behind you. Place your forward knee over your foot. Keep your other knee touching the floor. 2. Slowly push your hips forward until you feel a stretch in the upper thigh of your rear leg. 3. Hold the stretch for at least 15 to 30 seconds. Repeat with your other leg. 4. Do 2 to 4 times on each side. Wall sit    1. Stand with your back 10 to 12 inches away from a wall. 2. Lean into the wall until your back is flat against it. 3. Slowly slide down until your knees are slightly bent, pressing your lower back into the wall. 4. Hold for about 6 seconds, then slide back up the wall. 5. Repeat 8 to 12 times. Follow-up care is a key part of your treatment and safety. Be sure to make and go to all appointments, and call your doctor if you are having problems. It's also a good idea to know your test results and keep a list of the medicines you take. Where can you learn more? Go to http://jorge-steve.info/. Enter S797 in the search box to learn more about \"Low Back Pain: Exercises. \"  Current as of: September 20, 2018  Content Version: 11.9  © 9316-8970 [employer]. Care instructions adapted under license by [employer] (which disclaims liability or warranty for this information). If you have questions about a medical condition or this instruction, always ask your healthcare professional. [address] any warranty or liability for your use of this information. Sciatica: Exercises  Your Care Instructions  Here are some examples of typical rehabilitation exercises for your condition. Start each exercise slowly. Ease off the exercise if you start to have pain. Your doctor or physical therapist will tell you when you can start these exercises and which ones will work best for you.   When you are not being active, find a comfortable position for rest. Some people are comfortable on the floor or a medium-firm bed with a small pillow under their head and another under their knees. Some people prefer to lie on their side with a pillow between their knees. Don't stay in one position for too long. Take short walks (10 to 20 minutes) every 2 to 3 hours. Avoid slopes, hills, and stairs until you feel better. Walk only distances you can manage without pain, especially leg pain. How to do the exercises  Back stretches    1. Get down on your hands and knees on the floor. 2. Relax your head and allow it to droop. Round your back up toward the ceiling until you feel a nice stretch in your upper, middle, and lower back. Hold this stretch for as long as it feels comfortable, or about 15 to 30 seconds. 3. Return to the starting position with a flat back while you are on your hands and knees. 4. Let your back sway by pressing your stomach toward the floor. Lift your buttocks toward the ceiling. 5. Hold this position for 15 to 30 seconds. 6. Repeat 2 to 4 times. Follow-up care is a key part of your treatment and safety. Be sure to make and go to all appointments, and call your doctor if you are having problems. It's also a good idea to know your test results and keep a list of the medicines you take. Where can you learn more? Go to http://jorge-steve.info/. Enter V669 in the search box to learn more about \"Sciatica: Exercises. \"  Current as of: September 20, 2018  Content Version: 11.9  © 7608-9860 [employer]. Care instructions adapted under license by [employer] (which disclaims liability or warranty for this information). If you have questions about a medical condition or this instruction, always ask your healthcare professional. [address] any warranty or liability for your use of this information.

## 2019-02-18 NOTE — PROGRESS NOTES
[address]  [address], [address]  [place]  Phone: [phone]  Fax: [phone]    Faustina Edward  : 1950  PCP: Linh Milner MD    PROGRESS NOTE    HISTORY OF PRESENT ILLNESS:  Chief Complaint   Patient presents with    Back Pain     Follow up low back     Leg Pain     LLE, weakness, started 3 weeks ago, no specific injury     Juan Ramon Colvin is a 79 y.o.  male with history of a L4-5 fusion about 1 year ago. He was last seen with Dr. Damian Rowe. He was having no back or leg pain and was back to doing all activities. X-rays demonstrated a solid posterolateral fusion with fixation. He has compression fracture deformities of the segment above and some osteopenia we have discussed previously. He was to start a HEP And FU PRN. Today, he states 3 weeks ago he noticed when he was getting out of the  Car he started noticing some left leg weakness. He was taking motrin PRN. He was walking 5 miles a day on the treadmill. 2 weeks ago, he started noticing a discomfort in the mornings. This has progressed over the past 2 weeks. His pain is a left lumbar pain and left groin pain. He will get an intermittent electrical shooting pain down the front of his thigh in and L3 distribution. It sounds like a flare of back pain. He has failed gabapentin and Lyrica in the past. He did ok with Topamax but lost weight on this. He is off xarelto now. He had a post op PE. He has never tried Cymbalta. He is not on anti-depressants. Denies bladder/bowel dysfunction, saddle paresthesia, weakness, gait disturbance, or other neurological deficit. Pt at this time desires to continue with current care/proceed with medication evaluation. ASSESSMENT  79 y.o. male with lumbar pain. Diagnoses and all orders for this visit:    1.  Chronic bilateral low back pain with bilateral sciatica  -     methylPREDNISolone (MEDROL, CRISTINA,) 4 mg tablet; Per dose pack instructions  - DULoxetine (CYMBALTA) 20 mg capsule; Take 1 Cap by mouth daily. 2. DDD (degenerative disc disease), lumbar  -     methylPREDNISolone (MEDROL, CRISTINA,) 4 mg tablet; Per dose pack instructions  -     DULoxetine (CYMBALTA) 20 mg capsule; Take 1 Cap by mouth daily. 3. HNP (herniated nucleus pulposus), lumbar  -     methylPREDNISolone (MEDROL, CRISTINA,) 4 mg tablet; Per dose pack instructions  -     DULoxetine (CYMBALTA) 20 mg capsule; Take 1 Cap by mouth daily. 4. S/P lumbar fusion  -     methylPREDNISolone (MEDROL, CRISTINA,) 4 mg tablet; Per dose pack instructions  -     DULoxetine (CYMBALTA) 20 mg capsule; Take 1 Cap by mouth daily. IMPRESSION/PLAN    1) Pt was given information on lumbar exercises. 2) start HEP/ stretching/ yoga   3) MDP today. Cymbalta 20 mg to start in 1-2 weeks if no improvement with MDP. 4) Mr. Claudette Gotti has a reminder for a \"due or due soon\" health maintenance. I have asked that he contact his primary care provider, Mark Scanlon MD, for follow-up on this health maintenance. 5) We have informed patient to notify us for immediate appointment if he has any worsening neurogical symptoms or if an emergency situation presents, then call 911  6) Pt will follow-up, will call for FU. We can update his LMRI if needed. He is describing pain in an L2/3 distribution. S/p L4/5 fusion. Risks and benefits of ongoing therapy have been reviewed with the patient.  is appropriate. PAST MEDICAL HISTORY  Past Medical History:   Diagnosis Date    Back pain     Chronic pain     Headache(784.0)     HTN (hypertension)     Joint pain     hx of joint pain    Pulmonary emboli [facility])         MEDICATIONS  Current Outpatient Medications   Medication Sig Dispense Refill    cholecalciferol, vitamin D3, 2,000 unit tab One daily      methylPREDNISolone (MEDROL, CRISTINA,) 4 mg tablet Per dose pack instructions 1 Dose Pack 0    DULoxetine (CYMBALTA) 20 mg capsule Take 1 Cap by mouth daily.  30 Cap 1    desonide (TRIDESILON) 0.05 % topical lotion Use 1 Application to affected area Twice Daily.  lisinopril-hydroCHLOROthiazide (PRINZIDE, ZESTORETIC) 20-12.5 mg per tablet TAKE ONE TABLET BY MOUTH ONCE A DAY      alendronate (FOSAMAX) 5 mg tablet Take 1 Tab by mouth Daily (before breakfast). 30 Tab 2    rivaroxaban (XARELTO) 20 mg tab tablet Take 1 Tab by mouth daily (with breakfast). Indications: pulmonary thromboembolism 30 Tab 0    topiramate (TOPAMAX) 25 mg tablet Take 3 Tabs by mouth nightly. Indications: neuritis 90 Tab 5    clobetasol (TEMOVATE) 0.05 % topical cream Use 1 Application to affected area Twice Daily.  zolpidem (AMBIEN) 5 mg tablet Take 5 mg by mouth nightly as needed. ALLERGIES  Allergies   Allergen Reactions    Bee Sting  [Sting, Bee] Swelling     DIZZINESS       SOCIAL HISTORY    Social History     Socioeconomic History    Marital status:      Spouse name: Not on file    Number of children: Not on file    Years of education: Not on file    Highest education level: Not on file   Social Needs    Financial resource strain: Not on file    Food insecurity - worry: Not on file    Food insecurity - inability: Not on file   [employer] needs - medical: Not on file   [employer] needs - non-medical: Not on file   Occupational History    Not on file   Tobacco Use    Smoking status: Former Smoker     Packs/day: 0.75     Years: 40.00     Pack years: 30.00     Last attempt to quit: 10/17/2012     Years since quittin.3    Smokeless tobacco: Former User   Substance and Sexual Activity    Alcohol use:  Yes     Alcohol/week: 1.2 oz     Types: 2 Cans of beer per week     Comment: daily-beer and wine/ 2 drinks a day    Drug use: No    Sexual activity: Yes   Other Topics Concern    Not on file   Social History Narrative    Not on file       SUBJECTIVE        Pain Scale: 3/10    Pain Assessment  2019   Location of Pain Back;Leg   Location Modifiers Left Severity of Pain 3   Quality of Pain Sharp; Other (Comment)   Quality of Pain Comment weakness   Duration of Pain Persistent   Frequency of Pain Constant   Aggravating Factors Other (Comment)   Aggravating Factors Comment sitting, getting up in the morning and then getting up   Limiting Behavior Yes   Relieving Factors Other (Comment)   Relieving Factors Comment standing   Result of Injury -       Accompanied by self. REVIEW OF SYSTEMS  ROS    Constitutional: Negative for fever, chills, or weight change. Respiratory: Negative for cough or shortness of breath. Cardiovascular: Negative for chest pain or palpitations. Gastrointestinal: Negative for acid reflux, change in bowel habits, or constipation. Genitourinary: Negative for incontinence, dysuria and flank pain. Musculoskeletal: Positive for lumbar pain. Skin: Negative for rash. Neurological: Negative for headaches, dizziness, or numbness. Endo/Heme/Allergies: Negative . Psychiatric/Behavioral: Negative. PHYSICAL EXAMINATION  Visit Vitals  /81   Pulse 72   Temp 98.1 °F (36.7 °C) (Oral)   Resp 18   Ht 5' 11\" (1.803 m)   Wt 146 lb 9.6 oz (66.5 kg)   SpO2 97%   BMI 20.45 kg/m²       Constitutional: Well developed,  well nourished,  awake, alert, and in no acute distress. Neurological:  Sensation to light touch is intact. Psychiatric: Affect and mood are appropriate. Integumentary: No rashes or abrasions noted on exposed areas,  warm, dry and intact. Cardiovascular/Peripheral Vascular:  No peripheral edema is noted. Lymphatic:  No evidence of lymphedema. No cervical lymphadenopathy. SPINE/MUSCULOSKELETAL EXAM      Lumbar spine:  No rash, ecchymosis, or gross obliquity. No fasciculations. No focal atrophy is noted. Range of motion is intact. Tenderness to palpation to left lumbar spine. SI joints non-tender. Trochanters non tender. Musculoskeletal:  No pain with extension, axial loading, or forward flexion.  No pain with internal or external rotation of his hips. MOTOR     Hip Flex  Quads Hamstrings Ankle DF EHL Ankle PF   Right +4/5 +4/5 +4/5 +4/5 +4/5 +4/5   Left +4/5 +4/5 +4/5 +4/5 +4/5 +4/5       Straight Leg raise - bilaterally. normal gait and station    Ambulation without assistive device. full weight bearing, non-antalgic gait.     Clayton Callejas, NP

## 2019-02-26 ENCOUNTER — OFFICE VISIT (OUTPATIENT)
Dept: CARDIOLOGY CLINIC | Age: 69
End: 2019-02-26

## 2019-02-26 VITALS
WEIGHT: 145 LBS | DIASTOLIC BLOOD PRESSURE: 82 MMHG | BODY MASS INDEX: 20.3 KG/M2 | HEIGHT: 71 IN | SYSTOLIC BLOOD PRESSURE: 132 MMHG | HEART RATE: 85 BPM

## 2019-02-26 DIAGNOSIS — I26.99 OTHER PULMONARY EMBOLISM WITHOUT ACUTE COR PULMONALE, UNSPECIFIED CHRONICITY (HCC): Primary | ICD-10-CM

## 2019-02-26 DIAGNOSIS — I10 ESSENTIAL HYPERTENSION: ICD-10-CM

## 2019-02-26 NOTE — PROGRESS NOTES
HISTORY OF PRESENT ILLNESS Chuy Stokes is a 81 y.o. male. Hypertension The history is provided by the patient. This is a chronic problem. The problem occurs constantly. The problem has not changed since onset. Pertinent negatives include no chest pain, no abdominal pain, no headaches and no shortness of breath. Nothing aggravates the symptoms. Abnormal Cardiac Test  
The history is provided by the patient. This is a new problem. The problem occurs constantly. The problem has not changed since onset. Pertinent negatives include no chest pain, no abdominal pain, no headaches and no shortness of breath. Nothing aggravates the symptoms. Nothing relieves the symptoms. Shortness of Breath The history is provided by the patient. This is a recurrent problem. The problem occurs intermittently. The problem has been gradually improving. Pertinent negatives include no fever, no headaches, no cough, no sputum production, no hemoptysis, no wheezing, no PND, no orthopnea, no chest pain, no vomiting, no abdominal pain, no rash, no leg swelling and no claudication. The problem's precipitants include exercise. Review of Systems Constitutional: Negative for chills and fever. HENT: Negative for nosebleeds. Eyes: Negative for blurred vision and double vision. Respiratory: Negative for cough, hemoptysis, sputum production, shortness of breath and wheezing. Cardiovascular: Negative for chest pain, palpitations, orthopnea, claudication, leg swelling and PND. Gastrointestinal: Negative for abdominal pain, heartburn, nausea and vomiting. Musculoskeletal: Negative for myalgias. Skin: Negative for rash. Neurological: Negative for dizziness, weakness and headaches. Endo/Heme/Allergies: Does not bruise/bleed easily. Family History Problem Relation Age of Onset  Colon Cancer Mother  Hypertension Mother  Hypertension Father  Clotting Disorder Father  Other Brother  Cancer Brother Past Medical History:  
Diagnosis Date  Back pain  Chronic pain  Headache(784.0)  HTN (hypertension)  Joint pain   
 hx of joint pain  Pulmonary emboli ([address] Past Surgical History:  
Procedure Laterality Date  HX LUMBAR LAMINECTOMY  2002 L4/L5  HX ORTHOPAEDIC  2017 Lumbar surgery (Dr. Isma Chirinos) [address] \"open lung bx\"  HX OTHER SURGICAL    
 laminectomy/fusion L4-5  S1  
 NEUROLOGICAL PROCEDURE UNLISTED    
 pt spinal fracture T12 and L3 Social History Tobacco Use  Smoking status: Former Smoker Packs/day: 0.75 Years: 40.00 Pack years: 30.00 Last attempt to quit: 10/17/2012 Years since quittin.3  Smokeless tobacco: Former User Substance Use Topics  Alcohol use: Yes Alcohol/week: 1.2 oz Types: 2 Cans of beer per week Comment: daily-beer and wine/ 2 drinks a day Allergies Allergen Reactions  Bee Sting  [Sting, Bee] Swelling DIZZINESS Prior to Admission medications Medication Sig Start Date End Date Taking? Authorizing Provider  
cholecalciferol, vitamin D3, 2,000 unit tab One daily 18  Yes Provider, Historical  
DULoxetine (CYMBALTA) 20 mg capsule Take 1 Cap by mouth daily. 19  Yes Denise Lockwood, MARTA  
clobetasol (TEMOVATE) 0.05 % topical cream Use 1 Application to affected area Twice Daily. 10/21/16  Yes Provider, Historical  
desonide (TRIDESILON) 0.05 % topical lotion Use 1 Application to affected area Twice Daily. 16  Yes Provider, Historical  
lisinopril-hydroCHLOROthiazide (PRINZIDE, ZESTORETIC) 20-12.5 mg per tablet TAKE ONE TABLET BY MOUTH ONCE A DAY 16  Yes Provider, Historical  
zolpidem (AMBIEN) 5 mg tablet Take 5 mg by mouth nightly as needed. 10/21/16  Yes Provider, Historical  
 
 
 
Visit Vitals /82 Pulse 85 Ht 5' 11\" (1.803 m) Wt 65.8 kg (145 lb) BMI 20.22 kg/m² Physical Exam  
 Constitutional: He is oriented to person, place, and time. He appears well-developed and well-nourished. HENT:  
Head: Normocephalic and atraumatic. Eyes: Conjunctivae are normal.  
Neck: Neck supple. No JVD present. No tracheal deviation present. No thyromegaly present. Cardiovascular: Normal rate, regular rhythm and normal heart sounds. Exam reveals no gallop and no friction rub. No murmur heard. Pulmonary/Chest: Breath sounds normal. No respiratory distress. He has no wheezes. He has no rales. He exhibits no tenderness. Abdominal: Soft. There is no tenderness. Musculoskeletal: He exhibits no edema. Neurological: He is alert and oriented to person, place, and time. Skin: Skin is warm and dry. Psychiatric: He has a normal mood and affect. Mr. Britt Huston has a reminder for a \"due or due soon\" health maintenance. I have asked that he contact his primary care provider for follow-up on this health maintenance. I have personally reviewed patient's records available from hospital and other providers and incorporated findings in patient care. I have personally reviewed patients ekg done at other facility. Admit date: 12/18/2017 Discharge date and time: 12/20/2017 Discharge Diagnoses:                                          
1) Bilateral PE  
2) Recent lumbar surgery 3 ) HTN Normal sinus rhythm Incomplete right bundle branch block Borderline ECG  
SUMMARY:echo-1/2018 Left ventricle: There was hypokinesis of the basal-mid inferoseptal, apical  
inferior, and apical septal wall(s). Wall 
thickness was increased. Features were consistent with a pseudonormal left  
ventricular filling pattern, with 
concomitant abnormal relaxation and increased filling pressure (grade 2  
diastolic dysfunction). Right ventricle: The size was at the upper limits of normal. 
 
Mitral valve: There was mild regurgitation. Diagnosis: 2/2018 1. Negative EKG portion of exercise stress test at 89% of the maximum predicted heart rate. 2. Good exercise tolerance. 3. No symptoms of angina. 4. Nuclear imaging report to follow. NUCLEAR IMAGIN2018 Findings: 1. Stress images reveal normal Myoview distrubution in all the LV segments in short axis, vertical and horizontal long axis views. 2. Resting images have a normal uptake. 3. Gated images reveal normal wall motion and the ejection fraction is calculated to be 77%. Conclusion: 1. Normal perfusion scan. 2. Normal wall motion and ejection fraction. 3. No evidence of significant fixed or reversible defect suggesting ischemia or myocardial infarction noted from this nuclear study. 4. Low risk scan. Assessment ICD-10-CM ICD-9-CM 1. Other pulmonary embolism without acute cor pulmonale, unspecified chronicity (HCC) I26.99 415.19 Completed his therapy for acute PE. Now only on prophylaxis for long travel. Followed by Dr. Bre Garcia 2. Essential hypertension I10 401.9 Stable blood pressure continue therapy 2019 Stable cardiac status. Now on Xarelto only as needed for prophylaxis and long travels. In past had a provoked pulmonary embolism after surgery. Cardiac status okay blood pressure controlled. Medications Discontinued During This Encounter Medication Reason  methylPREDNISolone (MEDROL, CRISTINA,) 4 mg tablet Therapy Completed  alendronate (FOSAMAX) 5 mg tablet Not A Current Medication  topiramate (TOPAMAX) 25 mg tablet Not A Current Medication  rivaroxaban (XARELTO) 20 mg tab tablet Not A Current Medication No orders of the defined types were placed in this encounter. Follow-up Disposition: 
Return in about 1 year (around 2020).

## 2019-02-26 NOTE — LETTER
Tomerabby Norris 1950 2/26/2019 Dear MD Shaan Darnell, DO I had the pleasure of evaluating  Mr. Ronen Bolaños in office today. Below are the relevant portions of my assessment and plan of care. ICD-10-CM ICD-9-CM 1. Other pulmonary embolism without acute cor pulmonale, unspecified chronicity (HCC) I26.99 415.19 Completed his therapy for acute PE. Now only on prophylaxis for long travel. Followed by Dr. Lavon Scheuermann 2. Essential hypertension I10 401.9 Stable blood pressure continue therapy Current Outpatient Medications Medication Sig Dispense Refill  cholecalciferol, vitamin D3, 2,000 unit tab One daily  DULoxetine (CYMBALTA) 20 mg capsule Take 1 Cap by mouth daily. 30 Cap 1  clobetasol (TEMOVATE) 0.05 % topical cream Use 1 Application to affected area Twice Daily.  desonide (TRIDESILON) 0.05 % topical lotion Use 1 Application to affected area Twice Daily.  lisinopril-hydroCHLOROthiazide (PRINZIDE, ZESTORETIC) 20-12.5 mg per tablet TAKE ONE TABLET BY MOUTH ONCE A DAY  zolpidem (AMBIEN) 5 mg tablet Take 5 mg by mouth nightly as needed. No orders of the defined types were placed in this encounter. If you have questions, please do not hesitate to call me. I look forward to following Mr. Ronen Bolaños along with you. Sincerely, Mitch Dewey MD

## 2019-04-17 ENCOUNTER — OFFICE VISIT (OUTPATIENT)
Dept: ORTHOPEDIC SURGERY | Age: 69
End: 2019-04-17

## 2019-04-17 VITALS
SYSTOLIC BLOOD PRESSURE: 126 MMHG | HEART RATE: 91 BPM | HEIGHT: 71 IN | TEMPERATURE: 97.3 F | DIASTOLIC BLOOD PRESSURE: 83 MMHG | BODY MASS INDEX: 20.22 KG/M2

## 2019-04-17 DIAGNOSIS — M47.816 LUMBAR FACET ARTHROPATHY: ICD-10-CM

## 2019-04-17 DIAGNOSIS — M54.42 CHRONIC BILATERAL LOW BACK PAIN WITH LEFT-SIDED SCIATICA: Primary | ICD-10-CM

## 2019-04-17 DIAGNOSIS — M51.26 HNP (HERNIATED NUCLEUS PULPOSUS), LUMBAR: ICD-10-CM

## 2019-04-17 DIAGNOSIS — M43.16 SPONDYLOLISTHESIS AT L4-L5 LEVEL: ICD-10-CM

## 2019-04-17 DIAGNOSIS — Z98.1 S/P LUMBAR FUSION: ICD-10-CM

## 2019-04-17 DIAGNOSIS — G89.29 CHRONIC BILATERAL LOW BACK PAIN WITH LEFT-SIDED SCIATICA: Primary | ICD-10-CM

## 2019-04-17 RX ORDER — DULOXETIN HYDROCHLORIDE 30 MG/1
30 CAPSULE, DELAYED RELEASE ORAL DAILY
Qty: 30 CAP | Refills: 1 | Status: SHIPPED | OUTPATIENT
Start: 2019-04-17 | End: 2021-09-29 | Stop reason: ALTCHOICE

## 2019-04-17 NOTE — PROGRESS NOTES
[address]  [address], [address]  [place], [address] [place] [address] Street  Phone: [phone]  Fax: [phone]    Verlan Cabot  : 1950  PCP: Ml Dodge MD    PROGRESS NOTE    HISTORY OF PRESENT ILLNESS:  Chief Complaint   Patient presents with    Back Pain    Leg Pain     Yossi Batres is a 79 y.o.  male with history of a L4-5 fusion about 1 year ago. He was seen with Dr. Kelly Tinooc for a post op visit. He was having no back or leg pain and was back to doing all activities.  X-rays demonstrated a solid posterolateral fusion with fixation.  He has compression fracture deformities of the segment above and some osteopenia we have discussed previously. He was to start a HEP And FU PRN. I last saw him. he stated 3 weeks prior he noticed when he was getting out of the  Car he started noticing some left leg weakness. He was taking motrin PRN. He was walking 5 miles a day on the treadmill. 2 weeks ago, he started noticing a discomfort in the mornings. This has progressed over the 2 weeks. His pain was a left lumbar pain and left groin pain. He would get an intermittent electrical shooting pain down the front of his thigh in and L3 distribution. It sounds like a flare of back pain. He has failed gabapentin and Lyrica in the past. He did ok with Topamax but lost weight on this. He is off xarelto now. He had a post op PE. we started him on a MDP and a trial of Cymbalta in 1-2 weeks if he had no improvement with MDP. We discussed we could update his LMRI if  Needed. Today, he states the MDP did help for about a week and then the pain came back. He did start the Cymbalta and is unsure of the effect. He is currently on another MDP for a Respiratory infection from his PCP so he feels pretty good. His pain continues to be in a L2/3 distribution in his groin and down the left side of his leg. He has both pain and numbness.  Denies bladder/bowel dysfunction, saddle paresthesia, weakness, gait disturbance, or other neurological deficit. Pt at this time desires to continue with current care/proceed with medication evaluation. ASSESSMENT  81 y.o. male with lumbar pain and previous fusion. Diagnoses and all orders for this visit:    1. Chronic bilateral low back pain with left-sided sciatica  -     MRI LUMB SPINE W WO CONT; Future  -     DULoxetine (CYMBALTA) 30 mg capsule; Take 1 Cap by mouth daily. Indications: Neuropathic Pain    2. Spondylolisthesis at L4-L5 level  -     MRI LUMB SPINE W WO CONT; Future  -     DULoxetine (CYMBALTA) 30 mg capsule; Take 1 Cap by mouth daily. Indications: Neuropathic Pain    3. Lumbar facet arthropathy  -     MRI LUMB SPINE W WO CONT; Future  -     DULoxetine (CYMBALTA) 30 mg capsule; Take 1 Cap by mouth daily. Indications: Neuropathic Pain    4. HNP (herniated nucleus pulposus), lumbar  -     MRI LUMB SPINE W WO CONT; Future  -     DULoxetine (CYMBALTA) 30 mg capsule; Take 1 Cap by mouth daily. Indications: Neuropathic Pain    5. S/P lumbar fusion  -     MRI LUMB SPINE W WO CONT; Future  -     DULoxetine (CYMBALTA) 30 mg capsule; Take 1 Cap by mouth daily. Indications: Neuropathic Pain         IMPRESSION/PLAN    1) Pt was given information on lumbar exercises. 2) increase Cymbalta to 30 mg QHS. 3) Update LMRI, he is having continued Left leg pain and numbness despite 2 MDP and Cymbalta and HEP. 4) Mr. Asha Morrison has a reminder for a \"due or due soon\" health maintenance. I have asked that he contact his primary care provider, Elisabeth Maldonado MD, for follow-up on this health maintenance. 5) We have informed patient to notify us for immediate appointment if he has any worsening neurogical symptoms or if an emergency situation presents, then call 911  5) Pt will follow-up in 4 weeks for MRI FU with Dr. Awilda Engle. Risks and benefits of ongoing therapy have been reviewed with the patient.  is appropriate.        PAST MEDICAL HISTORY  Past Medical History: Diagnosis Date    Back pain     Chronic pain     Headache(784.0)     HTN (hypertension)     Joint pain     hx of joint pain    Pulmonary emboli (HCC)         MEDICATIONS  Current Outpatient Medications   Medication Sig Dispense Refill    DULoxetine (CYMBALTA) 30 mg capsule Take 1 Cap by mouth daily. Indications: Neuropathic Pain 30 Cap 1    cholecalciferol, vitamin D3, 2,000 unit tab One daily      clobetasol (TEMOVATE) 0.05 % topical cream Use 1 Application to affected area Twice Daily.  desonide (TRIDESILON) 0.05 % topical lotion Use 1 Application to affected area Twice Daily.  lisinopril-hydroCHLOROthiazide (PRINZIDE, ZESTORETIC) 20-12.5 mg per tablet TAKE ONE TABLET BY MOUTH ONCE A DAY      zolpidem (AMBIEN) 5 mg tablet Take 5 mg by mouth nightly as needed. ALLERGIES  Allergies   Allergen Reactions    Bee Sting  [Sting, Bee] Swelling     DIZZINESS       SOCIAL HISTORY    Social History     Socioeconomic History    Marital status:      Spouse name: Not on file    Number of children: Not on file    Years of education: Not on file    Highest education level: Not on file   Occupational History    Not on file   Social Needs    Financial resource strain: Not on file    Food insecurity:     Worry: Not on file     Inability: Not on file    Transportation needs:     Medical: Not on file     Non-medical: Not on file   Tobacco Use    Smoking status: Former Smoker     Packs/day: 0.75     Years: 40.00     Pack years: 30.00     Last attempt to quit: 10/17/2012     Years since quittin.5    Smokeless tobacco: Former User   Substance and Sexual Activity    Alcohol use:  Yes     Alcohol/week: 1.2 oz     Types: 2 Cans of beer per week     Comment: daily-beer and wine/ 2 drinks a day    Drug use: No    Sexual activity: Yes   Lifestyle    Physical activity:     Days per week: Not on file     Minutes per session: Not on file    Stress: Not on file   Relationships    Social connections:     Talks on phone: Not on file     Gets together: Not on file     Attends L[employer]-day service: Not on file     Active member of club or organization: Not on file     Attends meetings of clubs or organizations: Not on file     Relationship status: Not on file    Intimate partner violence:     Fear of current or ex partner: Not on file     Emotionally abused: Not on file     Physically abused: Not on file     Forced sexual activity: Not on file   Other Topics Concern    Not on file   Social History Narrative    Not on file       SUBJECTIVE        Pain Scale: 2/10    Pain Assessment  4/17/2019   Location of Pain Back;Leg   Location Modifiers Left   Severity of Pain 2   Quality of Pain Aching   Quality of Pain Comment numbness, tingling   Duration of Pain -   Frequency of Pain Constant   Aggravating Factors (No Data)   Aggravating Factors Comment sitting, laying   Limiting Behavior -   Relieving Factors (No Data)   Relieving Factors Comment prednisone   Result of Injury -       Accompanied by self. REVIEW OF SYSTEMS  ROS    Constitutional: Negative for fever, chills, or weight change. Respiratory: Negative for cough or shortness of breath. Cardiovascular: Negative for chest pain or palpitations. Gastrointestinal: Negative for acid reflux, change in bowel habits, or constipation. Genitourinary: Negative for incontinence, dysuria and flank pain. Musculoskeletal: Positive for lumbar pain. Skin: Negative for rash. Neurological: Negative for headaches, dizziness, or numbness. Endo/Heme/Allergies: Negative . Psychiatric/Behavioral: Negative. PHYSICAL EXAMINATION  Visit Vitals  /83 (BP 1 Location: Left arm, BP Patient Position: Sitting)   Pulse 91   Temp 97.3 °F (36.3 °C) (Oral)   Ht 5' 11\" (1.803 m)   BMI 20.22 kg/m²       Constitutional: Well developed,  well nourished,  awake, alert, and in no acute distress. Neurological:  Sensation to light touch is intact.    Psychiatric: Affect and mood are appropriate. Integumentary: No rashes or abrasions noted on exposed areas,  warm, dry and intact. Cardiovascular/Peripheral Vascular:  No peripheral edema is noted. Lymphatic:  No evidence of lymphedema. No cervical lymphadenopathy. SPINE/MUSCULOSKELETAL EXAM    Lumbar spine:  No rash, ecchymosis, or gross obliquity. No fasciculations. No focal atrophy is noted. Range of motion is intact. Tenderness to palpation to left lumbar spine. SI joints non-tender. Trochanters non tender.       Musculoskeletal:  No pain with extension, axial loading, or forward flexion. No pain with internal or external rotation of his hips.      MOTOR       Hip Flex  Quads Hamstrings Ankle DF EHL Ankle PF   Right +4/5 +4/5 +4/5 +4/5 +4/5 +4/5   Left +4/5 +4/5 +4/5 +4/5 +4/5 +4/5         Straight Leg raise - bilaterally.      normal gait and station     Ambulation without assistive device. full weight bearing, non-antalgic gait.         Margaux Mcelroy, MARTA

## 2019-04-24 ENCOUNTER — HOSPITAL ENCOUNTER (OUTPATIENT)
Age: 69
Discharge: HOME | End: 2019-04-24
Attending: NURSE PRACTITIONER
Payer: COMMERCIAL

## 2019-04-24 DIAGNOSIS — M54.42 CHRONIC BILATERAL LOW BACK PAIN WITH LEFT-SIDED SCIATICA: ICD-10-CM

## 2019-04-24 DIAGNOSIS — M51.26 HNP (HERNIATED NUCLEUS PULPOSUS), LUMBAR: ICD-10-CM

## 2019-04-24 DIAGNOSIS — Z98.1 S/P LUMBAR FUSION: ICD-10-CM

## 2019-04-24 DIAGNOSIS — M43.16 SPONDYLOLISTHESIS AT L4-L5 LEVEL: ICD-10-CM

## 2019-04-24 DIAGNOSIS — G89.29 CHRONIC BILATERAL LOW BACK PAIN WITH LEFT-SIDED SCIATICA: ICD-10-CM

## 2019-04-24 DIAGNOSIS — M47.816 LUMBAR FACET ARTHROPATHY: ICD-10-CM

## 2019-04-24 LAB — CREAT UR-MCNC: 0.8 MG/DL (ref 0.6–1.3)

## 2019-04-24 PROCEDURE — 82565 ASSAY OF CREATININE: CPT

## 2019-04-24 PROCEDURE — A9575 INJ GADOTERATE MEGLUMI 0.1ML: HCPCS | Performed by: NURSE PRACTITIONER

## 2019-04-24 PROCEDURE — 72158 MRI LUMBAR SPINE W/O & W/DYE: CPT

## 2019-04-24 PROCEDURE — 74011636320 HC RX REV CODE- 636/320: Performed by: NURSE PRACTITIONER

## 2019-04-24 RX ADMIN — GADOTERATE MEGLUMINE 13 ML: 376.9 INJECTION INTRAVENOUS at 17:00

## 2019-05-14 ENCOUNTER — OFFICE VISIT (OUTPATIENT)
Dept: ORTHOPEDIC SURGERY | Age: 69
End: 2019-05-14

## 2019-05-14 VITALS
HEIGHT: 71 IN | TEMPERATURE: 98 F | HEART RATE: 69 BPM | SYSTOLIC BLOOD PRESSURE: 128 MMHG | OXYGEN SATURATION: 100 % | BODY MASS INDEX: 20.1 KG/M2 | DIASTOLIC BLOOD PRESSURE: 77 MMHG | RESPIRATION RATE: 14 BRPM | WEIGHT: 143.6 LBS

## 2019-05-14 DIAGNOSIS — Z79.01 CHRONIC ANTICOAGULATION: ICD-10-CM

## 2019-05-14 DIAGNOSIS — M54.16 LEFT LUMBAR RADICULOPATHY: ICD-10-CM

## 2019-05-14 DIAGNOSIS — M62.838 MUSCLE SPASM: ICD-10-CM

## 2019-05-14 DIAGNOSIS — M48.061 SPINAL STENOSIS OF LUMBAR REGION WITHOUT NEUROGENIC CLAUDICATION: Primary | ICD-10-CM

## 2019-05-14 RX ORDER — RIVAROXABAN 10 MG/1
1 TABLET, FILM COATED ORAL
COMMUNITY
Start: 2019-03-06 | End: 2021-06-01 | Stop reason: SDUPTHER

## 2019-05-14 RX ORDER — TOPIRAMATE 25 MG/1
TABLET ORAL
Qty: 270 TAB | Refills: 0 | Status: SHIPPED | OUTPATIENT
Start: 2019-05-14 | End: 2019-06-25 | Stop reason: SDUPTHER

## 2019-05-14 NOTE — PROGRESS NOTES
[address] 
[address] Phone: [phone] Fax: [phone] Pt's YOB: 1950 ASSESSMENT Diagnoses and all orders for this visit: 1. Spinal stenosis of lumbar region without neurogenic claudication 2. Left lumbar radiculopathy 
-     topiramate (TOPAMAX) 25 mg tablet; Take 1 in the evening for 1 week, then increase to 2 the second week and continue with 3 in the evening 3. Muscle spasm 4. Chronic anticoagulation IMPRESSION AND PLAN: 
Belkis Verduzco is a 81 y.o. male with history of lumbar pain. He complains of pain, weakness, and numbness radiating down the left leg to the calf. Pt also complains of pain, numbness, and a pulsating sensation in the lower back when laying down. He reports some improvement with Cymbalta 30 mg and tolerated Topamax in the past.  
 
1) Pt was given information on herniated disc exercises. 2) He will restart Topamax 25 mg 3 tabs QHS, tapering up as directed. 3) Pt will continue taking Cymbalta 30 mg as prescribed and may discontinue the medication id his symptoms improve. 4) Mr. Lonny Darden has a reminder for a \"due or due soon\" health maintenance. I have asked that he contact his primary care provider, Carolin Hooper MD, for follow-up on this health maintenance. 5)  demonstrated consistency with prescribing. 6) Pt will follow-up in 4-6 weeks or sooner if needed. HISTORY OF PRESENT ILLNESS: 
Belkis Verduzco is a 81 y.o. male with history of lumbar pain and presents to the office today for MRI follow up. Pt notes that in 01/2019 he started to experience weakness in the left leg with pain in the lower back and left groin. He also reports numbness extending down the lateral aspect of the left leg to the calf. Pt complains of pain, numbness, and a pulsating sensation in the lower back when laying down.  He denies dragging the left leg with ambulation. Pt's pain is better when bending forward and worse when standing. Of note, he had an L4-5 laminectomy revision on 2017 with Dr. Jennifer Aguilar. He reports that prior to his surgery he experienced left-sided symptoms and after surgery he initially experiencing more right sided symptoms. Pt states that by the summer 2018 he felt like a new person. He reports temporary relief lasting about 1 week with a Medrol Dosepak. Pt reports some improvement with Cymbalta 30 mg. He tolerated Topamax in the past and discontinued the medication when his pain improved after surgery. He notes that he had a pulmonary embolism after his surgery and is on Xarelto 10 mg, which he takes prior to his transpacific flights. Pt at this time desires to proceed with medication evaluation. Of note, he is a nonsmoker. Pain Scale: 5/10 PCP: Francis Flores MD 
  
Past Medical History:  
Diagnosis Date  Back pain  Chronic pain  Headache(784.0)  HTN (hypertension)  Joint pain   
 hx of joint pain  Pulmonary emboli ([address] Social History Socioeconomic History  Marital status:  Spouse name: Not on file  Number of children: Not on file  Years of education: Not on file  Highest education level: Not on file Occupational History  Not on file Social Needs  Financial resource strain: Not on file  Food insecurity:  
  Worry: Not on file Inability: Not on file  Transportation needs:  
  Medical: Not on file Non-medical: Not on file Tobacco Use  Smoking status: Former Smoker Packs/day: 0.75 Years: 40.00 Pack years: 30.00 Last attempt to quit: 10/17/2012 Years since quittin.5  Smokeless tobacco: Former User Substance and Sexual Activity  Alcohol use: Yes Alcohol/week: 1.2 oz Types: 2 Cans of beer per week Comment: daily-beer and wine/ 2 drinks a day  Drug use: No  
 Sexual activity: Yes Lifestyle  Physical activity:  
  Days per week: Not on file Minutes per session: Not on file  Stress: Not on file Relationships  Social connections:  
  Talks on phone: Not on file Gets together: Not on file Attends Religion service: Not on file Active member of club or organization: Not on file Attends meetings of clubs or organizations: Not on file Relationship status: Not on file  Intimate partner violence:  
  Fear of current or ex partner: Not on file Emotionally abused: Not on file Physically abused: Not on file Forced sexual activity: Not on file Other Topics Concern  Not on file Social History Narrative  Not on file Current Outpatient Medications Medication Sig Dispense Refill  XARELTO 10 mg tablet Take 1 Tab by mouth daily as needed (2 days prior to traveling, 4 days after traveling. ).  topiramate (TOPAMAX) 25 mg tablet Take 1 in the evening for 1 week, then increase to 2 the second week and continue with 3 in the evening 270 Tab 0  
 DULoxetine (CYMBALTA) 30 mg capsule Take 1 Cap by mouth daily. Indications: Neuropathic Pain 30 Cap 1  cholecalciferol, vitamin D3, 2,000 unit tab One daily  clobetasol (TEMOVATE) 0.05 % topical cream Use 1 Application to affected area Twice Daily.  desonide (TRIDESILON) 0.05 % topical lotion Use 1 Application to affected area Twice Daily.  lisinopril-hydroCHLOROthiazide (PRINZIDE, ZESTORETIC) 20-12.5 mg per tablet TAKE ONE TABLET BY MOUTH ONCE A DAY  zolpidem (AMBIEN) 5 mg tablet Take 5 mg by mouth nightly as needed. Allergies Allergen Reactions  Bee Sting  [Sting, Bee] Swelling DIZZINESS REVIEW OF SYSTEMS Constitutional: Negative for fever, chills, or weight change. Respiratory: Negative for cough or shortness of breath. Cardiovascular: Negative for chest pain or palpitations.  
Gastrointestinal: Negative for acid reflux, change in bowel habits, or constipation. Genitourinary: Negative for dysuria and flank pain. Musculoskeletal: Positive for lumbar pain. Skin: Negative for rash. Neurological: Negative for headaches or dizziness. Positive for numbness in the left leg. Endo/Heme/Allergies: Negative for increased bruising. Psychiatric/Behavioral: Negative for difficulty with sleep. PHYSICAL EXAMINATION Visit Vitals /77 Pulse 69 Temp 98 °F (36.7 °C) (Oral) Resp 14 Ht 5' 11\" (1.803 m) Wt 143 lb 9.6 oz (65.1 kg) SpO2 100% BMI 20.03 kg/m² Constitutional: Awake, alert, and in no acute distress. Neurological: 1+ symmetrical DTRs in the upper extremities. 1+ symmetrical DTRs in the lower extremities. Sensation to light touch is intact. Negative Williams's sign bilaterally. Skin: warm, dry, and intact. Musculoskeletal: Minimal tenderness to palpation int he lower lumbar region. No pain with extension, axial loading, or forward flexion. No pain with internal or external rotation of his hips. Negative straight leg raise bilaterally. Biceps  Triceps Deltoids Wrist Ext Wrist Flex Hand Intrin Right +4/5 +4/5 +4/5 +4/5 +4/5 +4/5 Left +4/5 +4/5 +4/5 +4/5 +4/5 +4/5 Hip Flex  Quads Hamstrings Ankle DF EHL Ankle PF Right +4/5 +4/5 +4/5 +4/5 +4/5 +4/5 Left +4/5 +4/5 +4/5 +4/5 +4/5 +4/5 IMAGING: 
 
Lumbar spine MRI from 04/24/2019 was personally reviewed with the patient and demonstrated: 
Results from [place] encounter on 04/24/19 MRI LUMB SPINE W WO CONT Narrative EXAMINATION: MRI lumbar spine with and without contrast 
 
INDICATION: Lumbar pain, prior lumbar surgery, left lower extremity sciatica COMPARISON: CTA 1217 TECHNIQUE: Sagittal and axial multiecho MRI sequences of the lumbar spine 
performed before and after 13 cc IV Dotarem. FINDINGS: 
 
Postsurgical: Status post L4-L5 posterior hardware fixation and posterior decompression. No postoperative fluid collection identified. Mild lower lumbar 
paraspinous muscle edema. General: T12 anterior central mild superior endplate height loss, chronic 
appearing. L3 eccentric to the left mild superior endplate height loss, chronic 
appearing. Vertebral body heights elsewhere preserved. Disc space loss at 
multiple levels, most pronounced at L3-L4 and L4-L5. Grade 2 anterolisthesis of 
L4 and L5. Mild multilevel endplate edema, likely related to active disc 
degeneration. Straightening of the normal lordosis. Conus ends at level L1. No 
abnormal signal or enhancement at the distal cord. No abnormal epidural 
collection identified. Miscellaneous: No incidental acute or suspicious findings identified outside of 
the lumbar spine region. Levels: 
 
T11-T12: Evaluated sagittal plane only. No significant degenerative change or 
stenosis. T12-L1: No significant disc disease or stenosis. Mild facet arthropathy. L1-L2: Mild disc bulge. Moderate facet arthropathy. Moderate spinal canal 
stenosis. Mild foraminal stenoses. L2-L3: Minimal disc bulge. Moderate facet arthropathy. Moderate spinal canal 
stenosis. Foramina patent. L3-L4: Hardware-related artifact limits evaluation. Mild disc bulge in addition 
to a right central condyle disc extrusion extending roughly 1.2 cm condyle, 
measuring roughly 0.9 cm transverse, roughly 0.5 cm AP, likely abutting the 
crossing right L4 nerve. Severe facet arthropathy with ligamentum flavum bulge. Severe spinal canal stenosis, perhaps partly due to congenital narrowing. Moderate to severe foraminal stenoses. L4-L5: Artifact limits evaluation. Postsurgical level as above. Grade 2 
anterolisthesis with uncovering of disc. Facets not well evaluated due to 
artifact. Moderate to severe spinal canal stenosis, predominantly in the AP 
plane.  Moderate bilateral foraminal stenoses, predominantly in the vertical 
 plane, with possible abutment of the exiting L4 nerves. L5-S1: Posterior decompression level. A left central annular fissure with 
underlying mild disc bulge. Mild facet arthropathy. Minimal spinal canal 
narrowing. Moderate to severe foraminal stenoses with probable abutment of the 
exiting L5 nerves. Imaged sacrum: Unremarkable. Impression IMPRESSION: 
 
At L3-L4, degenerative changes results in severe spinal canal stenosis and 
moderate to severe foraminal stenoses. There is also notable right central disc 
extrusion at this level likely abutting the crossing right L4 nerve. Hardware-related artifact limits evaluation. Compared to 2017 MRI, spinal canal 
stenosis is slightly worse and disc extrusion is new. L4-L5 level postsurgical changes as above, with grade 2 anterolisthesis and 
moderate to severe spinal canal as well as moderate foraminal stenoses. Spinal 
canal and foraminal stenosis however is less compared to 2017 MRI. L5-S1 moderate to severe foraminal stenoses. L1-L2 and L2-L3 moderate spinal canal stenoses. See level by level details above. Written by Terrence Flores, as dictated by Tad Celestin MD. 
I, Dr. Tad Celestin confirm that all documentation is accurate.

## 2019-05-14 NOTE — PATIENT INSTRUCTIONS
Herniated Disc: Exercises Your Care Instructions Here are some examples of typical rehabilitation exercises for your condition. Start each exercise slowly. Ease off the exercise if you start to have pain. Your doctor or physical therapist will tell you when you can start these exercises and which ones will work best for you. How to stay safe These exercises can help you move easier and feel better. But when you first start doing them, you may have more pain in your back. This is normal. But it is important to pay close attention to your pain during and after each exercise. · Keep doing these exercises if your pain stays the same or moves from your leg and buttock more toward the middle of your spine. Pain moving out of your leg and buttock is a good sign. · Stop doing these exercises if your pain gets worse in your leg and buttock. Stop if you start to have pain in your leg and buttock that you didn't have before. Be sure to do these exercises in the order they appear. Note how your pain changes before you move to the next one. If your pain is much worse right after exercise and stays worse the next day, do not do any of these exercises. How to do the exercises 1. Rest on belly 1. Lie on your stomach, face down, with your head turned to the side. Place your arms beside your body. If this bothers your neck, place your hands, one on top of the other, underneath your forehead. This will help support your head and neck. 2. Try to relax your lower back muscles as much as you can. 3. Continue to lie on your stomach for 2 minutes. 4. If your pain spreads down your leg or increases down your leg, stop this exercise and do not do the next exercises. 2. Press-up 1. Lie on your stomach, face down. Keep your elbows tucked into your sides and under your shoulders. 2. Press your elbows down into the floor to raise your upper back.  As you do this, relax your stomach muscles. Allow your back to arch without using your back muscles. Let your low back relax completely as you arch up. 3. Hold this position for 2 minutes. 4. Repeat 2 to 4 times. 5. If your pain spreads down your leg or increases down your leg, stop this exercise and do not do the next exercises. 3. Full press-up 1. Lie on your stomach, face down. Keep your elbows tucked into your sides and under your shoulders. 2. Straighten your elbows, and push your upper body up as far as you can. Allow your lower back to sag. Keep your hips, pelvis, and legs relaxed. 3. Hold this position for 5 seconds, and then relax. 4. Repeat 10 times. Each time, try to raise your upper body a little higher and hold your arms a bit straighter. 5. If your pain spreads down your leg or gets worse down your leg, stop this exercise and do not move to the next exercise. 6. If you can't do this exercise, you may instead try the backward bend exercise that follows. 4. Backward bend 1. Stand with your feet hip-width apart. Your toes should point forward. Do not lock your knees. 2. Place your hands in the small of your back. 3. Bend backward as far as you can, keeping your knees straight. Hold this position for 2 to 3 seconds. Then return to your starting position. 4. Repeat 2 to 4 times. Each time, try to bend backward a little farther, until you bend backward as far as you can. 5. If your pain spreads down your leg or increases down your leg, stop this exercise. Follow-up care is a key part of your treatment and safety. Be sure to make and go to all appointments, and call your doctor if you are having problems. It's also a good idea to know your test results and keep a list of the medicines you take. Where can you learn more? Go to http://jorge-steve.info/. Enter 34260 [phone] [id number] [phone] in the search box to learn more about \"Herniated Disc: Exercises. \" Current as of: September 20, 2018 Content Version: 11.9 © 7399-2070 [employer]. Care instructions adapted under license by [employer] (which disclaims liability or warranty for this information). If you have questions about a medical condition or this instruction, always ask your healthcare professional. [address] any warranty or liability for your use of this information.

## 2019-05-14 NOTE — PROGRESS NOTES
Christy Esparza presents today for Chief Complaint Patient presents with  Back Pain  
  lower  Follow-up Is someone accompanying this pt? No 
 
Is the patient using any DME equipment during OV? NO Depression Screening: 
3 most recent PHQ Screens 5/14/2019 Little interest or pleasure in doing things Not at all Feeling down, depressed, irritable, or hopeless Not at all Total Score PHQ 2 0 Learning Assessment: 
Learning Assessment 2/1/2018 PRIMARY LEARNER Patient PRIMARY LANGUAGE ENGLISH  
LEARNER PREFERENCE PRIMARY PICTURES  
ANSWERED BY patient RELATIONSHIP SELF Abuse Screening: No flowsheet data found. Fall Risk Fall Risk Assessment, last 12 mths 5/14/2019 Able to walk? Yes Fall in past 12 months? No  
Fall with injury? -  
 
 
OPIOID RISK TOOL No flowsheet data found. Pt currently taking Antiplatelet therapy? Yes, Xarelto Coordination of Care: 1. Have you been to the ER, urgent care clinic since your last visit? NO  Hospitalized since your last visit? NO 
 
2. Have you seen or consulted any other health care providers outside of the [address] [place] [address] since your last visit? No Include any pap smears or colon screening. No 
 
Last  Checked 05/14/19

## 2019-06-25 ENCOUNTER — OFFICE VISIT (OUTPATIENT)
Dept: ORTHOPEDIC SURGERY | Age: 69
End: 2019-06-25

## 2019-06-25 VITALS
SYSTOLIC BLOOD PRESSURE: 131 MMHG | BODY MASS INDEX: 20.44 KG/M2 | DIASTOLIC BLOOD PRESSURE: 85 MMHG | TEMPERATURE: 98.1 F | WEIGHT: 146 LBS | HEART RATE: 62 BPM | HEIGHT: 71 IN | RESPIRATION RATE: 18 BRPM | OXYGEN SATURATION: 98 %

## 2019-06-25 DIAGNOSIS — M54.16 LEFT LUMBAR RADICULOPATHY: ICD-10-CM

## 2019-06-25 DIAGNOSIS — R20.0 RIGHT ARM NUMBNESS: ICD-10-CM

## 2019-06-25 DIAGNOSIS — Z79.01 CHRONIC ANTICOAGULATION: ICD-10-CM

## 2019-06-25 DIAGNOSIS — M48.061 SPINAL STENOSIS OF LUMBAR REGION WITHOUT NEUROGENIC CLAUDICATION: Primary | ICD-10-CM

## 2019-06-25 DIAGNOSIS — M50.30 DDD (DEGENERATIVE DISC DISEASE), CERVICAL: ICD-10-CM

## 2019-06-25 DIAGNOSIS — M54.12 CERVICAL RADICULOPATHY: ICD-10-CM

## 2019-06-25 DIAGNOSIS — M62.838 MUSCLE SPASM: ICD-10-CM

## 2019-06-25 RX ORDER — TOPIRAMATE 25 MG/1
75 TABLET ORAL
Qty: 270 TAB | Refills: 0 | Status: SHIPPED | OUTPATIENT
Start: 2019-06-25 | End: 2019-09-04 | Stop reason: SDUPTHER

## 2019-06-25 NOTE — PATIENT INSTRUCTIONS
Low Back Arthritis: Exercises Your Care Instructions Here are some examples of typical rehabilitation exercises for your condition. Start each exercise slowly. Ease off the exercise if you start to have pain. Your doctor or physical therapist will tell you when you can start these exercises and which ones will work best for you. When you are not being active, find a comfortable position for rest. Some people are comfortable on the floor or a medium-firm bed with a small pillow under their head and another under their knees. Some people prefer to lie on their side with a pillow between their knees. Don't stay in one position for too long. Take short walks (10 to 20 minutes) every 2 to 3 hours. Avoid slopes, hills, and stairs until you feel better. Walk only distances you can manage without pain, especially leg pain. How to do the exercises Pelvic tilt 1. Lie on your back with your knees bent. 2. \"Brace\" your stomachtighten your muscles by pulling in and imagining your belly button moving toward your spine. 3. Press your lower back into the floor. You should feel your hips and pelvis rock back. 4. Hold for 6 seconds while breathing smoothly. 5. Relax and allow your pelvis and hips to rock forward. 6. Repeat 8 to 12 times. Back stretches 1. Get down on your hands and knees on the floor. 2. Relax your head and allow it to droop. Round your back up toward the ceiling until you feel a nice stretch in your upper, middle, and lower back. Hold this stretch for as long as it feels comfortable, or about 15 to 30 seconds. 3. Return to the starting position with a flat back while you are on your hands and knees. 4. Let your back sway by pressing your stomach toward the floor. Lift your buttocks toward the ceiling. 5. Hold this position for 15 to 30 seconds. 6. Repeat 2 to 4 times. Follow-up care is a key part of your treatment and safety.  Be sure to make and go to all appointments, and call your doctor if you are having problems. It's also a good idea to know your test results and keep a list of the medicines you take. Where can you learn more? Go to http://jorge-steve.info/. Enter I935 in the search box to learn more about \"Low Back Arthritis: Exercises. \" Current as of: September 20, 2018 Content Version: 11.9 © 3546-3596 [employer]. Care instructions adapted under license by [employer] (which disclaims liability or warranty for this information). If you have questions about a medical condition or this instruction, always ask your healthcare professional. [address] any warranty or liability for your use of this information.

## 2019-06-25 NOTE — LETTER
6/27/19 Patient: Oziel Tolbert YOB: 1950 Date of Visit: 6/25/2019 Scott Chacon MD 
[place] [address] VIA Facsimile: [phone] Dear Scott Chacon MD, Thank you for referring Mr. Smooth Groves to [facility] for evaluation. My notes for this consultation are attached. If you have questions, please do not hesitate to call me. I look forward to following your patient along with you. Sincerely, Ric Padilla MD
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
## 2019-07-09 ENCOUNTER — TELEPHONE (OUTPATIENT)
Dept: ORTHOPEDIC SURGERY | Age: 69
End: 2019-07-09

## 2019-07-09 NOTE — TELEPHONE ENCOUNTER
Patient is requesting a cd of cervical xrays done in office on 6/25/19. Please advise patient when ready for  at [phone].

## 2019-09-04 ENCOUNTER — OFFICE VISIT (OUTPATIENT)
Dept: ORTHOPEDIC SURGERY | Age: 69
End: 2019-09-04

## 2019-09-04 VITALS
SYSTOLIC BLOOD PRESSURE: 125 MMHG | WEIGHT: 146 LBS | HEART RATE: 73 BPM | RESPIRATION RATE: 14 BRPM | BODY MASS INDEX: 20.9 KG/M2 | HEIGHT: 70 IN | OXYGEN SATURATION: 97 % | TEMPERATURE: 97.7 F | DIASTOLIC BLOOD PRESSURE: 83 MMHG

## 2019-09-04 DIAGNOSIS — R20.0 RIGHT ARM NUMBNESS: ICD-10-CM

## 2019-09-04 DIAGNOSIS — M54.16 LEFT LUMBAR RADICULOPATHY: ICD-10-CM

## 2019-09-04 DIAGNOSIS — M53.3 SACROILIAC JOINT DYSFUNCTION OF LEFT SIDE: ICD-10-CM

## 2019-09-04 DIAGNOSIS — M47.812 CERVICAL FACET JOINT SYNDROME: Primary | ICD-10-CM

## 2019-09-04 DIAGNOSIS — M50.30 DDD (DEGENERATIVE DISC DISEASE), CERVICAL: ICD-10-CM

## 2019-09-04 RX ORDER — TOPIRAMATE 25 MG/1
75 TABLET ORAL
Qty: 270 TAB | Refills: 1 | Status: SHIPPED | OUTPATIENT
Start: 2019-09-04 | End: 2020-03-04 | Stop reason: SDUPTHER

## 2019-09-04 RX ORDER — METHYLPREDNISOLONE 4 MG/1
TABLET ORAL
Qty: 1 DOSE PACK | Refills: 0 | Status: SHIPPED | OUTPATIENT
Start: 2019-09-04 | End: 2020-03-04 | Stop reason: ALTCHOICE

## 2019-09-04 NOTE — PROGRESS NOTES
Denise Franklin presents today for   Chief Complaint   Patient presents with    Neck Pain    Back Pain     lower    Follow-up     2 month       Is someone accompanying this pt? no    Is the patient using any DME equipment during OV? no    Depression Screening:  3 most recent PHQ Screens 9/4/2019   Little interest or pleasure in doing things Not at all   Feeling down, depressed, irritable, or hopeless Not at all   Total Score PHQ 2 0       Learning Assessment:  Learning Assessment 2/1/2018   PRIMARY LEARNER Patient   PRIMARY LANGUAGE ENGLISH   LEARNER PREFERENCE PRIMARY PICTURES   ANSWERED BY patient   RELATIONSHIP SELF       Abuse Screening:  Abuse Screening Questionnaire 9/4/2019   Do you ever feel afraid of your partner? N   Are you in a relationship with someone who physically or mentally threatens you? N   Is it safe for you to go home? Y       Fall Risk  Fall Risk Assessment, last 12 mths 9/4/2019   Able to walk? Yes   Fall in past 12 months? Yes   Fall with injury? No   Number of falls in past 12 months 1   Fall Risk Score 1       OPIOID RISK TOOL  No flowsheet data found. Pt currently taking Antiplatelet therapy? Yes, Xarelto PRN with travel    Coordination of Care:  1. Have you been to the ER, urgent care clinic since your last visit? no  Hospitalized since your last visit? no    2. Have you seen or consulted any other health care providers outside of the [address] [place] [address] since your last visit? no Include any pap smears or colon screening.  no    Last  Checked 09/04/19

## 2019-09-04 NOTE — PATIENT INSTRUCTIONS
Low Back Arthritis: Exercises  Introduction  Here are some examples of typical rehabilitation exercises for your condition. Start each exercise slowly. Ease off the exercise if you start to have pain. Your doctor or physical therapist will tell you when you can start these exercises and which ones will work best for you. When you are not being active, find a comfortable position for rest. Some people are comfortable on the floor or a medium-firm bed with a small pillow under their head and another under their knees. Some people prefer to lie on their side with a pillow between their knees. Don't stay in one position for too long. Take short walks (10 to 20 minutes) every 2 to 3 hours. Avoid slopes, hills, and stairs until you feel better. Walk only distances you can manage without pain, especially leg pain. How to do the exercises  Pelvic tilt    1. Lie on your back with your knees bent. 2. \"Brace\" your stomachtighten your muscles by pulling in and imagining your belly button moving toward your spine. 3. Press your lower back into the floor. You should feel your hips and pelvis rock back. 4. Hold for 6 seconds while breathing smoothly. 5. Relax and allow your pelvis and hips to rock forward. 6. Repeat 8 to 12 times. Back stretches    1. Get down on your hands and knees on the floor. 2. Relax your head and allow it to droop. Round your back up toward the ceiling until you feel a nice stretch in your upper, middle, and lower back. Hold this stretch for as long as it feels comfortable, or about 15 to 30 seconds. 3. Return to the starting position with a flat back while you are on your hands and knees. 4. Let your back sway by pressing your stomach toward the floor. Lift your buttocks toward the ceiling. 5. Hold this position for 15 to 30 seconds. 6. Repeat 2 to 4 times. Follow-up care is a key part of your treatment and safety.  Be sure to make and go to all appointments, and call your doctor if you are having problems. It's also a good idea to know your test results and keep a list of the medicines you take. Where can you learn more? Go to http://jorge-steve.info/. Enter W432 in the search box to learn more about \"Low Back Arthritis: Exercises. \"  Current as of: September 20, 2018  Content Version: 12.1  © 4842-5417 [employer]. Care instructions adapted under license by [employer] (which disclaims liability or warranty for this information). If you have questions about a medical condition or this instruction, always ask your healthcare professional. [place] [address] any warranty or liability for your use of this information. Sacroiliac Pain: Exercises  Your Care Instructions  Here are some examples of typical rehabilitation exercises for your condition. Start each exercise slowly. Ease off the exercise if you start to have pain. Your doctor or physical therapist will tell you when you can start these exercises and which ones will work best for you. How to do the exercises  Knee-to-chest stretch    7. Do not do the knee-to-chest exercise if it causes or increases back or leg pain. 8. Lie on your back with your knees bent and your feet flat on the floor. You can put a small pillow under your head and neck if it is more comfortable. 9. Grasp your hands under one knee and bring the knee to your chest, keeping the other foot flat on the floor. 10. Keep your lower back pressed to the floor. Hold for at least 15 to 30 seconds. 11. Relax and lower the knee to the starting position. Repeat with the other leg. 12. Repeat 2 to 4 times with each leg. 13. To get more stretch, keep your other leg flat on the floor while pulling your knee to your chest.    Bridging    7. Lie on your back with both knees bent. Your knees should be bent about 90 degrees. 8. Tighten your belly muscles by pulling in your belly button toward your spine. Then push your feet into the floor, squeeze your buttocks, and lift your hips off the floor until your shoulders, hips, and knees are all in a straight line. 9. Hold for about 6 seconds as you continue to breathe normally, and then slowly lower your hips back down to the floor and rest for up to 10 seconds. 10. Repeat 8 to 12 times. Hip extension    1. Get down on your hands and knees on the floor. 2. Keeping your back and neck straight, lift one leg straight out behind you. When you lift your leg, keep your hips level. Don't let your back twist, and don't let your hip drop toward the floor. 3. Hold for 6 seconds. Repeat 8 to 12 times with each leg. 4. If you feel steady and strong when you do this exercise, you can make it more difficult. To do this, when you lift your leg, also lift the opposite arm straight out in front of you. For example, lift the left leg and the right arm at the same time. (This is sometimes called the \"bird dog exercise. \") Hold for 6 seconds, and repeat 8 to 12 times on each side. Clamshell    1. Lie on your side with a pillow under your head. Keep your feet and knees together and your knees bent. 2. Raise your top knee, but keep your feet together. Do not let your hips roll back. Your legs should open up like a clamshell. 3. Hold for 6 seconds. 4. Slowly lower your knee back down. Rest for 10 seconds. 5. Repeat 8 to 12 times. 6. Switch to your other side and repeat steps 1 through 5. Hamstring wall stretch    1. Lie on your back in a doorway, with one leg through the open door. 2. Slide your affected leg up the wall to straighten your knee. You should feel a gentle stretch down the back of your leg. 1. Do not arch your back. 2. Do not bend either knee. 3. Keep one heel touching the floor and the other heel touching the wall. Do not point your toes. 3. Hold the stretch for at least 1 minute to begin.  Then try to lengthen the time you hold the stretch to as long as 6 minutes. 4. Switch legs, and repeat steps 1 through 3.  5. Repeat 2 to 4 times. 6. If you do not have a place to do this exercise in a doorway, there is another way to do it:  7. Lie on your back, and bend one knee. 8. Loop a towel under the ball and toes of that foot, and hold the ends of the towel in your hands. 9. Straighten your knee, and slowly pull back on the towel. You should feel a gentle stretch down the back of your leg. 10. Switch legs, and repeat steps 1 through 3.  11. Repeat 2 to 4 times. Lower abdominal strengthening    1. Lie on your back with your knees bent and your feet flat on the floor. 2. Tighten your belly muscles by pulling your belly button in toward your spine. 3. Lift one foot off the floor and bring your knee toward your chest, so that your knee is straight above your hip and your leg is bent like the letter \"L. \"  4. Lift the other knee up to the same position. 5. Lower one leg at a time to the starting position. 6. Keep alternating legs until you have lifted each leg 8 to 12 times. 7. Be sure to keep your belly muscles tight and your back still as you are moving your legs. Be sure to breathe normally. Piriformis stretch    1. Lie on your back with your legs straight. 2. Lift your affected leg, and bend your knee. With your opposite hand, reach across your body, and then gently pull your knee toward your opposite shoulder. 3. Hold the stretch for 15 to 30 seconds. 4. Switch legs and repeat steps 1 through 3.  5. Repeat 2 to 4 times. Follow-up care is a key part of your treatment and safety. Be sure to make and go to all appointments, and call your doctor if you are having problems. It's also a good idea to know your test results and keep a list of the medicines you take. Where can you learn more? Go to http://jorge-steve.info/. Enter Y932 in the search box to learn more about \"Sacroiliac Pain: Exercises. \"  Current as of: September 20, 2018  Content Version: 12.1  © 4287-2568 [employer]. Care instructions adapted under license by [employer] (which disclaims liability or warranty for this information). If you have questions about a medical condition or this instruction, always ask your healthcare professional. [address] any warranty or liability for your use of this information. Neck Arthritis: Exercises  Introduction  Here are some examples of exercises for you to try. The exercises may be suggested for a condition or for rehabilitation. Start each exercise slowly. Ease off the exercises if you start to have pain. You will be told when to start these exercises and which ones will work best for you. How to do the exercises  Neck stretches to the side    14. This stretch works best if you keep your shoulder down as you lean away from it. To help you remember to do this, start by relaxing your shoulders and lightly holding on to your thighs or your chair. 15. Tilt your head toward your shoulder and hold for 15 to 30 seconds. Let the weight of your head stretch your muscles. 16. Repeat 2 to 4 times toward each shoulder. Chin tuck    11. Lie on the floor with a rolled-up towel under your neck. Your head should be touching the floor. 12. Slowly bring your chin toward your chest.  13. Hold for a count of 6, and then relax for up to 10 seconds. 14. Repeat 8 to 12 times. Active cervical rotation    5. Sit in a firm chair, or stand up straight. 6. Keeping your chin level, turn your head to the right, and hold for 15 to 30 seconds. 7. Turn your head to the left and hold for 15 to 30 seconds. 8. Repeat 2 to 4 times to each side. Shoulder blade squeeze    7. While standing, squeeze your shoulder blades together. 8. Do not raise your shoulders up as you are squeezing. 9. Hold for 6 seconds. 10. Repeat 8 to 12 times. Shoulder rolls    12.  Sit comfortably with your feet shoulder-width apart. You can also do this exercise standing up. 13. Roll your shoulders up, then back, and then down in a smooth, circular motion. 14. Repeat 2 to 4 times. Follow-up care is a key part of your treatment and safety. Be sure to make and go to all appointments, and call your doctor if you are having problems. It's also a good idea to know your test results and keep a list of the medicines you take. Where can you learn more? Go to http://jorge-steve.info/. Enter I515 in the search box to learn more about \"Neck Arthritis: Exercises. \"  Current as of: September 20, 2018  Content Version: 12.1  © 7703-0312 [employer]. Care instructions adapted under license by [employer] (which disclaims liability or warranty for this information). If you have questions about a medical condition or this instruction, always ask your healthcare professional. [address] any warranty or liability for your use of this information.

## 2019-09-04 NOTE — LETTER
9/4/19 Patient: Meseret Little YOB: 1950 Date of Visit: 9/4/2019 Maikel Schmidt MD 
[place] [address] VIA Facsimile: [phone] Dear Maikel Schmidt MD, Thank you for referring Mr. Elina Stein to [facility] for evaluation. My notes for this consultation are attached. If you have questions, please do not hesitate to call me. I look forward to following your patient along with you. Sincerely, Jeaneth West MD

## 2019-09-04 NOTE — PROGRESS NOTES
[facility]  [address], [address]  Phone: [phone]  Fax: [phone]    Pt's YOB: 1950    ASSESSMENT   Diagnoses and all orders for this visit:    1. Cervical facet joint syndrome  -     methylPREDNISolone (MEDROL DOSEPACK) 4 mg tablet; Per dose pack instructions    2. DDD (degenerative disc disease), cervical    3. Left lumbar radiculopathy  -     topiramate (TOPAMAX) 25 mg tablet; Take 3 Tabs by mouth nightly. 4. Right arm numbness  -     topiramate (TOPAMAX) 25 mg tablet; Take 3 Tabs by mouth nightly. 5. Sacroiliac joint dysfunction of left side         IMPRESSION AND PLAN:  Lea Watts is a 81 y.o. male with history of cervical and lumbar pain. He complains of persistent aching pain radiating down the posterior aspect of the left thigh that is worse when sleeping/laying flat. Pt has been prescribed Topamax 25 mg and takes 3 tabs QHS. He takes Xarelto 2 days before travelling and 4 days afterwards as DVT/PE prophylaxis. Pt no longer takes Cymbalta. Pt at this time desires to continue with current care. 1) Pt was given information on cervical arthritis, lumbar arthritis, and sacroiliac exercises. 2) He received a refill of Topamax 25 mg 3 tabs QHS. 3) Pt is not candidate for regular use of NSAID's as he is on Eliquis for DVT/PE prophylaxis on an intermittent basis. 4) He was prescribed a Medrol Dosepak. 5) Discussed referral to cervical physical therapy with traction if needed. 6) Mr. David Walters has a reminder for a \"due or due soon\" health maintenance. I have asked that he contact his primary care provider, Michael Ibarra MD, for follow-up on this health maintenance. 7)  demonstrated consistency with prescribing. 8) Work ergonomics also discussed  Follow-up and Dispositions    · Return in about 6 months (around 3/4/2020) for Medication follow up.          HISTORY OF PRESENT ILLNESS:  Lea Watts is a 81 y.o. male with history of cervical and lumbar pain and presents to the office today for follow up. Pt complains of persistent aching pain radiating down the posterior aspect of the left thigh that is worse when sleeping/laying flat. He also complains of persistent neck pain. Pt notes that he has not recently attended physical therapy. He has been prescribed Topamax 25 mg and takes 3 tabs QHS. Pt takes Xarelto 2 days before travelling and 4 days afterwards as DVT/PE prophylaxis. He no longer takes Cymbalta. He denies any arm weakness but does have some decreased ROM at times; he also occasionally has some LUE radicular symptoms at times. Pt at this time desires to continue with current care. Pain Scale: 1/10    PCP: Michael Ibarra MD     Past Medical History:   Diagnosis Date    Back pain     Chronic pain     Headache(784.0)     HTN (hypertension)     Joint pain     hx of joint pain    Pulmonary emboli (HCC)         Social History     Socioeconomic History    Marital status:      Spouse name: Not on file    Number of children: Not on file    Years of education: Not on file    Highest education level: Not on file   Occupational History    Not on file   Social Needs    Financial resource strain: Not on file    Food insecurity:     Worry: Not on file     Inability: Not on file    Transportation needs:     Medical: Not on file     Non-medical: Not on file   Tobacco Use    Smoking status: Former Smoker     Packs/day: 0.75     Years: 40.00     Pack years: 30.00     Last attempt to quit: 10/17/2012     Years since quittin.8    Smokeless tobacco: Former User   Substance and Sexual Activity    Alcohol use:  Yes     Alcohol/week: 2.0 standard drinks     Types: 2 Cans of beer per week     Comment: daily-beer and wine/ 2 drinks a day    Drug use: No    Sexual activity: Yes   Lifestyle    Physical activity:     Days per week: Not on file     Minutes per session: Not on file    Stress: Not on file   Relationships    Social connections:     Talks on phone: Not on file     Gets together: Not on file     Attends Synagogue service: Not on file     Active member of club or organization: Not on file     Attends meetings of clubs or organizations: Not on file     Relationship status: Not on file    Intimate partner violence:     Fear of current or ex partner: Not on file     Emotionally abused: Not on file     Physically abused: Not on file     Forced sexual activity: Not on file   Other Topics Concern    Not on file   Social History Narrative    Not on file       Current Outpatient Medications   Medication Sig Dispense Refill    topiramate (TOPAMAX) 25 mg tablet Take 3 Tabs by mouth nightly. 270 Tab 1    methylPREDNISolone (MEDROL DOSEPACK) 4 mg tablet Per dose pack instructions 1 Dose Pack 0    XARELTO 10 mg tablet Take 1 Tab by mouth daily as needed (2 days prior to traveling, 4 days after traveling. ).  cholecalciferol, vitamin D3, 2,000 unit tab Take 2,000 Units by mouth daily.  clobetasol (TEMOVATE) 0.05 % topical cream Use 1 Application to affected area Twice Daily.  desonide (TRIDESILON) 0.05 % topical lotion Use 1 Application to affected area Twice Daily.  lisinopril-hydroCHLOROthiazide (PRINZIDE, ZESTORETIC) 20-12.5 mg per tablet TAKE ONE TABLET BY MOUTH ONCE A DAY      zolpidem (AMBIEN) 5 mg tablet Take 5 mg by mouth nightly as needed.  DULoxetine (CYMBALTA) 30 mg capsule Take 1 Cap by mouth daily. Indications: Neuropathic Pain (Patient not taking: Reported on 6/25/2019) 30 Cap 1       Allergies   Allergen Reactions    Bee Sting  [Sting, Bee] Swelling     DIZZINESS     REVIEW OF SYSTEMS    Constitutional: Negative for fever, chills, or weight change. Respiratory: Negative for cough or shortness of breath. Cardiovascular: Negative for chest pain or palpitations.   Gastrointestinal: Negative for acid reflux, change in bowel habits, or constipation. Genitourinary: Negative for dysuria and flank pain. Musculoskeletal: Positive for cervical, left arm and lumbar pain. Skin: Negative for rash. Neurological: Negative for headaches, dizziness, or numbness. Endo/Heme/Allergies: Negative for increased bruising. Psychiatric/Behavioral: Negative for difficulty with sleep. PHYSICAL EXAMINATION  Visit Vitals  /83   Pulse 73   Temp 97.7 °F (36.5 °C) (Oral)   Resp 14   Ht 5' 10\" (1.778 m)   Wt 146 lb (66.2 kg)   SpO2 97%   BMI 20.95 kg/m²       Constitutional: Awake, alert, and in no acute distress. Neurological: 1+ symmetrical DTRs in the upper extremities. 1+ symmetrical DTRs in the lower extremities. Sensation to light touch is intact. Negative Saul's sign bilaterally. Skin: warm, dry, and intact. Musculoskeletal: Decreased range of motion with side to side cervical flexion, L>R. Positive Spurling's test on the left; negative on the right. Tenderness to palpation in the lower lumbar region and over the left sacroiliac joint. Moderate pain with extension and axial loading. No pain with internal or external rotation of his hips. Negative straight leg raise bilaterally. Biceps  Triceps Deltoids Wrist Ext Wrist Flex Hand Intrin   Right +4/5 +4/5 +4/5 +4/5 +4/5 +4/5   Left +4/5 +4/5 +4/5 +4/5 +4/5 +4/5      Hip Flex  Quads Hamstrings Ankle DF EHL Ankle PF   Right +4/5 +4/5 +4/5 +4/5 +4/5 +4/5   Left +4/5 +4/5 +4/5 +4/5 +4/5 +4/5     IMAGING:    Cervical spine 2V x-rays from 06/25/2019 were personally reviewed with the patient and demonstrated:  Straightening of the cervical spine. Multilevel degenerative facets.  Degenerative disc at C4-5, C5-6, and C6-7.     Lumbar spine MRI from 04/24/2019 was personally reviewed with the patient and demonstrated:          Results from [facility] on 04/24/19   MRI LUMB SPINE W WO CONT     Narrative EXAMINATION: MRI lumbar spine with and without contrast     INDICATION: Lumbar pain, prior lumbar surgery, left lower extremity sciatica     COMPARISON: CTA 1217     TECHNIQUE: Sagittal and axial multiecho MRI sequences of the lumbar spine  performed before and after 13 cc IV Dotarem.     FINDINGS:     Postsurgical: Status post L4-L5 posterior hardware fixation and posterior  decompression. No postoperative fluid collection identified. Mild lower lumbar  paraspinous muscle edema.     General: T12 anterior central mild superior endplate height loss, chronic  appearing. L3 eccentric to the left mild superior endplate height loss, chronic  appearing. Vertebral body heights elsewhere preserved. Disc space loss at  multiple levels, most pronounced at L3-L4 and L4-L5. Grade 2 anterolisthesis of  L4 and L5. Mild multilevel endplate edema, likely related to active disc  degeneration. Straightening of the normal lordosis. Conus ends at level L1. No  abnormal signal or enhancement at the distal cord. No abnormal epidural  collection identified.     Miscellaneous: No incidental acute or suspicious findings identified outside of  the lumbar spine region.     Levels:     T11-T12: Evaluated sagittal plane only. No significant degenerative change or  stenosis.     T12-L1: No significant disc disease or stenosis. Mild facet arthropathy.     L1-L2: Mild disc bulge. Moderate facet arthropathy. Moderate spinal canal  stenosis. Mild foraminal stenoses.     L2-L3: Minimal disc bulge. Moderate facet arthropathy. Moderate spinal canal  stenosis. Foramina patent.     L3-L4: Hardware-related artifact limits evaluation. Mild disc bulge in addition  to a right central condyle disc extrusion extending roughly 1.2 cm condyle,  measuring roughly 0.9 cm transverse, roughly 0.5 cm AP, likely abutting the  crossing right L4 nerve. Severe facet arthropathy with ligamentum flavum bulge. Severe spinal canal stenosis, perhaps partly due to congenital narrowing. Moderate to severe foraminal stenoses.     L4-L5: Artifact limits evaluation. Postsurgical level as above. Grade 2  anterolisthesis with uncovering of disc. Facets not well evaluated due to  artifact. Moderate to severe spinal canal stenosis, predominantly in the AP  plane. Moderate bilateral foraminal stenoses, predominantly in the vertical  plane, with possible abutment of the exiting L4 nerves.     L5-S1: Posterior decompression level. A left central annular fissure with  underlying mild disc bulge. Mild facet arthropathy. Minimal spinal canal  narrowing. Moderate to severe foraminal stenoses with probable abutment of the  exiting L5 nerves.     Imaged sacrum: Unremarkable.        Impression IMPRESSION:     At L3-L4, degenerative changes results in severe spinal canal stenosis and  moderate to severe foraminal stenoses. There is also notable right central disc  extrusion at this level likely abutting the crossing right L4 nerve. Hardware-related artifact limits evaluation. Compared to 2017 MRI, spinal canal  stenosis is slightly worse and disc extrusion is new.     L4-L5 level postsurgical changes as above, with grade 2 anterolisthesis and  moderate to severe spinal canal as well as moderate foraminal stenoses. Spinal  canal and foraminal stenosis however is less compared to 2017 MRI.     L5-S1 moderate to severe foraminal stenoses.     L1-L2 and L2-L3 moderate spinal canal stenoses.     See level by level details above.        Written by Martha Ovalle, as dictated by Elenita Chapman MD.  I, Dr. Elenita Chapman confirm that all documentation is accurate.

## 2020-03-03 NOTE — PROGRESS NOTES
[facility]  [address] [place], [facility] [address]  Phone: [phone]  Fax: [phone]    Pt's YOB: 1950    ASSESSMENT   Diagnoses and all orders for this visit:    1. Cervical facet joint syndrome    2. Right arm numbness  -     topiramate (TOPAMAX) 25 mg tablet; Take 3 Tabs by mouth nightly. 3. Left lumbar radiculopathy  -     topiramate (TOPAMAX) 25 mg tablet; Take 3 Tabs by mouth nightly. 4. DDD (degenerative disc disease), cervical    5. Sacroiliac joint dysfunction of left side    6. Chronic anticoagulation       IMPRESSION AND PLAN:  Desiree Washington is a 78 y.o. male with history of cervical and lumbar pain. Pt denies any change since his last office visit. He reports intermittent pain and numbness in the left leg and occasional episodes of right-sided neck pain with right arm numbness. He continues to take Topamax 25 mg 3 tabs QHS. 1) Pt was given information on cervical and lumbar arthritis exercises. 2) He received a refill of Topamax 25 mg 3 tabs QHS. 3) Pt may take his previously prescribed Medrol Dosepak if needed. 4) He is not candidate for regular use of NSAID's as he is on Xarelto for DVT/PE prophylaxis on an intermittent basis. 5) Mr. Pat Dewey has a reminder for a \"due or due soon\" health maintenance. I have asked that he contact his primary care provider, Lidia Tam MD, for follow-up on this health maintenance. 6)  demonstrated consistency with prescribing. Follow-up and Dispositions    · Return in about 6 months (around 9/4/2020) for Medication follow up. HISTORY OF PRESENT ILLNESS:  Desiree Washington is a 78 y.o. male with history of cervical and lumbar pain and presents to the office today for follow up. Pt denies any change since his last office visit. He reports intermittent pain and numbness in the left leg, which generally improves when he changes positions.  Pt denies any pain or numbness in the right leg at this time. He reports episodes of right-sided neck pain and right arm numbness. Pt continues to take Topamax 25 mg 3 tabs QHS. He also takes Xarelto 2 days before travelling and 4 days afterwards as DVT/PE prophylaxis. Pt admits that he has done some recent travelling to [facility] for work. He notes that he very occasionally takes antiinflammatories if needed but never with the Xarelto. Pt notes that he has not yet taken the Medrol Dosepak prescribed at his last office visit. Pt at this time desires to continue with current care. Pt notes that his son is a senior at [employer] in [place] and he is a pitcher on the baseball team. He notes that he does not plan on travelling until after his graduation/season ends. Pain Scale: 1/10    PCP: Sean Toribio MD     Past Medical History:   Diagnosis Date    Back pain     Chronic pain     Headache(784.0)     HTN (hypertension)     Joint pain     hx of joint pain    Pulmonary emboli (HCC)         Social History     Socioeconomic History    Marital status:      Spouse name: Not on file    Number of children: Not on file    Years of education: Not on file    Highest education level: Not on file   Occupational History    Not on file   Social Needs    Financial resource strain: Not on file    Food insecurity:     Worry: Not on file     Inability: Not on file    Transportation needs:     Medical: Not on file     Non-medical: Not on file   Tobacco Use    Smoking status: Former Smoker     Packs/day: 0.75     Years: 40.00     Pack years: 30.00     Last attempt to quit: 10/17/2012     Years since quittin.3    Smokeless tobacco: Former User   Substance and Sexual Activity    Alcohol use:  Yes     Alcohol/week: 2.0 standard drinks     Types: 2 Cans of beer per week     Comment: daily-beer and wine/ 2 drinks a day    Drug use: No    Sexual activity: Yes   Lifestyle    Physical activity:     Days per week: Not on file Minutes per session: Not on file    Stress: Not on file   Relationships    Social connections:     Talks on phone: Not on file     Gets together: Not on file     Attends Christian service: Not on file     Active member of club or organization: Not on file     Attends meetings of clubs or organizations: Not on file     Relationship status: Not on file    Intimate partner violence:     Fear of current or ex partner: Not on file     Emotionally abused: Not on file     Physically abused: Not on file     Forced sexual activity: Not on file   Other Topics Concern    Not on file   Social History Narrative    Not on file       Current Outpatient Medications   Medication Sig Dispense Refill    topiramate (TOPAMAX) 25 mg tablet Take 3 Tabs by mouth nightly. 270 Tab 1    betamethasone valerate (VALISONE) 0.1 % topical cream Apply  to affected area two (2) times a day. Apply cream to affected area with each urination. (Patient taking differently: Apply  to affected area daily as needed. Apply cream to affected area with each urination. ) 15 g 1    XARELTO 10 mg tablet Take 1 Tab by mouth daily as needed (2 days prior to traveling, 4 days after traveling. ).  cholecalciferol, vitamin D3, 2,000 unit tab Take 2,000 Units by mouth daily.  clobetasol (TEMOVATE) 0.05 % topical cream as needed.  desonide (TRIDESILON) 0.05 % topical lotion Use 1 Application to affected area Twice Daily.  lisinopril-hydroCHLOROthiazide (PRINZIDE, ZESTORETIC) 20-12.5 mg per tablet Take  by mouth daily.  zolpidem (AMBIEN) 5 mg tablet Take 5 mg by mouth nightly as needed.  DULoxetine (CYMBALTA) 30 mg capsule Take 1 Cap by mouth daily. Indications: Neuropathic Pain (Patient not taking: Reported on 6/25/2019) 30 Cap 1       Allergies   Allergen Reactions    Bee Sting  [Sting, Bee] Swelling     DIZZINESS         REVIEW OF SYSTEMS    Constitutional: Negative for fever, chills, or weight change.    Respiratory: Negative for cough or shortness of breath. Cardiovascular: Negative for chest pain or palpitations. Gastrointestinal: Negative for acid reflux, change in bowel habits, or constipation. Genitourinary: Negative for dysuria and flank pain. Musculoskeletal: Positive for cervical and lumbar pain. Skin: Negative for rash. Neurological: Negative for headaches, dizziness, or numbness. Endo/Heme/Allergies: Negative for increased bruising. Psychiatric/Behavioral: Positive for difficulty with sleep. PHYSICAL EXAMINATION  Visit Vitals  /74 (BP 1 Location: Right arm, BP Patient Position: Sitting)   Pulse 62   Temp 97.5 °F (36.4 °C) (Oral)   Ht 5' 10\" (1.778 m)   Wt 144 lb 6.4 oz (65.5 kg)   SpO2 99%   BMI 20.72 kg/m²       Constitutional: Awake, alert, and in no acute distress. Neurological: 1+ symmetrical DTRs in the upper extremities. 1+ symmetrical DTRs in the lower extremities. Sensation to light touch is intact. Negative Saul's sign bilaterally. Skin: warm, dry, and intact. Musculoskeletal: Decreased range of motion with side to side cervical flexion, L>R. Tenderness to palpation in the lower lumbar region. Moderate pain with extension and axial loading. No pain with internal or external rotation of his hips. Negative straight leg raise bilaterally. Biceps  Triceps Deltoids Wrist Ext Wrist Flex Hand Intrin   Right +4/5 +4/5 +4/5 +4/5 +4/5 +4/5   Left +4/5 +4/5 +4/5 +4/5 +4/5 +4/5      Hip Flex  Quads Hamstrings Ankle DF EHL Ankle PF   Right +4/5 +4/5 +4/5 +4/5 +4/5 +4/5   Left +4/5 +4/5 +4/5 +4/5 +4/5 +4/5     IMAGING:    Cervical spine 2V x-rays from 06/25/2019 were personally reviewed with the patient and demonstrated:  Straightening of the cervical spine. Multilevel degenerative facets.  Degenerative disc at C4-5, C5-6, and C6-7.     Lumbar spine MRI from 04/24/2019 was personally reviewed with the patient and demonstrated:          Results from Hospital Encounter on 04/24/19   MRI LUMB SPINE W WO CONT     Narrative EXAMINATION: MRI lumbar spine with and without contrast     INDICATION: Lumbar pain, prior lumbar surgery, left lower extremity sciatica     COMPARISON: CTA 1217     TECHNIQUE: Sagittal and axial multiecho MRI sequences of the lumbar spine  performed before and after 13 cc IV Dotarem.     FINDINGS:     Postsurgical: Status post L4-L5 posterior hardware fixation and posterior  decompression. No postoperative fluid collection identified. Mild lower lumbar  paraspinous muscle edema.     General: T12 anterior central mild superior endplate height loss, chronic  appearing. L3 eccentric to the left mild superior endplate height loss, chronic  appearing. Vertebral body heights elsewhere preserved. Disc space loss at  multiple levels, most pronounced at L3-L4 and L4-L5. Grade 2 anterolisthesis of  L4 and L5. Mild multilevel endplate edema, likely related to active disc  degeneration. Straightening of the normal lordosis. Conus ends at level L1. No  abnormal signal or enhancement at the distal cord. No abnormal epidural  collection identified.     Miscellaneous: No incidental acute or suspicious findings identified outside of  the lumbar spine region.     Levels:     T11-T12: Evaluated sagittal plane only. No significant degenerative change or  stenosis.     T12-L1: No significant disc disease or stenosis. Mild facet arthropathy.     L1-L2: Mild disc bulge. Moderate facet arthropathy. Moderate spinal canal  stenosis. Mild foraminal stenoses.     L2-L3: Minimal disc bulge. Moderate facet arthropathy. Moderate spinal canal  stenosis. Foramina patent.     L3-L4: Hardware-related artifact limits evaluation. Mild disc bulge in addition  to a right central condyle disc extrusion extending roughly 1.2 cm condyle,  measuring roughly 0.9 cm transverse, roughly 0.5 cm AP, likely abutting the  crossing right L4 nerve. Severe facet arthropathy with ligamentum flavum bulge.   Severe spinal canal stenosis, perhaps partly due to congenital narrowing. Moderate to severe foraminal stenoses.     L4-L5: Artifact limits evaluation. Postsurgical level as above. Grade 2  anterolisthesis with uncovering of disc. Facets not well evaluated due to  artifact. Moderate to severe spinal canal stenosis, predominantly in the AP  plane. Moderate bilateral foraminal stenoses, predominantly in the vertical  plane, with possible abutment of the exiting L4 nerves.     L5-S1: Posterior decompression level. A left central annular fissure with  underlying mild disc bulge. Mild facet arthropathy. Minimal spinal canal  narrowing. Moderate to severe foraminal stenoses with probable abutment of the  exiting L5 nerves.     Imaged sacrum: Unremarkable.        Impression IMPRESSION:     At L3-L4, degenerative changes results in severe spinal canal stenosis and  moderate to severe foraminal stenoses. There is also notable right central disc  extrusion at this level likely abutting the crossing right L4 nerve. Hardware-related artifact limits evaluation. Compared to 2017 MRI, spinal canal  stenosis is slightly worse and disc extrusion is new.     L4-L5 level postsurgical changes as above, with grade 2 anterolisthesis and  moderate to severe spinal canal as well as moderate foraminal stenoses. Spinal  canal and foraminal stenosis however is less compared to 2017 MRI.     L5-S1 moderate to severe foraminal stenoses.     L1-L2 and L2-L3 moderate spinal canal stenoses.     See level by level details above.        Written by Alvaro Richards, as dictated by Claudia Herman MD.  I, Dr. Claudia Herman confirm that all documentation is accurate.

## 2020-03-04 ENCOUNTER — OFFICE VISIT (OUTPATIENT)
Dept: ORTHOPEDIC SURGERY | Age: 70
End: 2020-03-04

## 2020-03-04 VITALS
WEIGHT: 144.4 LBS | SYSTOLIC BLOOD PRESSURE: 127 MMHG | HEART RATE: 62 BPM | DIASTOLIC BLOOD PRESSURE: 74 MMHG | OXYGEN SATURATION: 99 % | BODY MASS INDEX: 20.67 KG/M2 | HEIGHT: 70 IN | TEMPERATURE: 97.5 F

## 2020-03-04 DIAGNOSIS — M54.16 LEFT LUMBAR RADICULOPATHY: ICD-10-CM

## 2020-03-04 DIAGNOSIS — M47.812 CERVICAL FACET JOINT SYNDROME: Primary | ICD-10-CM

## 2020-03-04 DIAGNOSIS — M50.30 DDD (DEGENERATIVE DISC DISEASE), CERVICAL: ICD-10-CM

## 2020-03-04 DIAGNOSIS — R20.0 RIGHT ARM NUMBNESS: ICD-10-CM

## 2020-03-04 DIAGNOSIS — Z79.01 CHRONIC ANTICOAGULATION: ICD-10-CM

## 2020-03-04 DIAGNOSIS — M53.3 SACROILIAC JOINT DYSFUNCTION OF LEFT SIDE: ICD-10-CM

## 2020-03-04 RX ORDER — TOPIRAMATE 25 MG/1
75 TABLET ORAL
Qty: 270 TAB | Refills: 1 | Status: SHIPPED | OUTPATIENT
Start: 2020-03-04 | End: 2020-09-28

## 2020-03-04 NOTE — LETTER
3/5/20 Patient: Timur Gonsales YOB: 1950 Date of Visit: 3/4/2020 López Casey MD 
[place] [address] VIA Facsimile: [phone] Dear López Casey MD, Thank you for referring Mr. Christina Madrigal to [facility] for evaluation. My notes for this consultation are attached. If you have questions, please do not hesitate to call me. I look forward to following your patient along with you. Sincerely, Jo Byrd MD

## 2020-03-04 NOTE — PATIENT INSTRUCTIONS

## 2020-09-01 NOTE — PROGRESS NOTES
[facility]  [address]endrick Perez[address] [place], [facility] [address]  Phone: [phone]  Fax: [phone]    Pt's YOB: 1950    ASSESSMENT   Diagnoses and all orders for this visit:    1. Cervical facet joint syndrome    2. Right arm numbness  -     topiramate (TOPAMAX) 25 mg tablet; Take 3 Tabs by mouth nightly. 3. Left lumbar radiculopathy  -     topiramate (TOPAMAX) 25 mg tablet; Take 3 Tabs by mouth nightly. 4. DDD (degenerative disc disease), cervical    5. Sacroiliac joint dysfunction of left side    6. Chronic anticoagulation         IMPRESSION AND PLAN:  Paul Treviño is a 78 y.o. male with history of cervical and lumbar pain. Pt notes that he is doing well at this time and denies any change in symptoms since his last office visit. He complains of intermittent pain and numbness in the left leg. Pt continues to take Topamax 25 mg 3 tabs QHS. 1) Pt was given information on lumbar arthritis exercises. 2) He may try tapering off the Topamax 25 mg 3 tabs QHS as tolerated  3) Pt is not candidate for regular use of NSAID's as he is on Xarelto for DVT/PE prophylaxis on an intermittent basis. 4) Mr. Gauri Crow has a reminder for a \"due or due soon\" health maintenance. I have asked that he contact his primary care provider, Ginger Toussaint MD, for follow-up on this health maintenance. 5)  demonstrated consistency with prescribing. Follow-up and Dispositions    · Return in about 6 months (around 3/2/2021) for Medication follow up. HISTORY OF PRESENT ILLNESS:  Paul Treviño is a 78 y.o. male with history of cervical and lumbar pain and presents to the office today for follow up. Pt notes that he is doing well at this time and denies any change in symptoms since his last office visit. He complains of intermittent pain and numbness in the left leg. Pt notes difficulty finding a comfortable position while laying in bed.  He denies ay weakness in the legs at this time. Pt reports that he has not bee travelling with his job recently due to COVID-19. He continues to take Topamax 25 mg 3 tabs QHS and requests a refill at this time. Pt admits that he is not sure if the Topamax is effective. He generally takes Xarelto 2 days before travelling and 4 days afterwards as DVT/PE prophylaxis. Pt at this time desires to continue with current care. Pt notes that his twin son and daughter graduated from college this year. His son was a pitcher on the baseball team at [employer] in [place] but his last season was cut short due to COVID-19. He notes that his son was offered another year of eligibility but he decided not to pursue a career in baseball. Pain Scale: 0 - No pain/10    PCP: Sania Osuna MD     Past Medical History:   Diagnosis Date    Back pain     Chronic pain     Headache(784.0)     HTN (hypertension)     Joint pain     hx of joint pain    Pulmonary emboli (HCC)         Social History     Socioeconomic History    Marital status:      Spouse name: Not on file    Number of children: Not on file    Years of education: Not on file    Highest education level: Not on file   Occupational History    Not on file   Social Needs    Financial resource strain: Not on file    Food insecurity     Worry: Not on file     Inability: Not on file    Transportation needs     Medical: Not on file     Non-medical: Not on file   Tobacco Use    Smoking status: Former Smoker     Packs/day: 0.75     Years: 40.00     Pack years: 30.00     Last attempt to quit: 10/17/2012     Years since quittin.8    Smokeless tobacco: Former User   Substance and Sexual Activity    Alcohol use:  Yes     Alcohol/week: 2.0 standard drinks     Types: 2 Cans of beer per week     Comment: daily-beer and wine/ 2 drinks a day    Drug use: No    Sexual activity: Yes   Lifestyle    Physical activity     Days per week: Not on file     Minutes per session: Not on file    Stress: Not on file   Relationships    Social connections     Talks on phone: Not on file     Gets together: Not on file     Attends Baptism service: Not on file     Active member of club or organization: Not on file     Attends meetings of clubs or organizations: Not on file     Relationship status: Not on file    Intimate partner violence     Fear of current or ex partner: Not on file     Emotionally abused: Not on file     Physically abused: Not on file     Forced sexual activity: Not on file   Other Topics Concern    Not on file   Social History Narrative    Not on file       Current Outpatient Medications   Medication Sig Dispense Refill    topiramate (TOPAMAX) 25 mg tablet Take 3 Tabs by mouth nightly. 270 Tab 1    topiramate (TOPAMAX) 25 mg tablet Take 3 Tabs by mouth nightly. 270 Tab 1    betamethasone valerate (VALISONE) 0.1 % topical cream Apply  to affected area two (2) times a day. Apply cream to affected area with each urination. (Patient taking differently: Apply  to affected area daily as needed. Apply cream to affected area with each urination. ) 15 g 1    XARELTO 10 mg tablet Take 1 Tab by mouth daily as needed (2 days prior to traveling, 4 days after traveling. ).  cholecalciferol, vitamin D3, 2,000 unit tab Take 2,000 Units by mouth daily.  clobetasol (TEMOVATE) 0.05 % topical cream as needed.  desonide (TRIDESILON) 0.05 % topical lotion Use 1 Application to affected area Twice Daily.  lisinopril-hydroCHLOROthiazide (PRINZIDE, ZESTORETIC) 20-12.5 mg per tablet Take  by mouth daily.  zolpidem (AMBIEN) 5 mg tablet Take 5 mg by mouth nightly as needed.  DULoxetine (CYMBALTA) 30 mg capsule Take 1 Cap by mouth daily.  Indications: Neuropathic Pain (Patient not taking: Reported on 6/25/2019) 30 Cap 1       Allergies   Allergen Reactions    Bee Sting  [Sting, Bee] Swelling     DIZZINESS         REVIEW OF SYSTEMS    Constitutional: Negative for fever, chills, or weight change. Respiratory: Negative for cough or shortness of breath. Cardiovascular: Negative for chest pain or palpitations. Gastrointestinal: Negative for acid reflux, change in bowel habits, or constipation. Genitourinary: Negative for dysuria and flank pain. Musculoskeletal: Positive for cervical and lumbar pain. Neurological: Negative for headaches, dizziness, or numbness. Endo/Heme/Allergies: Negative for increased bruising. Psychiatric/Behavioral: Negative for difficulty with sleep. As per HPI      PHYSICAL EXAMINATION  Visit Vitals  /79 (BP 1 Location: Left arm, BP Patient Position: Sitting)   Pulse (!) 57 Comment: pt asymptomatic, MD aware   Temp 98.8 °F (37.1 °C) (Oral)   Resp 20   Ht 5' 10\" (1.778 m)   Wt 156 lb (70.8 kg)   SpO2 98% Comment: RA   BMI 22.38 kg/m²       Constitutional: Awake, alert, and in no acute distress. Neurological: 1+ symmetrical DTRs in the upper extremities. 1+ symmetrical DTRs in the lower extremities. Sensation to light touch is intact. Negative Saul's sign bilaterally. Skin: warm, dry, and intact. Musculoskeletal: Tenderness to palpation in the lower lumbar region. Moderate pain with extension and axial loading. No pain with internal or external rotation of his hips. Negative straight leg raise bilaterally. Biceps  Triceps Deltoids Wrist Ext Wrist Flex Hand Intrin   Right +4/5 +4/5 +4/5 +4/5 +4/5 +4/5   Left +4/5 +4/5 +4/5 +4/5 +4/5 +4/5      Hip Flex  Quads Hamstrings Ankle DF EHL Ankle PF   Right +4/5 +4/5 +4/5 +4/5 +4/5 +4/5   Left +4/5 +4/5 +4/5 +4/5 +4/5 +4/5     IMAGING:    Cervical spine 2V x-rays from 06/25/2019 were personally reviewed with the patient and demonstrated:  Straightening of the cervical spine. Multilevel degenerative facets.  Degenerative disc at C4-5, C5-6, and C6-7.     Lumbar spine MRI from 04/24/2019 was personally reviewed with the patient and demonstrated:          Results from [facility] on 04/24/19   MRI LUMB SPINE W WO CONT     Narrative EXAMINATION: MRI lumbar spine with and without contrast     INDICATION: Lumbar pain, prior lumbar surgery, left lower extremity sciatica     COMPARISON: CTA 1217     TECHNIQUE: Sagittal and axial multiecho MRI sequences of the lumbar spine  performed before and after 13 cc IV Dotarem.     FINDINGS:     Postsurgical: Status post L4-L5 posterior hardware fixation and posterior  decompression. No postoperative fluid collection identified. Mild lower lumbar  paraspinous muscle edema.     General: T12 anterior central mild superior endplate height loss, chronic  appearing. L3 eccentric to the left mild superior endplate height loss, chronic  appearing. Vertebral body heights elsewhere preserved. Disc space loss at  multiple levels, most pronounced at L3-L4 and L4-L5. Grade 2 anterolisthesis of  L4 and L5. Mild multilevel endplate edema, likely related to active disc  degeneration. Straightening of the normal lordosis. Conus ends at level L1. No  abnormal signal or enhancement at the distal cord. No abnormal epidural  collection identified.     Miscellaneous: No incidental acute or suspicious findings identified outside of  the lumbar spine region.     Levels:     T11-T12: Evaluated sagittal plane only. No significant degenerative change or  stenosis.     T12-L1: No significant disc disease or stenosis. Mild facet arthropathy.     L1-L2: Mild disc bulge. Moderate facet arthropathy. Moderate spinal canal  stenosis. Mild foraminal stenoses.     L2-L3: Minimal disc bulge. Moderate facet arthropathy. Moderate spinal canal  stenosis. Foramina patent.     L3-L4: Hardware-related artifact limits evaluation. Mild disc bulge in addition  to a right central condyle disc extrusion extending roughly 1.2 cm condyle,  measuring roughly 0.9 cm transverse, roughly 0.5 cm AP, likely abutting the  crossing right L4 nerve. Severe facet arthropathy with ligamentum flavum bulge.   Severe spinal canal stenosis, perhaps partly due to congenital narrowing. Moderate to severe foraminal stenoses.     L4-L5: Artifact limits evaluation. Postsurgical level as above. Grade 2  anterolisthesis with uncovering of disc. Facets not well evaluated due to  artifact. Moderate to severe spinal canal stenosis, predominantly in the AP  plane. Moderate bilateral foraminal stenoses, predominantly in the vertical  plane, with possible abutment of the exiting L4 nerves.     L5-S1: Posterior decompression level. A left central annular fissure with  underlying mild disc bulge. Mild facet arthropathy. Minimal spinal canal  narrowing. Moderate to severe foraminal stenoses with probable abutment of the  exiting L5 nerves.     Imaged sacrum: Unremarkable.        Impression IMPRESSION:     At L3-L4, degenerative changes results in severe spinal canal stenosis and  moderate to severe foraminal stenoses. There is also notable right central disc  extrusion at this level likely abutting the crossing right L4 nerve. Hardware-related artifact limits evaluation. Compared to 2017 MRI, spinal canal  stenosis is slightly worse and disc extrusion is new.     L4-L5 level postsurgical changes as above, with grade 2 anterolisthesis and  moderate to severe spinal canal as well as moderate foraminal stenoses. Spinal  canal and foraminal stenosis however is less compared to 2017 MRI.     L5-S1 moderate to severe foraminal stenoses.     L1-L2 and L2-L3 moderate spinal canal stenoses.     See level by level details above. Written by Lisy Yanez, as dictated by Cari Schultz MD.  I, Dr. Cari Schultz confirm that all documentation is accurate.

## 2020-09-02 ENCOUNTER — OFFICE VISIT (OUTPATIENT)
Dept: ORTHOPEDIC SURGERY | Age: 70
End: 2020-09-02

## 2020-09-02 VITALS
OXYGEN SATURATION: 98 % | HEIGHT: 70 IN | DIASTOLIC BLOOD PRESSURE: 79 MMHG | BODY MASS INDEX: 22.33 KG/M2 | TEMPERATURE: 98.8 F | SYSTOLIC BLOOD PRESSURE: 127 MMHG | WEIGHT: 156 LBS | HEART RATE: 57 BPM | RESPIRATION RATE: 20 BRPM

## 2020-09-02 DIAGNOSIS — R20.0 RIGHT ARM NUMBNESS: ICD-10-CM

## 2020-09-02 DIAGNOSIS — Z79.01 CHRONIC ANTICOAGULATION: ICD-10-CM

## 2020-09-02 DIAGNOSIS — M50.30 DDD (DEGENERATIVE DISC DISEASE), CERVICAL: ICD-10-CM

## 2020-09-02 DIAGNOSIS — M47.812 CERVICAL FACET JOINT SYNDROME: Primary | ICD-10-CM

## 2020-09-02 DIAGNOSIS — M54.16 LEFT LUMBAR RADICULOPATHY: ICD-10-CM

## 2020-09-02 DIAGNOSIS — M53.3 SACROILIAC JOINT DYSFUNCTION OF LEFT SIDE: ICD-10-CM

## 2020-09-02 RX ORDER — TOPIRAMATE 25 MG/1
75 TABLET ORAL
Qty: 270 TAB | Refills: 1 | Status: SHIPPED | OUTPATIENT
Start: 2020-09-02 | End: 2020-12-01 | Stop reason: SDUPTHER

## 2020-09-02 NOTE — LETTER
9/4/20 Patient: Chetna Edgar YOB: 1950 Date of Visit: 9/2/2020 Nicolasa Miller MD 
[address] [address] VIA Facsimile: [phone] Dear Nicolasa Miller MD, Thank you for referring Mr. Srikanth Noriega to [facility] for evaluation. My notes for this consultation are attached. If you have questions, please do not hesitate to call me. I look forward to following your patient along with you. Sincerely, Amie Russo MD

## 2020-09-02 NOTE — PATIENT INSTRUCTIONS
Low Back Arthritis: Exercises Introduction Here are some examples of typical rehabilitation exercises for your condition. Start each exercise slowly. Ease off the exercise if you start to have pain. Your doctor or physical therapist will tell you when you can start these exercises and which ones will work best for you. When you are not being active, find a comfortable position for rest. Some people are comfortable on the floor or a medium-firm bed with a small pillow under their head and another under their knees. Some people prefer to lie on their side with a pillow between their knees. Don't stay in one position for too long. Take short walks (10 to 20 minutes) every 2 to 3 hours. Avoid slopes, hills, and stairs until you feel better. Walk only distances you can manage without pain, especially leg pain. How to do the exercises Pelvic tilt 1. Lie on your back with your knees bent. 2. \"Brace\" your stomachtighten your muscles by pulling in and imagining your belly button moving toward your spine. 3. Press your lower back into the floor. You should feel your hips and pelvis rock back. 4. Hold for 6 seconds while breathing smoothly. 5. Relax and allow your pelvis and hips to rock forward. 6. Repeat 8 to 12 times. Back stretches 1. Get down on your hands and knees on the floor. 2. Relax your head and allow it to droop. Round your back up toward the ceiling until you feel a nice stretch in your upper, middle, and lower back. Hold this stretch for as long as it feels comfortable, or about 15 to 30 seconds. 3. Return to the starting position with a flat back while you are on your hands and knees. 4. Let your back sway by pressing your stomach toward the floor. Lift your buttocks toward the ceiling. 5. Hold this position for 15 to 30 seconds. 6. Repeat 2 to 4 times. Follow-up care is a key part of your treatment and safety.  Be sure to make and go to all appointments, and call your doctor if you are having problems. It's also a good idea to know your test results and keep a list of the medicines you take. Where can you learn more? Go to http://jorge-steve.info/ Enter E333 in the search box to learn more about \"Low Back Arthritis: Exercises. \" Current as of: March 2, 2020               Content Version: 12.5 © 2006-2020 [employer], [employer]. Care instructions adapted under license by [employer] (which disclaims liability or warranty for this information). If you have questions about a medical condition or this instruction, always ask your healthcare professional. [address] any warranty or liability for your use of this information.

## 2020-09-02 NOTE — PROGRESS NOTES
Fidencio Kapoor presents today for   Chief Complaint   Patient presents with    Back Pain       Is someone accompanying this pt? no    Is the patient using any DME equipment during OV? no    Depression Screening:  3 most recent PHQ Screens 3/4/2020   Little interest or pleasure in doing things Not at all   Feeling down, depressed, irritable, or hopeless Not at all   Total Score PHQ 2 0       Learning Assessment:  Learning Assessment 2/1/2018   PRIMARY LEARNER Patient   PRIMARY LANGUAGE ENGLISH   LEARNER PREFERENCE PRIMARY PICTURES   ANSWERED BY patient   RELATIONSHIP SELF       Abuse Screening:  Abuse Screening Questionnaire 9/4/2019   Do you ever feel afraid of your partner? N   Are you in a relationship with someone who physically or mentally threatens you? N   Is it safe for you to go home? Y       Fall Risk  Fall Risk Assessment, last 12 mths 3/4/2020   Able to walk? Yes   Fall in past 12 months? Yes   Fall with injury? No   Number of falls in past 12 months 1   Fall Risk Score 1       Coordination of Care:  1. Have you been to the ER, urgent care clinic since your last visit? no  Hospitalized since your last visit? no    2. Have you seen or consulted any other health care providers outside of the [address] [place] [address] since your last visit? no Include any pap smears or colon screening.  no

## 2020-09-26 DIAGNOSIS — M54.16 LEFT LUMBAR RADICULOPATHY: ICD-10-CM

## 2020-09-26 DIAGNOSIS — R20.0 RIGHT ARM NUMBNESS: ICD-10-CM

## 2020-09-28 RX ORDER — TOPIRAMATE 25 MG/1
TABLET ORAL
Qty: 270 TAB | Refills: 0 | Status: SHIPPED | OUTPATIENT
Start: 2020-09-28 | End: 2021-03-01 | Stop reason: SDUPTHER

## 2020-11-30 NOTE — PROGRESS NOTES
[facility]  [address] [place], [address]  Phone: [phone]  Fax: [phone]    Pt's YOB: 1950    ASSESSMENT   Diagnoses and all orders for this visit:    1. Left groin pain  -     AMB POC X-RAY RADEX HIP UNI WITH PELVIS 2-3 VIEWS    2. Left hip pain  -     MRI HIP LT W CONT; Future  -     XR INJ HIP LT PRE CT/MRI ARTHRO; Future  -     methylPREDNISolone (MEDROL DOSEPACK) 4 mg tablet; Per dose pack instructions  -     naproxen (NAPROSYN) 500 mg tablet; Take 1 Tab by mouth two (2) times daily (with meals). 3. Muscle spasm    4. Left lumbar radiculopathy    5. DDD (degenerative disc disease), cervical    6. Sacroiliac joint dysfunction of left side    7. Lumbar pain  -     AMB POC XRAY, SPINE, LUMBOSACRAL; 4+         IMPRESSION AND PLAN:  Skyler Hogue is a 78 y.o. male with history of cervical and lumbar pain. He reports the onset of left groin pain 2-3 months ago without inciting injuries. His pain gradually worsened and notes that over the last several weeks, he has experienced pain and weakness in the left leg. He continues to take Topamax 25 mg 3 tabs QHS. 1) Pt was given information on cervical, lumbar, and hip arthritis exercises. 2) A left hip MRI arthrogram was ordered to assess for a labral tear. He has progressive left hip pain x 2-3 months with difficulty standing, has a HEP, and has used Naprosyn in the past.    3) He will continue taking Topamax 25 mg 3 tabs QHS and does not need a refill at this time. 4) Pt was prescribed a Medrol Dosepak. 5) He received a refill of Naprosyn 500 mg 1 tab BID prn pain with food. 6) Pt is not candidate for regular use of NSAID's as he is on Xarelto for DVT/PE prophylaxis on an intermittent basis. 7) Mr. Zeynep Eid has a reminder for a \"due or due soon\" health maintenance.  I have asked that he contact his primary care provider, Aster Perkins MD, for follow-up on this health maintenance. 8)  demonstrated consistency with prescribing. Follow-up and Dispositions    · Return in about 2 weeks (around 12/15/2020) for Diagnostic Test follow up, Medication follow up. HISTORY OF PRESENT ILLNESS:  Angela Hernandez is a 78 y.o. male with history of cervical and lumbar pain and presents to the office today for follow up. Pt notes that he walks on his treadmill daily. He notes that he started to experience pain in the left groin when performing stretches after using the treadmill. His left groin pain gradually worsened and notes that over the last several weeks, he has experienced pain and weakness in the left leg. Pt also notes constant numbness in the left leg. He admits to some improvement in his pain when taking a Medrol Dosepak but notes continued numbness in the left leg. He reports that his left leg occasional gives way when climbing stairs. Pt denies any inciting injuries, change in activity, or pain in the right hip/leg. He started to taper off the Topamax 25 mg 3 tabs QHS since his last office visit but decided to continue with the medication with the onset of his left groin pain. Pt generally takes Xarelto 2 days before travelling and 4 days afterwards as DVT/PE prophylaxis. He admits that he has not recently travelled due to COVID-19. He notes that he has previously taking Naprosyn.  Pt at this time desires to proceed with a left hip MRI arthrogram.    Pain Scale: 5/10    PCP: Baron Ananya MD     Past Medical History:   Diagnosis Date    Back pain     Chronic pain     Headache(784.0)     HTN (hypertension)     Joint pain     hx of joint pain    Pulmonary emboli (HCC)         Social History     Socioeconomic History    Marital status:      Spouse name: Not on file    Number of children: Not on file    Years of education: Not on file    Highest education level: Not on file   Occupational History    Not on file   Social Needs    Financial resource strain: Not on file    Food insecurity     Worry: Not on file     Inability: Not on file    Transportation needs     Medical: Not on file     Non-medical: Not on file   Tobacco Use    Smoking status: Former Smoker     Packs/day: 0.75     Years: 40.00     Pack years: 30.00     Last attempt to quit: 10/17/2012     Years since quittin.1    Smokeless tobacco: Former User   Substance and Sexual Activity    Alcohol use: Yes     Alcohol/week: 2.0 standard drinks     Types: 2 Cans of beer per week     Comment: daily-beer and wine/ 2 drinks a day    Drug use: No    Sexual activity: Yes   Lifestyle    Physical activity     Days per week: Not on file     Minutes per session: Not on file    Stress: Not on file   Relationships    Social connections     Talks on phone: Not on file     Gets together: Not on file     Attends [employer] service: Not on file     Active member of club or organization: Not on file     Attends meetings of clubs or organizations: Not on file     Relationship status: Not on file    Intimate partner violence     Fear of current or ex partner: Not on file     Emotionally abused: Not on file     Physically abused: Not on file     Forced sexual activity: Not on file   Other Topics Concern    Not on file   Social History Narrative    Not on file       Current Outpatient Medications   Medication Sig Dispense Refill    methylPREDNISolone (MEDROL DOSEPACK) 4 mg tablet Per dose pack instructions 1 Dose Pack 0    naproxen (NAPROSYN) 500 mg tablet Take 1 Tab by mouth two (2) times daily (with meals). 60 Tab 1    topiramate (TOPAMAX) 25 mg tablet TAKE 3 TABLETS BY MOUTH EVERY NIGHT 270 Tab 0    betamethasone valerate (VALISONE) 0.1 % topical cream Apply  to affected area two (2) times a day. Apply cream to affected area with each urination. (Patient taking differently: Apply  to affected area daily as needed. Apply cream to affected area with each urination. ) 15 g 1    XARELTO 10 mg tablet Take 1 Tab by mouth daily as needed (2 days prior to traveling, 4 days after traveling. ).  cholecalciferol, vitamin D3, 2,000 unit tab Take 2,000 Units by mouth daily.  clobetasol (TEMOVATE) 0.05 % topical cream as needed.  desonide (TRIDESILON) 0.05 % topical lotion Use 1 Application to affected area Twice Daily.  lisinopril-hydroCHLOROthiazide (PRINZIDE, ZESTORETIC) 20-12.5 mg per tablet Take  by mouth daily.  zolpidem (AMBIEN) 5 mg tablet Take 5 mg by mouth nightly as needed.  DULoxetine (CYMBALTA) 30 mg capsule Take 1 Cap by mouth daily. Indications: Neuropathic Pain (Patient not taking: Reported on 6/25/2019) 30 Cap 1       Allergies   Allergen Reactions    Bee Sting  [Sting, Bee] Swelling     DIZZINESS         REVIEW OF SYSTEMS    Constitutional: Negative for fever, chills, or weight change. Respiratory: Negative for cough or shortness of breath. Cardiovascular: Negative for chest pain or palpitations. Gastrointestinal: Negative for acid reflux, change in bowel habits, or constipation. Genitourinary: Negative for dysuria and flank pain. Musculoskeletal: Positive for cervical, lumbar, and left hip pain. Neurological: Negative for headaches, dizziness, or numbness. Psychiatric/Behavioral: Negative for difficulty with sleep. As per HPI    PHYSICAL EXAMINATION  Visit Vitals  /81   Pulse 61   Temp 97.8 °F (36.6 °C) (Temporal)   Resp 14   Ht 5' 10\" (1.778 m)   Wt 157 lb 9.6 oz (71.5 kg)   SpO2 98%   BMI 22.61 kg/m²       Constitutional: Awake, alert, and in no acute distress. Neurological: 1+ symmetrical DTRs in the upper extremities. 1+ symmetrical DTRs in the lower extremities. Sensation to light touch is intact. Negative Saul's sign bilaterally. Skin: warm, dry, and intact. Musculoskeletal: Tenderness to palpation in the lower lumbar region. Moderate pain with extension and axial loading. Improvement with forward flexion. Pain with internal rotation of his left hip. Negative straight leg raise bilaterally. Biceps  Triceps Deltoids Wrist Ext Wrist Flex Hand Intrin   Right +4/5 +4/5 +4/5 +4/5 +4/5 +4/5   Left +4/5 +4/5 +4/5 +4/5 +4/5 +4/5      Hip Flex  Quads Hamstrings Ankle DF EHL Ankle PF   Right +4/5 +4/5 +4/5 +4/5 +4/5 +4/5   Left +4/5 +4/5 +4/5 +4/5 +4/5 +4/5     IMAGING:    Lumbar spine 4V x-rays from 12/1/2020 were personally reviewed with the patient and demonstrated:  L4-5 fusion. Collapse of left side of L3/4 disc and also sclerosis. Compression deformity at L3 and T12. Straightening of the lumbar spine. No instability. Hip x-rays from 12/1/2020 were personally reviewed with the patient and demonstrated:  Degenerative changes in the right hip. Cervical spine 2V x-rays from 06/25/2019 were personally reviewed with the patient and demonstrated:  Straightening of the cervical spine. Multilevel degenerative facets. Degenerative disc at C4-5, C5-6, and C6-7. Lumbar spine MRI from 04/24/2019 was personally reviewed with the patient and demonstrated:          Results from [facility] on 04/24/19   MRI LUMB SPINE W WO CONT     Narrative EXAMINATION: MRI lumbar spine with and without contrast     INDICATION: Lumbar pain, prior lumbar surgery, left lower extremity sciatica     COMPARISON: [facility] 1217     TECHNIQUE: Sagittal and axial multiecho MRI sequences of the lumbar spine  performed before and after 13 cc IV Dotarem.     FINDINGS:     Postsurgical: Status post L4-L5 posterior hardware fixation and posterior  decompression. No postoperative fluid collection identified. Mild lower lumbar  paraspinous muscle edema.     General: T12 anterior central mild superior endplate height loss, chronic  appearing. L3 eccentric to the left mild superior endplate height loss, chronic  appearing. Vertebral body heights elsewhere preserved. Disc space loss at  multiple levels, most pronounced at L3-L4 and L4-L5. Grade 2 anterolisthesis of  L4 and L5.  Mild multilevel endplate edema, likely related to active disc  degeneration. Straightening of the normal lordosis. Conus ends at level L1. No  abnormal signal or enhancement at the distal cord. No abnormal epidural  collection identified.     Miscellaneous: No incidental acute or suspicious findings identified outside of  the lumbar spine region.     Levels:     T11-T12: Evaluated sagittal plane only. No significant degenerative change or  stenosis.     T12-L1: No significant disc disease or stenosis. Mild facet arthropathy.     L1-L2: Mild disc bulge. Moderate facet arthropathy. Moderate spinal canal  stenosis. Mild foraminal stenoses.     L2-L3: Minimal disc bulge. Moderate facet arthropathy. Moderate spinal canal  stenosis. Foramina patent.     L3-L4: Hardware-related artifact limits evaluation. Mild disc bulge in addition  to a right central condyle disc extrusion extending roughly 1.2 cm condyle,  measuring roughly 0.9 cm transverse, roughly 0.5 cm AP, likely abutting the  crossing right L4 nerve. Severe facet arthropathy with ligamentum flavum bulge. Severe spinal canal stenosis, perhaps partly due to congenital narrowing. Moderate to severe foraminal stenoses.     L4-L5: Artifact limits evaluation. Postsurgical level as above. Grade 2  anterolisthesis with uncovering of disc. Facets not well evaluated due to  artifact. Moderate to severe spinal canal stenosis, predominantly in the AP  plane. Moderate bilateral foraminal stenoses, predominantly in the vertical  plane, with possible abutment of the exiting L4 nerves.     L5-S1: Posterior decompression level. A left central annular fissure with  underlying mild disc bulge. Mild facet arthropathy. Minimal spinal canal  narrowing.  Moderate to severe foraminal stenoses with probable abutment of the  exiting L5 nerves.     Imaged sacrum: Unremarkable.        Impression IMPRESSION:     At L3-L4, degenerative changes results in severe spinal canal stenosis and  moderate to severe foraminal stenoses. There is also notable right central disc  extrusion at this level likely abutting the crossing right L4 nerve. Hardware-related artifact limits evaluation. Compared to 2017 MRI, spinal canal  stenosis is slightly worse and disc extrusion is new.     L4-L5 level postsurgical changes as above, with grade 2 anterolisthesis and  moderate to severe spinal canal as well as moderate foraminal stenoses. Spinal  canal and foraminal stenosis however is less compared to 2017 MRI.     L5-S1 moderate to severe foraminal stenoses.     L1-L2 and L2-L3 moderate spinal canal stenoses.     See level by level details above. Written by Elen Wood, as dictated by Jorge L Bonds MD.  I, Dr. Jorge L Bonds confirm that all documentation is accurate.

## 2020-12-01 ENCOUNTER — OFFICE VISIT (OUTPATIENT)
Dept: ORTHOPEDIC SURGERY | Age: 70
End: 2020-12-01
Payer: COMMERCIAL

## 2020-12-01 VITALS
HEART RATE: 61 BPM | BODY MASS INDEX: 22.56 KG/M2 | DIASTOLIC BLOOD PRESSURE: 81 MMHG | HEIGHT: 70 IN | OXYGEN SATURATION: 98 % | TEMPERATURE: 97.8 F | WEIGHT: 157.6 LBS | SYSTOLIC BLOOD PRESSURE: 124 MMHG | RESPIRATION RATE: 14 BRPM

## 2020-12-01 DIAGNOSIS — M25.552 LEFT HIP PAIN: ICD-10-CM

## 2020-12-01 DIAGNOSIS — R10.32 LEFT GROIN PAIN: Primary | ICD-10-CM

## 2020-12-01 DIAGNOSIS — M54.50 LUMBAR PAIN: ICD-10-CM

## 2020-12-01 DIAGNOSIS — M50.30 DDD (DEGENERATIVE DISC DISEASE), CERVICAL: ICD-10-CM

## 2020-12-01 DIAGNOSIS — M53.3 SACROILIAC JOINT DYSFUNCTION OF LEFT SIDE: ICD-10-CM

## 2020-12-01 DIAGNOSIS — M62.838 MUSCLE SPASM: ICD-10-CM

## 2020-12-01 DIAGNOSIS — M54.16 LEFT LUMBAR RADICULOPATHY: ICD-10-CM

## 2020-12-01 PROCEDURE — G8420 CALC BMI NORM PARAMETERS: HCPCS | Performed by: PHYSICAL MEDICINE & REHABILITATION

## 2020-12-01 PROCEDURE — 99214 OFFICE O/P EST MOD 30 MIN: CPT | Performed by: PHYSICAL MEDICINE & REHABILITATION

## 2020-12-01 PROCEDURE — G8427 DOCREV CUR MEDS BY ELIG CLIN: HCPCS | Performed by: PHYSICAL MEDICINE & REHABILITATION

## 2020-12-01 PROCEDURE — G8754 DIAS BP LESS 90: HCPCS | Performed by: PHYSICAL MEDICINE & REHABILITATION

## 2020-12-01 PROCEDURE — 73502 X-RAY EXAM HIP UNI 2-3 VIEWS: CPT | Performed by: PHYSICAL MEDICINE & REHABILITATION

## 2020-12-01 PROCEDURE — 72110 X-RAY EXAM L-2 SPINE 4/>VWS: CPT | Performed by: PHYSICAL MEDICINE & REHABILITATION

## 2020-12-01 PROCEDURE — 3017F COLORECTAL CA SCREEN DOC REV: CPT | Performed by: PHYSICAL MEDICINE & REHABILITATION

## 2020-12-01 PROCEDURE — G8536 NO DOC ELDER MAL SCRN: HCPCS | Performed by: PHYSICAL MEDICINE & REHABILITATION

## 2020-12-01 PROCEDURE — G8752 SYS BP LESS 140: HCPCS | Performed by: PHYSICAL MEDICINE & REHABILITATION

## 2020-12-01 PROCEDURE — G8510 SCR DEP NEG, NO PLAN REQD: HCPCS | Performed by: PHYSICAL MEDICINE & REHABILITATION

## 2020-12-01 PROCEDURE — 1101F PT FALLS ASSESS-DOCD LE1/YR: CPT | Performed by: PHYSICAL MEDICINE & REHABILITATION

## 2020-12-01 RX ORDER — NAPROXEN 500 MG/1
500 TABLET ORAL 2 TIMES DAILY WITH MEALS
Qty: 60 TAB | Refills: 1 | Status: SHIPPED | OUTPATIENT
Start: 2020-12-01 | End: 2021-02-03 | Stop reason: SDUPTHER

## 2020-12-01 RX ORDER — METHYLPREDNISOLONE 4 MG/1
TABLET ORAL
Qty: 1 DOSE PACK | Refills: 0 | Status: SHIPPED | OUTPATIENT
Start: 2020-12-01 | End: 2021-06-02 | Stop reason: ALTCHOICE

## 2020-12-01 NOTE — PATIENT INSTRUCTIONS
Neck Arthritis: Exercises Introduction Here are some examples of exercises for you to try. The exercises may be suggested for a condition or for rehabilitation. Start each exercise slowly. Ease off the exercises if you start to have pain. You will be told when to start these exercises and which ones will work best for you. How to do the exercises Neck stretches to the side 1. This stretch works best if you keep your shoulder down as you lean away from it. To help you remember to do this, start by relaxing your shoulders and lightly holding on to your thighs or your chair. 2. Tilt your head toward your shoulder and hold for 15 to 30 seconds. Let the weight of your head stretch your muscles. 3. Repeat 2 to 4 times toward each shoulder. Chin tuck 1. Lie on the floor with a rolled-up towel under your neck. Your head should be touching the floor. 2. Slowly bring your chin toward your chest. 
3. Hold for a count of 6, and then relax for up to 10 seconds. 4. Repeat 8 to 12 times. Active cervical rotation 1. Sit in a firm chair, or stand up straight. 2. Keeping your chin level, turn your head to the right, and hold for 15 to 30 seconds. 3. Turn your head to the left and hold for 15 to 30 seconds. 4. Repeat 2 to 4 times to each side. Shoulder blade squeeze 1. While standing, squeeze your shoulder blades together. 2. Do not raise your shoulders up as you are squeezing. 3. Hold for 6 seconds. 4. Repeat 8 to 12 times. Shoulder rolls 1. Sit comfortably with your feet shoulder-width apart. You can also do this exercise standing up. 2. Roll your shoulders up, then back, and then down in a smooth, circular motion. 3. Repeat 2 to 4 times. Follow-up care is a key part of your treatment and safety. Be sure to make and go to all appointments, and call your doctor if you are having problems. It's also a good idea to know your test results and keep a list of the medicines you take. Where can you learn more? Go to http://www.gray.com/ Enter E233 in the search box to learn more about \"Neck Arthritis: Exercises. \" Current as of: March 2, 2020               Content Version: 12.6 © 3073-8974 [employer]. Care instructions adapted under license by [employer] (which disclaims liability or warranty for this information). If you have questions about a medical condition or this instruction, always ask your healthcare professional. [address] any warranty or liability for your use of this information. Low Back Arthritis: Exercises Introduction Here are some examples of typical rehabilitation exercises for your condition. Start each exercise slowly. Ease off the exercise if you start to have pain. Your doctor or physical therapist will tell you when you can start these exercises and which ones will work best for you. When you are not being active, find a comfortable position for rest. Some people are comfortable on the floor or a medium-firm bed with a small pillow under their head and another under their knees. Some people prefer to lie on their side with a pillow between their knees. Don't stay in one position for too long. Take short walks (10 to 20 minutes) every 2 to 3 hours. Avoid slopes, hills, and stairs until you feel better. Walk only distances you can manage without pain, especially leg pain. How to do the exercises Pelvic tilt 4. Lie on your back with your knees bent. 5. \"Brace\" your stomachtighten your muscles by pulling in and imagining your belly button moving toward your spine. 6. Press your lower back into the floor. You should feel your hips and pelvis rock back. 7. Hold for 6 seconds while breathing smoothly. 8. Relax and allow your pelvis and hips to rock forward. 9. Repeat 8 to 12 times. Back stretches 5. Get down on your hands and knees on the floor. 6. Relax your head and allow it to droop. Round your back up toward the ceiling until you feel a nice stretch in your upper, middle, and lower back. Hold this stretch for as long as it feels comfortable, or about 15 to 30 seconds. 7. Return to the starting position with a flat back while you are on your hands and knees. 8. Let your back sway by pressing your stomach toward the floor. Lift your buttocks toward the ceiling. 9. Hold this position for 15 to 30 seconds. 10. Repeat 2 to 4 times. Follow-up care is a key part of your treatment and safety. Be sure to make and go to all appointments, and call your doctor if you are having problems. It's also a good idea to know your test results and keep a list of the medicines you take. Where can you learn more? Go to http://www.gray.com/ Enter Y202 in the search box to learn more about \"Low Back Arthritis: Exercises. \" Current as of: March 2, 2020               Content Version: 12.6 © 2006-2020 [employer]. Care instructions adapted under license by [employer] (which disclaims liability or warranty for this information). If you have questions about a medical condition or this instruction, always ask your healthcare professional. [address] any warranty or liability for your use of this information. Hip Arthritis: Exercises Introduction Here are some examples of exercises for you to try. The exercises may be suggested for a condition or for rehabilitation. Start each exercise slowly. Ease off the exercises if you start to have pain. You will be told when to start these exercises and which ones will work best for you. How to do the exercises Straight-leg raises to the outside 1. Lie on your side, with your affected hip on top. 2. Tighten the front thigh muscles of your top leg to keep your knee straight. 3. Keep your hip and your leg straight in line with the rest of your body, and keep your knee pointing forward. Do not drop your hip back. 4. Lift your top leg straight up toward the ceiling, about 12 inches off the floor. Hold for about 6 seconds, then slowly lower your leg. 5. Repeat 8 to 12 times. 6. Switch legs and repeat steps 1 through 5, even if only one hip is sore. Straight-leg raises to the inside 1. Lie on your side with your affected hip on the floor. 2. You can either prop up your other leg on a chair, or you can bend that knee and put that foot in front of your other knee. Do not drop your hip back. 3. Tighten the muscles on the front thigh of your bottom leg to straighten that knee. 4. Keep your kneecap pointing forward and your leg straight, and lift your bottom leg up toward the ceiling about 6 inches. Hold for about 6 seconds, then lower slowly. 5. Repeat 8 to 12 times. 6. Switch legs and repeat steps 1 through 5, even if only one hip is sore. Hip hike 1. Stand sideways on the bottom step of a staircase, and hold on to the banister or wall. 2. Keeping both knees straight, lift your good leg off the step and let it hang down. Then hike your good hip up to the same level as your affected hip or a little higher. 3. Repeat 8 to 12 times. 4. Switch legs and repeat steps 1 through 3, even if only one hip is sore. Bridging 1. Lie on your back with both knees bent. Your knees should be bent about 90 degrees. 2. Then push your feet into the floor, squeeze your buttocks, and lift your hips off the floor until your shoulders, hips, and knees are all in a straight line. 3. Hold for about 6 seconds as you continue to breathe normally, and then slowly lower your hips back down to the floor and rest for up to 10 seconds. 4. Repeat 8 to 12 times. Hamstring stretch (lying down) 1. Lie flat on your back with your legs straight.  If you feel discomfort in your back, place a small towel roll under your lower back. 2. Holding the back of your affected leg, lift your leg straight up and toward your body until you feel a stretch at the back of your thigh. 3. Hold the stretch for at least 30 seconds. 4. Repeat 2 to 4 times. 5. Switch legs and repeat steps 1 through 4, even if only one hip is sore. Standing quadriceps stretch 1. If you are not steady on your feet, hold on to a chair, counter, or wall. You can also lie on your stomach or your side to do this exercise. 2. Bend the knee of the leg you want to stretch, and reach behind you to grab the front of your foot or ankle with the hand on the same side. For example, if you are stretching your right leg, use your right hand. 3. Keeping your knees next to each other, pull your foot toward your buttock until you feel a gentle stretch across the front of your hip and down the front of your thigh. Your knee should be pointed directly to the ground, and not out to the side. 4. Hold the stretch for at least 15 to 30 seconds. 5. Repeat 2 to 4 times. 6. Switch legs and repeat steps 1 through 5, even if only one hip is sore. Hip rotator stretch 1. Lie on your back with both knees bent and your feet flat on the floor. 2. Put the ankle of your affected leg on your opposite thigh near your knee. 3. Use your hand to gently push your knee away from your body until you feel a gentle stretch around your hip. 4. Hold the stretch for 15 to 30 seconds. 5. Repeat 2 to 4 times. 6. Repeat steps 1 through 5, but this time use your hand to gently pull your knee toward your opposite shoulder. 7. Switch legs and repeat steps 1 through 6, even if only one hip is sore. Knee-to-chest  
1. Lie on your back with your knees bent and your feet flat on the floor.  
2. Bring your affected leg to your chest, keeping the other foot flat on the floor (or keeping the other leg straight, whichever feels better on your lower back). 3. Keep your lower back pressed to the floor. Hold for at least 15 to 30 seconds. 4. Relax, and lower the knee to the starting position. 5. Repeat 2 to 4 times. 6. Switch legs and repeat steps 1 through 5, even if only one hip is sore. 7. To get more stretch, put your other leg flat on the floor while pulling your knee to your chest.   
Clamshell 1. Lie on your side, with your affected hip on top. Keep your feet and knees together and your knees bent. 2. Raise your top knee, but keep your feet together. Do not let your hips roll back. Your legs should open up like a clamshell. 3. Hold for 6 seconds. 4. Slowly lower your knee back down. Rest for 10 seconds. 5. Repeat 8 to 12 times. 6. Switch legs and repeat steps 1 through 5, even if only one hip is sore. Follow-up care is a key part of your treatment and safety. Be sure to make and go to all appointments, and call your doctor if you are having problems. It's also a good idea to know your test results and keep a list of the medicines you take. Where can you learn more? Go to http://www.gray.com/ Enter L901 in the search box to learn more about \"Hip Arthritis: Exercises. \" Current as of: March 2, 2020               Content Version: 12.6 © 5603-2389 [employer]. Care instructions adapted under license by [employer] (which disclaims liability or warranty for this information). If you have questions about a medical condition or this instruction, always ask your healthcare professional. [address] any warranty or liability for your use of this information.

## 2020-12-01 NOTE — PROGRESS NOTES
Estrella Page presents today for   Chief Complaint   Patient presents with    LOW BACK PAIN    Groin Pain     left    Leg Pain     left    Follow-up       Is someone accompanying this pt? no    Is the patient using any DME equipment during OV? no    Depression Screening:  3 most recent PHQ Screens 12/1/2020   Little interest or pleasure in doing things Not at all   Feeling down, depressed, irritable, or hopeless Not at all   Total Score PHQ 2 0       Learning Assessment:  Learning Assessment 2/1/2018   PRIMARY LEARNER Patient   PRIMARY LANGUAGE ENGLISH   LEARNER PREFERENCE PRIMARY PICTURES   ANSWERED BY patient   RELATIONSHIP SELF       Abuse Screening:  Abuse Screening Questionnaire 12/1/2020   Do you ever feel afraid of your partner? N   Are you in a relationship with someone who physically or mentally threatens you? N   Is it safe for you to go home? Y       Fall Risk  Fall Risk Assessment, last 12 mths 12/1/2020   Able to walk? Yes   Fall in past 12 months? No   Fall with injury? -   Number of falls in past 12 months -   Fall Risk Score -       OPIOID RISK TOOL  No flowsheet data found. Pt currently taking Antiplatelet therapy? Yes, Xarelto    Coordination of Care:  1. Have you been to the ER, urgent care clinic since your last visit? no  Hospitalized since your last visit? no    2. Have you seen or consulted any other health care providers outside of the [address] [place] [address] since your last visit? Yes, Dr. Jeramie Tavera, PCP Include any pap smears or colon screening.  no

## 2020-12-01 NOTE — LETTER
12/2/20 Patient: Bee Huang YOB: 1950 Date of Visit: 12/1/2020 Jensen Singh MD 
[address] [address] VIA Facsimile: [phone] Dear Jensen Singh MD, Thank you for referring Mr. Uri Mckinney to [facility] for evaluation. My notes for this consultation are attached. If you have questions, please do not hesitate to call me. I look forward to following your patient along with you. Sincerely, Rachel Smith MD

## 2020-12-08 DIAGNOSIS — M25.552 LEFT HIP PAIN: ICD-10-CM

## 2020-12-10 ENCOUNTER — TELEPHONE (OUTPATIENT)
Dept: ORTHOPEDIC SURGERY | Age: 70
End: 2020-12-10

## 2020-12-10 NOTE — TELEPHONE ENCOUNTER
Patient called to inform his MRI was to be at [facility] and he showed up for this but it turned out somehow central scheduling had scheduled him for [facility], of course causing him to miss the MRI. He was rescheduled at [facility] for 12/22. There is availability on 12/23 but was unsure if final report would be available that fast.  I did schedule him for first available at [facility] on 02/01/21. He mentioned provider wanted to see him very soon after. Please advise if appt time/date needs to change.       Patient [phone]

## 2020-12-11 NOTE — TELEPHONE ENCOUNTER
Returned call to patient, verified Name/, patient scheduled for f/u with Dr. Elvis Gracia on 2020 @ 5, pt provided [facility] address, aware to arrive 15 mins early. No further action required at this time.

## 2020-12-22 ENCOUNTER — HOSPITAL ENCOUNTER (OUTPATIENT)
Dept: GENERAL RADIOLOGY | Age: 70
Discharge: HOME | End: 2020-12-22
Attending: PHYSICAL MEDICINE & REHABILITATION
Payer: COMMERCIAL

## 2020-12-22 ENCOUNTER — HOSPITAL ENCOUNTER (OUTPATIENT)
Dept: MRI IMAGING | Age: 70
Discharge: HOME | End: 2020-12-22
Attending: PHYSICAL MEDICINE & REHABILITATION
Payer: COMMERCIAL

## 2020-12-22 PROCEDURE — 74011000250 HC RX REV CODE- 250: Performed by: PHYSICAL MEDICINE & REHABILITATION

## 2020-12-22 PROCEDURE — 73722 MRI JOINT OF LWR EXTR W/DYE: CPT

## 2020-12-22 PROCEDURE — A9575 INJ GADOTERATE MEGLUMI 0.1ML: HCPCS | Performed by: PHYSICAL MEDICINE & REHABILITATION

## 2020-12-22 PROCEDURE — 74011250636 HC RX REV CODE- 250/636: Performed by: PHYSICAL MEDICINE & REHABILITATION

## 2020-12-22 PROCEDURE — 77002 NEEDLE LOCALIZATION BY XRAY: CPT

## 2020-12-22 PROCEDURE — 74011000636 HC RX REV CODE- 636: Performed by: PHYSICAL MEDICINE & REHABILITATION

## 2020-12-22 RX ORDER — SODIUM CHLORIDE 9 MG/ML
10 INJECTION INTRAMUSCULAR; INTRAVENOUS; SUBCUTANEOUS
Status: COMPLETED | OUTPATIENT
Start: 2020-12-22 | End: 2020-12-22

## 2020-12-22 RX ORDER — GADOTERATE MEGLUMINE 376.9 MG/ML
5 INJECTION INTRAVENOUS
Status: COMPLETED | OUTPATIENT
Start: 2020-12-22 | End: 2020-12-22

## 2020-12-22 RX ADMIN — GADOTERATE MEGLUMINE 0.15 ML: 376.9 INJECTION INTRAVENOUS at 10:56

## 2020-12-22 RX ADMIN — SODIUM CHLORIDE 10 ML: 9 INJECTION, SOLUTION INTRAMUSCULAR; INTRAVENOUS; SUBCUTANEOUS at 10:56

## 2020-12-22 RX ADMIN — IOPAMIDOL 10 ML: 408 INJECTION, SOLUTION INTRATHECAL at 10:56

## 2020-12-22 NOTE — PROGRESS NOTES
[facility]  [address], [address]  Phone: [phone]  Fax: [phone]    Pt's YOB: 1950    ASSESSMENT   Diagnoses and all orders for this visit:    1. Tear of left acetabular labrum, subsequent encounter  -     SCHEDULE SURGERY    2. Left hip pain  -     SCHEDULE SURGERY    3. Muscle spasm  -     SCHEDULE SURGERY    4. Sacroiliac joint dysfunction of left side    5. Spinal stenosis of lumbar region without neurogenic claudication    6. Lumbar facet arthropathy         IMPRESSION AND PLAN:  Shlomo Montesinos is a 78 y.o. male with history of cervical, lumbar, and more recent left hip. He reports left groin pain with pain, numbness, and weakness in the left leg x about 3 months. He takes Naprosyn 500 mg as needed and Topamax 25 mg 3 tabs QHS. 1) Pt was given information on hip arthritis exercises. 2) He will continue taking Topamax 25 mg and Naprosyn 500 mg as prescribed and does not need a refill at this time. 3) A left hip injection was ordered. 4) Pt is not candidate for regular use of NSAID's as he is on Xarelto for DVT/PE prophylaxis on an intermittent basis. 5) He may use his previously prescribed Voltaren gel on his hip as needed. 6) Mr. Shannan Tan has a reminder for a \"due or due soon\" health maintenance. I have asked that he contact his primary care provider, Sophia Livingston MD, for follow-up on this health maintenance. 7)  demonstrated consistency with prescribing. Follow-up and Dispositions    · Return in about 6 weeks (around 2/4/2021) for injection follow up. HISTORY OF PRESENT ILLNESS:  Shlomo Montesinos is a 78 y.o. male with history of cervical, lumbar, and more recent left hip pain and presents to the office today for left hip MRI arthrogram follow up. He reports the onset of left groin pain 3 months ago without inciting injuries. Pt complains of pain, numbness, and weakness in the left leg.  He likes to stay active and walks on his treadmill every morning but notes difficulty with left hip flexion after walking for about 1 hour. He notes that he then performs stretches and his pain generally improves throughout the day with less repetitive activity. Pt admits to difficulty finding a comfortable position in bed. He has been prescribed Topamax 25 mg and takes 3 tabs QHS. Pt also takes Naprosyn 500 mg as needed. He uses Voltaren gel on his thumb as needed but denies every using it on his hip. Of note, he is on Xarelto for DVT/PE prophylaxis on an intermittent basis (particularly when travelling). Pt notes that he has not been travelling recently due to the pandemic. Pt at this time desires to proceed with a left hip injection. Pain Scale: 2/10    PCP: Sukh Hartley MD     Past Medical History:   Diagnosis Date    Back pain     Chronic pain     Headache(784.0)     HTN (hypertension)     Joint pain     hx of joint pain    Pulmonary emboli (HCC)         Social History     Socioeconomic History    Marital status:      Spouse name: Not on file    Number of children: Not on file    Years of education: Not on file    Highest education level: Not on file   Occupational History    Not on file   Social Needs    Financial resource strain: Not on file    Food insecurity     Worry: Not on file     Inability: Not on file    Transportation needs     Medical: Not on file     Non-medical: Not on file   Tobacco Use    Smoking status: Former Smoker     Packs/day: 0.75     Years: 40.00     Pack years: 30.00     Quit date: 10/17/2012     Years since quittin.2    Smokeless tobacco: Former User   Substance and Sexual Activity    Alcohol use:  Yes     Alcohol/week: 2.0 standard drinks     Types: 2 Cans of beer per week     Comment: daily-beer and wine/ 2 drinks a day    Drug use: No    Sexual activity: Yes   Lifestyle    Physical activity     Days per week: Not on file     Minutes per session: Not on file    Stress: Not on file   Relationships    Social connections     Talks on phone: Not on file     Gets together: Not on file     Attends Restoration service: Not on file     Active member of club or organization: Not on file     Attends meetings of clubs or organizations: Not on file     Relationship status: Not on file    Intimate partner violence     Fear of current or ex partner: Not on file     Emotionally abused: Not on file     Physically abused: Not on file     Forced sexual activity: Not on file   Other Topics Concern    Not on file   Social History Narrative    Not on file       Current Outpatient Medications   Medication Sig Dispense Refill    naproxen (NAPROSYN) 500 mg tablet Take 1 Tab by mouth two (2) times daily (with meals). 60 Tab 1    topiramate (TOPAMAX) 25 mg tablet TAKE 3 TABLETS BY MOUTH EVERY NIGHT 270 Tab 0    betamethasone valerate (VALISONE) 0.1 % topical cream Apply  to affected area two (2) times a day. Apply cream to affected area with each urination. (Patient taking differently: Apply  to affected area daily as needed. Apply cream to affected area with each urination. ) 15 g 1    XARELTO 10 mg tablet Take 1 Tab by mouth daily as needed (2 days prior to traveling, 4 days after traveling. ).  cholecalciferol, vitamin D3, 2,000 unit tab Take 2,000 Units by mouth daily.  clobetasol (TEMOVATE) 0.05 % topical cream as needed.  desonide (TRIDESILON) 0.05 % topical lotion Use 1 Application to affected area Twice Daily.  lisinopril-hydroCHLOROthiazide (PRINZIDE, ZESTORETIC) 20-12.5 mg per tablet Take  by mouth daily.  zolpidem (AMBIEN) 5 mg tablet Take 5 mg by mouth nightly as needed.  methylPREDNISolone (MEDROL DOSEPACK) 4 mg tablet Per dose pack instructions (Patient not taking: Reported on 12/24/2020) 1 Dose Pack 0    DULoxetine (CYMBALTA) 30 mg capsule Take 1 Cap by mouth daily.  Indications: Neuropathic Pain (Patient not taking: Reported on 6/25/2019) 30 Cap 1 Allergies   Allergen Reactions    Bee Sting  [Sting, Bee] Swelling     DIZZINESS         REVIEW OF SYSTEMS    Constitutional: Negative for fever, chills, or weight change. Respiratory: Negative for cough or shortness of breath. Cardiovascular: Negative for chest pain or palpitations. Gastrointestinal: Negative for acid reflux, change in bowel habits, or constipation. Genitourinary: Negative for dysuria and flank pain. Musculoskeletal: Positive for left hip pain. Neurological: Negative for headaches or dizziness. Positive for numbness in the left leg  Endo/Heme/Allergies: Negative for increased bruising. Psychiatric/Behavioral: Positive for difficulty with sleep. As per HPI    PHYSICAL EXAMINATION  Visit Vitals  /79 (BP 1 Location: Left arm, BP Patient Position: Sitting)   Pulse 67   Temp 97.9 °F (36.6 °C) (Temporal)   Resp 14   Ht 5' 10\" (1.778 m)   Wt 153 lb (69.4 kg)   SpO2 99%   BMI 21.95 kg/m²       Constitutional: Awake, alert, and in no acute distress. Neurological: 1+ symmetrical DTRs in the lower extremities. Sensation to light touch is intact. Skin: warm, dry, and intact. Musculoskeletal: Tenderness to palpation in the lower lumbar region. Moderate pain with extension and axial loading. Improvement with forward flexion. Pain with internal rotation of his left hip. Negative straight leg raise bilaterally. Biceps  Triceps Deltoids Wrist Ext Wrist Flex Hand Intrin   Right +4/5 +4/5 +4/5 +4/5 +4/5 +4/5   Left +4/5 +4/5 +4/5 +4/5 +4/5 +4/5      Hip Flex  Quads Hamstrings Ankle DF EHL Ankle PF   Right +4/5 +4/5 +4/5 +4/5 +4/5 +4/5   Left +4/5 +4/5 +4/5 +4/5 +4/5 +4/5     IMAGING:    Left hip MRI arthrogram from 12/22/2020 was personally reviewed with the patient and demonstrated:   FINDINGS  OSSEOUS STRUCTURES / JOINTS     Adequate joint distention with contrast solution. Nondisplaced Anterior/superior  labral tear.  Mild diffuse cartilage surface irregularity of the left hip. No  subchondral marrow edema.     MUSCLES AND TENDONS  The myotendinous structures surrounding the hips and proximal thighs appear  intact.     BURSA  Normal.     OTHER FINDINGS  Small fat filled left inguinal hernia defect.      Enlarged prostate.     Slightly trabeculated appearance of the bladder wall, correlate for chronic  bladder outlet obstruction.     IMPRESSION  IMPRESSION:     No fracture or destructive osseous lesions.     Nondisplaced anterior/superior labral tear.     Early left hip osteoarthritis.     Incidental findings as above. Lumbar spine 4V x-rays from 12/1/2020 were personally reviewed with the patient and demonstrated:  L4-5 fusion. Collapse of left side of L3/4 disc and also sclerosis. Compression deformity at L3 and T12. Straightening of the lumbar spine. No instability. Hip x-rays from 12/1/2020 were personally reviewed with the patient and demonstrated:  Degenerative changes in the right hip.      Cervical spine 2V x-rays from 06/25/2019 were personally reviewed with the patient and demonstrated:  Straightening of the cervical spine. Multilevel degenerative facets. Degenerative disc at C4-5, C5-6, and C6-7.     Lumbar spine MRI from 04/24/2019 was personally reviewed with the patient and demonstrated:          Results from [facility] on 04/24/19   MRI LUMB SPINE W WO CONT     Narrative EXAMINATION: MRI lumbar spine with and without contrast     INDICATION: Lumbar pain, prior lumbar surgery, left lower extremity sciatica     COMPARISON: [facility] 1217     TECHNIQUE: Sagittal and axial multiecho MRI sequences of the lumbar spine  performed before and after 13 cc IV Dotarem.     FINDINGS:     Postsurgical: Status post L4-L5 posterior hardware fixation and posterior  decompression. No postoperative fluid collection identified. Mild lower lumbar  paraspinous muscle edema.     General: T12 anterior central mild superior endplate height loss, chronic  appearing.  L3 eccentric to the left mild superior endplate height loss, chronic  appearing. Vertebral body heights elsewhere preserved. Disc space loss at  multiple levels, most pronounced at L3-L4 and L4-L5. Grade 2 anterolisthesis of  L4 and L5. Mild multilevel endplate edema, likely related to active disc  degeneration. Straightening of the normal lordosis. Conus ends at level L1. No  abnormal signal or enhancement at the distal cord. No abnormal epidural  collection identified.     Miscellaneous: No incidental acute or suspicious findings identified outside of  the lumbar spine region.     Levels:     T11-T12: Evaluated sagittal plane only. No significant degenerative change or  stenosis.     T12-L1: No significant disc disease or stenosis. Mild facet arthropathy.     L1-L2: Mild disc bulge. Moderate facet arthropathy. Moderate spinal canal  stenosis. Mild foraminal stenoses.     L2-L3: Minimal disc bulge. Moderate facet arthropathy. Moderate spinal canal  stenosis. Foramina patent.     L3-L4: Hardware-related artifact limits evaluation. Mild disc bulge in addition  to a right central condyle disc extrusion extending roughly 1.2 cm condyle,  measuring roughly 0.9 cm transverse, roughly 0.5 cm AP, likely abutting the  crossing right L4 nerve. Severe facet arthropathy with ligamentum flavum bulge. Severe spinal canal stenosis, perhaps partly due to congenital narrowing. Moderate to severe foraminal stenoses.     L4-L5: Artifact limits evaluation. Postsurgical level as above. Grade 2  anterolisthesis with uncovering of disc. Facets not well evaluated due to  artifact. Moderate to severe spinal canal stenosis, predominantly in the AP  plane. Moderate bilateral foraminal stenoses, predominantly in the vertical  plane, with possible abutment of the exiting L4 nerves.     L5-S1: Posterior decompression level. A left central annular fissure with  underlying mild disc bulge. Mild facet arthropathy. Minimal spinal canal  narrowing.  Moderate to severe foraminal stenoses with probable abutment of the  exiting L5 nerves.     Imaged sacrum: Unremarkable.        Impression IMPRESSION:     At L3-L4, degenerative changes results in severe spinal canal stenosis and  moderate to severe foraminal stenoses. There is also notable right central disc  extrusion at this level likely abutting the crossing right L4 nerve. Hardware-related artifact limits evaluation. Compared to 2017 MRI, spinal canal  stenosis is slightly worse and disc extrusion is new.     L4-L5 level postsurgical changes as above, with grade 2 anterolisthesis and  moderate to severe spinal canal as well as moderate foraminal stenoses. Spinal  canal and foraminal stenosis however is less compared to 2017 MRI.     L5-S1 moderate to severe foraminal stenoses.     L1-L2 and L2-L3 moderate spinal canal stenoses.     See level by level details above. Written by Marc Max, as dictated by Natalee Kiran MD.  I, Dr. Natalee Kiran confirm that all documentation is accurate.

## 2020-12-24 ENCOUNTER — OFFICE VISIT (OUTPATIENT)
Dept: ORTHOPEDIC SURGERY | Age: 70
End: 2020-12-24
Payer: COMMERCIAL

## 2020-12-24 VITALS
DIASTOLIC BLOOD PRESSURE: 79 MMHG | TEMPERATURE: 97.9 F | BODY MASS INDEX: 21.9 KG/M2 | SYSTOLIC BLOOD PRESSURE: 113 MMHG | WEIGHT: 153 LBS | OXYGEN SATURATION: 99 % | HEART RATE: 67 BPM | HEIGHT: 70 IN | RESPIRATION RATE: 14 BRPM

## 2020-12-24 DIAGNOSIS — M47.816 LUMBAR FACET ARTHROPATHY: ICD-10-CM

## 2020-12-24 DIAGNOSIS — M25.552 LEFT HIP PAIN: ICD-10-CM

## 2020-12-24 DIAGNOSIS — M62.838 MUSCLE SPASM: ICD-10-CM

## 2020-12-24 DIAGNOSIS — M53.3 SACROILIAC JOINT DYSFUNCTION OF LEFT SIDE: ICD-10-CM

## 2020-12-24 DIAGNOSIS — S73.192D TEAR OF LEFT ACETABULAR LABRUM, SUBSEQUENT ENCOUNTER: Primary | ICD-10-CM

## 2020-12-24 DIAGNOSIS — M48.061 SPINAL STENOSIS OF LUMBAR REGION WITHOUT NEUROGENIC CLAUDICATION: ICD-10-CM

## 2020-12-24 PROCEDURE — G8420 CALC BMI NORM PARAMETERS: HCPCS | Performed by: PHYSICAL MEDICINE & REHABILITATION

## 2020-12-24 PROCEDURE — 99214 OFFICE O/P EST MOD 30 MIN: CPT | Performed by: PHYSICAL MEDICINE & REHABILITATION

## 2020-12-24 PROCEDURE — G8754 DIAS BP LESS 90: HCPCS | Performed by: PHYSICAL MEDICINE & REHABILITATION

## 2020-12-24 PROCEDURE — 1101F PT FALLS ASSESS-DOCD LE1/YR: CPT | Performed by: PHYSICAL MEDICINE & REHABILITATION

## 2020-12-24 PROCEDURE — G8427 DOCREV CUR MEDS BY ELIG CLIN: HCPCS | Performed by: PHYSICAL MEDICINE & REHABILITATION

## 2020-12-24 PROCEDURE — G8432 DEP SCR NOT DOC, RNG: HCPCS | Performed by: PHYSICAL MEDICINE & REHABILITATION

## 2020-12-24 PROCEDURE — G8752 SYS BP LESS 140: HCPCS | Performed by: PHYSICAL MEDICINE & REHABILITATION

## 2020-12-24 PROCEDURE — 3017F COLORECTAL CA SCREEN DOC REV: CPT | Performed by: PHYSICAL MEDICINE & REHABILITATION

## 2020-12-24 PROCEDURE — G8536 NO DOC ELDER MAL SCRN: HCPCS | Performed by: PHYSICAL MEDICINE & REHABILITATION

## 2020-12-24 NOTE — PATIENT INSTRUCTIONS
Hip Arthritis: Exercises Introduction Here are some examples of exercises for you to try. The exercises may be suggested for a condition or for rehabilitation. Start each exercise slowly. Ease off the exercises if you start to have pain. You will be told when to start these exercises and which ones will work best for you. How to do the exercises Straight-leg raises to the outside 1. Lie on your side, with your affected hip on top. 2. Tighten the front thigh muscles of your top leg to keep your knee straight. 3. Keep your hip and your leg straight in line with the rest of your body, and keep your knee pointing forward. Do not drop your hip back. 4. Lift your top leg straight up toward the ceiling, about 12 inches off the floor. Hold for about 6 seconds, then slowly lower your leg. 5. Repeat 8 to 12 times. 6. Switch legs and repeat steps 1 through 5, even if only one hip is sore. Straight-leg raises to the inside 1. Lie on your side with your affected hip on the floor. 2. You can either prop up your other leg on a chair, or you can bend that knee and put that foot in front of your other knee. Do not drop your hip back. 3. Tighten the muscles on the front thigh of your bottom leg to straighten that knee. 4. Keep your kneecap pointing forward and your leg straight, and lift your bottom leg up toward the ceiling about 6 inches. Hold for about 6 seconds, then lower slowly. 5. Repeat 8 to 12 times. 6. Switch legs and repeat steps 1 through 5, even if only one hip is sore. Hip hike 1. Stand sideways on the bottom step of a staircase, and hold on to the banister or wall. 2. Keeping both knees straight, lift your good leg off the step and let it hang down. Then hike your good hip up to the same level as your affected hip or a little higher. 3. Repeat 8 to 12 times. 4. Switch legs and repeat steps 1 through 3, even if only one hip is sore. Bridging 1. Lie on your back with both knees bent. Your knees should be bent about 90 degrees. 2. Then push your feet into the floor, squeeze your buttocks, and lift your hips off the floor until your shoulders, hips, and knees are all in a straight line. 3. Hold for about 6 seconds as you continue to breathe normally, and then slowly lower your hips back down to the floor and rest for up to 10 seconds. 4. Repeat 8 to 12 times. Hamstring stretch (lying down) 1. Lie flat on your back with your legs straight. If you feel discomfort in your back, place a small towel roll under your lower back. 2. Holding the back of your affected leg, lift your leg straight up and toward your body until you feel a stretch at the back of your thigh. 3. Hold the stretch for at least 30 seconds. 4. Repeat 2 to 4 times. 5. Switch legs and repeat steps 1 through 4, even if only one hip is sore. Standing quadriceps stretch 1. If you are not steady on your feet, hold on to a chair, counter, or wall. You can also lie on your stomach or your side to do this exercise. 2. Bend the knee of the leg you want to stretch, and reach behind you to grab the front of your foot or ankle with the hand on the same side. For example, if you are stretching your right leg, use your right hand. 3. Keeping your knees next to each other, pull your foot toward your buttock until you feel a gentle stretch across the front of your hip and down the front of your thigh. Your knee should be pointed directly to the ground, and not out to the side. 4. Hold the stretch for at least 15 to 30 seconds. 5. Repeat 2 to 4 times. 6. Switch legs and repeat steps 1 through 5, even if only one hip is sore. Hip rotator stretch 1. Lie on your back with both knees bent and your feet flat on the floor. 2. Put the ankle of your affected leg on your opposite thigh near your knee. 3. Use your hand to gently push your knee away from your body until you feel a gentle stretch around your hip. 4. Hold the stretch for 15 to 30 seconds. 5. Repeat 2 to 4 times. 6. Repeat steps 1 through 5, but this time use your hand to gently pull your knee toward your opposite shoulder. 7. Switch legs and repeat steps 1 through 6, even if only one hip is sore. Knee-to-chest  
1. Lie on your back with your knees bent and your feet flat on the floor. 2. Bring your affected leg to your chest, keeping the other foot flat on the floor (or keeping the other leg straight, whichever feels better on your lower back). 3. Keep your lower back pressed to the floor. Hold for at least 15 to 30 seconds. 4. Relax, and lower the knee to the starting position. 5. Repeat 2 to 4 times. 6. Switch legs and repeat steps 1 through 5, even if only one hip is sore. 7. To get more stretch, put your other leg flat on the floor while pulling your knee to your chest.   
Clamshell 1. Lie on your side, with your affected hip on top. Keep your feet and knees together and your knees bent. 2. Raise your top knee, but keep your feet together. Do not let your hips roll back. Your legs should open up like a clamshell. 3. Hold for 6 seconds. 4. Slowly lower your knee back down. Rest for 10 seconds. 5. Repeat 8 to 12 times. 6. Switch legs and repeat steps 1 through 5, even if only one hip is sore. Follow-up care is a key part of your treatment and safety. Be sure to make and go to all appointments, and call your doctor if you are having problems. It's also a good idea to know your test results and keep a list of the medicines you take. Where can you learn more? Go to http://www.gray.com/ Enter L084 in the search box to learn more about \"Hip Arthritis: Exercises. \" Current as of: March 2, 2020               Content Version: 12.6 © 3805-2385 [employer]. Care instructions adapted under license by [employer] (which disclaims liability or warranty for this information). If you have questions about a medical condition or this instruction, always ask your healthcare professional. [address] any warranty or liability for your use of this information.

## 2020-12-24 NOTE — LETTER
12/28/2020 Patient: Aminta Velez YOB: 1950 Date of Visit: 12/24/2020 Stefan Waite MD 
[address] [id number] Via Fax: [phone] Dear Stefan Waite MD, Thank you for referring Mr. Bing Archibald to [facility] for evaluation. My notes for this consultation are attached. If you have questions, please do not hesitate to call me. I look forward to following your patient along with you. Sincerely, Scarlett Horne MD

## 2020-12-28 DIAGNOSIS — R20.0 RIGHT ARM NUMBNESS: ICD-10-CM

## 2020-12-28 DIAGNOSIS — M54.16 LEFT LUMBAR RADICULOPATHY: ICD-10-CM

## 2020-12-28 RX ORDER — TOPIRAMATE 25 MG/1
75 TABLET ORAL
Qty: 270 TAB | Refills: 0 | Status: CANCELLED | OUTPATIENT
Start: 2020-12-28

## 2020-12-28 NOTE — TELEPHONE ENCOUNTER
Last Visit: 12/24/20 with MD Ruben Coffey  Next Appointment: 3/3/21 with MD Ruben Coffey  Previous Refill Encounter(s): 9/28/20 #[id number]    Requested Prescriptions     Pending Prescriptions Disp Refills    topiramate (TOPAMAX) 25 mg tablet 270 Tab 0     Sig: Take 3 Tabs by mouth nightly.

## 2020-12-29 NOTE — TELEPHONE ENCOUNTER
Relayed information to patient. He understood and had no questions, stating the pharmacy must have made an automatic request. He is good with his medications at this time. No further action required.

## 2020-12-29 NOTE — TELEPHONE ENCOUNTER
Per Dr. Rodriguez Corporal last office visit note on 12/24/2020:    \" 2) He will continue taking Topamax 25 mg and Naprosyn 500 mg as prescribed and does not need a refill at this time. \"    Attempted to call patient, Reached voicemail identified as [facility]", left message, identified myself/facility/callback number, requested return call to facility.

## 2021-01-05 ENCOUNTER — TELEPHONE (OUTPATIENT)
Dept: ORTHOPEDIC SURGERY | Age: 71
End: 2021-01-05

## 2021-01-05 NOTE — TELEPHONE ENCOUNTER
Patient was her on 12/24/2020 and was ordered a left hip injection. Which Dr do you want to perform the procedure?  Patient would like to go to [facility] if possible

## 2021-01-14 ENCOUNTER — APPOINTMENT (OUTPATIENT)
Dept: GENERAL RADIOLOGY | Age: 71
End: 2021-01-14
Attending: PHYSICAL MEDICINE & REHABILITATION
Payer: COMMERCIAL

## 2021-01-14 ENCOUNTER — HOSPITAL ENCOUNTER (OUTPATIENT)
Age: 71
Setting detail: OUTPATIENT SURGERY
Discharge: HOME | End: 2021-01-14
Attending: PHYSICAL MEDICINE & REHABILITATION | Admitting: PHYSICAL MEDICINE & REHABILITATION
Payer: COMMERCIAL

## 2021-01-14 VITALS
RESPIRATION RATE: 18 BRPM | DIASTOLIC BLOOD PRESSURE: 67 MMHG | TEMPERATURE: 97.7 F | HEART RATE: 72 BPM | SYSTOLIC BLOOD PRESSURE: 119 MMHG | OXYGEN SATURATION: 97 %

## 2021-01-14 PROCEDURE — 74011000636 HC RX REV CODE- 636: Performed by: PHYSICAL MEDICINE & REHABILITATION

## 2021-01-14 PROCEDURE — 2709999900 HC NON-CHARGEABLE SUPPLY: Performed by: PHYSICAL MEDICINE & REHABILITATION

## 2021-01-14 PROCEDURE — 76010000009 HC PAIN MGT 0 TO 30 MIN PROC: Performed by: PHYSICAL MEDICINE & REHABILITATION

## 2021-01-14 PROCEDURE — 77030003676 HC NDL SPN MPRI -A: Performed by: PHYSICAL MEDICINE & REHABILITATION

## 2021-01-14 PROCEDURE — 77030039433 HC TY MYLEOGRAM BD -B: Performed by: PHYSICAL MEDICINE & REHABILITATION

## 2021-01-14 PROCEDURE — 77002 NEEDLE LOCALIZATION BY XRAY: CPT | Performed by: PHYSICAL MEDICINE & REHABILITATION

## 2021-01-14 PROCEDURE — 74011000250 HC RX REV CODE- 250: Performed by: PHYSICAL MEDICINE & REHABILITATION

## 2021-01-14 PROCEDURE — 20610 DRAIN/INJ JOINT/BURSA W/O US: CPT | Performed by: PHYSICAL MEDICINE & REHABILITATION

## 2021-01-14 PROCEDURE — 74011250637 HC RX REV CODE- 250/637: Performed by: PHYSICAL MEDICINE & REHABILITATION

## 2021-01-14 PROCEDURE — 74011250636 HC RX REV CODE- 250/636: Performed by: PHYSICAL MEDICINE & REHABILITATION

## 2021-01-14 RX ORDER — LIDOCAINE HYDROCHLORIDE 10 MG/ML
INJECTION, SOLUTION EPIDURAL; INFILTRATION; INTRACAUDAL; PERINEURAL AS NEEDED
Status: DISCONTINUED | OUTPATIENT
Start: 2021-01-14 | End: 2021-01-14 | Stop reason: HOSPADM

## 2021-01-14 RX ORDER — DIAZEPAM 5 MG/1
5-20 TABLET ORAL ONCE
Status: COMPLETED | OUTPATIENT
Start: 2021-01-14 | End: 2021-01-14

## 2021-01-14 RX ORDER — DEXAMETHASONE SODIUM PHOSPHATE 100 MG/10ML
INJECTION INTRAMUSCULAR; INTRAVENOUS AS NEEDED
Status: DISCONTINUED | OUTPATIENT
Start: 2021-01-14 | End: 2021-01-14 | Stop reason: HOSPADM

## 2021-01-14 RX ADMIN — DIAZEPAM 10 MG: 5 TABLET ORAL at 12:31

## 2021-01-14 NOTE — PROCEDURES
Procedure Note    Patient Name: Jim Hutson    Date of Procedure: January 14, 2021    Preoperative Diagnosis: Osteoarthritis of pelvis and thigh    Post Operative Diagnosis: Osteoarthritis of pelvis and thigh    Procedure: left Hip Joint Injection    Consent: Informed consent was obtained prior to the procedure. The patient was given the opportunity to ask questions regarding the procedure and its associated risks. In addition to the potential risks associated with the procedure itself, the patient was informed both verbally and in writing of potential side effects of the use of glucocorticoids. The patient appeared to comprehend the informed consent and desired to have the procedure performed. Procedure: The patient was placed in the supine position on the fluoroscopy table and the hip was prepped and draped in the usual sterile manner. The hip joint was visualized using fluoroscopy, and after a local Lidocaine injection, a #22 gauge introducer needle was advanced to the hip joint. YES 1 cc of Isovue M-200 was used to verify positioning of the needle. A total of 10 mg of preservative free Dexamethasone and 5 cc of Lidocaine was slowly injected. The patient tolerated the procedure well. The injection area was cleaned and bandaids applied. No excessive bleeding was noted. Patient dressed and was discharged to home with instructions. Discussion:  The patient tolerated the procedure well.       Ashley Rayo MD  January 14, 2021

## 2021-01-14 NOTE — DISCHARGE INSTRUCTIONS
[facility]   ([facility])  Dr. Aaron Chanel, Dr. Sherie Garvey, Dr. Rianna Bass Spinal Procedure (Block) Instructions    * Do not drive a car, operate heavy machinery or dangerous equipment, or make important decisions for 12-24 hours. * Light activity as tolerated; may rest for the remainder of the day. * Resume pre-block medications including those from your other doctors. * Do not drink alcoholic beverages for 24 hours. Alcohol and the medications you have received may interact and cause an adverse reaction. * You may feel better this evening and worse tomorrow, as the numbing medications wears off and the steroid has yet to begin to work. After 48-72 hrs the steroid should begin to release bringing you relief. If you had a medial branch block, no steroids were used. The medial branch block is a test to see if you are a candidate for radiofrequency ablation (RFA). The anesthetic (numbing medicine)  will wear off by the next day. * You may shower this evening and remove any bandages. * Avoid hot tubs/pools/tub soaks and heating pads for 24 hours. You may use cold packs on the procedure site as tolerated for the first 24 hours. * If a headache develops, drink plenty of fluids and rest.  Take over the counter medications for headache if needed. If the headache continues longer than 24 hours, call MD at the [address] [place] [address] Avenue. [phone]    * Continue taking pain medications as needed. * You may resume your regular diet if tolerated. Otherwise, start with sips of water and advance slowly. * If Diabetic: check your blood sugar three times a day for the next 3 days. If your sugar is greater than 300 call your family doctor. If your sugar is greater than 400, have someone transport you to the nearest Emergency Room. * If you experience any of the following problems, Please Call the [address] [place] [address] Avenue at [phone].         * Excessive pain, swelling, redness or odor at or around the surgical area    * Fever of 101 or higher    * Nausea / Vomiting lasting longer than 4 hours or if unable to take medications. * Severe Headache    * Weakness or numbness in arms or legs that is not      resolving   * Any NEW signs of decreased circulation or nerve impairment in leg: change in color, swelling, persistent numbness, tingling                    * Prolonged increase in pain greater than 4 days      PATIENT INSTRUCTIONS:    After oral sedation, for 12-24 hours or while taking prescription Narcotics:  · Limit your activities  · Do not drive and operate hazardous machinery  · Do not make important personal or business decisions  · Do  not drink alcoholic beverages  · If you have not urinated within 8 hours after discharge, please contact your surgeon on call. *  Please give a list of your current medications to your Primary Care Provider. *  Please update this list whenever your medications are discontinued, doses are      changed, or new medications (including over-the-counter products) are added. *  Please carry medication information at all times in case of emergency situations. These are general instructions for a healthy lifestyle:    No smoking/ No tobacco products/ Avoid exposure to second hand smoke    Surgeon General's Warning:  Quitting smoking now greatly reduces serious risk to your health. Obesity, smoking, and sedentary lifestyle greatly increases your risk for illness    A healthy diet, regular physical exercise & weight monitoring are important for maintaining a healthy lifestyle    You may be retaining fluid if you have a history of heart failure or if you experience any of the following symptoms:  Weight gain of 3 pounds or more overnight or 5 pounds in a week, increased swelling in our hands or feet or shortness of breath while lying flat in bed.   Please call your doctor as soon as you notice any of these symptoms; do not wait until your next office visit. Recognize signs and symptoms of STROKE:    F-face looks uneven    A-arms unable to move or move unevenly    S-speech slurred or non-existent    T-time-call 911 as soon as signs and symptoms begin-DO NOT go       Back to bed or wait to see if you get better-TIME IS BRAIN.

## 2021-02-01 DIAGNOSIS — M25.552 LEFT HIP PAIN: ICD-10-CM

## 2021-02-02 RX ORDER — NAPROXEN 500 MG/1
TABLET ORAL
Qty: 60 TAB | Refills: 1 | OUTPATIENT
Start: 2021-02-02

## 2021-02-02 NOTE — TELEPHONE ENCOUNTER
Spoke with patient and he only uses the [place] when he flies for work before the flights. He hasn't done that for over 1 year now. He knows not to use with the Naproxen and right now he is only taking Naproxen once a day so he does not need a refill.

## 2021-02-03 DIAGNOSIS — M25.552 LEFT HIP PAIN: ICD-10-CM

## 2021-02-03 DIAGNOSIS — M47.816 LUMBAR FACET ARTHROPATHY: Primary | ICD-10-CM

## 2021-02-03 RX ORDER — NAPROXEN 500 MG/1
500 TABLET ORAL 2 TIMES DAILY WITH MEALS
Qty: 60 TAB | Refills: 3 | Status: SHIPPED | OUTPATIENT
Start: 2021-02-03 | End: 2021-12-21

## 2021-02-03 NOTE — PROGRESS NOTES
I sent prescription for Naproxen -- he is not traveling and not using Xarelto at this time as he is not traveling and knows when not to take the Naproxen

## 2021-03-01 ENCOUNTER — OFFICE VISIT (OUTPATIENT)
Dept: ORTHOPEDIC SURGERY | Age: 71
End: 2021-03-01
Payer: COMMERCIAL

## 2021-03-01 VITALS
HEART RATE: 62 BPM | TEMPERATURE: 97.3 F | OXYGEN SATURATION: 98 % | SYSTOLIC BLOOD PRESSURE: 120 MMHG | DIASTOLIC BLOOD PRESSURE: 82 MMHG | WEIGHT: 158.4 LBS | BODY MASS INDEX: 22.73 KG/M2

## 2021-03-01 DIAGNOSIS — S73.192D TEAR OF LEFT ACETABULAR LABRUM, SUBSEQUENT ENCOUNTER: Primary | ICD-10-CM

## 2021-03-01 DIAGNOSIS — R20.0 RIGHT ARM NUMBNESS: ICD-10-CM

## 2021-03-01 DIAGNOSIS — M47.816 LUMBAR FACET ARTHROPATHY: ICD-10-CM

## 2021-03-01 DIAGNOSIS — M48.061 SPINAL STENOSIS OF LUMBAR REGION WITHOUT NEUROGENIC CLAUDICATION: ICD-10-CM

## 2021-03-01 DIAGNOSIS — M53.3 SACROILIAC JOINT DYSFUNCTION OF LEFT SIDE: ICD-10-CM

## 2021-03-01 DIAGNOSIS — M62.838 MUSCLE SPASM: ICD-10-CM

## 2021-03-01 DIAGNOSIS — M54.16 LEFT LUMBAR RADICULOPATHY: ICD-10-CM

## 2021-03-01 PROCEDURE — G8427 DOCREV CUR MEDS BY ELIG CLIN: HCPCS | Performed by: PHYSICAL MEDICINE & REHABILITATION

## 2021-03-01 PROCEDURE — 3017F COLORECTAL CA SCREEN DOC REV: CPT | Performed by: PHYSICAL MEDICINE & REHABILITATION

## 2021-03-01 PROCEDURE — G8754 DIAS BP LESS 90: HCPCS | Performed by: PHYSICAL MEDICINE & REHABILITATION

## 2021-03-01 PROCEDURE — 99213 OFFICE O/P EST LOW 20 MIN: CPT | Performed by: PHYSICAL MEDICINE & REHABILITATION

## 2021-03-01 PROCEDURE — G8432 DEP SCR NOT DOC, RNG: HCPCS | Performed by: PHYSICAL MEDICINE & REHABILITATION

## 2021-03-01 PROCEDURE — G8536 NO DOC ELDER MAL SCRN: HCPCS | Performed by: PHYSICAL MEDICINE & REHABILITATION

## 2021-03-01 PROCEDURE — G8752 SYS BP LESS 140: HCPCS | Performed by: PHYSICAL MEDICINE & REHABILITATION

## 2021-03-01 PROCEDURE — G8420 CALC BMI NORM PARAMETERS: HCPCS | Performed by: PHYSICAL MEDICINE & REHABILITATION

## 2021-03-01 PROCEDURE — 1101F PT FALLS ASSESS-DOCD LE1/YR: CPT | Performed by: PHYSICAL MEDICINE & REHABILITATION

## 2021-03-01 RX ORDER — TOPIRAMATE 25 MG/1
TABLET ORAL
Qty: 270 TAB | Refills: 1 | Status: SHIPPED | OUTPATIENT
Start: 2021-03-01 | End: 2021-09-01

## 2021-03-01 NOTE — PROGRESS NOTES
[facility]  [address] [place] [address], [facility] [address]  Phone: [phone]  Fax: [phone]    Pt's YOB: 1950    ASSESSMENT   Diagnoses and all orders for this visit:    1. Tear of left acetabular labrum, subsequent encounter    2. Left lumbar radiculopathy  -     topiramate (TOPAMAX) 25 mg tablet; TAKE 3 TABLETS BY MOUTH EVERY NIGHT    3. Right arm numbness  -     topiramate (TOPAMAX) 25 mg tablet; TAKE 3 TABLETS BY MOUTH EVERY NIGHT    4. Muscle spasm    5. Sacroiliac joint dysfunction of left side    6. Spinal stenosis of lumbar region without neurogenic claudication    7. Lumbar facet arthropathy         IMPRESSION AND PLAN:  Marta Hayden is a 73 y.o. male with history of cervical and lumbar pain. He reports 75% relief with a left hip injection on 1/14/2021 by Dr. Britany Estrada. Pt notes continued numbness radiating down the left leg. He takes Topamax 25 mg 3 tabs QHS and Naprosyn 500 mg as needed. 1) Pt was given information on cervical and lumbar arthritis exercises. 2) He received a refill of Topamax 25 mg 3 tabs QHS. 3) Pt will continue taking Naprosyn 500 mg as prescribed and does not need a refill at this time. 4) He is not candidate for regular use of NSAID's as he is on Xarelto for DVT/PE prophylaxis on an intermittent basis. 5) I recommended the patient try gentle yoga. 6) Mr. Tad Dooley has a reminder for a \"due or due soon\" health maintenance. I have asked that he contact his primary care provider, Marium Dai MD, for follow-up on this health maintenance. 7)  demonstrated consistency with prescribing. Follow-up and Dispositions    · Return in about 3 months (around 6/1/2021) for Medication follow up. HISTORY OF PRESENT ILLNESS:  Marta Hayden is a 73 y.o. male with history of cervical and lumbar pain and presents to the office today for follow up.  He reports 75% relief with a left hip injection on 1/14/2021 by Dr. Ganga Patel. Pt notes that he has been able to ease back into a HEP. He likes to walk 4-4.5 miles a day and notes that he has lowered the incline and slowed down the speed of his treadmill. Pt notes continued numbness radiating down the left leg. He has been prescribed Topamax 25 mg 3 tabs QHS. He also received a refill of Naprosyn 500 mg as needed. Of note, he is on Xarelto for DVT/PE prophylaxis on an intermittent basis (particularly when travelling). Pt at this time desires to continue with current care. Pain Scale: 1/10    PCP: Tracy De Leon MD     Past Medical History:   Diagnosis Date    Back pain     Chronic pain     Headache(784.0)     HTN (hypertension)     Joint pain     hx of joint pain    Pulmonary emboli (HCC)         Social History     Socioeconomic History    Marital status:      Spouse name: Not on file    Number of children: Not on file    Years of education: Not on file    Highest education level: Not on file   Occupational History    Not on file   Social Needs    Financial resource strain: Not on file    Food insecurity     Worry: Not on file     Inability: Not on file    Transportation needs     Medical: Not on file     Non-medical: Not on file   Tobacco Use    Smoking status: Former Smoker     Packs/day: 0.75     Years: 40.00     Pack years: 30.00     Quit date: 10/17/2012     Years since quittin.3    Smokeless tobacco: Former User   Substance and Sexual Activity    Alcohol use:  Yes     Alcohol/week: 2.0 standard drinks     Types: 2 Cans of beer per week     Comment: daily-beer and wine/ 2 drinks a day    Drug use: No    Sexual activity: Yes   Lifestyle    Physical activity     Days per week: Not on file     Minutes per session: Not on file    Stress: Not on file   Relationships    Social connections     Talks on phone: Not on file     Gets together: Not on file     Attends Congregation service: Not on file     Active member of club or organization: Not on file     Attends meetings of clubs or organizations: Not on file     Relationship status: Not on file    Intimate partner violence     Fear of current or ex partner: Not on file     Emotionally abused: Not on file     Physically abused: Not on file     Forced sexual activity: Not on file   Other Topics Concern    Not on file   Social History Narrative    Not on file       Current Outpatient Medications   Medication Sig Dispense Refill    topiramate (TOPAMAX) 25 mg tablet TAKE 3 TABLETS BY MOUTH EVERY NIGHT 270 Tab 1    naproxen (NAPROSYN) 500 mg tablet Take 1 Tab by mouth two (2) times daily (with meals). 60 Tab 3    betamethasone valerate (VALISONE) 0.1 % topical cream Apply  to affected area two (2) times a day. Apply cream to affected area with each urination. (Patient taking differently: Apply  to affected area daily as needed. Apply cream to affected area with each urination. ) 15 g 1    cholecalciferol, vitamin D3, 2,000 unit tab Take 2,000 Units by mouth daily.  clobetasol (TEMOVATE) 0.05 % topical cream as needed.  desonide (TRIDESILON) 0.05 % topical lotion Use 1 Application to affected area Twice Daily.  lisinopril-hydroCHLOROthiazide (PRINZIDE, ZESTORETIC) 20-12.5 mg per tablet Take  by mouth daily.  zolpidem (AMBIEN) 5 mg tablet Take 5 mg by mouth nightly as needed.  methylPREDNISolone (MEDROL DOSEPACK) 4 mg tablet Per dose pack instructions (Patient not taking: Reported on 12/24/2020) 1 Dose Pack 0    XARELTO 10 mg tablet Take 1 Tab by mouth daily as needed (2 days prior to traveling, 4 days after traveling. ).  DULoxetine (CYMBALTA) 30 mg capsule Take 1 Cap by mouth daily. Indications: Neuropathic Pain (Patient not taking: Reported on 6/25/2019) 30 Cap 1       Allergies   Allergen Reactions    Bee Sting  [Sting, Bee] Swelling     DIZZINESS         REVIEW OF SYSTEMS    Constitutional: Negative for fever, chills, or weight change.    Respiratory: Negative for cough or shortness of breath. Cardiovascular: Negative for chest pain or palpitations. Gastrointestinal: Negative for acid reflux, change in bowel habits, or constipation. Genitourinary: Negative for dysuria and flank pain. Musculoskeletal: Positive for lumbar pain. Skin: Negative for rash. Neurological: Negative for headaches or dizziness. Positive for numbness in the left leg. Endo/Heme/Allergies: Negative for increased bruising. Psychiatric/Behavioral: Positive for difficulty with sleep. As per HPI    PHYSICAL EXAMINATION  Visit Vitals  /82 (BP 1 Location: Left arm, BP Patient Position: Sitting)   Pulse 62   Temp 97.3 °F (36.3 °C) (Skin)   Wt 158 lb 6.4 oz (71.8 kg)   SpO2 98%   BMI 22.73 kg/m²       Constitutional: Awake, alert, and in no acute distress. Neurological: 1+ symmetrical DTRs in the upper extremities. 1+ symmetrical DTRs in the lower extremities. Sensation to light touch is intact. Negative Saul's sign bilaterally. Skin: warm, dry, and intact. Musculoskeletal: Tenderness to palpation in the lower lumbar region. Moderate pain with extension and axial loading. Improvement with forward flexion. Pain with internal rotation of his left hip. Negative straight leg raise bilaterally. Hip Flex  Quads Hamstrings Ankle DF EHL Ankle PF   Right +4/5 +4/5 +4/5 +4/5 +4/5 +4/5   Left +4/5 +4/5 +4/5 +4/5 +4/5 +4/5     IMAGING:    Left hip MRI arthrogram from 12/22/2020 was personally reviewed with the patient and demonstrated:   FINDINGS  OSSEOUS STRUCTURES / JOINTS     Adequate joint distention with contrast solution. Nondisplaced Anterior/superior  labral tear. Mild diffuse cartilage surface irregularity of the left hip.  No  subchondral marrow edema.     MUSCLES AND TENDONS  The myotendinous structures surrounding the hips and proximal thighs appear  intact.     BURSA  Normal.     OTHER FINDINGS  Small fat filled left inguinal hernia defect.      Enlarged prostate.     Slightly trabeculated appearance of the bladder wall, correlate for chronic  bladder outlet obstruction. IMPRESSION:     No fracture or destructive osseous lesions.     Nondisplaced anterior/superior labral tear.     Early left hip osteoarthritis.     Incidental findings as above.     Lumbar spine 4V x-rays from 12/1/2020 were personally reviewed with the patient and demonstrated:  L4-5 fusion. Collapse of left side of L3/4 disc and also sclerosis. Compression deformity at L3 and T12. Straightening of the lumbar spine. No instability.     Hip x-rays from 12/1/2020 were personally reviewed with the patient and demonstrated:  Degenerative changes in the right hip.      Cervical spine 2V x-rays from 06/25/2019 were personally reviewed with the patient and demonstrated:  Straightening of the cervical spine. Multilevel degenerative facets. Degenerative disc at C4-5, C5-6, and C6-7.     Lumbar spine MRI from 04/24/2019 was personally reviewed with the patient and demonstrated:          Results from [facility] on 04/24/19   MRI LUMB SPINE W WO CONT     Narrative EXAMINATION: MRI lumbar spine with and without contrast     INDICATION: Lumbar pain, prior lumbar surgery, left lower extremity sciatica     COMPARISON: CTA 1217     TECHNIQUE: Sagittal and axial multiecho MRI sequences of the lumbar spine  performed before and after 13 cc IV Dotarem.     FINDINGS:     Postsurgical: Status post L4-L5 posterior hardware fixation and posterior  decompression. No postoperative fluid collection identified. Mild lower lumbar  paraspinous muscle edema.     General: T12 anterior central mild superior endplate height loss, chronic  appearing. L3 eccentric to the left mild superior endplate height loss, chronic  appearing. Vertebral body heights elsewhere preserved. Disc space loss at  multiple levels, most pronounced at L3-L4 and L4-L5. Grade 2 anterolisthesis of  L4 and L5. Mild multilevel endplate edema, likely related to active disc  degeneration. Straightening of the normal lordosis. Conus ends at level L1. No  abnormal signal or enhancement at the distal cord. No abnormal epidural  collection identified.     Miscellaneous: No incidental acute or suspicious findings identified outside of  the lumbar spine region.     Levels:     T11-T12: Evaluated sagittal plane only. No significant degenerative change or  stenosis.     T12-L1: No significant disc disease or stenosis. Mild facet arthropathy.     L1-L2: Mild disc bulge. Moderate facet arthropathy. Moderate spinal canal  stenosis. Mild foraminal stenoses.     L2-L3: Minimal disc bulge. Moderate facet arthropathy. Moderate spinal canal  stenosis. Foramina patent.     L3-L4: Hardware-related artifact limits evaluation. Mild disc bulge in addition  to a right central condyle disc extrusion extending roughly 1.2 cm condyle,  measuring roughly 0.9 cm transverse, roughly 0.5 cm AP, likely abutting the  crossing right L4 nerve. Severe facet arthropathy with ligamentum flavum bulge. Severe spinal canal stenosis, perhaps partly due to congenital narrowing. Moderate to severe foraminal stenoses.     L4-L5: Artifact limits evaluation. Postsurgical level as above. Grade 2  anterolisthesis with uncovering of disc. Facets not well evaluated due to  artifact. Moderate to severe spinal canal stenosis, predominantly in the AP  plane. Moderate bilateral foraminal stenoses, predominantly in the vertical  plane, with possible abutment of the exiting L4 nerves.     L5-S1: Posterior decompression level. A left central annular fissure with  underlying mild disc bulge. Mild facet arthropathy. Minimal spinal canal  narrowing. Moderate to severe foraminal stenoses with probable abutment of the  exiting L5 nerves.     Imaged sacrum: Unremarkable.        Impression IMPRESSION:     At L3-L4, degenerative changes results in severe spinal canal stenosis and  moderate to severe foraminal stenoses.  There is also notable right central disc  extrusion at this level likely abutting the crossing right L4 nerve.  Hardware-related artifact limits evaluation. Compared to 2017 MRI, spinal canal  stenosis is slightly worse and disc extrusion is new.     L4-L5 level postsurgical changes as above, with grade 2 anterolisthesis and  moderate to severe spinal canal as well as moderate foraminal stenoses. Spinal  canal and foraminal stenosis however is less compared to 2017 MRI.     L5-S1 moderate to severe foraminal stenoses.     L1-L2 and L2-L3 moderate spinal canal stenoses.     See level by level details above.       Written by Christal Colvin, as dictated by Marycarmen Westfall MD.  I, Dr. Marycarmen Westfall confirm that all documentation is accurate.

## 2021-03-01 NOTE — LETTER
3/1/2021    Patient: Natalia Dahl   YOB: 1950   Date of Visit: 3/1/2021     Alix Cummings MD  [address] [facility] [address] [place] [address]  Via Fax: [phone]    Dear Alix Cummings MD,      Thank you for referring Mr. Henrry Johnson to [facility] for evaluation. My notes for this consultation are attached. If you have questions, please do not hesitate to call me. I look forward to following your patient along with you.       Sincerely,    Zee Gonsales MD

## 2021-03-01 NOTE — PATIENT INSTRUCTIONS

## 2021-03-23 NOTE — HOME CARE
Received [facility] referral, Discharge noted for today, [facility] will follow for PT/ [facility] protocol , pt states he has a cane ,no other DME needs noted. [employer].
h/o asthma as a child, last exacerbation "teenager"

## 2021-06-01 ENCOUNTER — OFFICE VISIT (OUTPATIENT)
Dept: ORTHOPEDIC SURGERY | Age: 71
End: 2021-06-01
Payer: COMMERCIAL

## 2021-06-01 VITALS
WEIGHT: 156.6 LBS | BODY MASS INDEX: 22.42 KG/M2 | OXYGEN SATURATION: 99 % | SYSTOLIC BLOOD PRESSURE: 113 MMHG | TEMPERATURE: 97.1 F | HEART RATE: 61 BPM | RESPIRATION RATE: 12 BRPM | DIASTOLIC BLOOD PRESSURE: 60 MMHG | HEIGHT: 70 IN

## 2021-06-01 DIAGNOSIS — S73.192D TEAR OF LEFT ACETABULAR LABRUM, SUBSEQUENT ENCOUNTER: ICD-10-CM

## 2021-06-01 DIAGNOSIS — M53.3 SACROILIAC JOINT DYSFUNCTION OF LEFT SIDE: ICD-10-CM

## 2021-06-01 DIAGNOSIS — M48.061 SPINAL STENOSIS OF LUMBAR REGION WITHOUT NEUROGENIC CLAUDICATION: ICD-10-CM

## 2021-06-01 DIAGNOSIS — R20.0 RIGHT ARM NUMBNESS: ICD-10-CM

## 2021-06-01 DIAGNOSIS — M62.838 MUSCLE SPASM: ICD-10-CM

## 2021-06-01 DIAGNOSIS — M47.816 LUMBAR FACET ARTHROPATHY: ICD-10-CM

## 2021-06-01 DIAGNOSIS — M54.16 LEFT LUMBAR RADICULOPATHY: Primary | ICD-10-CM

## 2021-06-01 DIAGNOSIS — Z29.9 DVT PROPHYLAXIS: ICD-10-CM

## 2021-06-01 PROCEDURE — G8754 DIAS BP LESS 90: HCPCS | Performed by: PHYSICAL MEDICINE & REHABILITATION

## 2021-06-01 PROCEDURE — G8427 DOCREV CUR MEDS BY ELIG CLIN: HCPCS | Performed by: PHYSICAL MEDICINE & REHABILITATION

## 2021-06-01 PROCEDURE — 99214 OFFICE O/P EST MOD 30 MIN: CPT | Performed by: PHYSICAL MEDICINE & REHABILITATION

## 2021-06-01 PROCEDURE — 1101F PT FALLS ASSESS-DOCD LE1/YR: CPT | Performed by: PHYSICAL MEDICINE & REHABILITATION

## 2021-06-01 PROCEDURE — G8432 DEP SCR NOT DOC, RNG: HCPCS | Performed by: PHYSICAL MEDICINE & REHABILITATION

## 2021-06-01 PROCEDURE — G8420 CALC BMI NORM PARAMETERS: HCPCS | Performed by: PHYSICAL MEDICINE & REHABILITATION

## 2021-06-01 PROCEDURE — 3017F COLORECTAL CA SCREEN DOC REV: CPT | Performed by: PHYSICAL MEDICINE & REHABILITATION

## 2021-06-01 PROCEDURE — G8536 NO DOC ELDER MAL SCRN: HCPCS | Performed by: PHYSICAL MEDICINE & REHABILITATION

## 2021-06-01 PROCEDURE — G8752 SYS BP LESS 140: HCPCS | Performed by: PHYSICAL MEDICINE & REHABILITATION

## 2021-06-01 RX ORDER — RIVAROXABAN 10 MG/1
10 TABLET, FILM COATED ORAL
Qty: 30 TABLET | Refills: 3 | Status: SHIPPED | OUTPATIENT
Start: 2021-06-01

## 2021-06-01 NOTE — PROGRESS NOTES
[facility]  [address] [place], [address]  Phone: [phone]  Fax: [phone]    Pt's YOB: 1950    ASSESSMENT   Diagnoses and all orders for this visit:    1. Left lumbar radiculopathy    2. Right arm numbness    3. Tear of left acetabular labrum, subsequent encounter    4. DVT prophylaxis  -     Xarelto 10 mg tablet; Take 1 Tablet by mouth daily as needed (2 days prior to traveling, 4 days after traveling. ). 5. Muscle spasm    6. Sacroiliac joint dysfunction of left side    7. Spinal stenosis of lumbar region without neurogenic claudication    8. Lumbar facet arthropathy         IMPRESSION AND PLAN:  Micheal Barron is a 78 y.o. male with history of cervical, lumbar, and more recent left hip. He reports left groin pain with pain, numbness, and weakness in the left leg x about 9 months. He takes Naprosyn 500 mg as needed, Topamax 25 mg 3 tabs QHS, and Voltaren Gel. 1) Pt was given information on hip arthritis exercises. 2) I will  prescribe him Xarelto 10 mg at his request as he will be traveling soon-- he currently has enough for 1 trip and needs a further refill for his multiple upcoming trips; he is aware not to use the Naproxen with the Xarelto  3) He will call if he needs his Topamax refilled-- it was recently represcribed  4) Mr. Treva Lewis has a reminder for a \"due or due soon\" health maintenance. I have asked that he contact his primary care provider, Juliana Sloan MD, for follow-up on this health maintenance. 5)  demonstrated consistency with prescribing. 6) Indications for surgery and other steroid injections were also discussed  7) Consideration for new MRI also discussed if he has change/progression of his symptoms  Follow-up and Dispositions    · Return in about 3 months (around 9/1/2021) for Medication follow up.              HISTORY OF PRESENT ILLNESS:  Micheal Barron is a 78 y.o. male with history of cervical, lumbar, and more recent left hip pain and presents to the office today for left hip pain follow up. He reports the onset of left groin pain 6 months ago without inciting injuries. Pt complains of pain, numbness, and weakness in the left leg, unchanged since LOV. He likes to stay active and walks on his treadmill every morning but notes difficulty with left hip flexion after walking for about 1 hour. He notes that he then performs stretches and his pain generally improves throughout the day with less repetitive activity. Pt admits to difficulty finding a comfortable position in bed. He has been prescribed Topamax 25 mg and takes 3 tabs QHS. Pt also takes Naprosyn 500 mg as needed. He uses Voltaren gel on his thumb as needed but denies every using it on his hip. Of note, he is on Xarelto for DVT/PE prophylaxis on an intermittent basis (particularly when travelling). Pt notes that he will restart travelling for his job and will restart his Xarelto (1 tab for 2 days before he leaves and 1 tab for 4 days after he returns) and he is leaving for a 2-week trip to [facility] this Saturday 6/5/2021. Pt states he has not seen Dr. Cynthia Rogers in 3 years and wishes to be re-prescribed Xarelto. At [address] Pt received a left hip injection which provided moderate relief and was advised to continue Topamax, Naprosyn, and Voltaren gel as needed. Pt at this time desires to proceed with medication evaluation.     Pain Scale: 3/10    PCP: Leta Abreu MD     Past Medical History:   Diagnosis Date    Back pain     Chronic pain     Headache(784.0)     HTN (hypertension)     Joint pain     hx of joint pain    Pulmonary emboli (HCC)         Social History     Socioeconomic History    Marital status:      Spouse name: Not on file    Number of children: Not on file    Years of education: Not on file    Highest education level: Not on file   Occupational History    Not on file   Tobacco Use    Smoking status: Former Smoker Packs/day: 0.75     Years: 40.00     Pack years: 30.00     Quit date: 10/17/2012     Years since quittin.6    Smokeless tobacco: Former User   Substance and Sexual Activity    Alcohol use: Yes     Alcohol/week: 2.0 standard drinks     Types: 2 Cans of beer per week     Comment: daily-beer and wine/ 2 drinks a day    Drug use: No    Sexual activity: Yes   Other Topics Concern    Not on file   Social History Narrative    Not on file     Social Determinants of Health     Financial Resource Strain:     Difficulty of Paying Living Expenses:    Food Insecurity:     Worried About Running Out of Food in the Last Year:     Ran Out of Food in the Last Year:    Transportation Needs:     Lack of Transportation (Medical):  Lack of Transportation (Non-Medical):    Physical Activity:     Days of Exercise per Week:     Minutes of Exercise per Session:    Stress:     Feeling of Stress :    Social Connections:     Frequency of Communication with Friends and Family:     Frequency of Social Gatherings with Friends and Family:     Attends Spiritism Services:     Active Member of Clubs or Organizations:     Attends Club or Organization Meetings:     Marital Status:    Intimate Partner Violence:     Fear of Current or Ex-Partner:     Emotionally Abused:     Physically Abused:     Sexually Abused:        Current Outpatient Medications   Medication Sig Dispense Refill    Xarelto 10 mg tablet Take 1 Tablet by mouth daily as needed (2 days prior to traveling, 4 days after traveling. ). 30 Tablet 3    topiramate (TOPAMAX) 25 mg tablet TAKE 3 TABLETS BY MOUTH EVERY NIGHT 270 Tab 1    naproxen (NAPROSYN) 500 mg tablet Take 1 Tab by mouth two (2) times daily (with meals). 60 Tab 3    betamethasone valerate (VALISONE) 0.1 % topical cream Apply  to affected area two (2) times a day. Apply cream to affected area with each urination. (Patient taking differently: Apply  to affected area daily as needed.  Apply cream to affected area with each urination. ) 15 g 1    cholecalciferol, vitamin D3, 2,000 unit tab Take 2,000 Units by mouth daily.  clobetasol (TEMOVATE) 0.05 % topical cream as needed.  desonide (TRIDESILON) 0.05 % topical lotion Use 1 Application to affected area Twice Daily.  lisinopril-hydroCHLOROthiazide (PRINZIDE, ZESTORETIC) 20-12.5 mg per tablet Take  by mouth daily.  zolpidem (AMBIEN) 5 mg tablet Take 5 mg by mouth nightly as needed.  methylPREDNISolone (MEDROL DOSEPACK) 4 mg tablet Per dose pack instructions (Patient not taking: Reported on 12/24/2020) 1 Dose Pack 0    DULoxetine (CYMBALTA) 30 mg capsule Take 1 Cap by mouth daily. Indications: Neuropathic Pain (Patient not taking: Reported on 6/25/2019) 30 Cap 1       Allergies   Allergen Reactions    Bee Sting  [Sting, Bee] Swelling     DIZZINESS         REVIEW OF SYSTEMS    Constitutional: Negative for fever, chills, or weight change. Respiratory: Negative for cough or shortness of breath. Cardiovascular: Negative for chest pain or palpitations. Gastrointestinal: Negative for acid reflux, change in bowel habits, or constipation. Genitourinary: Negative for dysuria and flank pain. Musculoskeletal: Positive for left hip pain. Neurological: Negative for headaches and dizziness. Positive for numbness. Endo/Heme/Allergies: Negative for increased bruising. Psychiatric/Behavioral: Negative for difficulty with sleep. As per HPI    PHYSICAL EXAMINATION  Visit Vitals  /60   Pulse 61   Temp 97.1 °F (36.2 °C) (Tympanic)   Resp 12   Ht 5' 10\" (1.778 m)   Wt 156 lb 9.6 oz (71 kg)   SpO2 99%   BMI 22.47 kg/m²       Constitutional: Awake, alert, and in no acute distress. Neurological: 1+ symmetrical DTRs in the upper extremities. 1+ symmetrical DTRs in the lower extremities. Sensation to light touch is intact. Negative Saul's sign bilaterally. Skin: warm, dry, and intact.    Musculoskeletal: Tenderness to palpation in the lower lumbar region. Moderate pain with extension and axial loading. Improvement with forward flexion. Pain with internal rotation of his left hip. Negative straight leg raise bilaterally. Hip Flex  Quads Hamstrings Ankle DF EHL Ankle PF   Right +4/5 +4/5 +4/5 +4/5 +4/5 +4/5   Left +4/5 +4/5 +4/5 +4/5 +4/5 +4/5     IMAGING:    Left hip MRI arthrogram from 12/22/2020 was personally reviewed with the patient and demonstrated:   FINDINGS  OSSEOUS STRUCTURES / JOINTS     Adequate joint distention with contrast solution. Nondisplaced Anterior/superior  labral tear. Mild diffuse cartilage surface irregularity of the left hip. No  subchondral marrow edema.     MUSCLES AND TENDONS  The myotendinous structures surrounding the hips and proximal thighs appear  intact.     BURSA  Normal.     OTHER FINDINGS  Small fat filled left inguinal hernia defect.      Enlarged prostate.     Slightly trabeculated appearance of the bladder wall, correlate for chronic  bladder outlet obstruction.     IMPRESSION:     No fracture or destructive osseous lesions.     Nondisplaced anterior/superior labral tear.     Early left hip osteoarthritis.     Incidental findings as above.     Lumbar spine 4V x-rays from 12/1/2020 were personally reviewed with the patient and demonstrated:  L4-5 fusion. Collapse of left side of L3/4 disc and also sclerosis. Compression deformity at L3 and T12. Straightening of the lumbar spine. No instability.     Hip x-rays from 12/1/2020 were personally reviewed with the patient and demonstrated:  Degenerative changes in the right hip.      Cervical spine 2V x-rays from 06/25/2019 were personally reviewed with the patient and demonstrated:  Straightening of the cervical spine. Multilevel degenerative facets.  Degenerative disc at C4-5, C5-6, and C6-7.     Lumbar spine MRI from 04/24/2019 was personally reviewed with the patient and demonstrated:          Results from [facility] on 04/24/19   MRI LUMB SPINE W WO CONT     Narrative EXAMINATION: MRI lumbar spine with and without contrast     INDICATION: Lumbar pain, prior lumbar surgery, left lower extremity sciatica     COMPARISON: [facility] 1217     TECHNIQUE: Sagittal and axial multiecho MRI sequences of the lumbar spine  performed before and after 13 cc IV Dotarem.     FINDINGS:     Postsurgical: Status post L4-L5 posterior hardware fixation and posterior  decompression. No postoperative fluid collection identified. Mild lower lumbar  paraspinous muscle edema.     General: T12 anterior central mild superior endplate height loss, chronic  appearing. L3 eccentric to the left mild superior endplate height loss, chronic  appearing. Vertebral body heights elsewhere preserved. Disc space loss at  multiple levels, most pronounced at L3-L4 and L4-L5. Grade 2 anterolisthesis of  L4 and L5. Mild multilevel endplate edema, likely related to active disc  degeneration. Straightening of the normal lordosis. Conus ends at level L1. No  abnormal signal or enhancement at the distal cord. No abnormal epidural  collection identified.     Miscellaneous: No incidental acute or suspicious findings identified outside of  the lumbar spine region.     Levels:     T11-T12: Evaluated sagittal plane only. No significant degenerative change or  stenosis.     T12-L1: No significant disc disease or stenosis. Mild facet arthropathy.     L1-L2: Mild disc bulge. Moderate facet arthropathy. Moderate spinal canal  stenosis. Mild foraminal stenoses.     L2-L3: Minimal disc bulge. Moderate facet arthropathy. Moderate spinal canal  stenosis. Foramina patent.     L3-L4: Hardware-related artifact limits evaluation. Mild disc bulge in addition  to a right central condyle disc extrusion extending roughly 1.2 cm condyle,  measuring roughly 0.9 cm transverse, roughly 0.5 cm AP, likely abutting the  crossing right L4 nerve. Severe facet arthropathy with ligamentum flavum bulge.   Severe spinal canal stenosis, perhaps partly due to congenital narrowing. Moderate to severe foraminal stenoses.     L4-L5: Artifact limits evaluation. Postsurgical level as above. Grade 2  anterolisthesis with uncovering of disc. Facets not well evaluated due to  artifact. Moderate to severe spinal canal stenosis, predominantly in the AP  plane. Moderate bilateral foraminal stenoses, predominantly in the vertical  plane, with possible abutment of the exiting L4 nerves.     L5-S1: Posterior decompression level. A left central annular fissure with  underlying mild disc bulge. Mild facet arthropathy. Minimal spinal canal  narrowing. Moderate to severe foraminal stenoses with probable abutment of the  exiting L5 nerves.     Imaged sacrum: Unremarkable.        Impression IMPRESSION:     At L3-L4, degenerative changes results in severe spinal canal stenosis and  moderate to severe foraminal stenoses. There is also notable right central disc  extrusion at this level likely abutting the crossing right L4 nerve. Hardware-related artifact limits evaluation. Compared to 2017 MRI, spinal canal  stenosis is slightly worse and disc extrusion is new.     L4-L5 level postsurgical changes as above, with grade 2 anterolisthesis and  moderate to severe spinal canal as well as moderate foraminal stenoses. Spinal  canal and foraminal stenosis however is less compared to 2017 MRI.     L5-S1 moderate to severe foraminal stenoses.     L1-L2 and L2-L3 moderate spinal canal stenoses.     See level by level details above. Written by Valerio Trejo, as dictated by Will Altamirano MD.  I, Dr. Will Altamirano confirm that all documentation is accurate.

## 2021-06-01 NOTE — PATIENT INSTRUCTIONS
Hip Arthritis: Exercises Introduction Here are some examples of exercises for you to try. The exercises may be suggested for a condition or for rehabilitation. Start each exercise slowly. Ease off the exercises if you start to have pain. You will be told when to start these exercises and which ones will work best for you. How to do the exercises Straight-leg raises to the outside 1. Lie on your side, with your affected hip on top. 2. Tighten the front thigh muscles of your top leg to keep your knee straight. 3. Keep your hip and your leg straight in line with the rest of your body, and keep your knee pointing forward. Do not drop your hip back. 4. Lift your top leg straight up toward the ceiling, about 12 inches off the floor. Hold for about 6 seconds, then slowly lower your leg. 5. Repeat 8 to 12 times. 6. Switch legs and repeat steps 1 through 5, even if only one hip is sore. Straight-leg raises to the inside 1. Lie on your side with your affected hip on the floor. 2. You can either prop up your other leg on a chair, or you can bend that knee and put that foot in front of your other knee. Do not drop your hip back. 3. Tighten the muscles on the front thigh of your bottom leg to straighten that knee. 4. Keep your kneecap pointing forward and your leg straight, and lift your bottom leg up toward the ceiling about 6 inches. Hold for about 6 seconds, then lower slowly. 5. Repeat 8 to 12 times. 6. Switch legs and repeat steps 1 through 5, even if only one hip is sore. Hip hike 1. Stand sideways on the bottom step of a staircase, and hold on to the banister or wall. 2. Keeping both knees straight, lift your good leg off the step and let it hang down. Then hike your good hip up to the same level as your affected hip or a little higher. 3. Repeat 8 to 12 times. 4. Switch legs and repeat steps 1 through 3, even if only one hip is sore. Bridging 1.  Lie on your back with both knees bent. Your knees should be bent about 90 degrees. 2. Then push your feet into the floor, squeeze your buttocks, and lift your hips off the floor until your shoulders, hips, and knees are all in a straight line. 3. Hold for about 6 seconds as you continue to breathe normally, and then slowly lower your hips back down to the floor and rest for up to 10 seconds. 4. Repeat 8 to 12 times. Hamstring stretch (lying down) 1. Lie flat on your back with your legs straight. If you feel discomfort in your back, place a small towel roll under your lower back. 2. Holding the back of your affected leg, lift your leg straight up and toward your body until you feel a stretch at the back of your thigh. 3. Hold the stretch for at least 30 seconds. 4. Repeat 2 to 4 times. 5. Switch legs and repeat steps 1 through 4, even if only one hip is sore. Standing quadriceps stretch 1. If you are not steady on your feet, hold on to a chair, counter, or wall. You can also lie on your stomach or your side to do this exercise. 2. Bend the knee of the leg you want to stretch, and reach behind you to grab the front of your foot or ankle with the hand on the same side. For example, if you are stretching your right leg, use your right hand. 3. Keeping your knees next to each other, pull your foot toward your buttock until you feel a gentle stretch across the front of your hip and down the front of your thigh. Your knee should be pointed directly to the ground, and not out to the side. 4. Hold the stretch for at least 15 to 30 seconds. 5. Repeat 2 to 4 times. 6. Switch legs and repeat steps 1 through 5, even if only one hip is sore. Hip rotator stretch 1. Lie on your back with both knees bent and your feet flat on the floor. 2. Put the ankle of your affected leg on your opposite thigh near your knee. 3. Use your hand to gently push your knee away from your body until you feel a gentle stretch around your hip.  
4. Hold the stretch for 15 to 30 seconds. 5. Repeat 2 to 4 times. 6. Repeat steps 1 through 5, but this time use your hand to gently pull your knee toward your opposite shoulder. 7. Switch legs and repeat steps 1 through 6, even if only one hip is sore. Knee-to-chest  
1. Lie on your back with your knees bent and your feet flat on the floor. 2. Bring your affected leg to your chest, keeping the other foot flat on the floor (or keeping the other leg straight, whichever feels better on your lower back). 3. Keep your lower back pressed to the floor. Hold for at least 15 to 30 seconds. 4. Relax, and lower the knee to the starting position. 5. Repeat 2 to 4 times. 6. Switch legs and repeat steps 1 through 5, even if only one hip is sore. 7. To get more stretch, put your other leg flat on the floor while pulling your knee to your chest.   
Clamshell 1. Lie on your side, with your affected hip on top. Keep your feet and knees together and your knees bent. 2. Raise your top knee, but keep your feet together. Do not let your hips roll back. Your legs should open up like a clamshell. 3. Hold for 6 seconds. 4. Slowly lower your knee back down. Rest for 10 seconds. 5. Repeat 8 to 12 times. 6. Switch legs and repeat steps 1 through 5, even if only one hip is sore. Follow-up care is a key part of your treatment and safety. Be sure to make and go to all appointments, and call your doctor if you are having problems. It's also a good idea to know your test results and keep a list of the medicines you take. Where can you learn more? Go to http://www.gray.com/ Enter W806 in the search box to learn more about \"Hip Arthritis: Exercises. \" Current as of: November 16, 2020               Content Version: 12.8 © 2006-2021 [employer]. Care instructions adapted under license by [employer] (which disclaims liability or warranty for this information).  If you have questions about a medical condition or this instruction, always ask your healthcare professional. [place] [address] any warranty or liability for your use of this information.

## 2021-08-31 DIAGNOSIS — M54.16 LEFT LUMBAR RADICULOPATHY: ICD-10-CM

## 2021-08-31 DIAGNOSIS — R20.0 RIGHT ARM NUMBNESS: ICD-10-CM

## 2021-09-01 RX ORDER — TOPIRAMATE 25 MG/1
TABLET ORAL
Qty: 270 TABLET | Refills: 0 | Status: SHIPPED | OUTPATIENT
Start: 2021-09-01 | End: 2021-09-28 | Stop reason: SDUPTHER

## 2021-09-28 ENCOUNTER — OFFICE VISIT (OUTPATIENT)
Dept: ORTHOPEDIC SURGERY | Age: 71
End: 2021-09-28
Payer: COMMERCIAL

## 2021-09-28 VITALS
TEMPERATURE: 97 F | WEIGHT: 154 LBS | BODY MASS INDEX: 22.05 KG/M2 | OXYGEN SATURATION: 98 % | DIASTOLIC BLOOD PRESSURE: 78 MMHG | HEIGHT: 70 IN | HEART RATE: 60 BPM | SYSTOLIC BLOOD PRESSURE: 121 MMHG

## 2021-09-28 DIAGNOSIS — M16.12 PRIMARY OSTEOARTHRITIS OF LEFT HIP: ICD-10-CM

## 2021-09-28 DIAGNOSIS — R20.0 RIGHT ARM NUMBNESS: ICD-10-CM

## 2021-09-28 DIAGNOSIS — M25.552 LEFT HIP PAIN: ICD-10-CM

## 2021-09-28 DIAGNOSIS — M53.3 SACROILIAC JOINT DYSFUNCTION OF LEFT SIDE: ICD-10-CM

## 2021-09-28 DIAGNOSIS — M62.838 MUSCLE SPASM: ICD-10-CM

## 2021-09-28 DIAGNOSIS — Z79.01 CHRONIC ANTICOAGULATION: ICD-10-CM

## 2021-09-28 DIAGNOSIS — M54.16 LEFT LUMBAR RADICULOPATHY: Primary | ICD-10-CM

## 2021-09-28 PROCEDURE — 3017F COLORECTAL CA SCREEN DOC REV: CPT | Performed by: PHYSICAL MEDICINE & REHABILITATION

## 2021-09-28 PROCEDURE — G8754 DIAS BP LESS 90: HCPCS | Performed by: PHYSICAL MEDICINE & REHABILITATION

## 2021-09-28 PROCEDURE — G8432 DEP SCR NOT DOC, RNG: HCPCS | Performed by: PHYSICAL MEDICINE & REHABILITATION

## 2021-09-28 PROCEDURE — G8420 CALC BMI NORM PARAMETERS: HCPCS | Performed by: PHYSICAL MEDICINE & REHABILITATION

## 2021-09-28 PROCEDURE — 99214 OFFICE O/P EST MOD 30 MIN: CPT | Performed by: PHYSICAL MEDICINE & REHABILITATION

## 2021-09-28 PROCEDURE — 1101F PT FALLS ASSESS-DOCD LE1/YR: CPT | Performed by: PHYSICAL MEDICINE & REHABILITATION

## 2021-09-28 PROCEDURE — G8427 DOCREV CUR MEDS BY ELIG CLIN: HCPCS | Performed by: PHYSICAL MEDICINE & REHABILITATION

## 2021-09-28 PROCEDURE — G8752 SYS BP LESS 140: HCPCS | Performed by: PHYSICAL MEDICINE & REHABILITATION

## 2021-09-28 PROCEDURE — G8536 NO DOC ELDER MAL SCRN: HCPCS | Performed by: PHYSICAL MEDICINE & REHABILITATION

## 2021-09-28 RX ORDER — TOPIRAMATE 25 MG/1
75 TABLET ORAL
Qty: 270 TABLET | Refills: 1 | Status: SHIPPED | OUTPATIENT
Start: 2021-09-28 | End: 2022-01-04 | Stop reason: SDUPTHER

## 2021-09-28 RX ORDER — DICLOFENAC SODIUM 10 MG/G
GEL TOPICAL
Qty: 100 G | Refills: 3 | Status: SHIPPED | OUTPATIENT
Start: 2021-09-28

## 2021-09-28 NOTE — PROGRESS NOTES
[facility]  [address] [place] [address], [facility] [address]  Phone: [phone]  Fax: [phone]    Pt's YOB: 1950    ASSESSMENT   Diagnoses and all orders for this visit:    1. Left lumbar radiculopathy  -     topiramate (TOPAMAX) 25 mg tablet; Take 3 Tablets by mouth nightly. 2. Muscle spasm    3. Left hip pain  -     diclofenac (Voltaren) 1 % gel; Apply 4 grams to left hip up to 4 times per day, maximum 16 grams per joint per day. Dispense 100 gram tubes    4. Sacroiliac joint dysfunction of left side    5. Chronic anticoagulation    6. Right arm numbness  -     topiramate (TOPAMAX) 25 mg tablet; Take 3 Tablets by mouth nightly. 7. Primary osteoarthritis of left hip  -     diclofenac (Voltaren) 1 % gel; Apply 4 grams to left hip up to 4 times per day, maximum 16 grams per joint per day. Dispense 100 gram tubes         IMPRESSION AND PLAN:  Remedios Fowler is a 73 y.o. male with history of cervical, lumbar, and left hip pain. He notes an acute exacerbation of left hip pain about 1 month ago while he was on a trip to [facility]. Pt continues to take Topamax 25 mg 3 tabs QHS. 1) Pt was given information on hip arthritis exercises. 2) He received a refill of Voltaren 1% gel. 3) Pt received a refill of Topamax 25 mg 3 tabs QHS. 4) Mr. Keshia Card has a reminder for a \"due or due soon\" health maintenance. I have asked that he contact his primary care provider, Noemi Hennessy MD, for follow-up on this health maintenance. 5)  demonstrated consistency with prescribing. Follow-up and Dispositions    · Return in about 3 months (around 12/28/2021) for Medication follow up. HISTORY OF PRESENT ILLNESS:  Remedios Fowler is a 73 y.o. male with history of cervical, lumbar, and left hip pain and presents to the office today for follow up. He complains of pain in the right lower back and occasionally feels as if something is unstable.  Pt admits that he is concerned his hardware may be loosening. He notes an acute exacerbation of left hip pain about 1 month ago while he was on a trip to [facility]. Pt continues to take Topamax 25 mg 3 tabs QHS and uses Voltaren gel as needed. Of note, he is on Xarelto for DVT/PE prophylaxis on an intermittent basis, particularly when travelling (1 day before he leaves and for 3 days after he arrives). Pt at this time desires to continue with current care. He will be taking a trip to [place]. Pt reports that he will be retiring next summer. Pain Scale: 0 - No pain/10    PCP: Mendel Constable, MD     Past Medical History:   Diagnosis Date    Back pain     Chronic pain     Headache(784.0)     HTN (hypertension)     Joint pain     hx of joint pain    Pulmonary emboli (HCC)         Social History     Socioeconomic History    Marital status:      Spouse name: Not on file    Number of children: Not on file    Years of education: Not on file    Highest education level: Not on file   Occupational History    Not on file   Tobacco Use    Smoking status: Former Smoker     Packs/day: 0.75     Years: 40.00     Pack years: 30.00     Quit date: 10/17/2012     Years since quittin.9    Smokeless tobacco: Former User   Substance and Sexual Activity    Alcohol use: Yes     Alcohol/week: 2.0 standard drinks     Types: 2 Cans of beer per week     Comment: daily-beer and wine/ 2 drinks a day    Drug use: No    Sexual activity: Yes   Other Topics Concern    Not on file   Social History Narrative    Not on file     Social Determinants of Health     Financial Resource Strain:     Difficulty of Paying Living Expenses:    Food Insecurity:     Worried About Running Out of Food in the Last Year:     Ran Out of Food in the Last Year:    Transportation Needs:     Lack of Transportation (Medical):      Lack of Transportation (Non-Medical):    Physical Activity:     Days of Exercise per Week:     Minutes of Exercise per Session:    Stress:     Feeling of Stress :    Social Connections:     Frequency of Communication with Friends and Family:     Frequency of Social Gatherings with Friends and Family:     Attends Latter day Services:     Active Member of Clubs or Organizations:     Attends Club or Organization Meetings:     Marital Status:    Intimate Partner Violence:     Fear of Current or Ex-Partner:     Emotionally Abused:     Physically Abused:     Sexually Abused:        Current Outpatient Medications   Medication Sig Dispense Refill    diclofenac (Voltaren) 1 % gel Apply 4 grams to left hip up to 4 times per day, maximum 16 grams per joint per day. Dispense 100 gram tubes 100 g 3    topiramate (TOPAMAX) 25 mg tablet Take 3 Tablets by mouth nightly. 270 Tablet 1    Xarelto 10 mg tablet Take 1 Tablet by mouth daily as needed (2 days prior to traveling, 4 days after traveling. ). 30 Tablet 3    naproxen (NAPROSYN) 500 mg tablet Take 1 Tab by mouth two (2) times daily (with meals). 60 Tab 3    betamethasone valerate (VALISONE) 0.1 % topical cream Apply  to affected area two (2) times a day. Apply cream to affected area with each urination. (Patient taking differently: Apply  to affected area daily as needed. Apply cream to affected area with each urination. ) 15 g 1    cholecalciferol, vitamin D3, 2,000 unit tab Take 2,000 Units by mouth daily.  clobetasol (TEMOVATE) 0.05 % topical cream as needed.  desonide (TRIDESILON) 0.05 % topical lotion Use 1 Application to affected area Twice Daily.  lisinopril-hydroCHLOROthiazide (PRINZIDE, ZESTORETIC) 20-12.5 mg per tablet Take 1 Tablet by mouth daily.  zolpidem (AMBIEN) 5 mg tablet Take 5 mg by mouth nightly as needed.  DULoxetine (CYMBALTA) 30 mg capsule Take 1 Cap by mouth daily.  Indications: Neuropathic Pain (Patient not taking: Reported on 6/25/2019) 30 Cap 1       Allergies   Allergen Reactions    Bee Sting  [Sting, Bee] Swelling     DIZZINESS REVIEW OF SYSTEMS    Constitutional: Negative for fever, chills, or weight change. Respiratory: Negative for cough or shortness of breath. Cardiovascular: Negative for chest pain or palpitations. Gastrointestinal: Negative for acid reflux, change in bowel habits, or constipation. Genitourinary: Negative for dysuria and flank pain. Musculoskeletal: Positive for left hip and lumbar pain. Skin: Negative for rash. Neurological: Negative for headaches and dizziness. Positive for numbness. Endo/Heme/Allergies: Negative for increased bruising. Psychiatric/Behavioral: Negative for difficulty with sleep. As per HPI    PHYSICAL EXAMINATION  Visit Vitals  /78 (BP 1 Location: Right upper arm, BP Patient Position: Sitting, BP Cuff Size: Adult)   Pulse 60   Temp 97 °F (36.1 °C) (Skin)   Ht 5' 10\" (1.778 m)   Wt 154 lb (69.9 kg)   SpO2 98% Comment: RA   BMI 22.10 kg/m²       Constitutional: Awake, alert, and in no acute distress. Neurological: 1+ symmetrical DTRs in the upper extremities. 1+ symmetrical DTRs in the lower extremities. Sensation to light touch is intact. Negative Saul's sign bilaterally. Skin: warm, dry, and intact. Musculoskeletal: Tenderness to palpation in the right lower lumbar region. Moderate pain with extension and axial loading. Improvement with forward flexion. Pain with internal rotation of his left hip. Negative straight leg raise bilaterally. Hip Flex  Quads Hamstrings Ankle DF EHL Ankle PF   Right +4/5 +4/5 +4/5 +4/5 +4/5 +4/5   Left +4/5 +4/5 +4/5 +4/5 +4/5 +4/5     IMAGING:    Left hip MRI arthrogram from 12/22/2020 was personally reviewed with the patient and demonstrated:   FINDINGS  OSSEOUS STRUCTURES / JOINTS     Adequate joint distention with contrast solution. Nondisplaced Anterior/superior  labral tear. Mild diffuse cartilage surface irregularity of the left hip.  No  subchondral marrow edema.     MUSCLES AND TENDONS  The myotendinous structures surrounding the hips and proximal thighs appear  intact.     BURSA  Normal.     OTHER FINDINGS  Small fat filled left inguinal hernia defect.      Enlarged prostate.     Slightly trabeculated appearance of the bladder wall, correlate for chronic  bladder outlet obstruction.     IMPRESSION:     No fracture or destructive osseous lesions.     Nondisplaced anterior/superior labral tear.     Early left hip osteoarthritis.     Incidental findings as above.     Lumbar spine 4V x-rays from 12/1/2020 were personally reviewed with the patient and demonstrated:  L4-5 fusion. Collapse of left side of L3/4 disc and also sclerosis. Compression deformity at L3 and T12. Straightening of the lumbar spine. No instability.     Hip x-rays from 12/1/2020 were personally reviewed with the patient and demonstrated:  Degenerative changes in the right hip.      Cervical spine 2V x-rays from 06/25/2019 were personally reviewed with the patient and demonstrated:  Straightening of the cervical spine. Multilevel degenerative facets. Degenerative disc at C4-5, C5-6, and C6-7.     Lumbar spine MRI from 04/24/2019 was personally reviewed with the patient and demonstrated:          Results from [facility] on 04/24/19   MRI LUMB SPINE W WO CONT     Narrative EXAMINATION: MRI lumbar spine with and without contrast     INDICATION: Lumbar pain, prior lumbar surgery, left lower extremity sciatica     COMPARISON: [facility] 1217     TECHNIQUE: Sagittal and axial multiecho MRI sequences of the lumbar spine  performed before and after 13 cc IV Dotarem.     FINDINGS:     Postsurgical: Status post L4-L5 posterior hardware fixation and posterior  decompression. No postoperative fluid collection identified. Mild lower lumbar  paraspinous muscle edema.     General: T12 anterior central mild superior endplate height loss, chronic  appearing. L3 eccentric to the left mild superior endplate height loss, chronic  appearing.  Vertebral body heights elsewhere preserved. Disc space loss at  multiple levels, most pronounced at L3-L4 and L4-L5. Grade 2 anterolisthesis of  L4 and L5. Mild multilevel endplate edema, likely related to active disc  degeneration. Straightening of the normal lordosis. Conus ends at level L1. No  abnormal signal or enhancement at the distal cord. No abnormal epidural  collection identified.     Miscellaneous: No incidental acute or suspicious findings identified outside of  the lumbar spine region.     Levels:     T11-T12: Evaluated sagittal plane only. No significant degenerative change or  stenosis.     T12-L1: No significant disc disease or stenosis. Mild facet arthropathy.     L1-L2: Mild disc bulge. Moderate facet arthropathy. Moderate spinal canal  stenosis. Mild foraminal stenoses.     L2-L3: Minimal disc bulge. Moderate facet arthropathy. Moderate spinal canal  stenosis. Foramina patent.     L3-L4: Hardware-related artifact limits evaluation. Mild disc bulge in addition  to a right central condyle disc extrusion extending roughly 1.2 cm condyle,  measuring roughly 0.9 cm transverse, roughly 0.5 cm AP, likely abutting the  crossing right L4 nerve. Severe facet arthropathy with ligamentum flavum bulge. Severe spinal canal stenosis, perhaps partly due to congenital narrowing. Moderate to severe foraminal stenoses.     L4-L5: Artifact limits evaluation. Postsurgical level as above. Grade 2  anterolisthesis with uncovering of disc. Facets not well evaluated due to  artifact. Moderate to severe spinal canal stenosis, predominantly in the AP  plane. Moderate bilateral foraminal stenoses, predominantly in the vertical  plane, with possible abutment of the exiting L4 nerves.     L5-S1: Posterior decompression level. A left central annular fissure with  underlying mild disc bulge. Mild facet arthropathy. Minimal spinal canal  narrowing.  Moderate to severe foraminal stenoses with probable abutment of the  exiting L5 nerves.     Imaged sacrum: Unremarkable.        Impression IMPRESSION:     At L3-L4, degenerative changes results in severe spinal canal stenosis and  moderate to severe foraminal stenoses. There is also notable right central disc  extrusion at this level likely abutting the crossing right L4 nerve. Hardware-related artifact limits evaluation. Compared to 2017 MRI, spinal canal  stenosis is slightly worse and disc extrusion is new.     L4-L5 level postsurgical changes as above, with grade 2 anterolisthesis and  moderate to severe spinal canal as well as moderate foraminal stenoses. Spinal  canal and foraminal stenosis however is less compared to 2017 MRI.     L5-S1 moderate to severe foraminal stenoses.     L1-L2 and L2-L3 moderate spinal canal stenoses.     See level by level details above. Written by Nica Rayo, as dictated by Nicole Hu MD.  I, Dr. Nicole Hu confirm that all documentation is accurate.

## 2021-09-28 NOTE — LETTER
9/29/2021    Patient: Darylene Fess   YOB: 1950   Date of Visit: 9/28/2021     Terence Amador MD  [address] [facility] [place] [facility] [address] [place] [address]  Via Fax: [phone]    Dear Terence Amador MD,      Thank you for referring Mr. Edilma Ronquillo to [facility] for evaluation. My notes for this consultation are attached. If you have questions, please do not hesitate to call me. I look forward to following your patient along with you.       Sincerely,    Cody Marina MD

## 2021-09-28 NOTE — PROGRESS NOTES
Izzy Mcrae presents today for   Chief Complaint   Patient presents with    Back Pain    Hip Pain     left       Is someone accompanying this pt? no    Is the patient using any DME equipment during OV? no    Depression Screening:  3 most recent PHQ Screens 12/1/2020   Little interest or pleasure in doing things Not at all   Feeling down, depressed, irritable, or hopeless Not at all   Total Score PHQ 2 0       Learning Assessment:  Learning Assessment 2/1/2018   PRIMARY LEARNER Patient   PRIMARY LANGUAGE ENGLISH   LEARNER PREFERENCE PRIMARY PICTURES   ANSWERED BY patient   RELATIONSHIP SELF       Abuse Screening:  Abuse Screening Questionnaire 12/1/2020   Do you ever feel afraid of your partner? N   Are you in a relationship with someone who physically or mentally threatens you? N   Is it safe for you to go home? Y       Fall Risk  Fall Risk Assessment, last 12 mths 12/1/2020   Able to walk? Yes   Fall in past 12 months? No   Number of falls in past 12 months -   Fall with injury? -       Coordination of Care:  1. Have you been to the ER, urgent care clinic since your last visit? no  Hospitalized since your last visit? no    2. Have you seen or consulted any other health care providers outside of the [address] [place] [address] since your last visit? no Include any pap smears or colon screening.  no

## 2021-09-28 NOTE — PATIENT INSTRUCTIONS
Hip Arthritis: Exercises  Introduction  Here are some examples of exercises for you to try. The exercises may be suggested for a condition or for rehabilitation. Start each exercise slowly. Ease off the exercises if you start to have pain. You will be told when to start these exercises and which ones will work best for you. How to do the exercises  Straight-leg raises to the outside    1. Lie on your side, with your affected hip on top. 2. Tighten the front thigh muscles of your top leg to keep your knee straight. 3. Keep your hip and your leg straight in line with the rest of your body, and keep your knee pointing forward. Do not drop your hip back. 4. Lift your top leg straight up toward the ceiling, about 12 inches off the floor. Hold for about 6 seconds, then slowly lower your leg. 5. Repeat 8 to 12 times. 6. Switch legs and repeat steps 1 through 5, even if only one hip is sore. Straight-leg raises to the inside    1. Lie on your side with your affected hip on the floor. 2. You can either prop up your other leg on a chair, or you can bend that knee and put that foot in front of your other knee. Do not drop your hip back. 3. Tighten the muscles on the front thigh of your bottom leg to straighten that knee. 4. Keep your kneecap pointing forward and your leg straight, and lift your bottom leg up toward the ceiling about 6 inches. Hold for about 6 seconds, then lower slowly. 5. Repeat 8 to 12 times. 6. Switch legs and repeat steps 1 through 5, even if only one hip is sore. Hip hike    1. Stand sideways on the bottom step of a staircase, and hold on to the banister or wall. 2. Keeping both knees straight, lift your good leg off the step and let it hang down. Then hike your good hip up to the same level as your affected hip or a little higher. 3. Repeat 8 to 12 times. 4. Switch legs and repeat steps 1 through 3, even if only one hip is sore. Bridging    1. Lie on your back with both knees bent. Your knees should be bent about 90 degrees. 2. Then push your feet into the floor, squeeze your buttocks, and lift your hips off the floor until your shoulders, hips, and knees are all in a straight line. 3. Hold for about 6 seconds as you continue to breathe normally, and then slowly lower your hips back down to the floor and rest for up to 10 seconds. 4. Repeat 8 to 12 times. Hamstring stretch (lying down)    1. Lie flat on your back with your legs straight. If you feel discomfort in your back, place a small towel roll under your lower back. 2. Holding the back of your affected leg, lift your leg straight up and toward your body until you feel a stretch at the back of your thigh. 3. Hold the stretch for at least 30 seconds. 4. Repeat 2 to 4 times. 5. Switch legs and repeat steps 1 through 4, even if only one hip is sore. Standing quadriceps stretch    1. If you are not steady on your feet, hold on to a chair, counter, or wall. You can also lie on your stomach or your side to do this exercise. 2. Bend the knee of the leg you want to stretch, and reach behind you to grab the front of your foot or ankle with the hand on the same side. For example, if you are stretching your right leg, use your right hand. 3. Keeping your knees next to each other, pull your foot toward your buttock until you feel a gentle stretch across the front of your hip and down the front of your thigh. Your knee should be pointed directly to the ground, and not out to the side. 4. Hold the stretch for at least 15 to 30 seconds. 5. Repeat 2 to 4 times. 6. Switch legs and repeat steps 1 through 5, even if only one hip is sore. Hip rotator stretch    1. Lie on your back with both knees bent and your feet flat on the floor. 2. Put the ankle of your affected leg on your opposite thigh near your knee. 3. Use your hand to gently push your knee away from your body until you feel a gentle stretch around your hip.   4. Hold the stretch for 15 to 30 seconds. 5. Repeat 2 to 4 times. 6. Repeat steps 1 through 5, but this time use your hand to gently pull your knee toward your opposite shoulder. 7. Switch legs and repeat steps 1 through 6, even if only one hip is sore. Knee-to-chest    1. Lie on your back with your knees bent and your feet flat on the floor. 2. Bring your affected leg to your chest, keeping the other foot flat on the floor (or keeping the other leg straight, whichever feels better on your lower back). 3. Keep your lower back pressed to the floor. Hold for at least 15 to 30 seconds. 4. Relax, and lower the knee to the starting position. 5. Repeat 2 to 4 times. 6. Switch legs and repeat steps 1 through 5, even if only one hip is sore. 7. To get more stretch, put your other leg flat on the floor while pulling your knee to your chest.  Clamshell    1. Lie on your side, with your affected hip on top. Keep your feet and knees together and your knees bent. 2. Raise your top knee, but keep your feet together. Do not let your hips roll back. Your legs should open up like a clamshell. 3. Hold for 6 seconds. 4. Slowly lower your knee back down. Rest for 10 seconds. 5. Repeat 8 to 12 times. 6. Switch legs and repeat steps 1 through 5, even if only one hip is sore. Follow-up care is a key part of your treatment and safety. Be sure to make and go to all appointments, and call your doctor if you are having problems. It's also a good idea to know your test results and keep a list of the medicines you take. Where can you learn more? Go to http://www.gray.com/  Enter P388 in the search box to learn more about \"Hip Arthritis: Exercises. \"  Current as of: July 1, 2021               Content Version: 13.0  © 8479-8174 [employer]. Care instructions adapted under license by [employer] (which disclaims liability or warranty for this information).  If you have questions about a medical condition or this instruction, always ask your healthcare professional. [place] [address] any warranty or liability for your use of this information.

## 2021-12-21 ENCOUNTER — OFFICE VISIT (OUTPATIENT)
Dept: ORTHOPEDIC SURGERY | Age: 71
End: 2021-12-21
Payer: COMMERCIAL

## 2021-12-21 VITALS
SYSTOLIC BLOOD PRESSURE: 116 MMHG | RESPIRATION RATE: 16 BRPM | OXYGEN SATURATION: 100 % | HEART RATE: 60 BPM | TEMPERATURE: 96.6 F | DIASTOLIC BLOOD PRESSURE: 78 MMHG | WEIGHT: 155 LBS | HEIGHT: 70 IN | BODY MASS INDEX: 22.19 KG/M2

## 2021-12-21 DIAGNOSIS — S39.012A STRAIN OF LUMBAR REGION, INITIAL ENCOUNTER: Primary | ICD-10-CM

## 2021-12-21 DIAGNOSIS — M48.061 SPINAL STENOSIS OF LUMBAR REGION WITHOUT NEUROGENIC CLAUDICATION: ICD-10-CM

## 2021-12-21 DIAGNOSIS — M62.838 MUSCLE SPASM: ICD-10-CM

## 2021-12-21 DIAGNOSIS — M43.16 SPONDYLOLISTHESIS OF LUMBAR REGION: ICD-10-CM

## 2021-12-21 DIAGNOSIS — M47.816 LUMBAR FACET ARTHROPATHY: ICD-10-CM

## 2021-12-21 DIAGNOSIS — M79.2 NEURITIS: ICD-10-CM

## 2021-12-21 PROCEDURE — G8432 DEP SCR NOT DOC, RNG: HCPCS | Performed by: PHYSICAL MEDICINE & REHABILITATION

## 2021-12-21 PROCEDURE — G8752 SYS BP LESS 140: HCPCS | Performed by: PHYSICAL MEDICINE & REHABILITATION

## 2021-12-21 PROCEDURE — G8536 NO DOC ELDER MAL SCRN: HCPCS | Performed by: PHYSICAL MEDICINE & REHABILITATION

## 2021-12-21 PROCEDURE — 99214 OFFICE O/P EST MOD 30 MIN: CPT | Performed by: PHYSICAL MEDICINE & REHABILITATION

## 2021-12-21 PROCEDURE — G8754 DIAS BP LESS 90: HCPCS | Performed by: PHYSICAL MEDICINE & REHABILITATION

## 2021-12-21 PROCEDURE — G8427 DOCREV CUR MEDS BY ELIG CLIN: HCPCS | Performed by: PHYSICAL MEDICINE & REHABILITATION

## 2021-12-21 PROCEDURE — 1101F PT FALLS ASSESS-DOCD LE1/YR: CPT | Performed by: PHYSICAL MEDICINE & REHABILITATION

## 2021-12-21 PROCEDURE — G8420 CALC BMI NORM PARAMETERS: HCPCS | Performed by: PHYSICAL MEDICINE & REHABILITATION

## 2021-12-21 PROCEDURE — 3017F COLORECTAL CA SCREEN DOC REV: CPT | Performed by: PHYSICAL MEDICINE & REHABILITATION

## 2021-12-21 RX ORDER — CYCLOBENZAPRINE HCL 10 MG
10 TABLET ORAL
Qty: 30 TABLET | Refills: 1 | Status: SHIPPED | OUTPATIENT
Start: 2021-12-21

## 2021-12-21 RX ORDER — METHYLPREDNISOLONE 4 MG/1
TABLET ORAL
Qty: 1 DOSE PACK | Refills: 1 | Status: SHIPPED | OUTPATIENT
Start: 2021-12-21

## 2021-12-21 RX ORDER — SULINDAC 200 MG/1
TABLET ORAL
COMMUNITY
Start: 2021-10-24

## 2021-12-21 NOTE — PROGRESS NOTES
[facility]  [address], [facility] [place][note 2] [address]  Phone: [phone]  Fax: [phone]    Pt's YOB: 1950    ASSESSMENT   Diagnoses and all orders for this visit:    1. Strain of lumbar region, initial encounter  -     methylPREDNISolone (MEDROL DOSEPACK) 4 mg tablet; Per dose pack instructions    2. Muscle spasm  -     cyclobenzaprine (FLEXERIL) 10 mg tablet; Take 1 Tablet by mouth nightly. As needed    3. Spinal stenosis of lumbar region without neurogenic claudication    4. Neuritis    5. Spondylolisthesis of lumbar region    6. Lumbar facet arthropathy         IMPRESSION AND PLAN:  Anthony Smith is a 73 y.o. male with history of cervical, lumbar, and left hip pain. He complains of sharp mid-back pain with movement and changing positions subsequent to a rigorous hike in [place], beginning on the left side 3 weeks ago and spreading to the bilateral back 10 days ago. Pt is taking Topamax 25 mg 3 tabs QHS and Sulindac 200 mg 1 tab daily as prescribed by his PCP, Fiona Martin MD, and uses Voltaren 1% gel on his left hip. 1) Pt was given information on lumbar, upper back, and shoulder arthritis exercises. 2) He was prescribed a Medrol Dosepak with 1 refill. 3) He was prescribed Flexeril 10 mg 1 tab QHS as needed. 4) Pt will continue to take Topamax 25 mg 3 tabs QHS and does not require a refill at this time. 5) The patient is advised to apply heat to his back intermittently (avoid sleeping on heating pad). 6) Mr. Mook Pelaez has a reminder for a \"due or due soon\" health maintenance. I have asked that he contact his primary care provider, Fiona Martin MD, for follow-up on this health maintenance. 7)  demonstrated consistency with prescribing. 8) If pain persists, recommend updating MRI to assess for worsening stenosis  Follow-up and Dispositions    · Return in about 3 months (around 3/21/2022) for Medication follow up. HISTORY OF PRESENT ILLNESS:  Ramos Merritt is a 73 y.o.  male with history of cervical, lumbar, and left hip pain and presents to the office today for follow up. Pt complains of sharp mid-back pain with movement and changing positions subsequent to a rigorous hike in [place], beginning on the left side 3 weeks ago and spreading to the bilateral back 10 days ago. He denies any weakness, pain radiating down the legs, and incontinence. Pt is taking Topamax 25 mg 3 tabs QHS and Sulindac 200 mg 1 tab daily as prescribed by his PCP, Paco Ibrahim MD, and uses Voltaren 1% gel on his left hip. He is no longer taking Naprosyn. He states that he takes Xarelto for DVT prophylaxis when he travels and is not on it currently. He also uses a heating pad and a hot tub with temporary relief. Pt notes that he followed up with Paco Ibrahim MD in 2021 for shoulder pain, underwent x-rays of his right shoulder that revealed arthritis in his right shoulder, and was prescribed Sulindac. He notes that he has previously taken Flexeril but has not taken it in a while; he does not recall if it caused sedation. Pt at this time desires to proceed with medication evaluation. Of note, pt plans to retire in 2021.     Pain Scale: 3/10    PCP: Paco Ibrahim MD     Past Medical History:   Diagnosis Date    Back pain     Chronic pain     Headache(784.0)     HTN (hypertension)     Joint pain     hx of joint pain    Pulmonary emboli (HCC)         Social History     Socioeconomic History    Marital status:      Spouse name: Not on file    Number of children: Not on file    Years of education: Not on file    Highest education level: Not on file   Occupational History    Not on file   Tobacco Use    Smoking status: Former Smoker     Packs/day: 0.75     Years: 40.00     Pack years: 30.00     Quit date: 10/17/2012     Years since quittin.1    Smokeless tobacco: Former User   Substance and Sexual Activity    Alcohol use: Yes     Alcohol/week: 2.0 standard drinks     Types: 2 Cans of beer per week     Comment: daily-beer and wine/ 2 drinks a day    Drug use: No    Sexual activity: Yes   Other Topics Concern    Not on file   Social History Narrative    Not on file     Social Determinants of Health     Financial Resource Strain:     Difficulty of Paying Living Expenses: Not on file   Food Insecurity:     Worried About Running Out of Food in the Last Year: Not on file    Susana of Food in the Last Year: Not on file   Transportation Needs:     Lack of Transportation (Medical): Not on file    Lack of Transportation (Non-Medical): Not on file   Physical Activity:     Days of Exercise per Week: Not on file    Minutes of Exercise per Session: Not on file   Stress:     Feeling of Stress : Not on file   Social Connections:     Frequency of Communication with Friends and Family: Not on file    Frequency of Social Gatherings with Friends and Family: Not on file    Attends [employer] Services: Not on file    Active Member of [address] [place] [address] or Organizations: Not on file    Attends Club or Organization Meetings: Not on file    Marital Status: Not on file   Intimate Partner Violence:     Fear of Current or Ex-Partner: Not on file    Emotionally Abused: Not on file    Physically Abused: Not on file    Sexually Abused: Not on file   Housing Stability:     Unable to Pay for Housing in the Last Year: Not on file    Number of [place] in the Last Year: Not on file    Unstable Housing in the Last Year: Not on file       Current Outpatient Medications   Medication Sig Dispense Refill    sulindac (CLINORIL) 200 mg tablet       cyclobenzaprine (FLEXERIL) 10 mg tablet Take 1 Tablet by mouth nightly.  As needed 30 Tablet 1    methylPREDNISolone (MEDROL DOSEPACK) 4 mg tablet Per dose pack instructions 1 Dose Pack 1    diclofenac (Voltaren) 1 % gel Apply 4 grams to left hip up to 4 times per day, maximum 16 grams per joint per day. Dispense 100 gram tubes 100 g 3    topiramate (TOPAMAX) 25 mg tablet Take 3 Tablets by mouth nightly. 270 Tablet 1    Xarelto 10 mg tablet Take 1 Tablet by mouth daily as needed (2 days prior to traveling, 4 days after traveling. ). 30 Tablet 3    betamethasone valerate (VALISONE) 0.1 % topical cream Apply  to affected area two (2) times a day. Apply cream to affected area with each urination. (Patient taking differently: Apply  to affected area daily as needed. Apply cream to affected area with each urination. ) 15 g 1    cholecalciferol, vitamin D3, 2,000 unit tab Take 2,000 Units by mouth daily.  clobetasol (TEMOVATE) 0.05 % topical cream as needed.  desonide (TRIDESILON) 0.05 % topical lotion Use 1 Application to affected area Twice Daily.  lisinopril-hydroCHLOROthiazide (PRINZIDE, ZESTORETIC) 20-12.5 mg per tablet Take 1 Tablet by mouth daily.  zolpidem (AMBIEN) 5 mg tablet Take 5 mg by mouth nightly as needed. Allergies   Allergen Reactions    Bee Sting  [Sting, Bee] Swelling     DIZZINESS         REVIEW OF SYSTEMS    Constitutional: Negative for fever, chills, or weight change. Respiratory: Negative for cough or shortness of breath. Cardiovascular: Negative for chest pain or palpitations. Gastrointestinal: Negative for acid reflux, change in bowel habits, or constipation. Genitourinary: Negative for dysuria and flank pain. Musculoskeletal: Positive for bilateral mid-back, lumbar, and right shoulder pain. Skin: Negative for rash. Neurological: Negative for headaches, dizziness, or numbness. Endo/Heme/Allergies: Negative for increased bruising. Psychiatric/Behavioral: Negative for difficulty with sleep.     As per HPI    PHYSICAL EXAMINATION  Visit Vitals  /78 (BP 1 Location: Right arm, BP Patient Position: Sitting, BP Cuff Size: Adult)   Pulse 60   Temp (!) 96.6 °F (35.9 °C) (Tympanic)   Resp 16   Ht 5' 10\" (1.778 m)   Wt 155 lb (70.3 kg)   SpO2 100% BMI 22.24 kg/m²       Constitutional: Awake, alert, and in no acute distress. Neurological: Sensation to light touch is intact. Skin: warm, dry, and intact. Musculoskeletal: Mild pain with extension, axial loading, and improved with forward flexion. No pain with internal or external rotation of his hips. Negative straight leg raise bilaterally. Bilateral mid-back pain with slump test on the left. Hip Flex  Quads Hamstrings Ankle DF EHL Ankle PF   Right +4/5 +4/5 +4/5 +4/5 +4/5 +4/5   Left +4/5 +4/5 +4/5 +4/5 +4/5 +4/5     IMAGING:    Left hip MRI arthrogram from 12/22/2020 was personally reviewed with the patient and demonstrated:     FINDINGS  OSSEOUS STRUCTURES / JOINTS     Adequate joint distention with contrast solution. Nondisplaced Anterior/superior  labral tear. Mild diffuse cartilage surface irregularity of the left hip. No  subchondral marrow edema.     MUSCLES AND TENDONS  The myotendinous structures surrounding the hips and proximal thighs appear  intact.     BURSA  Normal.     OTHER FINDINGS  Small fat filled left inguinal hernia defect.      Enlarged prostate.     Slightly trabeculated appearance of the bladder wall, correlate for chronic  bladder outlet obstruction.     IMPRESSION:     No fracture or destructive osseous lesions.     Nondisplaced anterior/superior labral tear.     Early left hip osteoarthritis.     Incidental findings as above.     Lumbar spine 4V x-rays from 12/1/2020 were personally reviewed with the patient and demonstrated:  L4-5 fusion. Collapse of left side of L3/4 disc and also sclerosis. Compression deformity at L3 and T12. Straightening of the lumbar spine. No instability.     Hip x-rays from 12/1/2020 were personally reviewed with the patient and demonstrated:  Degenerative changes in the right hip.      Cervical spine 2V x-rays from 06/25/2019 were personally reviewed with the patient and demonstrated:  Straightening of the cervical spine. Multilevel degenerative facets. Degenerative disc at C4-5, C5-6, and C6-7.     Lumbar spine MRI from 04/24/2019 was personally reviewed with the patient and demonstrated:          Results from [facility] on 04/24/19   MRI LUMB SPINE W WO CONT     Narrative EXAMINATION: MRI lumbar spine with and without contrast     INDICATION: Lumbar pain, prior lumbar surgery, left lower extremity sciatica     COMPARISON: CTA 1217     TECHNIQUE: Sagittal and axial multiecho MRI sequences of the lumbar spine  performed before and after 13 cc IV Dotarem.     FINDINGS:     Postsurgical: Status post L4-L5 posterior hardware fixation and posterior  decompression. No postoperative fluid collection identified. Mild lower lumbar  paraspinous muscle edema.     General: T12 anterior central mild superior endplate height loss, chronic  appearing. L3 eccentric to the left mild superior endplate height loss, chronic  appearing. Vertebral body heights elsewhere preserved. Disc space loss at  multiple levels, most pronounced at L3-L4 and L4-L5. Grade 2 anterolisthesis of  L4 and L5. Mild multilevel endplate edema, likely related to active disc  degeneration. Straightening of the normal lordosis. Conus ends at level L1. No  abnormal signal or enhancement at the distal cord. No abnormal epidural  collection identified.     Miscellaneous: No incidental acute or suspicious findings identified outside of  the lumbar spine region.     Levels:     T11-T12: Evaluated sagittal plane only. No significant degenerative change or  stenosis.     T12-L1: No significant disc disease or stenosis. Mild facet arthropathy.     L1-L2: Mild disc bulge. Moderate facet arthropathy. Moderate spinal canal  stenosis. Mild foraminal stenoses.     L2-L3: Minimal disc bulge. Moderate facet arthropathy. Moderate spinal canal  stenosis. Foramina patent.     L3-L4: Hardware-related artifact limits evaluation.  Mild disc bulge in addition  to a right central condyle disc extrusion extending roughly 1.2 cm condyle,  measuring roughly 0.9 cm transverse, roughly 0.5 cm AP, likely abutting the  crossing right L4 nerve. Severe facet arthropathy with ligamentum flavum bulge. Severe spinal canal stenosis, perhaps partly due to congenital narrowing. Moderate to severe foraminal stenoses.     L4-L5: Artifact limits evaluation. Postsurgical level as above. Grade 2  anterolisthesis with uncovering of disc. Facets not well evaluated due to  artifact. Moderate to severe spinal canal stenosis, predominantly in the AP  plane. Moderate bilateral foraminal stenoses, predominantly in the vertical  plane, with possible abutment of the exiting L4 nerves.     L5-S1: Posterior decompression level. A left central annular fissure with  underlying mild disc bulge. Mild facet arthropathy. Minimal spinal canal  narrowing. Moderate to severe foraminal stenoses with probable abutment of the  exiting L5 nerves.     Imaged sacrum: Unremarkable.        Impression IMPRESSION:     At L3-L4, degenerative changes results in severe spinal canal stenosis and  moderate to severe foraminal stenoses. There is also notable right central disc  extrusion at this level likely abutting the crossing right L4 nerve. Hardware-related artifact limits evaluation. Compared to 2017 MRI, spinal canal  stenosis is slightly worse and disc extrusion is new.     L4-L5 level postsurgical changes as above, with grade 2 anterolisthesis and  moderate to severe spinal canal as well as moderate foraminal stenoses. Spinal  canal and foraminal stenosis however is less compared to 2017 MRI.     L5-S1 moderate to severe foraminal stenoses.     L1-L2 and L2-L3 moderate spinal canal stenoses.     See level by level details above. Written by Piper Chavez, as dictated by Jaime Little MD.  I, Dr. Jaime Little confirm that all documentation is accurate.

## 2021-12-21 NOTE — PROGRESS NOTES
Chief Complaint   Patient presents with    Follow-up       Pt preferred language for health care discussion is english. Is someone accompanying this pt? no    Is the patient using any DME equipment during [address]? no    Depression Screening:  3 most recent [facility] Screens 12/1/2020 3/4/2020 9/4/2019 5/14/2019 4/17/2019 5/2/2018 2/1/2018   Little interest or pleasure in doing things Not at all Not at all Not at all Not at all Not at all Not at all Not at all   Feeling down, depressed, irritable, or hopeless Not at all Not at all Not at all Not at all Not at all Not at all Not at all   Total Score PHQ 2 0 0 0 0 0 0 0       Learning Assessment:  Learning Assessment 2/1/2018   PRIMARY LEARNER Patient   PRIMARY LANGUAGE ENGLISH   LEARNER PREFERENCE PRIMARY PICTURES   ANSWERED BY patient   RELATIONSHIP SELF       Abuse Screening:  Abuse Screening Questionnaire 12/1/2020 9/4/2019   Do you ever feel afraid of your partner? N N   Are you in a relationship with someone who physically or mentally threatens you? N N   Is it safe for you to go home? Y Y       Fall Risk  Fall Risk Assessment, last 12 mths 12/1/2020   Able to walk? Yes   Fall in past 12 months? No   Number of falls in past 12 months -   Fall with injury? -           Advance Directive:  1. Do you have an advance directive in place? Patient Reply:no    2. If not, would you like material regarding how to put one in place? Patient Reply: no    2. Per patient no changes to their ACP contact no. Coordination of Care:  1. Have you been to the ER, urgent care clinic since your last visit? Hospitalized since your last visit? no    2. Have you seen or consulted any other health care providers outside of the [address] [place] [address] since your last visit? Include any pap smears or colon screening.  no

## 2021-12-21 NOTE — PATIENT INSTRUCTIONS
Low Back Arthritis: Exercises  Introduction  Here are some examples of typical rehabilitation exercises for your condition. Start each exercise slowly. Ease off the exercise if you start to have pain. Your doctor or physical therapist will tell you when you can start these exercises and which ones will work best for you. When you are not being active, find a comfortable position for rest. Some people are comfortable on the floor or a medium-firm bed with a small pillow under their head and another under their knees. Some people prefer to lie on their side with a pillow between their knees. Don't stay in one position for too long. Take short walks (10 to 20 minutes) every 2 to 3 hours. Avoid slopes, hills, and stairs until you feel better. Walk only distances you can manage without pain, especially leg pain. How to do the exercises  Pelvic tilt    1. Lie on your back with your knees bent. 2. \"Brace\" your stomachtighten your muscles by pulling in and imagining your belly button moving toward your spine. 3. Press your lower back into the floor. You should feel your hips and pelvis rock back. 4. Hold for 6 seconds while breathing smoothly. 5. Relax and allow your pelvis and hips to rock forward. 6. Repeat 8 to 12 times. Back stretches    1. Get down on your hands and knees on the floor. 2. Relax your head and allow it to droop. Round your back up toward the ceiling until you feel a nice stretch in your upper, middle, and lower back. Hold this stretch for as long as it feels comfortable, or about 15 to 30 seconds. 3. Return to the starting position with a flat back while you are on your hands and knees. 4. Let your back sway by pressing your stomach toward the floor. Lift your buttocks toward the ceiling. 5. Hold this position for 15 to 30 seconds. 6. Repeat 2 to 4 times. Follow-up care is a key part of your treatment and safety.  Be sure to make and go to all appointments, and call your doctor if you are having problems. It's also a good idea to know your test results and keep a list of the medicines you take. Where can you learn more? Go to http://www.[employer].com/  Enter T094 in the search box to learn more about \"Low Back Arthritis: Exercises. \"  Current as of: July 1, 2021               Content Version: 13.0  © 2006-2021 [employer]. Care instructions adapted under license by [employer] (which disclaims liability or warranty for this information). If you have questions about a medical condition or this instruction, always ask your healthcare professional. [place] [address] any warranty or liability for your use of this information. Healthy Upper Back: Exercises  Introduction  Here are some examples of exercises for your upper back. Start each exercise slowly. Ease off the exercise if you start to have pain. Your doctor or physical therapist will tell you when you can start these exercises and which ones will work best for you. How to do the exercises  Lower neck and upper back stretch    1. Stretch your arms out in front of your body. Clasp one hand on top of your other hand. 2. Gently reach out so that you feel your shoulder blades stretching away from each other. 3. Gently bend your head forward. 4. Hold for 15 to 30 seconds. 5. Repeat 2 to 4 times. Midback stretch    If you have knee pain, do not do this exercise. 1. Kneel on the floor, and sit back on your ankles. 2. Lean forward, place your hands on the floor, and stretch your arms out in front of you. Rest your head between your arms. 3. Gently push your chest toward the floor, reaching as far in front of you as possible. 4. Hold for 15 to 30 seconds. 5. Repeat 2 to 4 times. Shoulder rolls    1. Sit comfortably with your feet shoulder-width apart. You can also do this exercise while standing.   2. Roll your shoulders up, then back, and then down in a smooth, circular motion. 3. Repeat 2 to 4 times. Wall push-up    1. Stand against a wall with your feet about 12 to 24 inches back from the wall. If you feel any pain when you do this exercise, stand closer to the wall. 2. Place your hands on the wall slightly wider apart than your shoulders, and lean forward. 3. Gently lean your body toward the wall. Then push back to your starting position. Keep the motion smooth and controlled. 4. Repeat 8 to 12 times. Resisted shoulder blade squeeze    For this exercise, you will need elastic exercise material, such as surgical tubing or Thera-Band. 1. Sit or stand, holding the band in both hands in front of you. Keep your elbows close to your sides, bent at a 90-degree angle. Your palms should face up. 2. Squeeze your shoulder blades together, and move your arms to the outside, stretching the band. Be sure to keep your elbows at your sides while you do this. 3. Relax. 4. Repeat 8 to 12 times. Resisted rows    For this exercise, you will need elastic exercise material, such as surgical tubing or Thera-Band. 1. Put the band around a solid object, such as a bedpost, at about waist level. Hold one end of the band in each hand. 2. With your elbows at your sides and bent to 90 degrees, pull the band back to move your shoulder blades toward each other. Return to the starting position. 3. Repeat 8 to 12 times. Follow-up care is a key part of your treatment and safety. Be sure to make and go to all appointments, and call your doctor if you are having problems. It's also a good idea to know your test results and keep a list of the medicines you take. Where can you learn more? Go to http://www.[employer].com/  Enter H565 in the search box to learn more about \"Healthy Upper Back: Exercises. \"  Current as of: July 1, 2021               Content Version: 13.0  © 5526-0266 [employer].    Care instructions adapted under license by [employer] (which disclaims liability or warranty for this information). If you have questions about a medical condition or this instruction, always ask your healthcare professional. [place] [address] any warranty or liability for your use of this information. Shoulder Arthritis: Exercises  Introduction  Here are some examples of exercises for you to try. The exercises may be suggested for a condition or for rehabilitation. Start each exercise slowly. Ease off the exercises if you start to have pain. You will be told when to start these exercises and which ones will work best for you. How to do the exercises  Shoulder flexion (lying down)    To make a wand for this exercise, use a piece of PVC pipe or a broom handle with the broom removed. Make the wand about a foot wider than your shoulders. 1. Lie on your back, holding a wand with both hands. Your palms should face down as you hold the wand. 2. Keeping your elbows straight, slowly raise your arms over your head. Raise them until you feel a stretch in your shoulders, upper back, and chest.  3. Hold for 15 to 30 seconds. 4. Repeat 2 to 4 times. Shoulder rotation (lying down)    To make a wand for this exercise, use a piece of PVC pipe or a broom handle with the broom removed. Make the wand about a foot wider than your shoulders. 1. Lie on your back. Hold a wand with both hands with your elbows bent and palms up. 2. Keep your elbows close to your body, and move the wand across your body toward the sore arm. 3. Hold for 8 to 12 seconds. 4. Repeat 2 to 4 times. Shoulder internal rotation with towel    1. Hold a towel above and behind your head with the arm that is not sore. 2. With your sore arm, reach behind your back and grasp the towel. 3. With the arm above your head, pull the towel upward. Do this until you feel a stretch on the front and outside of your sore shoulder. 4. Hold 15 to 30 seconds. 5. Repeat 2 to 4 times.   Shoulder blade squeeze    1. Stand with your arms at your sides, and squeeze your shoulder blades together. Do not raise your shoulders up as you squeeze. 2. Hold 6 seconds. 3. Repeat 8 to 12 times. Resisted rows    For this exercise, you will need elastic exercise material, such as surgical tubing or Thera-Band. 1. Put the band around a solid object at about waist level. (A bedpost will work well.) Each hand should hold an end of the band. 2. With your elbows at your sides and bent to 90 degrees, pull the band back. Your shoulder blades should move toward each other. Return to the starting position. 3. Repeat 8 to 12 times. External rotator strengthening exercise    1. Start by tying a piece of elastic exercise material to a doorknob. You can use surgical tubing or Thera-Band. (You may also hold one end of the band in each hand.)  2. Stand or sit with your shoulder relaxed and your elbow bent 90 degrees. Your upper arm should rest comfortably against your side. Squeeze a rolled towel between your elbow and your body for comfort. This will help keep your arm at your side. 3. Hold one end of the elastic band with the hand of the painful arm. 4. Start with your forearm across your belly. Slowly rotate the forearm out away from your body. Keep your elbow and upper arm tucked against the towel roll or the side of your body until you begin to feel tightness in your shoulder. Slowly move your arm back to where you started. 5. Repeat 8 to 12 times. Internal rotator strengthening exercise    1. Start by tying a piece of elastic exercise material to a doorknob. You can use surgical tubing or Thera-Band. 2. Stand or sit with your shoulder relaxed and your elbow bent 90 degrees. Your upper arm should rest comfortably against your side. Squeeze a rolled towel between your elbow and your body for comfort. This will help keep your arm at your side. 3. Hold one end of the elastic band in the hand of the painful arm.   4. Slowly rotate your forearm toward your body until it touches your belly. Slowly move it back to where you started. 5. Keep your elbow and upper arm firmly tucked against the towel roll or at your side. 6. Repeat 8 to 12 times. Pendulum swing    If you have pain in your back, do not do this exercise. 1. Hold on to a table or the back of a chair with your good arm. Then bend forward a little and let your sore arm hang straight down. This exercise does not use the arm muscles. Rather, use your legs and your hips to create movement that makes your arm swing freely. 2. Use the movement from your hips and legs to guide the slightly swinging arm back and forth like a pendulum (or elephant trunk). Then guide it in circles that start small (about the size of a dinner plate). Make the circles a bit larger each day, as your pain allows. 3. Do this exercise for 5 minutes, 5 to 7 times each day. 4. As you have less pain, try bending over a little farther to do this exercise. This will increase the amount of movement at your shoulder. Follow-up care is a key part of your treatment and safety. Be sure to make and go to all appointments, and call your doctor if you are having problems. It's also a good idea to know your test results and keep a list of the medicines you take. Where can you learn more? Go to http://www.[employer].com/  Enter H562 in the search box to learn more about \"Shoulder Arthritis: Exercises. \"  Current as of: July 1, 2021               Content Version: 13.0  © 2006-2021 Healthwise, Incorporated. Care instructions adapted under license by [employer] (which disclaims liability or warranty for this information). If you have questions about a medical condition or this instruction, always ask your healthcare professional. [address] any warranty or liability for your use of this information.

## 2022-01-04 DIAGNOSIS — R20.0 RIGHT ARM NUMBNESS: ICD-10-CM

## 2022-01-04 DIAGNOSIS — M54.16 LEFT LUMBAR RADICULOPATHY: ICD-10-CM

## 2022-01-05 RX ORDER — TOPIRAMATE 25 MG/1
75 TABLET ORAL
Qty: 270 TABLET | Refills: 0 | Status: SHIPPED | OUTPATIENT
Start: 2022-01-05 | End: 2022-03-22 | Stop reason: SDUPTHER

## 2022-03-18 PROBLEM — I26.99 PULMONARY EMBOLI (HCC): Status: ACTIVE | Noted: 2017-12-18

## 2022-03-18 PROBLEM — M54.42 CHRONIC BILATERAL LOW BACK PAIN WITH SCIATICA: Status: ACTIVE | Noted: 2018-05-25

## 2022-03-18 PROBLEM — M43.16 SPONDYLOLISTHESIS OF LUMBAR REGION: Status: ACTIVE | Noted: 2017-12-04

## 2022-03-18 PROBLEM — G89.29 CHRONIC BILATERAL LOW BACK PAIN WITH SCIATICA: Status: ACTIVE | Noted: 2018-05-25

## 2022-03-18 PROBLEM — M96.1 POST LAMINECTOMY SYNDROME: Status: ACTIVE | Noted: 2017-12-18

## 2022-03-18 PROBLEM — M54.40 CHRONIC BILATERAL LOW BACK PAIN WITH SCIATICA: Status: ACTIVE | Noted: 2018-05-25

## 2022-03-18 PROBLEM — M54.41 CHRONIC BILATERAL LOW BACK PAIN WITH SCIATICA: Status: ACTIVE | Noted: 2018-05-25

## 2022-03-18 PROBLEM — M43.10 PARS DEFECT WITH SPONDYLOLISTHESIS: Status: ACTIVE | Noted: 2017-08-25

## 2022-03-19 PROBLEM — Z87.81 HX OF COMPRESSION FRACTURE OF SPINE: Status: ACTIVE | Noted: 2018-05-25

## 2022-03-19 PROBLEM — R29.898 LEFT LEG WEAKNESS: Status: ACTIVE | Noted: 2017-07-07

## 2022-03-19 PROBLEM — Z98.1 S/P LUMBAR FUSION: Status: ACTIVE | Noted: 2017-12-12

## 2022-03-19 PROBLEM — M47.816 LUMBAR FACET ARTHROPATHY: Status: ACTIVE | Noted: 2017-07-07

## 2022-03-19 PROBLEM — I10 HYPERTENSION: Status: ACTIVE | Noted: 2017-12-18

## 2022-03-19 PROBLEM — M79.2 NEURITIS: Status: ACTIVE | Noted: 2017-07-07

## 2022-03-19 PROBLEM — R93.1 ABNORMAL ECHOCARDIOGRAM: Status: ACTIVE | Noted: 2018-02-20

## 2022-03-19 PROBLEM — I26.99 PULMONARY EMBOLISM (HCC): Status: ACTIVE | Noted: 2017-12-18

## 2022-03-20 PROBLEM — M43.16 SPONDYLOLISTHESIS AT L4-L5 LEVEL: Status: ACTIVE | Noted: 2017-07-07

## 2022-03-22 ENCOUNTER — OFFICE VISIT (OUTPATIENT)
Dept: ORTHOPEDIC SURGERY | Age: 72
End: 2022-03-22
Payer: COMMERCIAL

## 2022-03-22 VITALS
HEART RATE: 58 BPM | DIASTOLIC BLOOD PRESSURE: 82 MMHG | WEIGHT: 158 LBS | TEMPERATURE: 96.6 F | BODY MASS INDEX: 22.62 KG/M2 | HEIGHT: 70 IN | RESPIRATION RATE: 16 BRPM | OXYGEN SATURATION: 99 % | SYSTOLIC BLOOD PRESSURE: 127 MMHG

## 2022-03-22 DIAGNOSIS — R20.0 RIGHT ARM NUMBNESS: ICD-10-CM

## 2022-03-22 DIAGNOSIS — M47.816 LUMBAR FACET ARTHROPATHY: ICD-10-CM

## 2022-03-22 DIAGNOSIS — M48.061 SPINAL STENOSIS OF LUMBAR REGION WITHOUT NEUROGENIC CLAUDICATION: Primary | ICD-10-CM

## 2022-03-22 DIAGNOSIS — M62.838 MUSCLE SPASM: ICD-10-CM

## 2022-03-22 DIAGNOSIS — M54.16 LEFT LUMBAR RADICULOPATHY: ICD-10-CM

## 2022-03-22 PROCEDURE — G8754 DIAS BP LESS 90: HCPCS | Performed by: PHYSICAL MEDICINE & REHABILITATION

## 2022-03-22 PROCEDURE — G8432 DEP SCR NOT DOC, RNG: HCPCS | Performed by: PHYSICAL MEDICINE & REHABILITATION

## 2022-03-22 PROCEDURE — 1101F PT FALLS ASSESS-DOCD LE1/YR: CPT | Performed by: PHYSICAL MEDICINE & REHABILITATION

## 2022-03-22 PROCEDURE — 99213 OFFICE O/P EST LOW 20 MIN: CPT | Performed by: PHYSICAL MEDICINE & REHABILITATION

## 2022-03-22 PROCEDURE — G8420 CALC BMI NORM PARAMETERS: HCPCS | Performed by: PHYSICAL MEDICINE & REHABILITATION

## 2022-03-22 PROCEDURE — G8427 DOCREV CUR MEDS BY ELIG CLIN: HCPCS | Performed by: PHYSICAL MEDICINE & REHABILITATION

## 2022-03-22 PROCEDURE — G8752 SYS BP LESS 140: HCPCS | Performed by: PHYSICAL MEDICINE & REHABILITATION

## 2022-03-22 PROCEDURE — G8536 NO DOC ELDER MAL SCRN: HCPCS | Performed by: PHYSICAL MEDICINE & REHABILITATION

## 2022-03-22 PROCEDURE — 3017F COLORECTAL CA SCREEN DOC REV: CPT | Performed by: PHYSICAL MEDICINE & REHABILITATION

## 2022-03-22 RX ORDER — TOPIRAMATE 25 MG/1
75 TABLET ORAL
Qty: 270 TABLET | Refills: 1 | Status: SHIPPED | OUTPATIENT
Start: 2022-03-22 | End: 2022-03-22 | Stop reason: CLARIF

## 2022-03-22 RX ORDER — TOPIRAMATE 25 MG/1
75 TABLET ORAL
Qty: 270 TABLET | Refills: 1 | Status: SHIPPED | OUTPATIENT
Start: 2022-03-22 | End: 2022-09-21 | Stop reason: SDUPTHER

## 2022-03-22 NOTE — PROGRESS NOTES
[facility]  [address], [facility] [address]  Phone: [phone]  Fax: [phone]    Pt's YOB: 1950    ASSESSMENT   Diagnoses and all orders for this visit:    1. Spinal stenosis of lumbar region without neurogenic claudication  -     topiramate (TOPAMAX) 25 mg tablet; Take 3 Tablets by mouth nightly. 2. Lumbar facet arthropathy    3. Muscle spasm    4. Left lumbar radiculopathy  -     topiramate (TOPAMAX) 25 mg tablet; Take 3 Tablets by mouth nightly. 5. Right arm numbness         IMPRESSION AND PLAN:  Shelby Iverson is a 81 y.o. male with history of cervical, lumbar, and left hip pain. He complains of continued numbness in the left leg but notes no change in his symptoms since his last office visit. Pt is taking Topamax 25 mg 3 tabs QHS, Flexeril 10 mg and Voltaren 75 mg as needed, and Xarelto prophylatically against deep vein thrombosis when he travels. 1) Pt was given information on lumbar arthritis and herniated disc exercises. 2) I advised the pt to watch for leg weakness and foot dragging. 3) Pt received refills of Topamax 25 mg for neuropathic symptoms. 4) Pt will continue to take Flexeril 10 mg as needed and use Voltaren gel as needed and does not require a refill at this time. 5) Lumbar MRI will be considered at his next office visit to assess for worsening stenosis. 6) Mr. Reji Phipps has a reminder for a \"due or due soon\" health maintenance. I have asked that he contact his primary care provider, Marshall Rendon MD, for follow-up on this health maintenance. 7)  demonstrated consistency with prescribing. Follow-up and Dispositions    · Return in about 3 months (around 6/22/2022) for Medication follow up. HISTORY OF PRESENT ILLNESS:  Shelby Iverson is a 81 y.o. male with history of cervical, lumbar, and left hip pain and presents to the office today for medication follow up.  Pt complains of continued numbness in the left leg but notes no change in his symptoms since his last office visit. Pt is taking Topamax 25 mg 3 tabs QHS, Flexeril 10 mg as needed, and Xarelto prophylatically against deep vein thrombosis when he travels and uses Voltaren gel as needed. Pt at this time desires to continue with current care. Pt also uses Xarelto for DVT prophylaxis when he travels. He has work trips to [employer] ([facility], [place], [facility] and a few other countries. He plans to retire in August.     Pain Scale: 0 - No pain/10    PCP: Belkys Bass MD     Past Medical History:   Diagnosis Date    Back pain     Chronic pain     Headache(784.0)     HTN (hypertension)     Joint pain     hx of joint pain    Pulmonary emboli ([address]         Social History     Socioeconomic History    Marital status:      Spouse name: Not on file    Number of children: Not on file    Years of education: Not on file    Highest education level: Not on file   Occupational History    Not on file   Tobacco Use    Smoking status: Former Smoker     Packs/day: 0.75     Years: 40.00     Pack years: 30.00     Quit date: 10/17/2012     Years since quittin.4    Smokeless tobacco: Former User   Substance and Sexual Activity    Alcohol use: Yes     Alcohol/week: 2.0 standard drinks     Types: 2 Cans of beer per week     Comment: daily-beer and wine/ 2 drinks a day    Drug use: No    Sexual activity: Yes   Other Topics Concern    Not on file   Social History Narrative    Not on file     Social Determinants of Health     Financial Resource Strain:     Difficulty of Paying Living Expenses: Not on file   Food Insecurity:     Worried About Running Out of Food in the Last Year: Not on file    Susana of Food in the Last Year: Not on file   Transportation Needs:     Lack of Transportation (Medical): Not on file    Lack of Transportation (Non-Medical):  Not on file   Physical Activity:     Days of Exercise per Week: Not on file    Minutes of Exercise per Session: Not on file   Stress:     Feeling of Stress : Not on file   Social Connections:     Frequency of Communication with Friends and Family: Not on file    Frequency of Social Gatherings with Friends and Family: Not on file    Attends [employer] Services: Not on file    Active Member of [address] [place] [address] or Organizations: Not on file    Attends Club or Organization Meetings: Not on file    Marital Status: Not on file   Intimate Partner Violence:     Fear of Current or Ex-Partner: Not on file    Emotionally Abused: Not on file    Physically Abused: Not on file    Sexually Abused: Not on file   Housing Stability:     Unable to Pay for Housing in the Last Year: Not on file    Number of [place] in the Last Year: Not on file    Unstable Housing in the Last Year: Not on file       Current Outpatient Medications   Medication Sig Dispense Refill    topiramate (TOPAMAX) 25 mg tablet Take 3 Tablets by mouth nightly. 270 Tablet 1    sulindac (CLINORIL) 200 mg tablet       cyclobenzaprine (FLEXERIL) 10 mg tablet Take 1 Tablet by mouth nightly. As needed 30 Tablet 1    diclofenac (Voltaren) 1 % gel Apply 4 grams to left hip up to 4 times per day, maximum 16 grams per joint per day. Dispense 100 gram tubes 100 g 3    Xarelto 10 mg tablet Take 1 Tablet by mouth daily as needed (2 days prior to traveling, 4 days after traveling. ). 30 Tablet 3    betamethasone valerate (VALISONE) 0.1 % topical cream Apply  to affected area two (2) times a day. Apply cream to affected area with each urination. (Patient taking differently: Apply  to affected area daily as needed. Apply cream to affected area with each urination. ) 15 g 1    cholecalciferol, vitamin D3, 2,000 unit tab Take 2,000 Units by mouth daily.  clobetasol (TEMOVATE) 0.05 % topical cream as needed.  desonide (TRIDESILON) 0.05 % topical lotion Use 1 Application to affected area Twice Daily.       lisinopril-hydroCHLOROthiazide (PRINZIDE, ZESTORETIC) 20-12.5 mg per tablet Take 1 Tablet by mouth daily.  zolpidem (AMBIEN) 5 mg tablet Take 5 mg by mouth nightly as needed.  methylPREDNISolone (MEDROL DOSEPACK) 4 mg tablet Per dose pack instructions (Patient not taking: Reported on 3/22/2022) 1 Dose Pack 1       Allergies   Allergen Reactions    Bee Sting  [Sting, Bee] Swelling     DIZZINESS         REVIEW OF SYSTEMS    Constitutional: Negative for fever, chills, or weight change. Respiratory: Negative for cough or shortness of breath. Cardiovascular: Negative for chest pain or palpitations. Gastrointestinal: Negative for acid reflux, change in bowel habits, or constipation. Genitourinary: Negative for dysuria and flank pain. Musculoskeletal: Negative for lumbar pain. Skin: Negative for rash. Neurological: Negative for headaches and dizziness. Positive for left leg numbness. Endo/Heme/Allergies: Negative for increased bruising. Psychiatric/Behavioral: Negative for difficulty with sleep. As per HPI    PHYSICAL EXAMINATION  Visit Vitals  /82 (BP 1 Location: Right arm, BP Patient Position: Sitting, BP Cuff Size: Adult)   Pulse (!) 58   Temp (!) 96.6 °F (35.9 °C) (Tympanic)   Resp 16   Ht 5' 10\" (1.778 m)   Wt 158 lb (71.7 kg)   SpO2 99%   BMI 22.67 kg/m²       Constitutional: Awake, alert, and in no acute distress. Neurological: Sensation to light touch is intact. Skin: warm, dry, and intact. Musculoskeletal: No pain with extension, axial loading, or forward flexion. No pain with internal or external rotation of his hips. Negative straight leg raise bilaterally.      Hip Flex  Quads Hamstrings Ankle DF EHL Ankle PF   Right +4/5 +4/5 +4/5 +4/5 +4/5 +4/5   Left +4/5 +4/5 +4/5 +4/5 +4/5 +4/5     IMAGING:    Left hip MRI arthrogram from 12/22/2020 was personally reviewed with the patient and demonstrated:      FINDINGS  OSSEOUS STRUCTURES / JOINTS     Adequate joint distention with contrast solution. Nondisplaced Anterior/superior  labral tear. Mild diffuse cartilage surface irregularity of the left hip. No  subchondral marrow edema.     MUSCLES AND TENDONS  The myotendinous structures surrounding the hips and proximal thighs appear  intact.     BURSA  Normal.     OTHER FINDINGS  Small fat filled left inguinal hernia defect.      Enlarged prostate.     Slightly trabeculated appearance of the bladder wall, correlate for chronic  bladder outlet obstruction.     IMPRESSION:     No fracture or destructive osseous lesions.     Nondisplaced anterior/superior labral tear.     Early left hip osteoarthritis.     Incidental findings as above.     Lumbar spine 4V x-rays from 12/1/2020 were personally reviewed with the patient and demonstrated:  L4-5 fusion. Collapse of left side of L3/4 disc and also sclerosis. Compression deformity at L3 and T12. Straightening of the lumbar spine. No instability.     Lumbar spine MRI from 04/24/2019 was personally reviewed with the patient and demonstrated:          Results from [facility] on 04/24/19   MRI LUMB SPINE W WO CONT     Narrative EXAMINATION: MRI lumbar spine with and without contrast     INDICATION: Lumbar pain, prior lumbar surgery, left lower extremity sciatica     COMPARISON: [facility] 1217     TECHNIQUE: Sagittal and axial multiecho MRI sequences of the lumbar spine  performed before and after 13 cc IV Dotarem.     FINDINGS:     Postsurgical: Status post L4-L5 posterior hardware fixation and posterior  decompression. No postoperative fluid collection identified. Mild lower lumbar  paraspinous muscle edema.     General: T12 anterior central mild superior endplate height loss, chronic  appearing. L3 eccentric to the left mild superior endplate height loss, chronic  appearing. Vertebral body heights elsewhere preserved. Disc space loss at  multiple levels, most pronounced at L3-L4 and L4-L5. Grade 2 anterolisthesis of  L4 and L5.  Mild multilevel endplate edema, likely related to active disc  degeneration. Straightening of the normal lordosis. Conus ends at level L1. No  abnormal signal or enhancement at the distal cord. No abnormal epidural  collection identified.     Miscellaneous: No incidental acute or suspicious findings identified outside of  the lumbar spine region.     Levels:     T11-T12: Evaluated sagittal plane only. No significant degenerative change or  stenosis.     T12-L1: No significant disc disease or stenosis. Mild facet arthropathy.     L1-L2: Mild disc bulge. Moderate facet arthropathy. Moderate spinal canal  stenosis. Mild foraminal stenoses.     L2-L3: Minimal disc bulge. Moderate facet arthropathy. Moderate spinal canal  stenosis. Foramina patent.     L3-L4: Hardware-related artifact limits evaluation. Mild disc bulge in addition  to a right central condyle disc extrusion extending roughly 1.2 cm condyle,  measuring roughly 0.9 cm transverse, roughly 0.5 cm AP, likely abutting the  crossing right L4 nerve. Severe facet arthropathy with ligamentum flavum bulge. Severe spinal canal stenosis, perhaps partly due to congenital narrowing. Moderate to severe foraminal stenoses.     L4-L5: Artifact limits evaluation. Postsurgical level as above. Grade 2  anterolisthesis with uncovering of disc. Facets not well evaluated due to  artifact. Moderate to severe spinal canal stenosis, predominantly in the AP  plane. Moderate bilateral foraminal stenoses, predominantly in the vertical  plane, with possible abutment of the exiting L4 nerves.     L5-S1: Posterior decompression level. A left central annular fissure with  underlying mild disc bulge. Mild facet arthropathy. Minimal spinal canal  narrowing.  Moderate to severe foraminal stenoses with probable abutment of the  exiting L5 nerves.     Imaged sacrum: Unremarkable.        Impression IMPRESSION:     At L3-L4, degenerative changes results in severe spinal canal stenosis and  moderate to severe foraminal stenoses. There is also notable right central disc  extrusion at this level likely abutting the crossing right L4 nerve. Hardware-related artifact limits evaluation. Compared to 2017 MRI, spinal canal  stenosis is slightly worse and disc extrusion is new.     L4-L5 level postsurgical changes as above, with grade 2 anterolisthesis and  moderate to severe spinal canal as well as moderate foraminal stenoses. Spinal  canal and foraminal stenosis however is less compared to 2017 MRI.     L5-S1 moderate to severe foraminal stenoses.     L1-L2 and L2-L3 moderate spinal canal stenoses.     See level by level details above.        Written by Amparo Pitts, as dictated by Zainab Crenshaw MD.  I, Dr. Zainab Crenshaw confirm that all documentation is accurate.

## 2022-03-22 NOTE — PROGRESS NOTES
Chief Complaint   Patient presents with    Follow-up       Pt preferred language for health care discussion is english. Is someone accompanying this pt? No     Is the patient using any DME equipment during OV? no    Depression Screening:  3 most recent [facility] 36 Screens 12/1/2020 3/4/2020 9/4/2019 5/14/2019 4/17/2019 5/2/2018 2/1/2018   Little interest or pleasure in doing things Not at all Not at all Not at all Not at all Not at all Not at all Not at all   Feeling down, depressed, irritable, or hopeless Not at all Not at all Not at all Not at all Not at all Not at all Not at all   Total Score PHQ 2 0 0 0 0 0 0 0       Learning Assessment:  Learning Assessment 2/1/2018   PRIMARY LEARNER Patient   PRIMARY LANGUAGE ENGLISH   LEARNER PREFERENCE PRIMARY PICTURES   ANSWERED BY patient   RELATIONSHIP SELF       Abuse Screening:  Abuse Screening Questionnaire 12/1/2020 9/4/2019   Do you ever feel afraid of your partner? N N   Are you in a relationship with someone who physically or mentally threatens you? N N   Is it safe for you to go home? Y Y       Fall Risk  Fall Risk Assessment, last 12 mths 12/1/2020   Able to walk? Yes   Fall in past 12 months? No   Number of falls in past 12 months -   Fall with injury? -           Advance Directive:  1. Do you have an advance directive in place? Patient Reply:no    2. If not, would you like material regarding how to put one in place? Patient Reply: no    2. Per patient no changes to their ACP contact no. Coordination of Care:  1. Have you been to the ER, urgent care clinic since your last visit? Hospitalized since your last visit? no    2. Have you seen or consulted any other health care providers outside of the [address] [place] [address] since your last visit? Include any pap smears or colon screening.  no

## 2022-03-22 NOTE — PATIENT INSTRUCTIONS
Low Back Arthritis: Exercises  Introduction  Here are some examples of typical rehabilitation exercises for your condition. Start each exercise slowly. Ease off the exercise if you start to have pain. Your doctor or physical therapist will tell you when you can start these exercises and which ones will work best for you. When you are not being active, find a comfortable position for rest. Some people are comfortable on the floor or a medium-firm bed with a small pillow under their head and another under their knees. Some people prefer to lie on their side with a pillow between their knees. Don't stay in one position for too long. Take short walks (10 to 20 minutes) every 2 to 3 hours. Avoid slopes, hills, and stairs until you feel better. Walk only distances you can manage without pain, especially leg pain. How to do the exercises  Pelvic tilt    1. Lie on your back with your knees bent. 2. \"Brace\" your stomach--tighten your muscles by pulling in and imagining your belly button moving toward your spine. 3. Press your lower back into the floor. You should feel your hips and pelvis rock back. 4. Hold for 6 seconds while breathing smoothly. 5. Relax and allow your pelvis and hips to rock forward. 6. Repeat 8 to 12 times. Back stretches    1. Get down on your hands and knees on the floor. 2. Relax your head and allow it to droop. Round your back up toward the ceiling until you feel a nice stretch in your upper, middle, and lower back. Hold this stretch for as long as it feels comfortable, or about 15 to 30 seconds. 3. Return to the starting position with a flat back while you are on your hands and knees. 4. Let your back sway by pressing your stomach toward the floor. Lift your buttocks toward the ceiling. 5. Hold this position for 15 to 30 seconds. 6. Repeat 2 to 4 times. Follow-up care is a key part of your treatment and safety.  Be sure to make and go to all appointments, and call your doctor if you are having problems. It's also a good idea to know your test results and keep a list of the medicines you take. Where can you learn more? Go to http://www.[employer].com/  Enter T094 in the search box to learn more about \"Low Back Arthritis: Exercises. \"  Current as of: July 1, 2021               Content Version: 13.2  © 2006-2022 [employer]. Care instructions adapted under license by [employer] (which disclaims liability or warranty for this information). If you have questions about a medical condition or this instruction, always ask your healthcare professional. [place] [address] any warranty or liability for your use of this information. Herniated Disc: Exercises  Introduction  Here are some examples of exercises for you to try. The exercises may be suggested for a condition or for rehabilitation. Start each exercise slowly. Ease off the exercises if you start to have pain. You will be told when to start these exercises and which ones will work best for you. How to stay safe  These exercises can help you move easier and feel better. But when you first start doing them, you may have more pain in your back. This is normal. But it is important to pay close attention to your pain during and after each exercise. · Keep doing these exercises if your pain stays the same or moves from your leg and buttock more toward the middle of your spine. Pain moving out of your leg and buttock is a good sign. · Stop doing these exercises if your pain gets worse in your leg and buttock. Stop if you start to have pain in your leg and buttock that you didn't have before. Be sure to do these exercises in the order they appear. Note how your pain changes before you move to the next one. If your pain is much worse right after exercise and stays worse the next day, do not do any of these exercises. How to do the exercises  1. Rest on belly    1.  Lie on your stomach, with your head turned to the side. 2. Try to relax your lower back muscles as much as you can. 3. Continue to lie on your stomach for 2 minutes. · Keep your arms beside your body. · If that position bothers your neck, place your hands, one on top of the other, underneath your forehead. This will help support your head and neck. If lying in this position causes or increases pain down your leg, stop this exercise and do not do the next exercises. 2. Press-up back extension    1. Lie on your stomach, with your face down and your elbows tucked into your sides and under your shoulders. 2. Press your elbows down into the floor to raise your upper back. 3. Hold this position for 15 to 30 seconds. 4. Repeat 2 to 4 times. · As you do this, relax your stomach muscles and allow your back to arch without using your back muscles. · Let your low back relax completely as you arch up. Don't let your hips or pelvis come off the floor. Then relax, and return to the start position. Over time, work up to staying in the press-up position for up to 2 minutes. If lying in this position causes or increases pain down your leg, stop this exercise and do not do the next exercises. 3. Full press-up back extension    1. Lie on your stomach with your face down, keeping your elbows tucked into your sides and under your shoulders. 2. Straighten your elbows, and push your upper body up as far as you can. 3. Hold this position for 5 seconds, and then relax. 4. Repeat 10 times. Allow your lower back to sag. Keep your hips, pelvis, and legs relaxed. Each time, try to raise your upper body a little higher and hold your arms a bit straighter. If lying in this position causes or increases pain down your leg, stop this exercise and do not do the next exercises. If you can't do this exercise, you may instead try the backward bend exercise that follows. 4. Backward bend    1.  Stand with your feet hip-width apart, and don't lock your knees. 2. Place your hands in the small of your back. 3. Bend backward as far as you can, keeping your knees straight. 4. Repeat 2 to 4 times. Your toes should be pointing forward. Hold this position for 2 to 3 seconds. Then return to your starting position. Each time, try to bend backward a little farther, until you bend backward as far as you can. If standing in this position causes or increases pain down your leg, stop this exercise. Follow-up care is a key part of your treatment and safety. Be sure to make and go to all appointments, and call your doctor if you are having problems. It's also a good idea to know your test results and keep a list of the medicines you take. Where can you learn more? Go to http://www.[employer].com/  Enter Z594 in the search box to learn more about \"Herniated Disc: Exercises. \"  Current as of: July 1, 2021               Content Version: 13.2  © 2006-2022 [employer]. Care instructions adapted under license by [employer] (which disclaims liability or warranty for this information). If you have questions about a medical condition or this instruction, always ask your healthcare professional. [address] any warranty or liability for your use of this information.

## 2022-05-09 ENCOUNTER — OFFICE VISIT (OUTPATIENT)
Dept: ORTHOPEDIC SURGERY | Age: 72
End: 2022-05-09
Payer: COMMERCIAL

## 2022-05-09 VITALS
BODY MASS INDEX: 22.19 KG/M2 | TEMPERATURE: 98 F | HEART RATE: 60 BPM | WEIGHT: 155 LBS | OXYGEN SATURATION: 98 % | HEIGHT: 70 IN

## 2022-05-09 DIAGNOSIS — M19.011 GLENOHUMERAL ARTHRITIS, RIGHT: Primary | ICD-10-CM

## 2022-05-09 PROCEDURE — 99203 OFFICE O/P NEW LOW 30 MIN: CPT | Performed by: ORTHOPAEDIC SURGERY

## 2022-05-09 PROCEDURE — 20611 DRAIN/INJ JOINT/BURSA W/US: CPT | Performed by: ORTHOPAEDIC SURGERY

## 2022-05-09 RX ORDER — TRIAMCINOLONE ACETONIDE 40 MG/ML
40 INJECTION, SUSPENSION INTRA-ARTICULAR; INTRAMUSCULAR ONCE
Status: COMPLETED | OUTPATIENT
Start: 2022-05-09 | End: 2022-05-09

## 2022-05-09 RX ADMIN — TRIAMCINOLONE ACETONIDE 40 MG: 40 INJECTION, SUSPENSION INTRA-ARTICULAR; INTRAMUSCULAR at 13:48

## 2022-05-09 NOTE — PROGRESS NOTES
Mitch Ramos  1950   Chief Complaint   Patient presents with    Shoulder Pain     RIGHT        HISTORY OF PRESENT ILLNESS  Mitch Ramos is a 81 y.o. male who presents today for evaluation of right shoulder. He rates his pain 6/10 today. Pain has been present since September with no injury. Referred by Dr. Reinaldo Green. He has sharp pain with reaching overhead. He took sulindac with provided some relief. Notes the pain is limiting when reaching for objects. Previous cortisone injections in his hip and back. Patient describes the pain as sharp that is Intermittent in nature. Symptoms are worse with Bending; Stretching; Straightening and is better with  Rest. Associated symptoms include nothing. Since problem started, it: is unchanged. Pain does not wake patient up at night. Has taken sulindac for the problem. Has tried following treatments: Injections:NO; Brace:NO; Therapy:NO; Cane/Crutch:NO       Allergies   Allergen Reactions    Bee Sting  [Sting, Bee] Swelling     DIZZINESS        Past Medical History:   Diagnosis Date    Back pain     Chronic pain     Headache(784.0)     HTN (hypertension)     Joint pain     hx of joint pain    Pulmonary emboli (HCC)       Social History     Socioeconomic History    Marital status:      Spouse name: Not on file    Number of children: Not on file    Years of education: Not on file    Highest education level: Not on file   Occupational History    Not on file   Tobacco Use    Smoking status: Former Smoker     Packs/day: 0.75     Years: 40.00     Pack years: 30.00     Quit date: 10/17/2012     Years since quittin.5    Smokeless tobacco: Former User   Substance and Sexual Activity    Alcohol use:  Yes     Alcohol/week: 2.0 standard drinks     Types: 2 Cans of beer per week     Comment: daily-beer and wine/ 2 drinks a day    Drug use: No    Sexual activity: Yes   Other Topics Concern    Not on file   Social History Narrative    Not on file     Social Determinants of Health     Financial Resource Strain:     Difficulty of Paying Living Expenses: Not on file   Food Insecurity:     Worried About Running Out of Food in the Last Year: Not on file    Susana of Food in the Last Year: Not on file   Transportation Needs:     Lack of Transportation (Medical): Not on file    Lack of Transportation (Non-Medical): Not on file   Physical Activity:     Days of Exercise per Week: Not on file    Minutes of Exercise per Session: Not on file   Stress:     Feeling of Stress : Not on file   Social Connections:     Frequency of Communication with Friends and Family: Not on file    Frequency of Social Gatherings with Friends and Family: Not on file    Attends Yarsanism Services: Not on file    Active Member of [address] [place] [address] [employer] or Organizations: Not on file    Attends Club or Organization Meetings: Not on file    Marital Status: Not on file   Intimate Partner Violence:     Fear of Current or Ex-Partner: Not on file    Emotionally Abused: Not on file    Physically Abused: Not on file    Sexually Abused: Not on file   Housing Stability:     Unable to Pay for Housing in the Last Year: Not on file    Number of [place] in the Last Year: Not on file    Unstable Housing in the Last Year: Not on file      Past Surgical History:   Procedure Laterality Date    HX LUMBAR LAMINECTOMY  2002    L4/L5    HX ORTHOPAEDIC  12/04/2017    Lumbar surgery (Dr. Shayla Bosworth)    [address]    \"open lung bx\"    HX OTHER SURGICAL      laminectomy/fusion L4-5  S1    NEUROLOGICAL PROCEDURE UNLISTED  2003    pt spinal fracture T12 and L3      Family History   Problem Relation Age of Onset    Colon Cancer Mother     Hypertension Mother     Hypertension Father     Clotting Disorder Father     Other Brother     Cancer Brother       Current Outpatient Medications   Medication Sig    topiramate (TOPAMAX) 25 mg tablet Take 3 Tablets by mouth nightly.     sulindac (CLINORIL) 200 mg tablet     cyclobenzaprine (FLEXERIL) 10 mg tablet Take 1 Tablet by mouth nightly. As needed    methylPREDNISolone (MEDROL DOSEPACK) 4 mg tablet Per dose pack instructions    diclofenac (Voltaren) 1 % gel Apply 4 grams to left hip up to 4 times per day, maximum 16 grams per joint per day. Dispense 100 gram tubes    Xarelto 10 mg tablet Take 1 Tablet by mouth daily as needed (2 days prior to traveling, 4 days after traveling. ).  betamethasone valerate (VALISONE) 0.1 % topical cream Apply  to affected area two (2) times a day. Apply cream to affected area with each urination. (Patient taking differently: Apply  to affected area daily as needed. Apply cream to affected area with each urination.)    cholecalciferol, vitamin D3, 2,000 unit tab Take 2,000 Units by mouth daily.  clobetasol (TEMOVATE) 0.05 % topical cream as needed.  desonide (TRIDESILON) 0.05 % topical lotion Use 1 Application to affected area Twice Daily.  lisinopril-hydroCHLOROthiazide (PRINZIDE, ZESTORETIC) 20-12.5 mg per tablet Take 1 Tablet by mouth daily.  zolpidem (AMBIEN) 5 mg tablet Take 5 mg by mouth nightly as needed. Current Facility-Administered Medications   Medication Dose Route Frequency    triamcinolone acetonide (KENALOG-40) 40 mg/mL injection 40 mg  40 mg Intra artICUlar ONCE       REVIEW OF SYSTEM   Patient denies: Weight loss, Fever/Chills, HA, Visual changes, Fatigue, Chest pain, SOB, Abdominal pain, N/V/D/C, Blood in stool or urine, Edema. Pertinent positive as above in HPI. All others were negative    PHYSICAL EXAM:   Visit Vitals  Pulse 60   Temp 98 °F (36.7 °C)   Ht 5' 10\" (1.778 m)   Wt 155 lb (70.3 kg)   SpO2 98%   BMI 22.24 kg/m²     The patient is a well-developed, well-nourished male   in no acute distress. The patient is alert and oriented times three. The patient is alert and oriented times three.  Mood and affect are normal.  LYMPHATIC: lymph nodes are not enlarged and are within normal limits  SKIN: normal in color and non tender to palpation. There are no bruises or abrasions noted. NEUROLOGICAL: Motor sensory exam is within normal limits. Reflexes are equal bilaterally. There is normal sensation to pinprick and light touch  MUSCULOSKELETAL:  Examination Right shoulder   Skin Intact   AC joint tenderness -   Biceps tenderness -   Forward flexion/Elevation    Active abduction    Glenohumeral abduction 75   External rotation ROM 45   Internal rotation ROM 30   Apprehension -   Elmers Relocation -   Jerk -   Load and Shift -   Obriens -   Speeds -   Impingement sign +   Supraspinatus/Empty Can -, 5/5   External Rotation Strength -, 5/5   Lift Off/Belly Press -, 5/5   Neurovascular Intact        PROCEDURE:   Right shoulder Injection with Ultrasound Guidance    Indication:Right Shoulder pain/swelling    After sterile prep, 6 cc of Xylocaine and 1 cc of Kenalog were injected into the right Shoulder. Intra-articular Ultrasound images captured using [employer] Loop Ultrasound machine using a frequency of 10 MHz with a linear transducer and scanned into patient's chart.            [facility]  OFFICE PROCEDURE PROGRESS NOTE        Chart reviewed for the following:  Bryant Penn M.D, have reviewed the History, Physical and updated the Allergic reactions for [address] performed immediately prior to start of procedure:  Bryant Penn M.D, have performed the following reviews on Pablo [place] prior to the start of the procedure:            * Patient was identified by name and date of birth   * Agreement on procedure being performed was verified  * Risks and Benefits explained to the patient  * Procedure site verified and marked as necessary  * Patient was positioned for comfort  * Needle placement confirmed by ultrasound  * Consent was signed and verified     Time: 1:41 PM     Date of procedure: 5/9/2022    Procedure performed by:  Parish Barrett M.D    Provider assisted by: (see medication administration)    How tolerated by patient: tolerated the procedure well with no complications    Comments: none      IMAGING: XR of the right shoulder with 4 views obtained at [facility] dated 10/22/2021 was reviewed and read by Dr. Schaeffer Pay: Mild/moderate degenerative changes of the glenohumeral joint space. No acute findings. IMPRESSION:      ICD-10-CM ICD-9-CM    1. Glenohumeral arthritis, right  M19.011 715.91 ARTHROCENTESIS ASPIR&/INJ MAJOR JT/BURSA W/US      triamcinolone acetonide (KENALOG-40) 40 mg/mL injection 40 mg        PLAN:  1. Patient presents today with right shoulder pain due to exacerbation of glenohumeral arthritis and I would like to try a cortisone injection today. Risk factors include: htn  2. No ultrasound exam indicated today  3. Yes cortisone injection indicated today R SHOULDER US   4. No Physical/Occupational Therapy indicated today  5. No diagnostic test indicated today:   6. No durable medical equipment indicated today  7. No referral indicated today   8. No medications indicated today:   9. No Narcotic indicated today        RTC 3 weeks if pain continues      Office note will be sent to referring provider. Scribed by Liang Salgado (Iron Cohn) as dictated by Parish Barrett MD    I, Dr. Parish Barrett, confirm that all documentation is accurate.     Parish Barrett M.D.   [facility]

## 2022-06-21 NOTE — PROGRESS NOTES
[facility]  [address] [place], [facility] [address]  Phone: [phone]  Fax: [phone]    Pt's YOB: 1950    ASSESSMENT   Diagnoses and all orders for this visit:    1. Spinal stenosis of lumbar region without neurogenic claudication    2. Lumbar facet arthropathy    3. Acute pain of right knee  -     REFERRAL TO ORTHOPEDICS    4. Muscle spasm    5. Left lumbar radiculopathy         IMPRESSION AND PLAN:  Braden Acevedo is a 81 y.o. male with history of cervical, lumbar, and left hip pain. He complains of continued numbness in the left leg but notes no change in his symptoms since his last office visit. Pt is taking Topamax 25 mg 3 tabs QHS, Flexeril 10 mg and Voltaren 75 mg as needed, and Xarelto prophylatically against deep vein thrombosis when he travels. He also notes right knee pain and weakness today X 6 weeks after feeling a popping sensation after standing up from a crouched position. 1) Pt was given information on lumbar arthritis exercises. 2) Pt will continue to take Topamax 25 mg for neuropathic symptoms, does not require refill at this time. 3) Pt will continue to take Flexeril 10 mg as needed and use Voltaren gel as needed and does not require a refill at this time. 4) Will refer to Dr. Chelsey Zavala for his right knee pain  5) Mr. Sree Gupta has a reminder for a \"due or due soon\" health maintenance. I have asked that he contact his primary care provider, Teresita Damon MD, for follow-up on this health maintenance. 6)  demonstrated consistency with prescribing. Follow-up and Dispositions    · Return in about 3 months (around 9/22/2022) for Medication follow up-- appt with Dr Chelsey Zavala at [facility] next 1-2 weeks. HISTORY OF PRESENT ILLNESS:  Braden Acevedo is a 81 y.o.  male with history of cervical, lumbar, and left hip pain and presents to the office today for medication follow up.  He was last seen in the office by myself on 3/22/2022. Pt complains of continued numbness in the left leg but notes no change in his symptoms since his last office visit. He notes minimal lumbar pain at this time. Pt is taking Topamax 25 mg 3 tabs QHS, Flexeril 10 mg as needed, and Xarelto prophylatically against deep vein thrombosis when he travels and uses Voltaren gel as needed. He reports right knee pain X 6 weeks. He states he was crouching down and went to stand up and felt a popping/cracking sensation in the knee. He did not notice any swelling at the time. He also endorses weakness in the knee and has tired wearing a knee brace. He mostly endorses weakness, only experiences occasional acute pain in the knee. He endorses an occasional feeling of instability. Pt at this time desires to continue with current care and referral to Dr. Kain Laird for right knee.     Pt also uses Xarelto for DVT prophylaxis when he travels. He has work trips to [place] ([facility]), [facility], [facility], [facility] and a few other countries. He notes he just returned from a 2-week trip to [facility]. He plans to retire in August.     Pain Scale: 0/10    PCP: Senthil Shah MD     Past Medical History:   Diagnosis Date    Back pain     Chronic pain     Headache(784.0)     HTN (hypertension)     Joint pain     hx of joint pain    Pulmonary emboli ([facility] [address]         Social History     Socioeconomic History    Marital status:      Spouse name: Not on file    Number of children: Not on file    Years of education: Not on file    Highest education level: Not on file   Occupational History    Not on file   Tobacco Use    Smoking status: Former Smoker     Packs/day: 0.75     Years: 40.00     Pack years: 30.00     Quit date: 10/17/2012     Years since quittin.6    Smokeless tobacco: Former User   Substance and Sexual Activity    Alcohol use:  Yes     Alcohol/week: 2.0 standard drinks     Types: 2 Cans of beer per week     Comment: daily-beer and wine/ 2 drinks a day  Drug use: No    Sexual activity: Yes   Other Topics Concern    Not on file   Social History Narrative    Not on file     Social Determinants of Health     Financial Resource Strain:     Difficulty of Paying Living Expenses: Not on file   Food Insecurity:     Worried About Running Out of Food in the Last Year: Not on file    Susana of Food in the Last Year: Not on file   Transportation Needs:     Lack of Transportation (Medical): Not on file    Lack of Transportation (Non-Medical): Not on file   Physical Activity:     Days of Exercise per Week: Not on file    Minutes of Exercise per Session: Not on file   Stress:     Feeling of Stress : Not on file   Social Connections:     Frequency of Communication with Friends and Family: Not on file    Frequency of Social Gatherings with Friends and Family: Not on file    Attends Episcopal Services: Not on file    Active Member of [address] [place] [address] [employer] or Organizations: Not on file    Attends Club or Organization Meetings: Not on file    Marital Status: Not on file   Intimate Partner Violence:     Fear of Current or Ex-Partner: Not on file    Emotionally Abused: Not on file    Physically Abused: Not on file    Sexually Abused: Not on file   Housing Stability:     Unable to Pay for Housing in the Last Year: Not on file    Number of [place] in the Last Year: Not on file    Unstable Housing in the Last Year: Not on file       Current Outpatient Medications   Medication Sig Dispense Refill    topiramate (TOPAMAX) 25 mg tablet Take 3 Tablets by mouth nightly. 270 Tablet 1    diclofenac (Voltaren) 1 % gel Apply 4 grams to left hip up to 4 times per day, maximum 16 grams per joint per day. Dispense 100 gram tubes 100 g 3    Xarelto 10 mg tablet Take 1 Tablet by mouth daily as needed (2 days prior to traveling, 4 days after traveling. ). 30 Tablet 3    betamethasone valerate (VALISONE) 0.1 % topical cream Apply  to affected area two (2) times a day.  Apply cream to affected area with each urination. (Patient taking differently: Apply  to affected area daily as needed. Apply cream to affected area with each urination. ) 15 g 1    cholecalciferol, vitamin D3, 2,000 unit tab Take 2,000 Units by mouth daily.  clobetasol (TEMOVATE) 0.05 % topical cream as needed.  desonide (TRIDESILON) 0.05 % topical lotion Use 1 Application to affected area Twice Daily.  lisinopril-hydroCHLOROthiazide (PRINZIDE, ZESTORETIC) 20-12.5 mg per tablet Take 1 Tablet by mouth daily.  zolpidem (AMBIEN) 5 mg tablet Take 5 mg by mouth nightly as needed.  sulindac (CLINORIL) 200 mg tablet  (Patient not taking: Reported on 6/22/2022)      cyclobenzaprine (FLEXERIL) 10 mg tablet Take 1 Tablet by mouth nightly. As needed 30 Tablet 1    methylPREDNISolone (MEDROL DOSEPACK) 4 mg tablet Per dose pack instructions 1 Dose Pack 1       Allergies   Allergen Reactions    Bee Sting  [Sting, Bee] Swelling     DIZZINESS         REVIEW OF SYSTEMS    Constitutional: Negative for fever, chills, or weight change. Respiratory: Negative for cough or shortness of breath. Cardiovascular: Negative for chest pain or palpitations. Gastrointestinal: Negative for acid reflux, change in bowel habits, or constipation. Genitourinary: Negative for dysuria and flank pain. Musculoskeletal: Negative for lumbar pain. Positive for right knee pain and weakness. Skin: Negative for rash. Neurological: Negative for headaches, dizziness. Positive for left leg numbness. Positive for right knee weakness. Endo/Heme/Allergies: Negative for increased bruising. Psychiatric/Behavioral: Negative for difficulty with sleep. As per HPI    PHYSICAL EXAMINATION  Visit Vitals  /68   Pulse 77   Temp 97.2 °F (36.2 °C) (Temporal)   Resp 16   Ht 5' 10\" (1.778 m)   Wt 148 lb 12.8 oz (67.5 kg)   SpO2 97%   BMI 21.35 kg/m²       Constitutional: Awake, alert, and in no acute distress.   Neurological: 1+ symmetrical DTRs in the upper extremities. 1+ symmetrical DTRs in the lower extremities. Sensation to light touch is intact. Negative Saul's sign bilaterally. Skin: warm, dry, and intact. Musculoskeletal: No pain with extension, axial loading, or forward flexion. No pain with internal or external rotation of his hips. Negative straight leg raise bilaterally. Biceps  Triceps Deltoids Wrist Ext Wrist Flex Hand Intrin   Right +4/5 +4/5 +4/5 +4/5 +4/5 +4/5   Left +4/5 +4/5 +4/5 +4/5 +4/5 +4/5      Hip Flex  Quads Hamstrings Ankle DF EHL Ankle PF   Right +4/5 +4/5 +4/5 +4/5 +4/5 +4/5   Left +4/5 +4/5 +4/5 +4/5 +4/5 +4/5     IMAGING:    Left hip MRI arthrogram from 12/22/2020 was personally reviewed with the patient and demonstrated:      FINDINGS  OSSEOUS STRUCTURES / JOINTS     Adequate joint distention with contrast solution. Nondisplaced Anterior/superior  labral tear. Mild diffuse cartilage surface irregularity of the left hip. No  subchondral marrow edema.     MUSCLES AND TENDONS  The myotendinous structures surrounding the hips and proximal thighs appear  intact.     BURSA  Normal.     OTHER FINDINGS  Small fat filled left inguinal hernia defect.      Enlarged prostate.     Slightly trabeculated appearance of the bladder wall, correlate for chronic  bladder outlet obstruction.     IMPRESSION:     No fracture or destructive osseous lesions.     Nondisplaced anterior/superior labral tear.     Early left hip osteoarthritis.     Incidental findings as above.     Lumbar spine 4V x-rays from 12/1/2020 were personally reviewed with the patient and demonstrated:  L4-5 fusion. Collapse of left side of L3/4 disc and also sclerosis. Compression deformity at L3 and T12. Straightening of the lumbar spine.  No instability.     Lumbar spine MRI from 04/24/2019 was personally reviewed with the patient and demonstrated:          Results from [facility] on 04/24/19   MRI LUMB SPINE W WO CONT     Narrative EXAMINATION: MRI lumbar spine with and without contrast     INDICATION: Lumbar pain, prior lumbar surgery, left lower extremity sciatica     COMPARISON: CTA 1217     TECHNIQUE: Sagittal and axial multiecho MRI sequences of the lumbar spine  performed before and after 13 cc IV Dotarem.     FINDINGS:     Postsurgical: Status post L4-L5 posterior hardware fixation and posterior  decompression. No postoperative fluid collection identified. Mild lower lumbar  paraspinous muscle edema.     General: T12 anterior central mild superior endplate height loss, chronic  appearing. L3 eccentric to the left mild superior endplate height loss, chronic  appearing. Vertebral body heights elsewhere preserved. Disc space loss at  multiple levels, most pronounced at L3-L4 and L4-L5. Grade 2 anterolisthesis of  L4 and L5. Mild multilevel endplate edema, likely related to active disc  degeneration. Straightening of the normal lordosis. Conus ends at level L1. No  abnormal signal or enhancement at the distal cord. No abnormal epidural  collection identified.     Miscellaneous: No incidental acute or suspicious findings identified outside of  the lumbar spine region.     Levels:     T11-T12: Evaluated sagittal plane only. No significant degenerative change or  stenosis.     T12-L1: No significant disc disease or stenosis. Mild facet arthropathy.     L1-L2: Mild disc bulge. Moderate facet arthropathy. Moderate spinal canal  stenosis. Mild foraminal stenoses.     L2-L3: Minimal disc bulge. Moderate facet arthropathy. Moderate spinal canal  stenosis. Foramina patent.     L3-L4: Hardware-related artifact limits evaluation. Mild disc bulge in addition  to a right central condyle disc extrusion extending roughly 1.2 cm condyle,  measuring roughly 0.9 cm transverse, roughly 0.5 cm AP, likely abutting the  crossing right L4 nerve. Severe facet arthropathy with ligamentum flavum bulge. Severe spinal canal stenosis, perhaps partly due to congenital narrowing.   Moderate to severe foraminal stenoses.     L4-L5: Artifact limits evaluation. Postsurgical level as above. Grade 2  anterolisthesis with uncovering of disc. Facets not well evaluated due to  artifact. Moderate to severe spinal canal stenosis, predominantly in the AP  plane. Moderate bilateral foraminal stenoses, predominantly in the vertical  plane, with possible abutment of the exiting L4 nerves.     L5-S1: Posterior decompression level. A left central annular fissure with  underlying mild disc bulge. Mild facet arthropathy. Minimal spinal canal  narrowing. Moderate to severe foraminal stenoses with probable abutment of the  exiting L5 nerves.     Imaged sacrum: Unremarkable.        Impression IMPRESSION:     At L3-L4, degenerative changes results in severe spinal canal stenosis and  moderate to severe foraminal stenoses. There is also notable right central disc  extrusion at this level likely abutting the crossing right L4 nerve. Hardware-related artifact limits evaluation. Compared to 2017 MRI, spinal canal  stenosis is slightly worse and disc extrusion is new.     L4-L5 level postsurgical changes as above, with grade 2 anterolisthesis and  moderate to severe spinal canal as well as moderate foraminal stenoses. Spinal  canal and foraminal stenosis however is less compared to 2017 MRI.     L5-S1 moderate to severe foraminal stenoses.     L1-L2 and L2-L3 moderate spinal canal stenoses.     See level by level details above.           Written by David Monreal, as dictated by Taurus Kenny MD.    I, Dr. Taurus Kenny confirm that all documentation is accurate.

## 2022-06-22 ENCOUNTER — OFFICE VISIT (OUTPATIENT)
Dept: ORTHOPEDIC SURGERY | Age: 72
End: 2022-06-22
Payer: COMMERCIAL

## 2022-06-22 VITALS
RESPIRATION RATE: 16 BRPM | OXYGEN SATURATION: 97 % | HEART RATE: 77 BPM | DIASTOLIC BLOOD PRESSURE: 68 MMHG | HEIGHT: 70 IN | TEMPERATURE: 97.2 F | SYSTOLIC BLOOD PRESSURE: 106 MMHG | BODY MASS INDEX: 21.3 KG/M2 | WEIGHT: 148.8 LBS

## 2022-06-22 DIAGNOSIS — M48.061 SPINAL STENOSIS OF LUMBAR REGION WITHOUT NEUROGENIC CLAUDICATION: Primary | ICD-10-CM

## 2022-06-22 DIAGNOSIS — M54.16 LEFT LUMBAR RADICULOPATHY: ICD-10-CM

## 2022-06-22 DIAGNOSIS — M62.838 MUSCLE SPASM: ICD-10-CM

## 2022-06-22 DIAGNOSIS — M25.561 ACUTE PAIN OF RIGHT KNEE: ICD-10-CM

## 2022-06-22 DIAGNOSIS — M47.816 LUMBAR FACET ARTHROPATHY: ICD-10-CM

## 2022-06-22 PROCEDURE — 1123F ACP DISCUSS/DSCN MKR DOCD: CPT | Performed by: PHYSICAL MEDICINE & REHABILITATION

## 2022-06-22 PROCEDURE — G8536 NO DOC ELDER MAL SCRN: HCPCS | Performed by: PHYSICAL MEDICINE & REHABILITATION

## 2022-06-22 PROCEDURE — 99214 OFFICE O/P EST MOD 30 MIN: CPT | Performed by: PHYSICAL MEDICINE & REHABILITATION

## 2022-06-22 PROCEDURE — G8427 DOCREV CUR MEDS BY ELIG CLIN: HCPCS | Performed by: PHYSICAL MEDICINE & REHABILITATION

## 2022-06-22 PROCEDURE — G8752 SYS BP LESS 140: HCPCS | Performed by: PHYSICAL MEDICINE & REHABILITATION

## 2022-06-22 PROCEDURE — G8420 CALC BMI NORM PARAMETERS: HCPCS | Performed by: PHYSICAL MEDICINE & REHABILITATION

## 2022-06-22 PROCEDURE — G8432 DEP SCR NOT DOC, RNG: HCPCS | Performed by: PHYSICAL MEDICINE & REHABILITATION

## 2022-06-22 PROCEDURE — 3017F COLORECTAL CA SCREEN DOC REV: CPT | Performed by: PHYSICAL MEDICINE & REHABILITATION

## 2022-06-22 PROCEDURE — 1101F PT FALLS ASSESS-DOCD LE1/YR: CPT | Performed by: PHYSICAL MEDICINE & REHABILITATION

## 2022-06-22 PROCEDURE — G8754 DIAS BP LESS 90: HCPCS | Performed by: PHYSICAL MEDICINE & REHABILITATION

## 2022-06-22 RX ORDER — TOPIRAMATE 25 MG/1
TABLET ORAL
Status: CANCELLED | OUTPATIENT
Start: 2022-06-22

## 2022-06-22 NOTE — PROGRESS NOTES
Cesar Leblanc presents today for   Chief Complaint   Patient presents with    Back Pain       Is someone accompanying this pt? no    Is the patient using any DME equipment during OV? no    Depression Screening:  3 most recent PHQ Screens 12/1/2020   Little interest or pleasure in doing things Not at all   Feeling down, depressed, irritable, or hopeless Not at all   Total Score PHQ 2 0       Learning Assessment:  Learning Assessment 2/1/2018   PRIMARY LEARNER Patient   PRIMARY LANGUAGE ENGLISH   LEARNER PREFERENCE PRIMARY PICTURES   ANSWERED BY patient   RELATIONSHIP SELF       Abuse Screening:  Abuse Screening Questionnaire 12/1/2020   Do you ever feel afraid of your partner? N   Are you in a relationship with someone who physically or mentally threatens you? N   Is it safe for you to go home? Y       Fall Risk  Fall Risk Assessment, last 12 mths 12/1/2020   Able to walk? Yes   Fall in past 12 months? No   Number of falls in past 12 months -   Fall with injury? -       OPIOID RISK TOOL  No flowsheet data found. Coordination of Care:  1. Have you been to the ER, urgent care clinic since your last visit? no  Hospitalized since your last visit? no    2. Have you seen or consulted any other health care providers outside of the [address] [place] [address] since your last visit? no Include any pap smears or colon screening.  no

## 2022-06-28 ENCOUNTER — OFFICE VISIT (OUTPATIENT)
Dept: ORTHOPEDIC SURGERY | Age: 72
End: 2022-06-28
Payer: COMMERCIAL

## 2022-06-28 VITALS
HEIGHT: 70 IN | OXYGEN SATURATION: 98 % | TEMPERATURE: 97.3 F | HEART RATE: 67 BPM | WEIGHT: 154.6 LBS | BODY MASS INDEX: 22.13 KG/M2

## 2022-06-28 DIAGNOSIS — M17.11 ARTHRITIS OF RIGHT KNEE: Primary | ICD-10-CM

## 2022-06-28 DIAGNOSIS — M25.561 RIGHT KNEE PAIN, UNSPECIFIED CHRONICITY: ICD-10-CM

## 2022-06-28 PROCEDURE — 99214 OFFICE O/P EST MOD 30 MIN: CPT | Performed by: ORTHOPAEDIC SURGERY

## 2022-06-28 PROCEDURE — G8756 NO BP MEASURE DOC: HCPCS | Performed by: ORTHOPAEDIC SURGERY

## 2022-06-28 PROCEDURE — 1123F ACP DISCUSS/DSCN MKR DOCD: CPT | Performed by: ORTHOPAEDIC SURGERY

## 2022-06-28 PROCEDURE — 20610 DRAIN/INJ JOINT/BURSA W/O US: CPT | Performed by: ORTHOPAEDIC SURGERY

## 2022-06-28 PROCEDURE — 73560 X-RAY EXAM OF KNEE 1 OR 2: CPT | Performed by: ORTHOPAEDIC SURGERY

## 2022-06-28 PROCEDURE — G8427 DOCREV CUR MEDS BY ELIG CLIN: HCPCS | Performed by: ORTHOPAEDIC SURGERY

## 2022-06-28 PROCEDURE — 1101F PT FALLS ASSESS-DOCD LE1/YR: CPT | Performed by: ORTHOPAEDIC SURGERY

## 2022-06-28 PROCEDURE — 3017F COLORECTAL CA SCREEN DOC REV: CPT | Performed by: ORTHOPAEDIC SURGERY

## 2022-06-28 PROCEDURE — G8536 NO DOC ELDER MAL SCRN: HCPCS | Performed by: ORTHOPAEDIC SURGERY

## 2022-06-28 PROCEDURE — G8420 CALC BMI NORM PARAMETERS: HCPCS | Performed by: ORTHOPAEDIC SURGERY

## 2022-06-28 PROCEDURE — G8432 DEP SCR NOT DOC, RNG: HCPCS | Performed by: ORTHOPAEDIC SURGERY

## 2022-06-28 RX ORDER — TRIAMCINOLONE ACETONIDE 40 MG/ML
40 INJECTION, SUSPENSION INTRA-ARTICULAR; INTRAMUSCULAR ONCE
Status: COMPLETED | OUTPATIENT
Start: 2022-06-28 | End: 2022-06-28

## 2022-06-28 RX ADMIN — TRIAMCINOLONE ACETONIDE 40 MG: 40 INJECTION, SUSPENSION INTRA-ARTICULAR; INTRAMUSCULAR at 16:27

## 2022-06-28 NOTE — PROGRESS NOTES
Delana Phalen  1950   Chief Complaint   Patient presents with    Knee Pain        HISTORY OF PRESENT ILLNESS  Delana Phalen is a 81 y.o. male who presents today for reevaluation of RT shoulder pain that began Atraumatically in September 2021. Pt noted pain with reaching overhead and pain improvement with use of Sulindac. Pt describes the pain as intermittent, worse with bending, stretching and straightening, and better with rest. Of note, he has had cortisone injections in his hip and back. I administered an injection for his RT shoulder and told him to return in 3 weeks if his pain returns. Patient rates pain as 2/10 today. Since LOV Pt has developed RT knee pain he developed after a fall he had 8 weeks ago. Pt states he finds himself limping when he doesn't have his knee brace on. The pain has improved since the fall but it hasn't returned to normal.  Patient denies any fever, chills, chest pain, shortness of breath or calf pain. The remainder of the review of systems is negative. There are no new illness or injuries to report since last seen in the office. There are no changes to medications, allergies, family or social history. PHYSICAL EXAM:   Visit Vitals  Pulse 67   Temp 97.3 °F (36.3 °C) (Temporal)   Ht 5' 10\" (1.778 m)   Wt 154 lb 9.6 oz (70.1 kg)   SpO2 98%   BMI 22.18 kg/m²     The patient is a well-developed, well-nourished male   in no acute distress. The patient is alert and oriented times three. The patient is alert and oriented times three. Mood and affect are normal.  LYMPHATIC: lymph nodes are not enlarged and are within normal limits  SKIN: normal in color and non tender to palpation. There are no bruises or abrasions noted. NEUROLOGICAL: Motor sensory exam is within normal limits. Reflexes are equal bilaterally.  There is normal sensation to pinprick and light touch  MUSCULOSKELETAL:    Examination Right knee   Skin Intact   Range of motion 0-130   Effusion +   Medial joint line tenderness +   Lateral joint line tenderness -   Tenderness Pes Bursa -   Tenderness insertion MCL -   Tenderness insertion LCL -   Rileys -   Patella crepitus +   Patella grind +   Lachman -   Pivot shift -   Anterior drawer -   Posterior drawer -   Varus stress -   Valgus stress -   Neurovascular Intact   Calf Swelling and Tenderness to Palpation -   Marisela's Test -   Hamstring Cord Tightness -       PROCEDURE:     After sterile prep, 4 cc of Xylocaine and 1 cc of Kenalog were injected into the right knee. [facility]  OFFICE PROCEDURE PROGRESS NOTE        Chart reviewed for the following:  Paul Villafana MD, have reviewed the History, Physical and updated the Allergic reactions for [address] performed immediately prior to start of procedure:  Paul Villafana MD, have performed the following reviews on Gareld Hives prior to the start of the procedure:            * Patient was identified by name and date of birth   * Agreement on procedure being performed was verified  * Risks and Benefits explained to the patient  * Procedure site verified and marked as necessary  * Patient was positioned for comfort  * Consent was signed and verified     Time: 4:01 PM    Date of procedure: 6/28/2022    Procedure performed by:  Andreea Gallo MD    Provider assisted by: (see medication administration)    How tolerated by patient: tolerated the procedure well with no complications    Comments: none    IMAGING:     XR of the RT knee was reviewed by DR Gregg Cordero and indicates no acute abnormalities. IMPRESSION:      ICD-10-CM ICD-9-CM    1. Arthritis of right knee  M17.11 716.96    2. Right knee pain, unspecified chronicity  M25.561 719.46 AMB POC XRAY, KNEE; 1/2 VIEWS        PLAN:   1. Pt presents today with pain in his RT knee secondary to a fall. I will administer an injection for his RT knee. Risk factors include: none  2.  No ultrasound exam indicated today  3. Yes cortisone injection indicated today   4. No Physical/Occupational Therapy indicated today  5. No diagnostic test indicated today:   6. No durable medical equipment indicated today  7. No referral indicated today   8. No medications indicated today:   9. No Narcotic indicated today for short term acute pain. RTC in 3 weeks      Scribed by Marian Gale [address]) as dictated by Cindy Dolan MD    I, Dr. Cindy Dolan, confirm that all documentation is accurate.     Cindy Dolan M.D.   [facility]

## 2022-08-25 NOTE — TELEPHONE ENCOUNTER
Pt called back about DEXA scan results. He is almost osteoporotic. I recommend Fosamax and routine lab work. [place] and will call next week when he comes back to town regarding the lab work. We discussed Fosamax its risks vs benefit. He agrees to take it after lab work is drawn. Where Do You Want The Question To Include Opioid Counseling Located?: Case Summary Tab

## 2022-09-20 NOTE — PROGRESS NOTES
[facility]  [address] [place], [facility] [address]  Phone: [phone]  Fax: [phone]    Pt's YOB: 1950    ASSESSMENT   Diagnoses and all orders for this visit:    1. Spinal stenosis of lumbar region without neurogenic claudication  -     topiramate (TOPAMAX) 25 mg tablet; Take 3 Tablets by mouth nightly. -     AMB POC XRAY, SPINE, LUMBOSACRAL; 4+  -     MRI LUMB SPINE W CONT; Future    2. Lumbar facet arthropathy  -     MRI LUMB SPINE W CONT; Future    3. Muscle spasm  -     MRI LUMB SPINE W CONT; Future    4. Left lumbar radiculopathy  -     topiramate (TOPAMAX) 25 mg tablet; Take 3 Tablets by mouth nightly. -     MRI LUMB SPINE W CONT; Future    5. Spondylolisthesis of lumbar region  -     MRI LUMB SPINE W CONT; Future       IMPRESSION AND PLAN:  Colt Mckeon is a 81 y.o. male with history of cervical, lumbar, and left hip pain and presents to the office today for follow up. Pt continues to complain of numbness in the left leg and notes intermittent increase of the numbness since his last office visit. He is taking Topamax 25 mg 3 tabs QHS, Flexeril 10 mg as needed, and Xarelto prophylatically against deep vein thrombosis when he travels and uses Voltaren gel as needed. 1) Pt was given information on lumbar arthritis exercises. 2) He received refills of Topamax 25 mg 3 tabs QHS at this time. 3) Pt will continue taking Flexeril 10 mg 1 tab as needed and using Voltaren 1% gel. 4) An updated lumbar spine MRI was ordered at this time. 5) Lumbar spine 4V x-rays were obtained at this visit. 6) Mr. Valentino Washington has a reminder for a \"due or due soon\" health maintenance. I have asked that he contact his primary care provider, Mis Burks MD, for follow-up on this health maintenance. 7)  demonstrated consistency with prescribing.      Follow-up and Dispositions    Return in about 6 weeks (around 11/2/2022) for Diagnostic Test follow up. HISTORY OF PRESENT ILLNESS:  Anthony Smith is a 81 y.o. male with history of cervical, lumbar, and left hip pain and presents to the office today for follow up. Pt continues to complain of numbness in the left leg and notes intermittent increase of the numbness since his last office visit. He reports lessening pain in the right knee after receiving treatment from Dr. Jessica Wood. He underwent an injection in the right knee with minimal relief and plans to continue wearing a brace on the affected knee and follow up with Dr. Jessica Wood in the future weeks. Pt admits to continued numbness in the left leg that has progressively gotten worse to the point where at times he is not able to feel the entire leg. He denies limiting activity in relation to leg weakness but does note he intermittently limits his activity due to the right knee pain. He is taking Topamax 25 mg 3 tabs QHS, Flexeril 10 mg as needed, and Xarelto prophylatically against deep vein thrombosis when he travels and uses Voltaren gel as needed. Pt at this time desires to proceed with an updated lumbar MRI. 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  He notes the transition from working to senior living has been a little difficult so working part time is ideal for him    Pain Scale: 0 - No pain/10    PCP: Fiona Martin MD     Past Medical History:   Diagnosis Date    Back pain     Chronic pain     Headache(784.0)     HTN (hypertension)     Joint pain     hx of joint pain    Pulmonary emboli [facility])         Social History     Socioeconomic History    Marital status:      Spouse name: Not on file    Number of children: Not on file    Years of education: Not on file    Highest education level: Not on file   Occupational History    Not on file   Tobacco Use    Smoking status: Former     Packs/day: 0.75     Years: 40.00     Pack years: 30. 00     Types: Cigarettes     Quit date: 10/17/2012     Years since quittin.9    Smokeless tobacco: Former   Substance and Sexual Activity    Alcohol use: Yes     Alcohol/week: 2.0 standard drinks     Types: 2 Cans of beer per week     Comment: daily-beer and wine/ 2 drinks a day    Drug use: No    Sexual activity: Yes   Other Topics Concern    Not on file   Social History Narrative    Not on file     Social Determinants of Health     Financial Resource Strain: Not on file   Food Insecurity: Not on file   Transportation Needs: Not on file   Physical Activity: Not on file   Stress: Not on file   Social Connections: Not on file   Intimate Partner Violence: Not on file   Housing Stability: Not on file       Current Outpatient Medications   Medication Sig Dispense Refill    topiramate (TOPAMAX) 25 mg tablet Take 3 Tablets by mouth nightly. 270 Tablet 1    sulindac (CLINORIL) 200 mg tablet       cyclobenzaprine (FLEXERIL) 10 mg tablet Take 1 Tablet by mouth nightly. As needed 30 Tablet 1    diclofenac (Voltaren) 1 % gel Apply 4 grams to left hip up to 4 times per day, maximum 16 grams per joint per day. Dispense 100 gram tubes 100 g 3    Xarelto 10 mg tablet Take 1 Tablet by mouth daily as needed (2 days prior to traveling, 4 days after traveling. ). 30 Tablet 3    betamethasone valerate (VALISONE) 0.1 % topical cream Apply  to affected area two (2) times a day. Apply cream to affected area with each urination. (Patient taking differently: Apply  to affected area daily as needed. Apply cream to affected area with each urination. ) 15 g 1    cholecalciferol, vitamin D3, 2,000 unit tab Take 2,000 Units by mouth daily. clobetasol (TEMOVATE) 0.05 % topical cream as needed. desonide (TRIDESILON) 0.05 % topical lotion Use 1 Application to affected area Twice Daily. lisinopril-hydroCHLOROthiazide (PRINZIDE, ZESTORETIC) 20-12.5 mg per tablet Take 1 Tablet by mouth daily.       zolpidem (AMBIEN) 5 mg tablet Take 5 mg by mouth nightly as needed. methylPREDNISolone (MEDROL DOSEPACK) 4 mg tablet Per dose pack instructions (Patient not taking: No sig reported) 1 Dose Pack 1       Allergies   Allergen Reactions    Bee Sting  [Sting, Bee] Swelling     DIZZINESS         REVIEW OF SYSTEMS    Constitutional: Negative for fever, chills, or weight change. Respiratory: Negative for cough or shortness of breath. Cardiovascular: Negative for chest pain or palpitations. Gastrointestinal: Negative for acid reflux, change in bowel habits, or constipation. Genitourinary: Negative for dysuria and flank pain. Musculoskeletal: Positive for right knee pain and weakness. Skin: Negative for rash. Neurological: Negative for headaches or dizziness. Positive for left leg numbness   Endo/Heme/Allergies: Negative for increased bruising. Psychiatric/Behavioral: Negative for difficulty with sleep. As per HPI    PHYSICAL EXAMINATION  Visit Vitals  /80   Pulse 61   Temp 97.1 °F (36.2 °C) (Temporal)   Resp 16   Wt 148 lb (67.1 kg)   SpO2 99%   BMI 21.24 kg/m²       Constitutional: Awake, alert, and in no acute distress. Neurological: Sensation to light touch is intact. Negative Saul's sign bilaterally. Skin: warm, dry, and intact. Musculoskeletal: No pain with extension, axial loading, or forward flexion. No pain with internal or external rotation of his hips. Negative straight leg raise bilaterally. Biceps  Triceps Deltoids Wrist Ext Wrist Flex Hand Intrin   Right +4/5 +4/5 +4/5 +4/5 +4/5 +4/5   Left +4/5 +4/5 +4/5 +4/5 +4/5 +4/5      Hip Flex  Quads Hamstrings Ankle DF EHL Ankle PF   Right +4/5 +4/5 +4/5 +4/5 +4/5 +4/5   Left +4/5 +4/5 +4/5 +4/5 +4/5 +4/5     IMAGING:    Lumbar spine 4V x-rays from 09/21/2022 were personally reviewed with the patient and demonstrated:     Fusion at L4/5. Severe collapse on left side at L3/4 disc. Hardware is intact. Straightening of the lumbar spine. Multilevel degenerative facet. Bones appear thin. No instability. Left hip MRI arthrogram from 12/22/2020 was personally reviewed with the patient and demonstrated:     FINDINGS  OSSEOUS STRUCTURES / JOINTS     Adequate joint distention with contrast solution. Nondisplaced Anterior/superior  labral tear. Mild diffuse cartilage surface irregularity of the left hip. No  subchondral marrow edema. MUSCLES AND TENDONS  The myotendinous structures surrounding the hips and proximal thighs appear  intact. BURSA  Normal.     OTHER FINDINGS  Small fat filled left inguinal hernia defect. Enlarged prostate. Slightly trabeculated appearance of the bladder wall, correlate for chronic  bladder outlet obstruction. IMPRESSION:     No fracture or destructive osseous lesions. Nondisplaced anterior/superior labral tear. Early left hip osteoarthritis. Incidental findings as above. Lumbar spine 4V x-rays from 12/1/2020 were personally reviewed with the patient and demonstrated:  L4-5 fusion. Collapse of left side of L3/4 disc and also sclerosis. Compression deformity at L3 and T12. Straightening of the lumbar spine. No instability. Lumbar spine MRI from 04/24/2019 was personally reviewed with the patient and demonstrated:          Results from [facility] on 04[facility]24/19   MRI LUMB SPINE W WO CONT     Narrative EXAMINATION: MRI lumbar spine with and without contrast     INDICATION: Lumbar pain, prior lumbar surgery, left lower extremity sciatica     COMPARISON: CTA 1217     TECHNIQUE: Sagittal and axial multiecho MRI sequences of the lumbar spine  performed before and after 13 cc IV Dotarem. FINDINGS:     Postsurgical: Status post L4-L5 posterior hardware fixation and posterior  decompression. No postoperative fluid collection identified. Mild lower lumbar  paraspinous muscle edema. General: T12 anterior central mild superior endplate height loss, chronic  appearing.  L3 eccentric to the left mild superior endplate height loss, chronic  appearing. Vertebral body heights elsewhere preserved. Disc space loss at  multiple levels, most pronounced at L3-L4 and L4-L5. Grade 2 anterolisthesis of  L4 and L5. Mild multilevel endplate edema, likely related to active disc  degeneration. Straightening of the normal lordosis. Conus ends at level L1. No  abnormal signal or enhancement at the distal cord. No abnormal epidural  collection identified. Miscellaneous: No incidental acute or suspicious findings identified outside of  the lumbar spine region. Levels:     T11-T12: Evaluated sagittal plane only. No significant degenerative change or  stenosis. T12-L1: No significant disc disease or stenosis. Mild facet arthropathy. L1-L2: Mild disc bulge. Moderate facet arthropathy. Moderate spinal canal  stenosis. Mild foraminal stenoses. L2-L3: Minimal disc bulge. Moderate facet arthropathy. Moderate spinal canal  stenosis. Foramina patent. L3-L4: Hardware-related artifact limits evaluation. Mild disc bulge in addition  to a right central condyle disc extrusion extending roughly 1.2 cm condyle,  measuring roughly 0.9 cm transverse, roughly 0.5 cm AP, likely abutting the  crossing right L4 nerve. Severe facet arthropathy with ligamentum flavum bulge. Severe spinal canal stenosis, perhaps partly due to congenital narrowing. Moderate to severe foraminal stenoses. L4-L5: Artifact limits evaluation. Postsurgical level as above. Grade 2  anterolisthesis with uncovering of disc. Facets not well evaluated due to  artifact. Moderate to severe spinal canal stenosis, predominantly in the AP  plane. Moderate bilateral foraminal stenoses, predominantly in the vertical  plane, with possible abutment of the exiting L4 nerves. L5-S1: Posterior decompression level. A left central annular fissure with  underlying mild disc bulge. Mild facet arthropathy. Minimal spinal canal  narrowing.  Moderate to severe foraminal stenoses with probable abutment of the  exiting L5 nerves. Imaged sacrum: Unremarkable. Impression IMPRESSION:     At L3-L4, degenerative changes results in severe spinal canal stenosis and  moderate to severe foraminal stenoses. There is also notable right central disc  extrusion at this level likely abutting the crossing right L4 nerve. Hardware-related artifact limits evaluation. Compared to 2017 MRI, spinal canal  stenosis is slightly worse and disc extrusion is new. L4-L5 level postsurgical changes as above, with grade 2 anterolisthesis and  moderate to severe spinal canal as well as moderate foraminal stenoses. Spinal  canal and foraminal stenosis however is less compared to 2017 MRI. L5-S1 moderate to severe foraminal stenoses. L1-L2 and L2-L3 moderate spinal canal stenoses. See level by level details above. Written by Abelino Cortes, as dictated by Paige Watkins MD.  I, Dr. Paige Watkins confirm that all documentation is accurate.

## 2022-09-21 ENCOUNTER — OFFICE VISIT (OUTPATIENT)
Dept: ORTHOPEDIC SURGERY | Age: 72
End: 2022-09-21
Payer: COMMERCIAL

## 2022-09-21 VITALS
RESPIRATION RATE: 16 BRPM | SYSTOLIC BLOOD PRESSURE: 120 MMHG | TEMPERATURE: 97.1 F | OXYGEN SATURATION: 99 % | HEART RATE: 61 BPM | WEIGHT: 148 LBS | DIASTOLIC BLOOD PRESSURE: 80 MMHG | BODY MASS INDEX: 21.24 KG/M2

## 2022-09-21 DIAGNOSIS — M48.061 SPINAL STENOSIS OF LUMBAR REGION WITHOUT NEUROGENIC CLAUDICATION: ICD-10-CM

## 2022-09-21 DIAGNOSIS — M62.838 MUSCLE SPASM: ICD-10-CM

## 2022-09-21 DIAGNOSIS — M43.16 SPONDYLOLISTHESIS OF LUMBAR REGION: ICD-10-CM

## 2022-09-21 DIAGNOSIS — M54.16 LEFT LUMBAR RADICULOPATHY: ICD-10-CM

## 2022-09-21 DIAGNOSIS — M47.816 LUMBAR FACET ARTHROPATHY: ICD-10-CM

## 2022-09-21 DIAGNOSIS — M48.061 SPINAL STENOSIS OF LUMBAR REGION WITHOUT NEUROGENIC CLAUDICATION: Primary | ICD-10-CM

## 2022-09-21 PROCEDURE — G8754 DIAS BP LESS 90: HCPCS | Performed by: PHYSICAL MEDICINE & REHABILITATION

## 2022-09-21 PROCEDURE — 72110 X-RAY EXAM L-2 SPINE 4/>VWS: CPT | Performed by: PHYSICAL MEDICINE & REHABILITATION

## 2022-09-21 PROCEDURE — 1123F ACP DISCUSS/DSCN MKR DOCD: CPT | Performed by: PHYSICAL MEDICINE & REHABILITATION

## 2022-09-21 PROCEDURE — G8432 DEP SCR NOT DOC, RNG: HCPCS | Performed by: PHYSICAL MEDICINE & REHABILITATION

## 2022-09-21 PROCEDURE — 1101F PT FALLS ASSESS-DOCD LE1/YR: CPT | Performed by: PHYSICAL MEDICINE & REHABILITATION

## 2022-09-21 PROCEDURE — G8420 CALC BMI NORM PARAMETERS: HCPCS | Performed by: PHYSICAL MEDICINE & REHABILITATION

## 2022-09-21 PROCEDURE — 99214 OFFICE O/P EST MOD 30 MIN: CPT | Performed by: PHYSICAL MEDICINE & REHABILITATION

## 2022-09-21 PROCEDURE — G8536 NO DOC ELDER MAL SCRN: HCPCS | Performed by: PHYSICAL MEDICINE & REHABILITATION

## 2022-09-21 PROCEDURE — G8752 SYS BP LESS 140: HCPCS | Performed by: PHYSICAL MEDICINE & REHABILITATION

## 2022-09-21 PROCEDURE — 3017F COLORECTAL CA SCREEN DOC REV: CPT | Performed by: PHYSICAL MEDICINE & REHABILITATION

## 2022-09-21 PROCEDURE — G8427 DOCREV CUR MEDS BY ELIG CLIN: HCPCS | Performed by: PHYSICAL MEDICINE & REHABILITATION

## 2022-09-21 RX ORDER — TOPIRAMATE 25 MG/1
75 TABLET ORAL
Qty: 270 TABLET | Refills: 1 | Status: SHIPPED | OUTPATIENT
Start: 2022-09-21

## 2022-09-21 NOTE — PROGRESS NOTES
Chief Complaint   Patient presents with    Follow-up       Pt preferred language for health care discussion is english. Is someone accompanying this pt? no    Is the patient using any DME equipment during [address]? no    Depression Screening:  3 most recent [facility] Screens 12/1/2020 3/4/2020 9/4/2019 5/14/2019 4/17/2019 5/2/2018 2/1/2018   Little interest or pleasure in doing things Not at all Not at all Not at all Not at all Not at all Not at all Not at all   Feeling down, depressed, irritable, or hopeless Not at all Not at all Not at all Not at all Not at all Not at all Not at all   Total Score PHQ 2 0 0 0 0 0 0 0       Learning Assessment:  Learning Assessment 2/1/2018   PRIMARY LEARNER Patient   PRIMARY LANGUAGE ENGLISH   LEARNER PREFERENCE PRIMARY PICTURES   ANSWERED BY patient   RELATIONSHIP SELF       Abuse Screening:  Abuse Screening Questionnaire 12/1/2020 9/4/2019   Do you ever feel afraid of your partner? N N   Are you in a relationship with someone who physically or mentally threatens you? N N   Is it safe for you to go home? Y Y       Fall Risk  Fall Risk Assessment, last 12 mths 12/1/2020   Able to walk? Yes   Fall in past 12 months? No   Number of falls in past 12 months -   Fall with injury? -           Advance Directive:  1. Do you have an advance directive in place? Patient Reply:no    2. If not, would you like material regarding how to put one in place? Patient Reply: no    2. Per patient no changes to their ACP contact no. Coordination of Care:  1. Have you been to the ER, urgent care clinic since your last visit? Hospitalized since your last visit? no    2. Have you seen or consulted any other health care providers outside of the [address] [place] [address] since your last visit? Include any pap smears or colon screening.  no

## 2022-10-02 ENCOUNTER — HOSPITAL ENCOUNTER (OUTPATIENT)
Age: 72
Discharge: HOME | End: 2022-10-02
Attending: PHYSICAL MEDICINE & REHABILITATION
Payer: COMMERCIAL

## 2022-10-02 DIAGNOSIS — M47.816 LUMBAR FACET ARTHROPATHY: ICD-10-CM

## 2022-10-02 DIAGNOSIS — M62.838 MUSCLE SPASM: ICD-10-CM

## 2022-10-02 DIAGNOSIS — M54.16 LEFT LUMBAR RADICULOPATHY: ICD-10-CM

## 2022-10-02 DIAGNOSIS — M48.061 SPINAL STENOSIS OF LUMBAR REGION WITHOUT NEUROGENIC CLAUDICATION: ICD-10-CM

## 2022-10-02 DIAGNOSIS — M43.16 SPONDYLOLISTHESIS OF LUMBAR REGION: ICD-10-CM

## 2022-10-02 PROCEDURE — 72158 MRI LUMBAR SPINE W/O & W/DYE: CPT

## 2022-10-02 PROCEDURE — 74011250636 HC RX REV CODE- 250/636: Performed by: PHYSICAL MEDICINE & REHABILITATION

## 2022-10-02 PROCEDURE — 82565 ASSAY OF CREATININE: CPT

## 2022-10-02 PROCEDURE — A9575 INJ GADOTERATE MEGLUMI 0.1ML: HCPCS | Performed by: PHYSICAL MEDICINE & REHABILITATION

## 2022-10-02 RX ADMIN — GADOTERATE MEGLUMINE 14 ML: 376.9 INJECTION INTRAVENOUS at 11:16

## 2022-10-03 LAB — CREAT UR-MCNC: 0.8 MG/DL (ref 0.6–1.3)

## 2022-11-09 ENCOUNTER — OFFICE VISIT (OUTPATIENT)
Dept: ORTHOPEDIC SURGERY | Age: 72
End: 2022-11-09
Payer: COMMERCIAL

## 2022-11-09 VITALS
WEIGHT: 148 LBS | HEIGHT: 70 IN | TEMPERATURE: 96.8 F | OXYGEN SATURATION: 99 % | RESPIRATION RATE: 16 BRPM | HEART RATE: 60 BPM | BODY MASS INDEX: 21.19 KG/M2

## 2022-11-09 DIAGNOSIS — M43.16 SPONDYLOLISTHESIS OF LUMBAR REGION: ICD-10-CM

## 2022-11-09 DIAGNOSIS — M47.816 LUMBAR FACET ARTHROPATHY: ICD-10-CM

## 2022-11-09 DIAGNOSIS — M48.061 SPINAL STENOSIS OF LUMBAR REGION WITHOUT NEUROGENIC CLAUDICATION: ICD-10-CM

## 2022-11-09 DIAGNOSIS — M62.838 MUSCLE SPASM: ICD-10-CM

## 2022-11-09 DIAGNOSIS — M54.16 LEFT LUMBAR RADICULOPATHY: Primary | ICD-10-CM

## 2022-11-09 PROCEDURE — 3017F COLORECTAL CA SCREEN DOC REV: CPT | Performed by: PHYSICAL MEDICINE & REHABILITATION

## 2022-11-09 PROCEDURE — G8756 NO BP MEASURE DOC: HCPCS | Performed by: PHYSICAL MEDICINE & REHABILITATION

## 2022-11-09 PROCEDURE — 99214 OFFICE O/P EST MOD 30 MIN: CPT | Performed by: PHYSICAL MEDICINE & REHABILITATION

## 2022-11-09 PROCEDURE — G8427 DOCREV CUR MEDS BY ELIG CLIN: HCPCS | Performed by: PHYSICAL MEDICINE & REHABILITATION

## 2022-11-09 PROCEDURE — G8536 NO DOC ELDER MAL SCRN: HCPCS | Performed by: PHYSICAL MEDICINE & REHABILITATION

## 2022-11-09 PROCEDURE — G8432 DEP SCR NOT DOC, RNG: HCPCS | Performed by: PHYSICAL MEDICINE & REHABILITATION

## 2022-11-09 PROCEDURE — 1101F PT FALLS ASSESS-DOCD LE1/YR: CPT | Performed by: PHYSICAL MEDICINE & REHABILITATION

## 2022-11-09 PROCEDURE — 1123F ACP DISCUSS/DSCN MKR DOCD: CPT | Performed by: PHYSICAL MEDICINE & REHABILITATION

## 2022-11-09 PROCEDURE — G8420 CALC BMI NORM PARAMETERS: HCPCS | Performed by: PHYSICAL MEDICINE & REHABILITATION

## 2022-11-09 NOTE — PROGRESS NOTES
[facility]  [address], [facility] [address]  Phone: [phone]  Fax: [phone]    Pt's YOB: 1950    ASSESSMENT   Diagnoses and all orders for this visit:    1. Left lumbar radiculopathy  -     SCHEDULE SURGERY    2. Spinal stenosis of lumbar region without neurogenic claudication  -     SCHEDULE SURGERY    3. Lumbar facet arthropathy    4. Spondylolisthesis of lumbar region    5. Muscle spasm       IMPRESSION AND PLAN:  Ro Long is a 81 y.o. male with history of cervical, lumbar, and left hip pain and presents to the office today for MRI follow up. Pt complains of continued lumbar pain with numbness and pain radiating into the left buttock region towards the posterior aspect of the left thigh intermittently to the ball of the foot. He is taking Topamax 25 mg 3 tabs QHS, Flexeril 10 mg as needed, and Xarelto prophylatically against deep vein thrombosis when he travels and uses Voltaren gel as needed. 1) Pt was given information on lumbar arthritis exercises. 2) Lumbar spine MRI from 10/02/2022 was reviewed with the patient at today's office visit. 3) Pt will continue taking Flexeril 10 mg 1 tab as needed, Topamax 25 mg 3 tabs QHS, and using Voltaren 1% gel. Refills were not required at today's office visit. 4) A left L5, S1 SNRB was ordered at this time. 5) Mr. Jv Juarez has a reminder for a \"due or due soon\" health maintenance. I have asked that he contact his primary care provider, Katt Mena MD, for follow-up on this health maintenance. 6)  demonstrated consistency with prescribing. Follow-up and Dispositions    Return in about 2 months (around 1/9/2023) for follow up for injection, Medication follow up. HISTORY OF PRESENT ILLNESS:  Ro Long is a 81 y.o. male with history of cervical, lumbar, and left hip pain and presents to the office today for MRI follow up.  Pt complains of continued lumbar pain with numbness and pain radiating into the left buttock region towards the posterior aspect of the left thigh intermittently to the ball of the foot. He reports his pain has not significantly changed since his last office visit with lessened pain in the right knee after being followed by Dr. Nadine Seaman. Pt admits to chronic numbness in the lower extremities but the pain is intermittent with episodes of severe aching. He admits to previously undergoing L4, L5,S1 SNRB in 2016 prior to the surgery on his back with relief. Pt denies undergoing physical therapy recently. He is taking Topamax 25 mg 3 tabs QHS, Flexeril 10 mg as needed, and Xarelto prophylatically against deep vein thrombosis when he travels and uses Voltaren gel as needed. Pt at this time desires to proceed with a left L5/S1 SNRB. Pain Scale: 1/10    PCP: Tonia Randle MD     Past Medical History:   Diagnosis Date    Back pain     Chronic pain     Headache(784.0)     HTN (hypertension)     Joint pain     hx of joint pain    Pulmonary emboli [facility])         Social History     Socioeconomic History    Marital status:      Spouse name: Not on file    Number of children: Not on file    Years of education: Not on file    Highest education level: Not on file   Occupational History    Not on file   Tobacco Use    Smoking status: Former     Packs/day: 0.75     Years: 40.00     Pack years: 30.00     Types: Cigarettes     Quit date: 10/17/2012     Years since quitting: 10.0    Smokeless tobacco: Former   Substance and Sexual Activity    Alcohol use:  Yes     Alcohol/week: 2.0 standard drinks     Types: 2 Cans of beer per week     Comment: daily-beer and wine/ 2 drinks a day    Drug use: No    Sexual activity: Yes   Other Topics Concern    Not on file   Social History Narrative    Not on file     Social Determinants of Health     Financial Resource Strain: Not on file   Food Insecurity: Not on file   Transportation Needs: Not on file Physical Activity: Not on file   Stress: Not on file   Social Connections: Not on file   Intimate Partner Violence: Not on file   Housing Stability: Not on file       Current Outpatient Medications   Medication Sig Dispense Refill    topiramate (TOPAMAX) 25 mg tablet Take 3 Tablets by mouth nightly. 270 Tablet 1    cyclobenzaprine (FLEXERIL) 10 mg tablet Take 1 Tablet by mouth nightly. As needed 30 Tablet 1    diclofenac (Voltaren) 1 % gel Apply 4 grams to left hip up to 4 times per day, maximum 16 grams per joint per day. Dispense 100 gram tubes 100 g 3    cholecalciferol, vitamin D3, 2,000 unit tab Take 2,000 Units by mouth daily. clobetasol (TEMOVATE) 0.05 % topical cream as needed. desonide (TRIDESILON) 0.05 % topical lotion Use 1 Application to affected area Twice Daily. lisinopril-hydroCHLOROthiazide (PRINZIDE, ZESTORETIC) 20-12.5 mg per tablet Take 1 Tablet by mouth daily. zolpidem (AMBIEN) 5 mg tablet Take 5 mg by mouth nightly as needed. sulindac (CLINORIL) 200 mg tablet       methylPREDNISolone (MEDROL DOSEPACK) 4 mg tablet Per dose pack instructions (Patient not taking: No sig reported) 1 Dose Pack 1    Xarelto 10 mg tablet Take 1 Tablet by mouth daily as needed (2 days prior to traveling, 4 days after traveling. ). 30 Tablet 3    betamethasone valerate (VALISONE) 0.1 % topical cream Apply  to affected area two (2) times a day. Apply cream to affected area with each urination. (Patient not taking: Reported on 11/9/2022) 15 g 1       Allergies   Allergen Reactions    Bee Sting  [Sting, Bee] Swelling     DIZZINESS         REVIEW OF SYSTEMS    Constitutional: Negative for fever, chills, or weight change. Respiratory: Negative for cough or shortness of breath. Cardiovascular: Negative for chest pain or palpitations. Gastrointestinal: Negative for acid reflux, change in bowel habits, or constipation. Genitourinary: Negative for dysuria and flank pain.    Musculoskeletal: Positive for lumbar and left lower extremity pain. Skin: Negative for rash. Neurological: Negative for headaches or dizziness. Positive for bilateral lower extremity numbness (L>R). Endo/Heme/Allergies: Negative for increased bruising. Psychiatric/Behavioral: Negative for difficulty with sleep. As per HPI    PHYSICAL EXAMINATION  Visit Vitals  Pulse 60   Temp 96.8 °F (36 °C) (Temporal)   Resp 16   Ht 5' 10\" (1.778 m)   Wt 148 lb (67.1 kg)   SpO2 99%   BMI 21.24 kg/m²       Constitutional: Awake, alert, and in no acute distress. Neurological: 1+ symmetrical DTRs in the upper extremities. 1+ symmetrical DTRs in the lower extremities. Sensation to light touch is intact. Negative Saul's sign bilaterally. Skin: warm, dry, and intact. Musculoskeletal: Discomfort pain with extension or axial loading. Improvement with forward flexion. No pain with internal or external rotation of his hips. Negative straight leg raise bilaterally. Improvement with left sided KAL's test.       Biceps  Triceps Deltoids Wrist Ext Wrist Flex Hand Intrin   Right +4/5 +4/5 +4/5 +4/5 +4/5 +4/5   Left +4/5 +4/5 +4/5 +4/5 +4/5 +4/5      Hip Flex  Quads Hamstrings Ankle DF EHL Ankle PF   Right +4/5 +4/5 +4/5 +4/5 +4/5 +4/5   Left +4/5 +4/5 +4/5 +4/5 +4/5 +4/5     IMAGING:    Lumbar spine MRI from 10/02/2022 was personally reviewed with the patient and demonstrated:     Results from [place] encounter on 10/02/22    MRI LUMB SPINE W WO CONT    Narrative  MR lumbar spine with and without contrast    HISTORY: Spinal stenosis of the lumbar region without neurogenic claudication,  lumbar facet arthropathy, muscle spasm, left lumbar radiculopathy , continued  left hip pain and intermittent numbness of the left leg    COMPARISON: MRI 4/24/2019. TECHNIQUE: Lumbar spine scanned with axial and sagittal T1W scans, axial and  sagittal T2W scans, and with post gadolinium axial and sagittal T1W scans.     FINDINGS:    Postop and alignment: L4-S1 laminectomy. L4-5 posterolateral fusion with the  hardware susceptibility artifacts and field inhomogeneity mildly limiting the  evaluation. Grade 1 anterolisthesis L4 on L5 similar to prior. Vertebral body height and bone marrow: Chronic T12 superior endplate compression  fracture deformity with mild to moderate vertebral height loss and anterior  wedging. Chronic L3 superior endplate mild to moderate compression fracture  deformity. Intervertebral increased bone marrow edema throughout the L3  vertebral body without new fracture line identified. Bone marrow edema signal  superior L4 vertebral body. Remainder of the vertebral body heights are  maintained. No associated prevertebral or epidural soft tissue inflammation. No  suspicious bone marrow lesion. Conus: Conus terminates at the L1 level with normal signal.    Correlation of sagittal and axial images demonstrates the following:    T11-T12: Sagittal images only. T12 superior endplate compression fracture  without significant disc height loss. Patent canal and foramina    T12-L1: No significant disc pathology. Patent canal and foramina. L1-L2: Moderate disc height loss. Mild facet hypertrophy and ligamentum flavum  thickening. Mild spinal stenosis and foraminal stenosis. L2-L3: L3 superior endplate chronic compression fracture deformity and preserved  disc height with mild bulge. Mild facet hypertrophy and ligamentum flavum  thickening. Mild spinal stenosis. Patent foramina. L3-L4: Severe disc height loss. Circumferential disc bulge with osteophytic  ridging. Moderate facet hypertrophy and ligamentum flavum thickening. Moderate  spinal stenosis. Severe foraminal stenosis with compression of the foraminal L3  nerve roots    L4-L5: Laminectomy and fusion. Grade 1 anterolisthesis. Severe disc height loss. Moderate spinal stenosis.  Moderate to severe foraminal stenosis with likely  compression of the foraminal L4 foraminal nerve roots. L5-S1: Laminectomy. Preserved disc height. Postsurgical changes left central  disc and minimally increased recurrent left central protrusion with minimal mass  effect on the ventral thecal sac. Also postsurgical fibrosis surrounding the  left L5 traversing nerve root in the lateral recess with distortion of the nerve  root (series 10 image 6). Mild facet hypertrophy. Patent spinal canal otherwise. Moderate to severe foraminal stenosis and likely compression of the foraminal  nerve roots. Other soft tissue: Left kidney 7 mm T2 hyperintense lesion mid to lower pole not  included in the postcontrast images    Impression  1. Prior posterior decompression at L4-S1 with posterolateral fusion L4-5. Unchanged alignment with grade 1 anterolisthesis L4-5.  -Similar moderate to severe spinal canal stenosis L3-4.  -Multilevel mild or moderate spinal canal stenosis otherwise as discussed. -Multilevel high-grade foraminal stenosis L3-4, L4-5 and L5-S1 with likely  compression of the corresponding foraminal nerve roots.  -Postsurgical changes left central L5-S1 disc and minimally increased recurrent  left central protrusion with minimal mass effect on the ventral thecal sac. Postsurgical fibrosis surrounding the left L5 traversing nerve root in the  lateral recess with distortion of the L5 nerve root. 2. Chronic wedge compression fracture deformities of T12 and L3 vertebral bodies  with mild to moderate the vertebral height loss. 3. Interval development of bone marrow edema in L3 vertebral body and superior  L4 vertebral body without associated soft tissue inflammation or new fracture. Severe disc height loss at L3-4 suggesting bone marrow changes are likely  reactive but can be source of pain. 4. Subcentimeter left kidney T2 hyperintense lesion, likely cyst but can be  evaluated with ultrasound.     Lumbar spine 4V x-rays from 09/21/2022 were personally reviewed with the patient and demonstrated: Fusion at L4/5. Severe collapse on left side at L3/4 disc. Hardware is intact. Straightening of the lumbar spine. Multilevel degenerative facet. Bones appear thin. No instability. Left hip MRI arthrogram from 12/22/2020 was personally reviewed with the patient and demonstrated:      FINDINGS  OSSEOUS STRUCTURES / JOINTS     Adequate joint distention with contrast solution. Nondisplaced Anterior/superior  labral tear. Mild diffuse cartilage surface irregularity of the left hip. No  subchondral marrow edema. MUSCLES AND TENDONS  The myotendinous structures surrounding the hips and proximal thighs appear  intact. BURSA  Normal.     OTHER FINDINGS  Small fat filled left inguinal hernia defect. Enlarged prostate. Slightly trabeculated appearance of the bladder wall, correlate for chronic  bladder outlet obstruction. IMPRESSION:     No fracture or destructive osseous lesions. Nondisplaced anterior/superior labral tear. Early left hip osteoarthritis. Incidental findings as above. Lumbar spine 4V x-rays from 12/1/2020 were personally reviewed with the patient and demonstrated:  L4-5 fusion. Collapse of left side of L3/4 disc and also sclerosis. Compression deformity at L3 and T12. Straightening of the lumbar spine. No instability. Lumbar spine MRI from 04/24/2019 was personally reviewed with the patient and demonstrated:    Narrative EXAMINATION: MRI lumbar spine with and without contrast     INDICATION: Lumbar pain, prior lumbar surgery, left lower extremity sciatica     COMPARISON: CTA 1217     TECHNIQUE: Sagittal and axial multiecho MRI sequences of the lumbar spine  performed before and after 13 cc IV Dotarem. FINDINGS:     Postsurgical: Status post L4-L5 posterior hardware fixation and posterior  decompression. No postoperative fluid collection identified. Mild lower lumbar  paraspinous muscle edema.      General: T12 anterior central mild superior endplate height loss, chronic  appearing. L3 eccentric to the left mild superior endplate height loss, chronic  appearing. Vertebral body heights elsewhere preserved. Disc space loss at  multiple levels, most pronounced at L3-L4 and L4-L5. Grade 2 anterolisthesis of  L4 and L5. Mild multilevel endplate edema, likely related to active disc  degeneration. Straightening of the normal lordosis. Conus ends at level L1. No  abnormal signal or enhancement at the distal cord. No abnormal epidural  collection identified. Miscellaneous: No incidental acute or suspicious findings identified outside of  the lumbar spine region. Levels:     T11-T12: Evaluated sagittal plane only. No significant degenerative change or  stenosis. T12-L1: No significant disc disease or stenosis. Mild facet arthropathy. L1-L2: Mild disc bulge. Moderate facet arthropathy. Moderate spinal canal  stenosis. Mild foraminal stenoses. L2-L3: Minimal disc bulge. Moderate facet arthropathy. Moderate spinal canal  stenosis. Foramina patent. L3-L4: Hardware-related artifact limits evaluation. Mild disc bulge in addition  to a right central condyle disc extrusion extending roughly 1.2 cm condyle,  measuring roughly 0.9 cm transverse, roughly 0.5 cm AP, likely abutting the  crossing right L4 nerve. Severe facet arthropathy with ligamentum flavum bulge. Severe spinal canal stenosis, perhaps partly due to congenital narrowing. Moderate to severe foraminal stenoses. L4-L5: Artifact limits evaluation. Postsurgical level as above. Grade 2  anterolisthesis with uncovering of disc. Facets not well evaluated due to  artifact. Moderate to severe spinal canal stenosis, predominantly in the AP  plane. Moderate bilateral foraminal stenoses, predominantly in the vertical  plane, with possible abutment of the exiting L4 nerves. L5-S1: Posterior decompression level. A left central annular fissure with  underlying mild disc bulge. Mild facet arthropathy. Minimal spinal canal  narrowing. Moderate to severe foraminal stenoses with probable abutment of the  exiting L5 nerves. Imaged sacrum: Unremarkable. Impression IMPRESSION:     At L3-L4, degenerative changes results in severe spinal canal stenosis and  moderate to severe foraminal stenoses. There is also notable right central disc  extrusion at this level likely abutting the crossing right L4 nerve. Hardware-related artifact limits evaluation. Compared to 2017 MRI, spinal canal  stenosis is slightly worse and disc extrusion is new. L4-L5 level postsurgical changes as above, with grade 2 anterolisthesis and  moderate to severe spinal canal as well as moderate foraminal stenoses. Spinal  canal and foraminal stenosis however is less compared to 2017 MRI. L5-S1 moderate to severe foraminal stenoses. L1-L2 and L2-L3 moderate spinal canal stenoses. See level by level details above. Written by Beatrice Mesa, as dictated by Zainab Crenshaw MD.  I, Dr. Zainab Crenshaw confirm that all documentation is accurate.

## 2022-11-09 NOTE — PATIENT INSTRUCTIONS
Low Back Arthritis: Exercises  Introduction  Here are some examples of typical rehabilitation exercises for your condition. Start each exercise slowly. Ease off the exercise if you start to have pain. Your doctor or physical therapist will tell you when you can start these exercises and which ones will work best for you. When you are not being active, find a comfortable position for rest. Some people are comfortable on the floor or a medium-firm bed with a small pillow under their head and another under their knees. Some people prefer to lie on their side with a pillow between their knees. Don't stay in one position for too long. Take short walks (10 to 20 minutes) every 2 to 3 hours. Avoid slopes, hills, and stairs until you feel better. Walk only distances you can manage without pain, especially leg pain. How to do the exercises  Pelvic tilt  Lie on your back with your knees bent. \"Brace\" your stomach--tighten your muscles by pulling in and imagining your belly button moving toward your spine. Press your lower back into the floor. You should feel your hips and pelvis rock back. Hold for 6 seconds while breathing smoothly. Relax and allow your pelvis and hips to rock forward. Repeat 8 to 12 times. Back stretches  Get down on your hands and knees on the floor. Relax your head and allow it to droop. Round your back up toward the ceiling until you feel a nice stretch in your upper, middle, and lower back. Hold this stretch for as long as it feels comfortable, or about 15 to 30 seconds. Return to the starting position with a flat back while you are on your hands and knees. Let your back sway by pressing your stomach toward the floor. Lift your buttocks toward the ceiling. Hold this position for 15 to 30 seconds. Repeat 2 to 4 times. Follow-up care is a key part of your treatment and safety. Be sure to make and go to all appointments, and call your doctor if you are having problems.  It's also a good idea to know your test results and keep a list of the medicines you take. Current as of: March 9, 2022               Content Version: 13.4  © 2006-2022 [employer]. Care instructions adapted under license by [employer] (which disclaims liability or warranty for this information). If you have questions about a medical condition or this instruction, always ask your healthcare professional. [place] [address] any warranty or liability for your use of this information.

## 2022-11-09 NOTE — PROGRESS NOTES
Chief Complaint   Patient presents with    Follow-up     mri       Pt preferred language for health care discussion is english. Is someone accompanying this pt? no    Is the patient using any DME equipment during [address]? no    Depression Screening:  3 most recent [facility] Screens 12/1/2020 3/4/2020 9/4/2019 5/14/2019 4/17/2019 5/2/2018 2/1/2018   Little interest or pleasure in doing things Not at all Not at all Not at all Not at all Not at all Not at all Not at all   Feeling down, depressed, irritable, or hopeless Not at all Not at all Not at all Not at all Not at all Not at all Not at all   Total Score PHQ 2 0 0 0 0 0 0 0       Learning Assessment:  Learning Assessment 2/1/2018   PRIMARY LEARNER Patient   PRIMARY LANGUAGE ENGLISH   LEARNER PREFERENCE PRIMARY PICTURES   ANSWERED BY patient   RELATIONSHIP SELF       Abuse Screening:  Abuse Screening Questionnaire 12/1/2020 9/4/2019   Do you ever feel afraid of your partner? N N   Are you in a relationship with someone who physically or mentally threatens you? N N   Is it safe for you to go home? Y Y       Fall Risk  Fall Risk Assessment, last 12 mths 12/1/2020   Able to walk? Yes   Fall in past 12 months? No   Number of falls in past 12 months -   Fall with injury? -           Advance Directive:  1. Do you have an advance directive in place? Patient Reply:no    2. If not, would you like material regarding how to put one in place? Patient Reply: no    2. Per patient no changes to their ACP contact no. Coordination of Care:  1. Have you been to the ER, urgent care clinic since your last visit? Hospitalized since your last visit? no    2. Have you seen or consulted any other health care providers outside of the [address] [place] [address] since your last visit? Include any pap smears or colon screening.  no

## 2022-12-07 ENCOUNTER — HOSPITAL ENCOUNTER (OUTPATIENT)
Age: 72
Setting detail: OUTPATIENT SURGERY
Discharge: HOME | End: 2022-12-07
Attending: PHYSICAL MEDICINE & REHABILITATION | Admitting: PHYSICAL MEDICINE & REHABILITATION
Payer: COMMERCIAL

## 2022-12-07 ENCOUNTER — APPOINTMENT (OUTPATIENT)
Dept: GENERAL RADIOLOGY | Age: 72
End: 2022-12-07
Attending: PHYSICAL MEDICINE & REHABILITATION
Payer: COMMERCIAL

## 2022-12-07 VITALS
SYSTOLIC BLOOD PRESSURE: 118 MMHG | TEMPERATURE: 98.2 F | HEART RATE: 67 BPM | RESPIRATION RATE: 14 BRPM | OXYGEN SATURATION: 97 % | DIASTOLIC BLOOD PRESSURE: 73 MMHG

## 2022-12-07 DIAGNOSIS — M54.16 RADICULOPATHY, LUMBAR REGION: Primary | ICD-10-CM

## 2022-12-07 PROCEDURE — 2709999900 HC NON-CHARGEABLE SUPPLY: Performed by: PHYSICAL MEDICINE & REHABILITATION

## 2022-12-07 PROCEDURE — 74011250637 HC RX REV CODE- 250/637: Performed by: PHYSICAL MEDICINE & REHABILITATION

## 2022-12-07 PROCEDURE — 64484 NJX AA&/STRD TFRM EPI L/S EA: CPT | Performed by: PHYSICAL MEDICINE & REHABILITATION

## 2022-12-07 PROCEDURE — 76010000009 HC PAIN MGT 0 TO 30 MIN PROC: Performed by: PHYSICAL MEDICINE & REHABILITATION

## 2022-12-07 PROCEDURE — 74011250636 HC RX REV CODE- 250/636: Performed by: PHYSICAL MEDICINE & REHABILITATION

## 2022-12-07 PROCEDURE — 74011000250 HC RX REV CODE- 250: Performed by: PHYSICAL MEDICINE & REHABILITATION

## 2022-12-07 PROCEDURE — 77030003676 HC NDL SPN MPRI -A: Performed by: PHYSICAL MEDICINE & REHABILITATION

## 2022-12-07 PROCEDURE — 77030039433 HC TY MYLEOGRAM BD -B: Performed by: PHYSICAL MEDICINE & REHABILITATION

## 2022-12-07 PROCEDURE — 74011000636 HC RX REV CODE- 636: Performed by: PHYSICAL MEDICINE & REHABILITATION

## 2022-12-07 PROCEDURE — 77030003669 HC NDL SPN COOK -B: Performed by: PHYSICAL MEDICINE & REHABILITATION

## 2022-12-07 PROCEDURE — 64483 NJX AA&/STRD TFRM EPI L/S 1: CPT | Performed by: PHYSICAL MEDICINE & REHABILITATION

## 2022-12-07 RX ORDER — DEXAMETHASONE SODIUM PHOSPHATE 100 MG/10ML
INJECTION INTRAMUSCULAR; INTRAVENOUS AS NEEDED
Status: DISCONTINUED | OUTPATIENT
Start: 2022-12-07 | End: 2022-12-07 | Stop reason: HOSPADM

## 2022-12-07 RX ORDER — LIDOCAINE HYDROCHLORIDE 10 MG/ML
INJECTION, SOLUTION EPIDURAL; INFILTRATION; INTRACAUDAL; PERINEURAL AS NEEDED
Status: DISCONTINUED | OUTPATIENT
Start: 2022-12-07 | End: 2022-12-07 | Stop reason: HOSPADM

## 2022-12-07 RX ORDER — DIAZEPAM 5 MG/1
5-20 TABLET ORAL ONCE
Status: COMPLETED | OUTPATIENT
Start: 2022-12-07 | End: 2022-12-07

## 2022-12-07 RX ADMIN — DIAZEPAM 10 MG: 5 TABLET ORAL at 13:13

## 2022-12-07 NOTE — H&P
Date of Surgery Update:  [place] was seen and examined. History and physical has been reviewed. The patient has been examined. There have been no significant clinical changes since the last office visit. The patient was counseled at length about the risks of darrel Covid-19 during their perioperative period and any recovery window from their procedure. The patient was made aware that darrel Covid-19  may worsen their prognosis for recovering from their procedure and lend to a higher morbidity and/or mortality risk. All material risks, benefits, and reasonable alternatives including postponing the procedure were discussed. The patient does  wish to proceed with the procedure at this time.     Pre Injection pain level:                   3/10  Post Injection pain level:                     2/10        Signed By: Magali Mccabe MD     December 7, 2022 1:02 PM

## 2022-12-07 NOTE — PROCEDURES
PROCEDURE NOTE      Patient Name: Mylene Quevedo    Date of Procedure: December 7, 2022    Preoperative Diagnosis:  Lumbar radiculopathy [M54.16]    Post Operative Diagnosis:  Lumbar radiculopathy [M54.16]    Procedure :    left L5 Selective Nerve Root Block  left S1 Selective Nerve Root Block      Consent:  Informed consent was obtained prior to the procedure. The patient was given the opportunity to ask questions regarding the procedure and its associated risks. In addition to the potential risks associated with the procedure itself, the patient was informed both verbally and in writing of the potential side effects of the use of glucocorticoid. The patient appeared to comprehend the informed consent and desired to have the procedure performed. Procedure: The patient was placed in the prone position on the fluoroscopy table and the back was prepped and draped in the usual sterile manner. The exact spinal level was  identified using fluoroscopy, and Lidocaine 1 % was injected locally, a # 22 gauge spinal needle was passed to the transverse process. The depth was noted and the needle redirected to pass inferior and approximately one cm anterior to the transverse process. YES  1 cc of Isovue M-200 was used to verify positioning in the epidural and paravertebral space and outlined the course of the spinal nerve into the epidural space. The same procedure was repeated at each spinal level indicated above. A total of 10 mg of preservative free Dexamethasone and 5 cc of Lidocaine was slowly injected. The patient tolerated the procedure well. The injection area was cleaned and bandaids applied. Not excessive bleeding was noted. Patient dressed and discharged to home with instructions. Discussion: The patient tolerated the procedure well.                                               Vivek Quan MD  December 7, 2022

## 2022-12-07 NOTE — PERIOP NOTES
Patient verbalized understanding of discharge instructions and verbally consented to [facility] Patient Consent. Signature pad not working.

## 2023-01-11 ENCOUNTER — OFFICE VISIT (OUTPATIENT)
Dept: ORTHOPEDIC SURGERY | Age: 73
End: 2023-01-11
Payer: MEDICARE

## 2023-01-11 VITALS
HEART RATE: 60 BPM | OXYGEN SATURATION: 99 % | BODY MASS INDEX: 21.19 KG/M2 | TEMPERATURE: 97.1 F | WEIGHT: 148 LBS | HEIGHT: 70 IN

## 2023-01-11 DIAGNOSIS — M62.838 MUSCLE SPASM: ICD-10-CM

## 2023-01-11 DIAGNOSIS — M47.816 LUMBAR FACET ARTHROPATHY: ICD-10-CM

## 2023-01-11 DIAGNOSIS — M48.061 SPINAL STENOSIS OF LUMBAR REGION WITHOUT NEUROGENIC CLAUDICATION: Primary | ICD-10-CM

## 2023-01-11 DIAGNOSIS — M54.16 LEFT LUMBAR RADICULOPATHY: ICD-10-CM

## 2023-01-11 DIAGNOSIS — M43.16 SPONDYLOLISTHESIS OF LUMBAR REGION: ICD-10-CM

## 2023-01-11 PROCEDURE — G8432 DEP SCR NOT DOC, RNG: HCPCS | Performed by: PHYSICAL MEDICINE & REHABILITATION

## 2023-01-11 PROCEDURE — G8536 NO DOC ELDER MAL SCRN: HCPCS | Performed by: PHYSICAL MEDICINE & REHABILITATION

## 2023-01-11 PROCEDURE — 1123F ACP DISCUSS/DSCN MKR DOCD: CPT | Performed by: PHYSICAL MEDICINE & REHABILITATION

## 2023-01-11 PROCEDURE — 3017F COLORECTAL CA SCREEN DOC REV: CPT | Performed by: PHYSICAL MEDICINE & REHABILITATION

## 2023-01-11 PROCEDURE — 1101F PT FALLS ASSESS-DOCD LE1/YR: CPT | Performed by: PHYSICAL MEDICINE & REHABILITATION

## 2023-01-11 PROCEDURE — 99213 OFFICE O/P EST LOW 20 MIN: CPT | Performed by: PHYSICAL MEDICINE & REHABILITATION

## 2023-01-11 PROCEDURE — G8420 CALC BMI NORM PARAMETERS: HCPCS | Performed by: PHYSICAL MEDICINE & REHABILITATION

## 2023-01-11 PROCEDURE — G8427 DOCREV CUR MEDS BY ELIG CLIN: HCPCS | Performed by: PHYSICAL MEDICINE & REHABILITATION

## 2023-01-11 RX ORDER — TOPIRAMATE 25 MG/1
75 TABLET ORAL
Qty: 270 TABLET | Refills: 1 | Status: CANCELLED | OUTPATIENT
Start: 2023-01-11

## 2023-01-11 NOTE — LETTER
1/12/2023    Patient: Juan Jose Boyd   YOB: 1950   Date of Visit: 1/11/2023     Queen Stephen MD  [address] [facility] [place] [address]  Via Fax: [phone]    Dear Queen Stephen MD,      Thank you for referring Mr. Corbin Humphreys to [facility] for evaluation. My notes for this consultation are attached. If you have questions, please do not hesitate to call me. I look forward to following your patient along with you.       Sincerely,    Sherri Goel MD

## 2023-01-11 NOTE — PROGRESS NOTES
[facility]  [address] [place], [facility] [address]  Phone: [phone]  Fax: [phone]    Pt's YOB: 1950    ASSESSMENT   Diagnoses and all orders for this visit:    1. Spinal stenosis of lumbar region without neurogenic claudication    2. Lumbar facet arthropathy    3. Left lumbar radiculopathy    4. Spondylolisthesis of lumbar region    5. Muscle spasm       IMPRESSION AND PLAN:  Mariam Diallo is a 81 y.o. male with history of cervical, lumbar, and left hip pain and presents to the office today for injection and medication follow up. Pt continues to complain of lumbar pain with chronic numbness in the lower extremities and intermittent aching in the bilateral legs. He is taking Topamax 25 mg 3 tabs QHS, Flexeril 10 mg as needed, and Xarelto prophylatically against deep vein thrombosis when he travels and uses Voltaren gel as needed. 1) Pt was given information on lumbar arthritis exercises. 2) He will continue taking Flexeril 10 mg 1 tab as needed, Topamax 25 mg 3 tabs QHS, and using Voltaren 1% gel. Refills were not required at today's office visit. 3) Mr. Gemini Page has a reminder for a \"due or due soon\" health maintenance. I have asked that he contact his primary care provider, William Joseph MD, for follow-up on this health maintenance. 4)  demonstrated consistency with prescribing. Follow-up and Dispositions    Return in about 5 months (around 6/11/2023) for Medication follow up. HISTORY OF PRESENT ILLNESS:  Mariam Diallo is a 81 y.o. male with history of cervical, lumbar, and left hip pain and presents to the office today for injection and medication follow up. Pt continues to complain of lumbar pain with chronic numbness in the lower extremities and intermittent aching in the bilateral legs. He reports undergoing a left L5 and left S1 SNRB on 12/07/2022 which provided significant benefit.  Pt denies episodic pain or aching since the injection and admit to lessened numbness intermittently. He continues to be followed by Dr. Los Alford secondary to right knee pain. Of note, pt is recently retired but continues to work a small amount in a [employer] which he enjoys. Pt confirms a HEP of walking and aerobic exercises. He is taking Topamax 25 mg 3 tabs QHS, Flexeril 10 mg as needed, and Xarelto prophylatically against deep vein thrombosis when he travels and uses Voltaren gel as needed. Pt at this time desires to continue with current care. Pain Scale: 0 - No pain/10    PCP: Marshall Rendon MD     Past Medical History:   Diagnosis Date    Back pain     Chronic pain     Headache(784.0)     HTN (hypertension)     Joint pain     hx of joint pain    Pulmonary emboli [facility])         Social History     Socioeconomic History    Marital status:      Spouse name: Not on file    Number of children: Not on file    Years of education: Not on file    Highest education level: Not on file   Occupational History    Not on file   Tobacco Use    Smoking status: Former     Packs/day: 0.75     Years: 40.00     Pack years: 30.00     Types: Cigarettes     Quit date: 10/17/2012     Years since quitting: 10.2    Smokeless tobacco: Former   Substance and Sexual Activity    Alcohol use:  Yes     Alcohol/week: 2.0 standard drinks     Types: 2 Cans of beer per week     Comment: daily-beer and wine/ 2 drinks a day    Drug use: No    Sexual activity: Yes   Other Topics Concern    Not on file   Social History Narrative    Not on file     Social Determinants of Health     Financial Resource Strain: Not on file   Food Insecurity: Not on file   Transportation Needs: Not on file   Physical Activity: Not on file   Stress: Not on file   Social Connections: Not on file   Intimate Partner Violence: Not on file   Housing Stability: Not on file       Current Outpatient Medications   Medication Sig Dispense Refill    topiramate (TOPAMAX) 25 mg tablet Take 3 Tablets by mouth nightly. 270 Tablet 1    cyclobenzaprine (FLEXERIL) 10 mg tablet Take 1 Tablet by mouth nightly. As needed 30 Tablet 1    diclofenac (Voltaren) 1 % gel Apply 4 grams to left hip up to 4 times per day, maximum 16 grams per joint per day. Dispense 100 gram tubes 100 g 3    Xarelto 10 mg tablet Take 1 Tablet by mouth daily as needed (2 days prior to traveling, 4 days after traveling. ). 30 Tablet 3    cholecalciferol, vitamin D3, 2,000 unit tab Take 2,000 Units by mouth daily. clobetasol (TEMOVATE) 0.05 % topical cream as needed. desonide (TRIDESILON) 0.05 % topical lotion Use 1 Application to affected area Twice Daily. lisinopril-hydroCHLOROthiazide (PRINZIDE, ZESTORETIC) 20-12.5 mg per tablet Take 1 Tablet by mouth daily. zolpidem (AMBIEN) 5 mg tablet Take 5 mg by mouth nightly as needed. sulindac (CLINORIL) 200 mg tablet       methylPREDNISolone (MEDROL DOSEPACK) 4 mg tablet Per dose pack instructions (Patient not taking: No sig reported) 1 Dose Pack 1    betamethasone valerate (VALISONE) 0.1 % topical cream Apply  to affected area two (2) times a day. Apply cream to affected area with each urination. (Patient not taking: No sig reported) 15 g 1       Allergies   Allergen Reactions    Bee Sting  [Sting, Bee] Swelling     DIZZINESS         REVIEW OF SYSTEMS    Constitutional: Negative for fever, chills, or weight change. Respiratory: Negative for cough or shortness of breath. Cardiovascular: Negative for chest pain or palpitations. Gastrointestinal: Negative for acid reflux, change in bowel habits, or constipation. Genitourinary: Negative for dysuria and flank pain. Musculoskeletal: Positive for lumbar pain and left lower extremity aching. Skin: Negative for rash. Neurological: Negative for headaches or dizziness. Positive for bilateral lower extremity numbness (L>R). Endo/Heme/Allergies: Negative for increased bruising.    Psychiatric/Behavioral: Negative for difficulty with sleep. As per HPI    PHYSICAL EXAMINATION  Visit Vitals  Pulse 60   Temp 97.1 °F (36.2 °C) (Temporal)   Ht 5' 10\" (1.778 m)   Wt 148 lb (67.1 kg)   SpO2 99%   BMI 21.24 kg/m²       Constitutional: Awake, alert, and in no acute distress. Neurological: 1+ symmetrical DTRs in the upper extremities. 1+ symmetrical DTRs in the lower extremities. Sensation to light touch is intact. Negative Saul's sign bilaterally. Skin: warm, dry, and intact. Musculoskeletal: No pain with extension, axial loading, or forward flexion. No pain with internal or external rotation of his hips. Negative straight leg raise bilaterally. Biceps  Triceps Deltoids Wrist Ext Wrist Flex Hand Intrin   Right +4/5 +4/5 +4/5 +4/5 +4/5 +4/5   Left +4/5 +4/5 +4/5 +4/5 +4/5 +4/5      Hip Flex  Quads Hamstrings Ankle DF EHL Ankle PF   Right +4/5 +4/5 +4/5 +4/5 +4/5 +4/5   Left +4/5 +4/5 +4/5 +4/5 +4/5 +4/5     IMAGING:    Lumbar spine MRI from 10/02/2022 was personally reviewed with the patient and demonstrated:      FINDINGS:     Postop and alignment: L4-S1 laminectomy. L4-5 posterolateral fusion with the  hardware susceptibility artifacts and field inhomogeneity mildly limiting the  evaluation. Grade 1 anterolisthesis L4 on L5 similar to prior. Vertebral body height and bone marrow: Chronic T12 superior endplate compression  fracture deformity with mild to moderate vertebral height loss and anterior  wedging. Chronic L3 superior endplate mild to moderate compression fracture  deformity. Intervertebral increased bone marrow edema throughout the L3  vertebral body without new fracture line identified. Bone marrow edema signal  superior L4 vertebral body. Remainder of the vertebral body heights are  maintained. No associated prevertebral or epidural soft tissue inflammation. No  suspicious bone marrow lesion.      Conus: Conus terminates at the L1 level with normal signal.     Correlation of sagittal and axial images demonstrates the following:     T11-T12: Sagittal images only. T12 superior endplate compression fracture  without significant disc height loss. Patent canal and foramina     T12-L1: No significant disc pathology. Patent canal and foramina. L1-L2: Moderate disc height loss. Mild facet hypertrophy and ligamentum flavum  thickening. Mild spinal stenosis and foraminal stenosis. L2-L3: L3 superior endplate chronic compression fracture deformity and preserved  disc height with mild bulge. Mild facet hypertrophy and ligamentum flavum  thickening. Mild spinal stenosis. Patent foramina. L3-L4: Severe disc height loss. Circumferential disc bulge with osteophytic  ridging. Moderate facet hypertrophy and ligamentum flavum thickening. Moderate  spinal stenosis. Severe foraminal stenosis with compression of the foraminal L3  nerve roots     L4-L5: Laminectomy and fusion. Grade 1 anterolisthesis. Severe disc height loss. Moderate spinal stenosis. Moderate to severe foraminal stenosis with likely  compression of the foraminal L4 foraminal nerve roots. L5-S1: Laminectomy. Preserved disc height. Postsurgical changes left central  disc and minimally increased recurrent left central protrusion with minimal mass  effect on the ventral thecal sac. Also postsurgical fibrosis surrounding the  left L5 traversing nerve root in the lateral recess with distortion of the nerve  root (series 10 image 6). Mild facet hypertrophy. Patent spinal canal otherwise. Moderate to severe foraminal stenosis and likely compression of the foraminal  nerve roots. Other soft tissue: Left kidney 7 mm T2 hyperintense lesion mid to lower pole not  included in the postcontrast images     Impression  1. Prior posterior decompression at L4-S1 with posterolateral fusion L4-5.   Unchanged alignment with grade 1 anterolisthesis L4-5.  -Similar moderate to severe spinal canal stenosis L3-4.  -Multilevel mild or moderate spinal canal stenosis otherwise as discussed. -Multilevel high-grade foraminal stenosis L3-4, L4-5 and L5-S1 with likely  compression of the corresponding foraminal nerve roots.  -Postsurgical changes left central L5-S1 disc and minimally increased recurrent  left central protrusion with minimal mass effect on the ventral thecal sac. Postsurgical fibrosis surrounding the left L5 traversing nerve root in the  lateral recess with distortion of the L5 nerve root. 2. Chronic wedge compression fracture deformities of T12 and L3 vertebral bodies  with mild to moderate the vertebral height loss. 3. Interval development of bone marrow edema in L3 vertebral body and superior  L4 vertebral body without associated soft tissue inflammation or new fracture. Severe disc height loss at L3-4 suggesting bone marrow changes are likely  reactive but can be source of pain. 4. Subcentimeter left kidney T2 hyperintense lesion, likely cyst but can be  evaluated with ultrasound. Lumbar spine 4V x-rays from 09/21/2022 were personally reviewed with the patient and demonstrated:   Fusion at L4/5. Severe collapse on left side at L3/4 disc. Hardware is intact. Straightening of the lumbar spine. Multilevel degenerative facet. Bones appear thin. No instability. Left hip MRI arthrogram from 12/22/2020 was personally reviewed with the patient and demonstrated:   FINDINGS  OSSEOUS STRUCTURES / JOINTS     Adequate joint distention with contrast solution. Nondisplaced Anterior/superior  labral tear. Mild diffuse cartilage surface irregularity of the left hip. No  subchondral marrow edema. MUSCLES AND TENDONS  The myotendinous structures surrounding the hips and proximal thighs appear  intact. BURSA  Normal.     OTHER FINDINGS  Small fat filled left inguinal hernia defect. Enlarged prostate. Slightly trabeculated appearance of the bladder wall, correlate for chronic  bladder outlet obstruction.      IMPRESSION:     No fracture or destructive osseous lesions. Nondisplaced anterior/superior labral tear. Early left hip osteoarthritis. Incidental findings as above. Lumbar spine 4V x-rays from 12/1/2020 were personally reviewed with the patient and demonstrated:  L4-5 fusion. Collapse of left side of L3/4 disc and also sclerosis. Compression deformity at L3 and T12. Straightening of the lumbar spine. No instability. Lumbar spine MRI from 04/24/2019 was personally reviewed with the patient and demonstrated:  FINDINGS:     Postsurgical: Status post L4-L5 posterior hardware fixation and posterior  decompression. No postoperative fluid collection identified. Mild lower lumbar  paraspinous muscle edema. General: T12 anterior central mild superior endplate height loss, chronic  appearing. L3 eccentric to the left mild superior endplate height loss, chronic  appearing. Vertebral body heights elsewhere preserved. Disc space loss at  multiple levels, most pronounced at L3-L4 and L4-L5. Grade 2 anterolisthesis of  L4 and L5. Mild multilevel endplate edema, likely related to active disc  degeneration. Straightening of the normal lordosis. Conus ends at level L1. No  abnormal signal or enhancement at the distal cord. No abnormal epidural  collection identified. Miscellaneous: No incidental acute or suspicious findings identified outside of  the lumbar spine region. Levels:     T11-T12: Evaluated sagittal plane only. No significant degenerative change or  stenosis. T12-L1: No significant disc disease or stenosis. Mild facet arthropathy. L1-L2: Mild disc bulge. Moderate facet arthropathy. Moderate spinal canal  stenosis. Mild foraminal stenoses. L2-L3: Minimal disc bulge. Moderate facet arthropathy. Moderate spinal canal  stenosis. Foramina patent. L3-L4: Hardware-related artifact limits evaluation.  Mild disc bulge in addition  to a right central condyle disc extrusion extending roughly 1.2 cm condyle,  measuring roughly 0.9 cm transverse, roughly 0.5 cm AP, likely abutting the  crossing right L4 nerve. Severe facet arthropathy with ligamentum flavum bulge. Severe spinal canal stenosis, perhaps partly due to congenital narrowing. Moderate to severe foraminal stenoses. L4-L5: Artifact limits evaluation. Postsurgical level as above. Grade 2  anterolisthesis with uncovering of disc. Facets not well evaluated due to  artifact. Moderate to severe spinal canal stenosis, predominantly in the AP  plane. Moderate bilateral foraminal stenoses, predominantly in the vertical  plane, with possible abutment of the exiting L4 nerves. L5-S1: Posterior decompression level. A left central annular fissure with  underlying mild disc bulge. Mild facet arthropathy. Minimal spinal canal  narrowing. Moderate to severe foraminal stenoses with probable abutment of the  exiting L5 nerves. Imaged sacrum: Unremarkable. IMPRESSION:     At L3-L4, degenerative changes results in severe spinal canal stenosis and  moderate to severe foraminal stenoses. There is also notable right central disc  extrusion at this level likely abutting the crossing right L4 nerve. Hardware-related artifact limits evaluation. Compared to 2017 MRI, spinal canal  stenosis is slightly worse and disc extrusion is new. L4-L5 level postsurgical changes as above, with grade 2 anterolisthesis and  moderate to severe spinal canal as well as moderate foraminal stenoses. Spinal  canal and foraminal stenosis however is less compared to 2017 MRI. L5-S1 moderate to severe foraminal stenoses. L1-L2 and L2-L3 moderate spinal canal stenoses. See level by level details above. Written by Harpreet Saleem, as dictated by Minh Cobb MD.  I, Dr. Minh Cobb confirm that all documentation is accurate.

## 2023-01-11 NOTE — PROGRESS NOTES
Paco Danielson presents today for   Chief Complaint   Patient presents with    Back Pain       Is someone accompanying this pt? no    Is the patient using any DME equipment during OV? no    Depression Screening:  3 most recent PHQ Screens 12/1/2020   Little interest or pleasure in doing things Not at all   Feeling down, depressed, irritable, or hopeless Not at all   Total Score PHQ 2 0       Learning Assessment:  Learning Assessment 2/1/2018   PRIMARY LEARNER Patient   PRIMARY LANGUAGE ENGLISH   LEARNER PREFERENCE PRIMARY PICTURES   ANSWERED BY patient   RELATIONSHIP SELF       Abuse Screening:  Abuse Screening Questionnaire 12/1/2020   Do you ever feel afraid of your partner? N   Are you in a relationship with someone who physically or mentally threatens you? N   Is it safe for you to go home? Y       Fall Risk  Fall Risk Assessment, last 12 mths 12/1/2020   Able to walk? Yes   Fall in past 12 months? No   Number of falls in past 12 months -   Fall with injury? -       OPIOID RISK TOOL  No flowsheet data found. Coordination of Care:  1. Have you been to the ER, urgent care clinic since your last visit? no  Hospitalized since your last visit? no    2. Have you seen or consulted any other health care providers outside of the [address] [place] [address] since your last visit? no Include any pap smears or colon screening.  no

## 2023-01-11 NOTE — PATIENT INSTRUCTIONS
Low Back Arthritis: Exercises  Introduction  Here are some examples of typical rehabilitation exercises for your condition. Start each exercise slowly. Ease off the exercise if you start to have pain. Your doctor or physical therapist will tell you when you can start these exercises and which ones will work best for you. When you are not being active, find a comfortable position for rest. Some people are comfortable on the floor or a medium-firm bed with a small pillow under their head and another under their knees. Some people prefer to lie on their side with a pillow between their knees. Don't stay in one position for too long. Take short walks (10 to 20 minutes) every 2 to 3 hours. Avoid slopes, hills, and stairs until you feel better. Walk only distances you can manage without pain, especially leg pain. How to do the exercises  Pelvic tilt  Lie on your back with your knees bent. \"Brace\" your stomach--tighten your muscles by pulling in and imagining your belly button moving toward your spine. Press your lower back into the floor. You should feel your hips and pelvis rock back. Hold for 6 seconds while breathing smoothly. Relax and allow your pelvis and hips to rock forward. Repeat 8 to 12 times. Back stretches  Get down on your hands and knees on the floor. Relax your head and allow it to droop. Round your back up toward the ceiling until you feel a nice stretch in your upper, middle, and lower back. Hold this stretch for as long as it feels comfortable, or about 15 to 30 seconds. Return to the starting position with a flat back while you are on your hands and knees. Let your back sway by pressing your stomach toward the floor. Lift your buttocks toward the ceiling. Hold this position for 15 to 30 seconds. Repeat 2 to 4 times. Follow-up care is a key part of your treatment and safety. Be sure to make and go to all appointments, and call your doctor if you are having problems.  It's also a good idea to know your test results and keep a list of the medicines you take. Current as of: March 9, 2022               Content Version: 13.4  © 2006-2022 [employer]. Care instructions adapted under license by "[employer] (which disclaims liability or warranty for this information). If you have questions about a medical condition or this instruction, always ask your healthcare professional. [place] [address] any warranty or liability for your use of this information.

## 2023-05-11 RX ORDER — TOPIRAMATE 25 MG/1
TABLET ORAL
Qty: 270 TABLET | Refills: 0 | Status: SHIPPED | OUTPATIENT
Start: 2023-05-11

## 2023-05-11 NOTE — TELEPHONE ENCOUNTER
Requested Prescriptions     Pending Prescriptions Disp Refills    topiramate (TOPAMAX) 25 MG tablet [Pharmacy Med Name: TOPIRAMATE 25 MG TABLET] 270 tablet      Sig: TAKE THREE TABLETS BY MOUTH EVERY NIGHT        Patient was last seen on 1/11/23. Patient's choice of pharmacy [facility], [place]. The refill request's last refill info   topiramate (TOPAMAX) 25 mg tablet 270 Tablet 1 9/21/2022     Sig - Route: Take 3 Tablets by mouth nightly.  - Oral    Sent to pharmacy as: topiramate 25 mg tablet (TOPAMAX)    E-Prescribing Status: Receipt confirmed by pharmacy (9/21/2022  8:28 AM EDT)

## 2023-05-22 NOTE — PROGRESS NOTES
Galindo Hooker  1950   Chief Complaint   Patient presents with    Shoulder Pain     Lt         HISTORY OF PRESENT ILLNESS  Galindo Hooker is a 72 y.o. male who presents today for evaluation of left shoulder pain. Pain is a 4/10. Pain has been present for 3 months. Cannot recall specific injury. Pain increases with movement. Endorses limited ROM secondary to pain. Can experience night pain with laying on his left side. Of note, he was seen by myself for his right shoulder in May 2022 and received a cortisone injection at that time. Patient denies any fever, chills, chest pain, shortness of breath or calf pain. The remainder of the review of systems is negative. There are no new illness or injuries to report since last seen in the office. No changes in medications, allergies, social or family history. PHYSICAL EXAM:   Ht 5' 10\" (1.778 m)   Wt 150 lb (68 kg)   BMI 21.52 kg/m²   The patient is a well-developed, well-nourished male   in no acute distress. The patient is alert and oriented times three. The patient is alert and oriented times three. Mood and affect are normal.  LYMPHATIC: lymph nodes are not enlarged and are within normal limits  SKIN: normal in color and non tender to palpation. There are no bruises or abrasions noted. NEUROLOGICAL: Motor sensory exam is within normal limits. Reflexes are equal bilaterally.  There is normal sensation to pinprick and light touch  MUSCULOSKELETAL:  Examination Left shoulder   Skin Intact   AC joint tenderness -   Biceps tenderness -   Forward flexion/Elevation    Active abduction    Glenohumeral abduction 90   External rotation ROM 45   Internal rotation ROM 30   Apprehension -   Nighats Relocation -   Jerk -   Load and Shift -   Obriens -   Speeds -   Impingement sign +   Supraspinatus/Empty Can +   External Rotation Strength -, 5/5   Lift Off/Belly Press -, 5/5   Neurovascular Intact        PROCEDURE: left shoulder Injection with Ultrasound

## 2023-05-23 ENCOUNTER — OFFICE VISIT (OUTPATIENT)
Age: 73
End: 2023-05-23

## 2023-05-23 VITALS — WEIGHT: 150 LBS | HEIGHT: 70 IN | BODY MASS INDEX: 21.47 KG/M2

## 2023-05-23 DIAGNOSIS — M12.812 ROTATOR CUFF ARTHROPATHY OF LEFT SHOULDER: Primary | ICD-10-CM

## 2023-05-23 DIAGNOSIS — M25.512 LEFT SHOULDER PAIN, UNSPECIFIED CHRONICITY: ICD-10-CM

## 2023-05-23 RX ORDER — TRIAMCINOLONE ACETONIDE 40 MG/ML
40 INJECTION, SUSPENSION INTRA-ARTICULAR; INTRAMUSCULAR ONCE
Status: COMPLETED | OUTPATIENT
Start: 2023-05-23 | End: 2023-05-23

## 2023-05-23 RX ADMIN — TRIAMCINOLONE ACETONIDE 40 MG: 40 INJECTION, SUSPENSION INTRA-ARTICULAR; INTRAMUSCULAR at 08:43

## 2023-06-22 ENCOUNTER — HOSPITAL ENCOUNTER (OUTPATIENT)
Facility: HOSPITAL | Age: 73
Setting detail: RECURRING SERIES
Discharge: HOME | End: 2023-06-25
Payer: MEDICARE

## 2023-06-22 PROCEDURE — 97110 THERAPEUTIC EXERCISES: CPT

## 2023-06-22 PROCEDURE — 97162 PT EVAL MOD COMPLEX 30 MIN: CPT

## 2023-06-22 PROCEDURE — 97535 SELF CARE MNGMENT TRAINING: CPT

## 2023-06-26 ENCOUNTER — HOSPITAL ENCOUNTER (OUTPATIENT)
Facility: HOSPITAL | Age: 73
Setting detail: RECURRING SERIES
Discharge: HOME | End: 2023-06-29
Payer: MEDICARE

## 2023-06-26 PROCEDURE — 97110 THERAPEUTIC EXERCISES: CPT

## 2023-06-26 PROCEDURE — 97112 NEUROMUSCULAR REEDUCATION: CPT

## 2023-06-26 PROCEDURE — 97140 MANUAL THERAPY 1/> REGIONS: CPT

## 2023-06-28 ENCOUNTER — HOSPITAL ENCOUNTER (OUTPATIENT)
Facility: HOSPITAL | Age: 73
Setting detail: RECURRING SERIES
Discharge: HOME | End: 2023-07-01
Payer: MEDICARE

## 2023-06-28 PROCEDURE — 97112 NEUROMUSCULAR REEDUCATION: CPT

## 2023-06-28 PROCEDURE — 97140 MANUAL THERAPY 1/> REGIONS: CPT

## 2023-06-28 PROCEDURE — 97110 THERAPEUTIC EXERCISES: CPT

## 2023-07-06 ENCOUNTER — HOSPITAL ENCOUNTER (OUTPATIENT)
Facility: HOSPITAL | Age: 73
Setting detail: RECURRING SERIES
Discharge: HOME | End: 2023-07-09
Payer: MEDICARE

## 2023-07-06 PROCEDURE — 97140 MANUAL THERAPY 1/> REGIONS: CPT

## 2023-07-06 PROCEDURE — 97112 NEUROMUSCULAR REEDUCATION: CPT

## 2023-07-06 PROCEDURE — 97110 THERAPEUTIC EXERCISES: CPT

## 2023-07-10 ENCOUNTER — APPOINTMENT (OUTPATIENT)
Facility: HOSPITAL | Age: 73
End: 2023-07-10
Payer: MEDICARE

## 2023-07-11 ENCOUNTER — HOSPITAL ENCOUNTER (OUTPATIENT)
Facility: HOSPITAL | Age: 73
Discharge: HOME | End: 2023-07-14
Payer: MEDICARE

## 2023-07-11 VITALS
OXYGEN SATURATION: 96 % | SYSTOLIC BLOOD PRESSURE: 125 MMHG | RESPIRATION RATE: 16 BRPM | DIASTOLIC BLOOD PRESSURE: 75 MMHG | TEMPERATURE: 97.9 F | HEART RATE: 63 BPM

## 2023-07-11 PROCEDURE — 6360000002 HC RX W HCPCS: Performed by: PHYSICAL MEDICINE & REHABILITATION

## 2023-07-11 PROCEDURE — 2500000003 HC RX 250 WO HCPCS: Performed by: PHYSICAL MEDICINE & REHABILITATION

## 2023-07-11 PROCEDURE — 64483 NJX AA&/STRD TFRM EPI L/S 1: CPT | Performed by: PHYSICAL MEDICINE & REHABILITATION

## 2023-07-11 PROCEDURE — 64484 NJX AA&/STRD TFRM EPI L/S EA: CPT | Performed by: PHYSICAL MEDICINE & REHABILITATION

## 2023-07-11 PROCEDURE — 64483 NJX AA&/STRD TFRM EPI L/S 1: CPT

## 2023-07-11 PROCEDURE — 6360000004 HC RX CONTRAST MEDICATION: Performed by: PHYSICAL MEDICINE & REHABILITATION

## 2023-07-11 PROCEDURE — 64484 NJX AA&/STRD TFRM EPI L/S EA: CPT

## 2023-07-11 PROCEDURE — 6370000000 HC RX 637 (ALT 250 FOR IP): Performed by: PHYSICAL MEDICINE & REHABILITATION

## 2023-07-11 RX ORDER — DIAZEPAM 5 MG/1
2.5 TABLET ORAL ONCE
Status: COMPLETED | OUTPATIENT
Start: 2023-07-11 | End: 2023-07-11

## 2023-07-11 RX ORDER — LIDOCAINE HYDROCHLORIDE 10 MG/ML
30 INJECTION, SOLUTION EPIDURAL; INFILTRATION; INTRACAUDAL; PERINEURAL ONCE
Status: COMPLETED | OUTPATIENT
Start: 2023-07-11 | End: 2023-07-11

## 2023-07-11 RX ORDER — DIAZEPAM 5 MG/1
5 TABLET ORAL ONCE
Status: COMPLETED | OUTPATIENT
Start: 2023-07-11 | End: 2023-07-11

## 2023-07-11 RX ORDER — DIAZEPAM 5 MG/1
10 TABLET ORAL ONCE
Status: COMPLETED | OUTPATIENT
Start: 2023-07-11 | End: 2023-07-11

## 2023-07-11 RX ORDER — DEXAMETHASONE SODIUM PHOSPHATE 10 MG/ML
10 INJECTION, SOLUTION INTRAMUSCULAR; INTRAVENOUS ONCE
Status: COMPLETED | OUTPATIENT
Start: 2023-07-11 | End: 2023-07-11

## 2023-07-11 RX ADMIN — DIAZEPAM 10 MG: 5 TABLET ORAL at 09:02

## 2023-07-11 RX ADMIN — DEXAMETHASONE SODIUM PHOSPHATE 10 MG: 10 INJECTION, SOLUTION INTRAMUSCULAR; INTRAVENOUS at 09:33

## 2023-07-11 RX ADMIN — LIDOCAINE HYDROCHLORIDE 15 ML: 10 INJECTION, SOLUTION EPIDURAL; INFILTRATION; INTRACAUDAL; PERINEURAL at 09:27

## 2023-07-11 RX ADMIN — IOPAMIDOL 1 ML: 408 INJECTION, SOLUTION INTRATHECAL at 09:33

## 2023-07-11 ASSESSMENT — PAIN SCALES - GENERAL: PAINLEVEL_OUTOF10: 2

## 2023-07-11 ASSESSMENT — PAIN - FUNCTIONAL ASSESSMENT: PAIN_FUNCTIONAL_ASSESSMENT: 0-10

## 2023-07-11 NOTE — INTERVAL H&P NOTE
Update History & Physical    The patient's History and Physical of June 12, 2023 was reviewed. There was no change. The surgical site was confirmed by the patient and me. Plan: The risks, benefits, expected outcome, and alternative to the recommended procedure have been discussed with the patient. Patient understands and wants to proceed with the procedure.      Electronically signed by Erika Patel MD on 7/11/2023 at 9:17 AM

## 2023-07-11 NOTE — PROCEDURES
SELECTIVE NERVE ROOT BLOCK PROCEDURE NOTE      Patient Name: Maday Cottrell  Date of Procedure: July 11, 2023  Preoperative Diagnosis:  Lumbar Radicular Pain, Post-laminectomy Syndrome  Post Operative Diagnosis: Same  Location:  [address] [facility]    Procedure :    left L5 Selective Nerve Root Block  left S1 Selective Nerve Root Block    Consent:  Informed consent was obtained prior to the procedure. The patient was given the opportunity to ask questions regarding the procedure and its associated risks. In addition to the potential risks associated with the procedure itself, the patient was informed both verbally and in writing of the potential side effects of the use of glucocorticoid. The patient appeared to comprehend the informed consent and desired to have the procedure performed. Procedure: The patient was placed in the prone position on the fluoroscopy table and the back was prepped and draped in the usual sterile manner. The exact spinal level was  identified using fluoroscopy, and Lidocaine 1 % was injected locally, a 22 gauge spinal needle was passed to the transverse process. The depth was noted and the needle redirected to pass inferior and approximately one cm anterior to the transverse process. Yes  about 1 cc of Isovue M-200 was used to verify positioning in the epidural and paravertebral space and outlined the course of the spinal nerve into the epidural space. The same procedure was repeated at each spinal level indicated above. No vascular uptake was identified. A total of 10 mg of preservative free Dexamethasone and 1 cc of Lidocaine/site was slowly injected. The patient tolerated the procedure well. The injection area was cleaned and bandaids applied. Not excessive bleeding was noted. Patient dressed and discharged to home with instructions. Discussion: The patient tolerated the procedure well.  Patient reported denise-procedural pain on

## 2023-07-13 ENCOUNTER — HOSPITAL ENCOUNTER (OUTPATIENT)
Facility: HOSPITAL | Age: 73
Setting detail: RECURRING SERIES
Discharge: HOME | End: 2023-07-16
Payer: MEDICARE

## 2023-07-13 PROCEDURE — 97110 THERAPEUTIC EXERCISES: CPT

## 2023-07-13 PROCEDURE — 97140 MANUAL THERAPY 1/> REGIONS: CPT

## 2023-07-13 PROCEDURE — 97112 NEUROMUSCULAR REEDUCATION: CPT

## 2023-07-17 ENCOUNTER — HOSPITAL ENCOUNTER (OUTPATIENT)
Facility: HOSPITAL | Age: 73
Setting detail: RECURRING SERIES
Discharge: HOME | End: 2023-07-20
Payer: MEDICARE

## 2023-07-17 PROCEDURE — 97112 NEUROMUSCULAR REEDUCATION: CPT

## 2023-07-17 PROCEDURE — 97110 THERAPEUTIC EXERCISES: CPT

## 2023-07-17 PROCEDURE — 97140 MANUAL THERAPY 1/> REGIONS: CPT

## 2023-07-17 NOTE — PROGRESS NOTES
Min (if diff from Tx Min) Procedure, Rationale, Specifics   26  27209 Therapeutic Exercise (timed):  increase ROM, strength, coordination, balance, and proprioception to improve patient's ability to progress to PLOF and address remaining functional goals. (see flow sheet as applicable)     Details if applicable:       10  61679 Neuromuscular Re-Education (timed):  improve balance, coordination, kinesthetic sense, posture, core stability and proprioception to improve patient's ability to develop conscious control of individual muscles and awareness of position of extremities in order to progress to PLOF and address remaining functional goals. (see flow sheet as applicable)     Details if applicable:     8  96801 Manual Therapy (timed):  decrease pain, increase ROM, and increase tissue extensibility to improve patient's ability to progress to PLOF and address remaining functional goals. The manual therapy interventions were performed at a separate and distinct time from the therapeutic activities interventions . (see flow sheet as applicable)     Details if applicable:     40  [facility] Totals Reminder: bill using total billable min of TIMED therapeutic procedures (example: do not include dry needle or estim unattended, both untimed codes, in totals to left)  8-22 min = 1 unit; 23-37 min = 2 units; 38-52 min = 3 units; 53-67 min = 4 units; 68-82 min = 5 units   Total Total     TOTAL TREATMENT TIME:        54     [x]  Patient Education billed concurrently with other procedures   [x] Review HEP    [] Progressed/Changed HEP, detail:    [] Other detail:       Objective Information/Functional Measures/Assessment    FIR of left GHJ to L2.      Patient will continue to benefit from skilled PT / OT services to modify and progress therapeutic interventions, analyze and address functional mobility deficits, analyze and address ROM deficits, analyze and address strength deficits, analyze and address soft tissue restrictions, analyze

## 2023-07-19 ENCOUNTER — HOSPITAL ENCOUNTER (OUTPATIENT)
Facility: HOSPITAL | Age: 73
Setting detail: RECURRING SERIES
Discharge: HOME | End: 2023-07-22
Payer: MEDICARE

## 2023-07-19 PROCEDURE — 97112 NEUROMUSCULAR REEDUCATION: CPT

## 2023-07-19 PROCEDURE — 97110 THERAPEUTIC EXERCISES: CPT

## 2023-07-19 PROCEDURE — 97140 MANUAL THERAPY 1/> REGIONS: CPT

## 2023-07-19 NOTE — PROGRESS NOTES
[facility] PHYSICAL THERAPY  [address] [facility] - Ph: [phone]   Fx: [phone]    PHYSICAL THERAPY PROGRESS NOTE      Patient name: Mookie Davis Start of Care: 2023   Referral source: Eileen Morel,* : 1950    Medical Diagnosis: Pain in left shoulder [M25.512]  Payor: MEDICARE / Plan: MEDICARE PART A AND B / Product Type: *No Product type* /  Onset Date:2023         Treatment Diagnosis: M25.512  LEFT SHOULDER PAIN                 Prior Hospitalization: see medical history Provider#: [id number]   Medications: Verified on Patient summary List   Comorbidities: OA, LBP, c/s pain  Prior Level of Function:functionally I with all activities - left handed    Visits from Start of Care: 7    Missed Visits: None    Goals/Measure of Progress: To be achieved in 12 weeks: The pt will be I and compliant with HEP   IE- issued HEP   Current: MET on 23  2. The pt will demonstrate 135 degrees of AROM for improved ability for daily tasks. IE- 128 degrees   Current:  degrees on 23  3. The pt will report 30% improvement for improved ability for functional tasks. IE- worsening  Current: MET 50% on 23  Long Term Goals: To be accomplished in 12 weeks  The pt will report at least 50% improvement for improved ability for daily tasks. IE- worsening  Current: MET 50% on 23  2. Improve FOTO score to the predicted outcome to improve ability for functional tasks              IE- 53  Current: 59, 2023  3. The pt will demonstrates 4+/5 left shoulder abduction MMT to improve ability for daily activities              IE- 4-/5 with pain  Current: progressing, 4/5  , 2023  4. Improve FIR of the left shoulder to L3 for improved ability for ADL. IE- PSIS  Current: Met Left FIR to L2. 2023  5.  Improve left shoulder AROM flexion to 140 degrees for improved tolerance for

## 2023-07-19 NOTE — PROGRESS NOTES
PHYSICAL / OCCUPATIONAL THERAPY - DAILY TREATMENT NOTE (updated )    Patient Name: Bhavna Oliver    Date: 2023    : 1950  Insurance: Payor: MEDICARE / Plan: MEDICARE PART A AND B / Product Type: *No Product type* /      Patient  verified Yes     Visit #   Current / Total 7 24   Time   In / Out 8:24 9:23   Pain   In / Out 0 0   Subjective Functional Status/Changes: Pt reported feeling good today and reported being pain free. Also reported increased ROM in the shoulder since the start of the therapy but lateral and vertical reach are the weak areas as per the patient. Changes to: Allergies, Med Hx, Sx Hx?   no       TREATMENT AREA =  Pain in left shoulder [M25.512]    OBJECTIVE    Modalities Rationale:     decrease inflammation and decrease pain to improve patient's ability to progress to PLOF and address remaining functional goals. 10   min  unbilled [x]  Ice     []  Heat    location/position: Left GHJ in sitting   Skin assessment post-treatment (if applicable):    []  intact    []  redness- no adverse reaction                 []redness - adverse reaction:         Therapeutic Procedures: Tx Min Billable or 1:1 Min (if diff from Tx Min) Procedure, Rationale, Specifics   21  27924 Therapeutic Exercise (timed):  increase ROM, strength, coordination, balance, and proprioception to improve patient's ability to progress to PLOF and address remaining functional goals. (see flow sheet as applicable)     Details if applicable:       20  30806 Neuromuscular Re-Education (timed):  improve balance, coordination, kinesthetic sense, posture, core stability and proprioception to improve patient's ability to develop conscious control of individual muscles and awareness of position of extremities in order to progress to PLOF and address remaining functional goals.  (see flow sheet as applicable)     Details if applicable:     8  79164 Manual Therapy (timed):  decrease pain, increase ROM, and increase

## 2023-07-20 ENCOUNTER — OFFICE VISIT (OUTPATIENT)
Age: 73
End: 2023-07-20

## 2023-07-20 VITALS — WEIGHT: 149 LBS | BODY MASS INDEX: 21.33 KG/M2 | HEIGHT: 70 IN

## 2023-07-20 DIAGNOSIS — M12.812 ROTATOR CUFF ARTHROPATHY OF LEFT SHOULDER: Primary | ICD-10-CM

## 2023-07-24 ENCOUNTER — HOSPITAL ENCOUNTER (OUTPATIENT)
Facility: HOSPITAL | Age: 73
Setting detail: RECURRING SERIES
Discharge: HOME | End: 2023-07-27
Payer: MEDICARE

## 2023-07-24 PROCEDURE — 97110 THERAPEUTIC EXERCISES: CPT

## 2023-07-24 PROCEDURE — 97140 MANUAL THERAPY 1/> REGIONS: CPT

## 2023-07-24 PROCEDURE — 97112 NEUROMUSCULAR REEDUCATION: CPT

## 2023-07-24 NOTE — PROGRESS NOTES
PHYSICAL / OCCUPATIONAL THERAPY - DAILY TREATMENT NOTE (updated )    Patient Name: Sami Hayward    Date: 2023    : 1950  Insurance: Payor: MEDICARE / Plan: MEDICARE PART A AND B / Product Type: *No Product type* /      Patient  verified Yes     Visit #   Current / Total 8 24   Time   In / Out 1:51 2:40   Pain   In / Out 0/10 0/10   Subjective Functional Status/Changes: \"Reaching overhead has gotten a lot better, reaching behind my back is still hard. \"   Changes to: Allergies, Med Hx, Sx Hx?   no       TREATMENT AREA =  Pain in left shoulder [M25.512]    OBJECTIVE    Modalities Rationale:     decrease pain to improve patient's ability to progress to PLOF and address remaining functional goals. min [] Estim Unattended, type/location:                                      []  w/ice    []  w/heat    min [] Estim Attended, type/location:                                     []  w/US     []  w/ice    []  w/heat    []  TENS insruct      min []  Mechanical Traction: type/lbs                   []  pro   []  sup   []  int   []  cont    []  before manual    []  after manual    min []  Ultrasound, settings/location:      min []  Iontophoresis w/ dexamethasone, location:                                               []  take home patch       []  in clinic     10   min  unbilled [x]  Ice     []  Heat    location/position:  Left shoulder - Pt seated    min []  Paraffin,  details:     min []  Vasopneumatic Device, press/temp:     min []  Charlann Dues / Exie Issa: If using vaso (only need to measure limb vaso being performed on)      pre-treatment girth :       post-treatment girth :       measured at (landmark location) :      min []  Other:    Skin assessment post-treatment (if applicable):    []  intact    []  redness- no adverse reaction                 []redness - adverse reaction:         Therapeutic Procedures:   Tx Min Billable or 1:1 Min (if diff from Tx Min) Procedure, Rationale, Specifics   01 [id number]

## 2023-07-26 ENCOUNTER — HOSPITAL ENCOUNTER (OUTPATIENT)
Facility: HOSPITAL | Age: 73
Setting detail: RECURRING SERIES
Discharge: HOME | End: 2023-07-29
Payer: MEDICARE

## 2023-07-26 PROCEDURE — 97140 MANUAL THERAPY 1/> REGIONS: CPT

## 2023-07-26 PROCEDURE — 97110 THERAPEUTIC EXERCISES: CPT

## 2023-07-26 PROCEDURE — 97530 THERAPEUTIC ACTIVITIES: CPT

## 2023-07-26 NOTE — PROGRESS NOTES
7-13-23  3. The pt will report 30% improvement for improved ability for functional tasks. IE- worsening  PN: MET 50% on 7-13-23  Long Term Goals: To be accomplished in 12 weeks  The pt will report at least 50% improvement for improved ability for daily tasks. IE- worsening  PN: MET 50% on 7-13-23  2. Improve FOTO score to the predicted outcome to improve ability for functional tasks              IE- 53  PN: 59, 07/19/2023  3. The pt will demonstrates 4+/5 left shoulder abduction MMT to improve ability for daily activities              IE- 4-/5 with pain  PN: progressing, 4/5  , 07/19/2023  Current: Met, 4+/5, 07/26/2023  4. Improve FIR of the left shoulder to L3 for improved ability for ADL. IE- PSIS  PN: Met Left FIR to L2. 07/17/2023  5. Improve left shoulder AROM flexion to 140 degrees for improved tolerance for overhead tasks              IE- 128 degrees   PN: progressing 138 degrees.  07/19/2023         PLAN  Yes  Continue plan of care  []  Upgrade activities as tolerated  []  Discharge due to :  []  Other:    Mike Mcginnis, PT    7/26/2023    8:25 AM    Future Appointments   Date Time Provider [address]   7/26/2023  8:30 AM [facility] Arellano   7/31/2023  7:50 AM [facility]   8/31/2023  8:00 AM Aldo Song MD [facility]   12/12/2023  8:40 AM Jj Hobbs MD [facility] AMB

## 2023-07-31 ENCOUNTER — HOSPITAL ENCOUNTER (OUTPATIENT)
Facility: HOSPITAL | Age: 73
Setting detail: RECURRING SERIES
Discharge: HOME | End: 2023-08-03
Payer: MEDICARE

## 2023-07-31 PROCEDURE — 97110 THERAPEUTIC EXERCISES: CPT

## 2023-07-31 PROCEDURE — 97140 MANUAL THERAPY 1/> REGIONS: CPT

## 2023-07-31 PROCEDURE — 97112 NEUROMUSCULAR REEDUCATION: CPT

## 2023-07-31 NOTE — PROGRESS NOTES
To be accomplished in 12 weeks  The pt will report at least 50% improvement for improved ability for daily tasks. IE- worsening  PN: MET 50% on 7-13-23  2. Improve FOTO score to the predicted outcome to improve ability for functional tasks              IE- 53  PN: 59, 07/19/2023  3. The pt will demonstrates 4+/5 left shoulder abduction MMT to improve ability for daily activities              IE- 4-/5 with pain  PN: progressing, 4/5  , 07/19/2023  Current: Met, 4+/5, 07/26/2023  4. Improve FIR of the left shoulder to L3 for improved ability for ADL. IE- PSIS  PN: Met Left FIR to L2. 07/17/2023  5. Improve left shoulder AROM flexion to 140 degrees for improved tolerance for overhead tasks              IE- 128 degrees   PN: progressing 138 degrees.  07/19/2023   Current: 138 degrees 7-31-23       PLAN  Yes  Continue plan of care  []  Upgrade activities as tolerated  []  Discharge due to :  []  Other:    Shira Finney, PT    7/31/2023    7:59 AM    Future Appointments   Date Time Provider [address] [place] [address]   8/31/2023  8:00 AM Bev Officer, MD [facility]   12/12/2023  8:40 AM Leo Stroud MD [facility] AM[facility]

## 2023-08-04 ENCOUNTER — HOSPITAL ENCOUNTER (OUTPATIENT)
Facility: HOSPITAL | Age: 73
Setting detail: RECURRING SERIES
Discharge: HOME | End: 2023-08-07
Payer: MEDICARE

## 2023-08-04 PROCEDURE — 97112 NEUROMUSCULAR REEDUCATION: CPT

## 2023-08-04 PROCEDURE — 97110 THERAPEUTIC EXERCISES: CPT

## 2023-08-04 PROCEDURE — 97140 MANUAL THERAPY 1/> REGIONS: CPT

## 2023-08-04 NOTE — PROGRESS NOTES
PHYSICAL / OCCUPATIONAL THERAPY - DAILY TREATMENT NOTE (updated )    Patient Name: Eloy Villalpando    Date: 2023    : 1950  Insurance: Payor: MEDICARE / Plan: MEDICARE PART A AND B / Product Type: *No Product type* /      Patient  verified Yes     Visit #   Current / Total 11 24   Time   In / Out 9:50 9:54   Pain   In / Out 0 0   Subjective Functional Status/Changes: Pt denies pain currently. Pt reports compliance with HEP. Changes to: Allergies, Med Hx, Sx Hx?   no       TREATMENT AREA =  Pain in left shoulder [M25.512]    OBJECTIVE    Modalities Rationale:     decrease inflammation and decrease pain to improve patient's ability to progress to PLOF and address remaining functional goals. min [] Estim Unattended, type/location:                                      []  w/ice    []  w/heat    min [] Estim Attended, type/location:                                     []  w/US     []  w/ice    []  w/heat    []  TENS insruct      min []  Mechanical Traction: type/lbs                   []  pro   []  sup   []  int   []  cont    []  before manual    []  after manual    min []  Ultrasound, settings/location:      min []  Iontophoresis w/ dexamethasone, location:                                               []  take home patch       []  in clinic     10   min  unbilled [x]  Ice     []  Heat    location/position: Left shoulder/sitting    min []  Paraffin,  details:     min []  Vasopneumatic Device, press/temp:     min []  Donita Webb / [employer]: If using vaso (only need to measure limb vaso being performed on)      pre-treatment girth :       post-treatment girth :       measured at (landmark location) :      min []  Other:    Skin assessment post-treatment (if applicable):    []  intact    []  redness- no adverse reaction                 []redness - adverse reaction:         Therapeutic Procedures:   Tx Min Billable or 1:1 Min (if diff from Tx Min) Procedure, Rationale, Specifics   36  [id number]

## 2023-08-08 ENCOUNTER — HOSPITAL ENCOUNTER (OUTPATIENT)
Facility: HOSPITAL | Age: 73
Setting detail: RECURRING SERIES
Discharge: HOME | End: 2023-08-11
Payer: MEDICARE

## 2023-08-08 PROCEDURE — 97112 NEUROMUSCULAR REEDUCATION: CPT

## 2023-08-08 PROCEDURE — 97140 MANUAL THERAPY 1/> REGIONS: CPT

## 2023-08-08 PROCEDURE — 97110 THERAPEUTIC EXERCISES: CPT

## 2023-08-08 NOTE — PROGRESS NOTES
ROM, strength, coordination, balance, and proprioception to improve patient's ability to progress to PLOF and address remaining functional goals. (see flow sheet as applicable)     Details if applicable:       10 10 10966 Neuromuscular Re-Education (timed):  improve balance, coordination, kinesthetic sense, posture, core stability and proprioception to improve patient's ability to develop conscious control of individual muscles and awareness of position of extremities in order to progress to PLOF and address remaining functional goals. (see flow sheet as applicable)     Details if applicable:     8 8 02627 Manual Therapy (timed):  decrease pain, increase ROM, and increase tissue extensibility to improve patient's ability to progress to PLOF and address remaining functional goals. The manual therapy interventions were performed at a separate and distinct time from the therapeutic activities interventions . (see flow sheet as applicable)     Details if applicable:  in S/L: Scap mobs; In Supine: Grade 2 Post GHJ mobs, PROM into Flexion, scaption, ER, and IR with gentle oscillations as needed. Details if applicable:            Details if applicable:     42 36 [facility] Totals Reminder: bill using total billable min of TIMED therapeutic procedures (example: do not include dry needle or estim unattended, both untimed codes, in totals to left)  8-22 min = 1 unit; 23-37 min = 2 units; 38-52 min = 3 units; 53-67 min = 4 units; 68-82 min = 5 units   Total Total     TOTAL TREATMENT TIME:        54     [x]  Patient Education billed concurrently with other procedures   [x] Review HEP    [] Progressed/Changed HEP, detail:    [] Other detail:       Objective Information/Functional Measures/Assessment    Therapist provided cues for correct technique and muscle activation with exercises. Initiated ball stabs against the wall and patient was able to perform with good control. Noted mild tightness at end range of passive IR.

## 2023-08-15 ENCOUNTER — HOSPITAL ENCOUNTER (OUTPATIENT)
Facility: HOSPITAL | Age: 73
Setting detail: RECURRING SERIES
Discharge: HOME | End: 2023-08-18
Payer: MEDICARE

## 2023-08-15 PROCEDURE — 97140 MANUAL THERAPY 1/> REGIONS: CPT

## 2023-08-15 PROCEDURE — 97110 THERAPEUTIC EXERCISES: CPT

## 2023-08-15 PROCEDURE — 97112 NEUROMUSCULAR REEDUCATION: CPT

## 2023-08-15 NOTE — PROGRESS NOTES
PHYSICAL / OCCUPATIONAL THERAPY - DAILY TREATMENT NOTE (updated )    Patient Name: Alcides Poll    Date: 8/15/2023    : 1950  Insurance: Payor: MEDICARE / Plan: MEDICARE PART A AND B / Product Type: *No Product type* /      Patient  verified Yes     Visit #   Current / Total 13 24   Time   In / Out 10:30 11:20   Pain   In / Out 0 0   Subjective Functional Status/Changes: Pt reports no new complaints of pain. Changes to: Allergies, Med Hx, Sx Hx?   no       TREATMENT AREA =  Pain in left shoulder [M25.512]    OBJECTIVE    Modalities Rationale:     decrease inflammation and decrease pain to improve patient's ability to progress to PLOF and address remaining functional goals. min [] Estim Unattended, type/location:                                      []  w/ice    []  w/heat    min [] Estim Attended, type/location:                                     []  w/US     []  w/ice    []  w/heat    []  TENS insruct      min []  Mechanical Traction: type/lbs                   []  pro   []  sup   []  int   []  cont    []  before manual    []  after manual    min []  Ultrasound, settings/location:      min []  Iontophoresis w/ dexamethasone, location:                                               []  take home patch       []  in clinic     10   min  unbilled [x]  Ice     []  Heat    location/position: Left shoulder/sitting    min []  Paraffin,  details:     min []  Vasopneumatic Device, press/temp:     min []  Marley Choi / [employer] Sam[employer]: If using vaso (only need to measure limb vaso being performed on)      pre-treatment girth :       post-treatment girth :       measured at (landmark location) :      min []  Other:    Skin assessment post-treatment (if applicable):    []  intact    []  redness- no adverse reaction                 []redness - adverse reaction:         Therapeutic Procedures:   Tx Min Billable or 1:1 Min (if diff from Tx Min) Procedure, Rationale, Specifics   57 [id number] Therapeutic Exercise

## 2023-08-16 ENCOUNTER — HOSPITAL ENCOUNTER (OUTPATIENT)
Facility: HOSPITAL | Age: 73
Setting detail: RECURRING SERIES
Discharge: HOME | End: 2023-08-19
Payer: MEDICARE

## 2023-08-16 PROCEDURE — 97530 THERAPEUTIC ACTIVITIES: CPT

## 2023-08-16 PROCEDURE — 97110 THERAPEUTIC EXERCISES: CPT

## 2023-08-16 PROCEDURE — 97112 NEUROMUSCULAR REEDUCATION: CPT

## 2023-08-16 NOTE — PROGRESS NOTES
[address] [place] [address] [email or web address] PHYSICAL THERAPY  [id number] [place] [address] [address] [facility] - Ph: [phone]   Fx: [phone]    PHYSICAL THERAPY PROGRESS NOTE      Patient name: Mookie Davis Start of Care: 2023   Referral source: Eileen Morel,* : 1950    Medical Diagnosis: Pain in left shoulder [M25.512]  Payor: MEDICARE / Plan: MEDICARE PART A AND B / Product Type: *No Product type* /  Onset Date:2023                   Treatment Diagnosis: M25.512  LEFT SHOULDER PAIN      Prior Hospitalization: see medical history Provider#: [id number]   Medications: Verified on Patient summary List   Comorbidities:  OA, LBP, c/s pain  Prior Level of Function::functionally I with all activities - left handed       Visits from Start of Care: 14    Missed Visits: 0    Goals/Measure of Progress: To be achieved in 5 weeks: The pt will be I and compliant with HEP   IE- issued HEP   Current: MET on 23  2. The pt will demonstrate 135 degrees of AROM for improved ability for daily tasks. IE- 128 degrees               PN:  degrees on 23  3. The pt will report 30% improvement for improved ability for functional tasks. IE- worsening  PN: MET 50% on 23  Long Term Goals: To be accomplished in 12 weeks  The pt will report at least 50% improvement for improved ability for daily tasks. IE- worsening  PN: MET 50% on 23  2. Improve FOTO score to the predicted outcome to improve ability for functional tasks              IE- 53  PN: 59, 2023     3. The pt will demonstrates 4+/5 left shoulder abduction MMT to improve ability for daily activities              IE- 4-/5 with pain  PN: progressing, 4/5  , 2023  Current: Met, 4+/5, 2023  4. Improve FIR of the left shoulder to L3 for improved ability for ADL. IE- PSIS  PN: Met Left FIR to L2. 2023  5.  Improve left shoulder AROM flexion to 140 degrees for
progress to PLOF and address remaining functional goals. (see flow sheet as applicable)     Details if applicable:     39  [facility] Totals Reminder: bill using total billable min of TIMED therapeutic procedures (example: do not include dry needle or estim unattended, both untimed codes, in totals to left)  8-22 min = 1 unit; 23-37 min = 2 units; 38-52 min = 3 units; 53-67 min = 4 units; 68-82 min = 5 units   Total Total     TOTAL TREATMENT TIME:        51     [x]  Patient Education billed concurrently with other procedures   [x] Review HEP    [] Progressed/Changed HEP, detail:    [] Other detail:       Objective Information/Functional Measures/Assessment    Pt required minimal cues for therex today. Met most of his goals. Talked to the patient about the discharge but patient wants 4 more therapy sessions. Patient will continue to benefit from skilled PT / OT services to modify and progress therapeutic interventions, analyze and address functional mobility deficits, analyze and address ROM deficits, analyze and address strength deficits, analyze and address soft tissue restrictions, analyze and cue for proper movement patterns, and analyze and modify for postural abnormalities to address functional deficits and attain remaining goals. Progress toward goals / Updated goals:  []  See Progress Note/Recertification    The pt will be I and compliant with HEP   IE- issued HEP   Current: MET on 6-26-23  2. The pt will demonstrate 135 degrees of AROM for improved ability for daily tasks. IE- 128 degrees               PN:  degrees on 7-13-23  3. The pt will report 30% improvement for improved ability for functional tasks. IE- worsening  PN: MET 50% on 7-13-23  Long Term Goals: To be accomplished in 12 weeks  The pt will report at least 50% improvement for improved ability for daily tasks. IE- worsening  PN: MET 50% on 7-13-23  2.   Improve FOTO score to the predicted outcome to improve

## 2023-08-23 ENCOUNTER — HOSPITAL ENCOUNTER (OUTPATIENT)
Facility: HOSPITAL | Age: 73
Setting detail: RECURRING SERIES
Discharge: HOME | End: 2023-08-26
Payer: MEDICARE

## 2023-08-23 PROCEDURE — 97112 NEUROMUSCULAR REEDUCATION: CPT

## 2023-08-23 PROCEDURE — 97110 THERAPEUTIC EXERCISES: CPT

## 2023-08-23 PROCEDURE — 97530 THERAPEUTIC ACTIVITIES: CPT

## 2023-08-23 NOTE — PROGRESS NOTES
PHYSICAL / OCCUPATIONAL THERAPY - DAILY TREATMENT NOTE (updated )    Patient Name: Malka Barton    Date: 2023    : 1950  Insurance: Payor: MEDICARE / Plan: MEDICARE PART A AND B / Product Type: *No Product type* /      Patient  verified Yes     Visit #   Current / Total 15 24   Time   In / Out 9:10 9:59   Pain   In / Out 0 0   Subjective Functional Status/Changes: Pt reports no new complaints of pain. Pt reports compliance with HEP. Changes to: Allergies, Med Hx, Sx Hx?   no       TREATMENT AREA =  Pain in left shoulder [M25.512]    OBJECTIVE    Modalities Rationale:     decrease inflammation and decrease pain to improve patient's ability to progress to PLOF and address remaining functional goals. min [] Estim Unattended, type/location:                                      []  w/ice    []  w/heat    min [] Estim Attended, type/location:                                     []  w/US     []  w/ice    []  w/heat    []  TENS insruct      min []  Mechanical Traction: type/lbs                   []  pro   []  sup   []  int   []  cont    []  before manual    []  after manual    min []  Ultrasound, settings/location:      min []  Iontophoresis w/ dexamethasone, location:                                               []  take home patch       []  in clinic        min  unbilled []  Ice     []  Heat    location/position:     min []  Paraffin,  details:     min []  Vasopneumatic Device, press/temp:     min []  Marylou Dixon / Dejah Yusuf: If using vaso (only need to measure limb vaso being performed on)      pre-treatment girth :       post-treatment girth :       measured at (landmark location) :      min []  Other:    Skin assessment post-treatment (if applicable):    []  intact    []  redness- no adverse reaction                 []redness - adverse reaction:         Therapeutic Procedures:   Tx Min Billable or 1:1 Min (if diff from Tx Min) Procedure, Rationale, Specifics   19  10756 Therapeutic Exercise

## 2023-08-24 ENCOUNTER — TELEPHONE (OUTPATIENT)
Facility: HOSPITAL | Age: 73
End: 2023-08-24

## 2023-08-25 ENCOUNTER — APPOINTMENT (OUTPATIENT)
Facility: HOSPITAL | Age: 73
End: 2023-08-25
Payer: MEDICARE

## 2023-08-25 NOTE — PATIENT INSTRUCTIONS
Low Back Arthritis: Exercises  Introduction  Here are some examples of typical rehabilitation exercises for your condition. Start each exercise slowly. Ease off the exercise if you start to have pain. Your doctor or physical therapist will tell you when you can start these exercises and which ones will work best for you. When you are not being active, find a comfortable position for rest. Some people are comfortable on the floor or a medium-firm bed with a small pillow under their head and another under their knees. Some people prefer to lie on their side with a pillow between their knees. Don't stay in one position for too long. Take short walks (10 to 20 minutes) every 2 to 3 hours. Avoid slopes, hills, and stairs until you feel better. Walk only distances you can manage without pain, especially leg pain. How to do the exercises  Pelvic tilt    1. Lie on your back with your knees bent. 2. \"Brace\" your stomachtighten your muscles by pulling in and imagining your belly button moving toward your spine. 3. Press your lower back into the floor. You should feel your hips and pelvis rock back. 4. Hold for 6 seconds while breathing smoothly. 5. Relax and allow your pelvis and hips to rock forward. 6. Repeat 8 to 12 times. Back stretches    1. Get down on your hands and knees on the floor. 2. Relax your head and allow it to droop. Round your back up toward the ceiling until you feel a nice stretch in your upper, middle, and lower back. Hold this stretch for as long as it feels comfortable, or about 15 to 30 seconds. 3. Return to the starting position with a flat back while you are on your hands and knees. 4. Let your back sway by pressing your stomach toward the floor. Lift your buttocks toward the ceiling. 5. Hold this position for 15 to 30 seconds. 6. Repeat 2 to 4 times. Follow-up care is a key part of your treatment and safety.  Be sure to make and go to all appointments, and call your doctor if Patient returned call and reviewed providers note with patient. Patient states must have been neurology that told him about shot for pain.   Patient is going to call his neurologist. you are having problems. It's also a good idea to know your test results and keep a list of the medicines you take. Where can you learn more? Go to http://www.[employer].com/  Enter T094 in the search box to learn more about \"Low Back Arthritis: Exercises. \"  Current as of: July 1, 2021               Content Version: 13.2  © 2006-2022 [employer]. Care instructions adapted under license by [employer] (which disclaims liability or warranty for this information). If you have questions about a medical condition or this instruction, always ask your healthcare professional. [place] [address] any warranty or liability for your use of this information.

## 2023-08-29 ENCOUNTER — HOSPITAL ENCOUNTER (OUTPATIENT)
Facility: HOSPITAL | Age: 73
Setting detail: RECURRING SERIES
Discharge: HOME | End: 2023-09-01
Payer: MEDICARE

## 2023-08-29 PROCEDURE — 97110 THERAPEUTIC EXERCISES: CPT

## 2023-08-29 PROCEDURE — 97112 NEUROMUSCULAR REEDUCATION: CPT

## 2023-08-29 NOTE — PROGRESS NOTES
increase ROM, strength, coordination, balance, and proprioception to improve patient's ability to progress to PLOF and address remaining functional goals. (see flow sheet as applicable)     Details if applicable:       10  26295 Neuromuscular Re-Education (timed):  improve balance, coordination, kinesthetic sense, posture, core stability and proprioception to improve patient's ability to develop conscious control of individual muscles and awareness of position of extremities in order to progress to PLOF and address remaining functional goals. (see flow sheet as applicable)     Details if applicable:     36  [facility] Totals Reminder: bill using total billable min of TIMED therapeutic procedures (example: do not include dry needle or estim unattended, both untimed codes, in totals to left)  8-22 min = 1 unit; 23-37 min = 2 units; 38-52 min = 3 units; 53-67 min = 4 units; 68-82 min = 5 units   Total Total     TOTAL TREATMENT TIME:        40     [x]  Patient Education billed concurrently with other procedures   [x] Review HEP    [] Progressed/Changed HEP, detail:    [] Other detail:       Objective Information/Functional Measures/Assessment    Added 2# ankle weight to left wrist with dowel exercises. Discussed DC next visit with patient; will update HEP. .     Patient will continue to benefit from skilled PT / OT services to modify and progress therapeutic interventions, analyze and address functional mobility deficits, analyze and address ROM deficits, analyze and address strength deficits, analyze and address soft tissue restrictions, analyze and cue for proper movement patterns, and analyze and modify for postural abnormalities to address functional deficits and attain remaining goals. Progress toward goals / Updated goals:  []  See Progress Note/Recertification    Goals/Measure of Progress: To be achieved in 5 weeks: The pt will be I and compliant with HEP   IE- issued HEP   Current: MET on 6-26-23  2.  The pt

## 2023-08-30 ENCOUNTER — HOSPITAL ENCOUNTER (OUTPATIENT)
Facility: HOSPITAL | Age: 73
Setting detail: RECURRING SERIES
Discharge: HOME | End: 2023-09-02
Payer: MEDICARE

## 2023-08-30 PROCEDURE — 97530 THERAPEUTIC ACTIVITIES: CPT

## 2023-08-30 PROCEDURE — 97110 THERAPEUTIC EXERCISES: CPT

## 2023-08-30 NOTE — PROGRESS NOTES
PT DISCHARGE DAILY NOTE AND SUMMARY     Date:2023  Patient name: Leesa Jorge Start of Care: 2023   Referral source: Mariela Saenz,* : 1950   Medical/Treatment Diagnosis: Pain in left shoulder [M25.512] Onset Date:2023                    Prior Hospitalization: see medical history Provider#: [id number]   Medications: Verified on Patient Summary List    Comorbidities: OA, LBP, c/s pain  Prior Level of Function:functionally I with all activities - left handed  Insurance: Payor: Lisa Morales / Plan: MEDICARE PART A AND B / Product Type: *No Product type* /      Visits from Start of Care: 17 Missed Visits: 0    Reporting Period : 2023 to 2023    Patient  verified yes     Visit #   Current / Total 17 24   Time   In / Out 8:29 9:18   Pain   In / Out 0 0   Subjective Functional Status/Changes: Pt reported some tightness in the left shoulder, which has been going on for the past 4-5 days. No change in activity reported     TREATMENT AREA =  Pain in left shoulder [M25.512]    OBJECTIVE         Therapeutic Procedures: Tx Min Billable or 1:1 Min (if diff from Tx Min) Procedure, Rationale, Specifics   30  81045 Therapeutic Exercise (timed):  increase ROM, strength, coordination, balance, and proprioception to improve patient's ability to progress to PLOF and address remaining functional goals. (see flow sheet as applicable)     Details if applicable:       19  72056 Therapeutic Activity (timed):  use of dynamic activities replicating functional movements to increase ROM, strength, coordination, balance, and proprioception in order to improve patient's ability to progress to PLOF and address remaining functional goals.   (see flow sheet as applicable)     Details if applicable:     52  [address] Totals Reminder: bill using total billable min of TIMED therapeutic procedures (example: do not include dry needle or estim unattended, both untimed codes, in totals to left)  8- min = 1 unit;

## 2023-08-31 ENCOUNTER — OFFICE VISIT (OUTPATIENT)
Age: 73
End: 2023-08-31
Payer: MEDICARE

## 2023-08-31 VITALS — BODY MASS INDEX: 21.38 KG/M2 | HEIGHT: 70 IN

## 2023-08-31 DIAGNOSIS — M12.812 ROTATOR CUFF ARTHROPATHY OF LEFT SHOULDER: Primary | ICD-10-CM

## 2023-08-31 PROCEDURE — 99212 OFFICE O/P EST SF 10 MIN: CPT | Performed by: ORTHOPAEDIC SURGERY

## 2023-08-31 PROCEDURE — 3017F COLORECTAL CA SCREEN DOC REV: CPT | Performed by: ORTHOPAEDIC SURGERY

## 2023-08-31 PROCEDURE — 1123F ACP DISCUSS/DSCN MKR DOCD: CPT | Performed by: ORTHOPAEDIC SURGERY

## 2023-08-31 PROCEDURE — G8427 DOCREV CUR MEDS BY ELIG CLIN: HCPCS | Performed by: ORTHOPAEDIC SURGERY

## 2023-08-31 PROCEDURE — G8420 CALC BMI NORM PARAMETERS: HCPCS | Performed by: ORTHOPAEDIC SURGERY

## 2023-08-31 PROCEDURE — 1036F TOBACCO NON-USER: CPT | Performed by: ORTHOPAEDIC SURGERY

## 2023-11-20 DIAGNOSIS — M54.16 RADICULOPATHY, LUMBAR REGION: ICD-10-CM

## 2023-11-21 ENCOUNTER — OFFICE VISIT (OUTPATIENT)
Age: 73
End: 2023-11-21
Payer: MEDICARE

## 2023-11-21 VITALS — HEIGHT: 70 IN | WEIGHT: 154 LBS | BODY MASS INDEX: 22.05 KG/M2

## 2023-11-21 DIAGNOSIS — M17.11 UNILATERAL PRIMARY OSTEOARTHRITIS, RIGHT KNEE: Primary | ICD-10-CM

## 2023-11-21 PROCEDURE — G8484 FLU IMMUNIZE NO ADMIN: HCPCS | Performed by: ORTHOPAEDIC SURGERY

## 2023-11-21 PROCEDURE — 1036F TOBACCO NON-USER: CPT | Performed by: ORTHOPAEDIC SURGERY

## 2023-11-21 PROCEDURE — 1123F ACP DISCUSS/DSCN MKR DOCD: CPT | Performed by: ORTHOPAEDIC SURGERY

## 2023-11-21 PROCEDURE — 3017F COLORECTAL CA SCREEN DOC REV: CPT | Performed by: ORTHOPAEDIC SURGERY

## 2023-11-21 PROCEDURE — G8420 CALC BMI NORM PARAMETERS: HCPCS | Performed by: ORTHOPAEDIC SURGERY

## 2023-11-21 PROCEDURE — 20611 DRAIN/INJ JOINT/BURSA W/US: CPT | Performed by: ORTHOPAEDIC SURGERY

## 2023-11-21 PROCEDURE — 99214 OFFICE O/P EST MOD 30 MIN: CPT | Performed by: ORTHOPAEDIC SURGERY

## 2023-11-21 PROCEDURE — G8427 DOCREV CUR MEDS BY ELIG CLIN: HCPCS | Performed by: ORTHOPAEDIC SURGERY

## 2023-11-21 RX ORDER — TRIAMCINOLONE ACETONIDE 40 MG/ML
40 INJECTION, SUSPENSION INTRA-ARTICULAR; INTRAMUSCULAR ONCE
Status: COMPLETED | OUTPATIENT
Start: 2023-11-21 | End: 2023-11-21

## 2023-11-21 RX ORDER — TOPIRAMATE 25 MG/1
TABLET ORAL
Qty: 270 TABLET | Refills: 0 | Status: SHIPPED | OUTPATIENT
Start: 2023-11-21

## 2023-11-21 RX ADMIN — TRIAMCINOLONE ACETONIDE 40 MG: 40 INJECTION, SUSPENSION INTRA-ARTICULAR; INTRAMUSCULAR at 14:37

## 2023-11-21 NOTE — PROGRESS NOTES
PROCEDURE: right knee Injection with Ultrasound Guidance    Indication:  right knee pain/swelling    After sterile prep, 4mL lidocaine with 1mL 40mg Kenalog were injected into the right knee intraarticular under ultrasound guidance. Ultrasound images captured and scanned into patient's chart. [facility]  OFFICE PROCEDURE PROGRESS NOTE        Chart reviewed for the following:  Bassem PAEZ MD, have reviewed the History, Physical and updated the Allergic reactions for Gabriela Flowers performed immediately prior to start of procedure:  Bassem PAEZ MD have performed the following reviews on Gladys Jacob prior to the start of the procedure:            * Patient was identified by name and date of birth   * Agreement on procedure being performed was verified  * Risks and Benefits explained to the patient  * Procedure site verified and marked as necessary  * Patient was positioned for comfort  * Consent was signed and verified     Time: 2:34 PM    Date of procedure: 11/21/2023    Procedure performed by:  Bassem Ness MD    Provider assisted by: (see medication administration)    How tolerated by patient: tolerated the procedure well with no complications    Comments: none    IMAGING: XR of the right knee with 3 views obtained in office dated 11/21/2023 reviewed and read by Dr. Samuel Juarez: Diffuse degenerative changes throughout 3 compartments with change from before    IMPRESSION:      ICD-10-CM    1. Unilateral primary osteoarthritis, right knee  M17.11 [90855] Knee 1-2V           PLAN:   1. Pt presented with right knee pain secondary to primary OA. Discussed treatment options with the patient including a right knee cortisone injection, physician directed exercise program versus formal physical therapy, and/or modifications of activities. Patient wishes to proceed with a right knee cortisone injection under ultrasound guidance.     Risk factors

## 2024-02-27 ENCOUNTER — OFFICE VISIT (OUTPATIENT)
Age: 74
End: 2024-02-27
Payer: MEDICARE

## 2024-02-27 VITALS
TEMPERATURE: 97.7 F | HEART RATE: 65 BPM | OXYGEN SATURATION: 96 % | BODY MASS INDEX: 22.19 KG/M2 | WEIGHT: 155 LBS | HEIGHT: 70 IN

## 2024-02-27 DIAGNOSIS — M48.061 SPINAL STENOSIS, LUMBAR REGION WITHOUT NEUROGENIC CLAUDICATION: ICD-10-CM

## 2024-02-27 DIAGNOSIS — M54.16 RADICULOPATHY, LUMBAR REGION: Primary | ICD-10-CM

## 2024-02-27 DIAGNOSIS — M47.812 FACET ARTHROPATHY, CERVICAL: ICD-10-CM

## 2024-02-27 DIAGNOSIS — M47.816 LUMBAR FACET ARTHROPATHY: ICD-10-CM

## 2024-02-27 PROCEDURE — G8420 CALC BMI NORM PARAMETERS: HCPCS | Performed by: PHYSICAL MEDICINE & REHABILITATION

## 2024-02-27 PROCEDURE — G8427 DOCREV CUR MEDS BY ELIG CLIN: HCPCS | Performed by: PHYSICAL MEDICINE & REHABILITATION

## 2024-02-27 PROCEDURE — 99214 OFFICE O/P EST MOD 30 MIN: CPT | Performed by: PHYSICAL MEDICINE & REHABILITATION

## 2024-02-27 PROCEDURE — 3017F COLORECTAL CA SCREEN DOC REV: CPT | Performed by: PHYSICAL MEDICINE & REHABILITATION

## 2024-02-27 PROCEDURE — G8484 FLU IMMUNIZE NO ADMIN: HCPCS | Performed by: PHYSICAL MEDICINE & REHABILITATION

## 2024-02-27 PROCEDURE — 1036F TOBACCO NON-USER: CPT | Performed by: PHYSICAL MEDICINE & REHABILITATION

## 2024-02-27 PROCEDURE — 1123F ACP DISCUSS/DSCN MKR DOCD: CPT | Performed by: PHYSICAL MEDICINE & REHABILITATION

## 2024-02-27 RX ORDER — TOPIRAMATE 25 MG/1
TABLET ORAL
Qty: 270 TABLET | Refills: 1 | Status: SHIPPED | OUTPATIENT
Start: 2024-02-27

## 2024-02-27 RX ORDER — METHYLPREDNISOLONE 4 MG/1
TABLET ORAL
Qty: 1 KIT | Refills: 0 | Status: SHIPPED | OUTPATIENT
Start: 2024-02-27 | End: 2024-03-04

## 2024-02-27 NOTE — PROGRESS NOTES
Simeon Doan presents today for   Chief Complaint   Patient presents with    Back Pain       Is someone accompanying this pt? no    Is the patient using any DME equipment during OV? no    Depression Screening:       No data to display                Learning Assessment:  Failed to redirect to the Timeline version of the [employer].    Abuse Screening:       No data to display                Fall Risk  Failed to redirect to the Timeline version of the [employer].    OPIOID RISK TOOL  Failed to redirect to the Timeline version of the [employer].    Coordination of Care:  1. Have you been to the ER, urgent care clinic since your last visit? no  Hospitalized since your last visit? no    2. Have you seen or consulted any other health care providers outside of the [facility] since your last visit? no Include any pap smears or colon screening. no    
worse and disc extrusion is new.       L4-L5 level postsurgical changes as above, with grade 2 anterolisthesis and   moderate to severe spinal canal as well as moderate foraminal stenoses. Spinal   canal and foraminal stenosis however is less compared to 2017 MRI.       L5-S1 moderate to severe foraminal stenoses.       L1-L2 and L2-L3 moderate spinal canal stenoses.       See level by level details above.        Written by Fabian Pop, as dictated by Stephenie Higginbotham MD.  I, Dr. Stephenie Higginbotham confirm that all documentation is accurate.

## 2024-05-23 NOTE — PROGRESS NOTES
Took down dressing  Nothing to stitch  Just some wound ooze generally  Applied some dermabond  And redressed  hct drop secondary to acute blood loss anemia and hrydration  Dc home fu 2 weeks Detail Level: Detailed Quality 226: Preventive Care And Screening: Tobacco Use: Screening And Cessation Intervention: Patient screened for tobacco use and is an ex/non-smoker Quality 47: Advance Care Plan: Advance Care Planning discussed and documented; advance care plan or surrogate decision maker documented in the medical record.

## 2024-08-27 ENCOUNTER — OFFICE VISIT (OUTPATIENT)
Age: 74
End: 2024-08-27
Payer: MEDICARE

## 2024-08-27 VITALS
SYSTOLIC BLOOD PRESSURE: 116 MMHG | DIASTOLIC BLOOD PRESSURE: 72 MMHG | HEART RATE: 56 BPM | WEIGHT: 147 LBS | BODY MASS INDEX: 21.05 KG/M2 | HEIGHT: 70 IN | OXYGEN SATURATION: 96 % | TEMPERATURE: 96.9 F

## 2024-08-27 DIAGNOSIS — M47.816 LUMBAR FACET ARTHROPATHY: ICD-10-CM

## 2024-08-27 DIAGNOSIS — M47.812 FACET ARTHROPATHY, CERVICAL: ICD-10-CM

## 2024-08-27 DIAGNOSIS — R20.0 LEFT LEG NUMBNESS: ICD-10-CM

## 2024-08-27 DIAGNOSIS — M48.061 SPINAL STENOSIS, LUMBAR REGION WITHOUT NEUROGENIC CLAUDICATION: ICD-10-CM

## 2024-08-27 DIAGNOSIS — M54.16 RADICULOPATHY, LUMBAR REGION: Primary | ICD-10-CM

## 2024-08-27 PROCEDURE — 3017F COLORECTAL CA SCREEN DOC REV: CPT | Performed by: PHYSICAL MEDICINE & REHABILITATION

## 2024-08-27 PROCEDURE — 1036F TOBACCO NON-USER: CPT | Performed by: PHYSICAL MEDICINE & REHABILITATION

## 2024-08-27 PROCEDURE — G8420 CALC BMI NORM PARAMETERS: HCPCS | Performed by: PHYSICAL MEDICINE & REHABILITATION

## 2024-08-27 PROCEDURE — G8427 DOCREV CUR MEDS BY ELIG CLIN: HCPCS | Performed by: PHYSICAL MEDICINE & REHABILITATION

## 2024-08-27 PROCEDURE — 3074F SYST BP LT 130 MM HG: CPT | Performed by: PHYSICAL MEDICINE & REHABILITATION

## 2024-08-27 PROCEDURE — 99213 OFFICE O/P EST LOW 20 MIN: CPT | Performed by: PHYSICAL MEDICINE & REHABILITATION

## 2024-08-27 PROCEDURE — 3078F DIAST BP <80 MM HG: CPT | Performed by: PHYSICAL MEDICINE & REHABILITATION

## 2024-08-27 PROCEDURE — 1123F ACP DISCUSS/DSCN MKR DOCD: CPT | Performed by: PHYSICAL MEDICINE & REHABILITATION

## 2024-08-27 RX ORDER — METHYLPREDNISOLONE 4 MG
TABLET, DOSE PACK ORAL
Qty: 1 KIT | Refills: 0 | Status: SHIPPED | OUTPATIENT
Start: 2024-08-27 | End: 2024-09-02

## 2024-08-27 RX ORDER — TOPIRAMATE 25 MG/1
TABLET, FILM COATED ORAL
Qty: 270 TABLET | Refills: 1 | Status: SHIPPED | OUTPATIENT
Start: 2024-08-27

## 2024-08-27 NOTE — PROGRESS NOTES
Simeon Doan presents today for   Chief Complaint   Patient presents with    Lower Back Pain       Is someone accompanying this pt? no    Is the patient using any DME equipment during OV? no    Coordination of Care:  1. Have you been to the ER, urgent care clinic since your last visit? no  Hospitalized since your last visit? no    2. Have you seen or consulted any other health care providers outside of the [facility] since your last visit? Yes, pcp  Include any pap smears or colon screening. no        
demonstrated:    FINDINGS   OSSEOUS STRUCTURES / JOINTS       Adequate joint distention with contrast solution. Nondisplaced Anterior/superior   labral tear. Mild diffuse cartilage surface irregularity of the left hip. No   subchondral marrow edema.       MUSCLES AND TENDONS   The myotendinous structures surrounding the hips and proximal thighs appear   intact.       BURSA   Normal.       OTHER FINDINGS   Small fat filled left inguinal hernia defect.        Enlarged prostate.       Slightly trabeculated appearance of the bladder wall, correlate for chronic   bladder outlet obstruction.       IMPRESSION:       No fracture or destructive osseous lesions.       Nondisplaced anterior/superior labral tear.       Early left hip osteoarthritis.       Incidental findings as above.       Lumbar spine 4V x-rays from 12/1/2020 were personally reviewed with the patient and demonstrated:   L4-5 fusion. Collapse of left side of L3/4 disc and also sclerosis.Compression deformity at L3 and T12. Straightening of the lumbar spine. No instability.       Lumbar spine MRI from 04/24/2019 was personally reviewed with the patient and demonstrated:     FINDINGS:       Postsurgical: Status post L4-L5 posterior hardware fixation and posterior   decompression. No postoperative fluid collection identified. Mild lower lumbar   paraspinous muscle edema.       General: T12 anterior central mild superior endplate height loss, chronic   appearing. L3 eccentric to the left mild superior endplate height loss, chronic   appearing. Vertebral body heights elsewhere preserved. Disc space loss at   multiple levels, most pronounced at L3-L4 and L4-L5. Grade 2 anterolisthesis of   L4 and L5. Mild multilevel endplate edema, likely related to active disc   degeneration. Straightening of the normal lordosis. Conus ends at level L1. No   abnormal signal or enhancement at the distal cord. No abnormal epidural   collection identified.       Miscellaneous: No

## 2025-02-23 NOTE — PROGRESS NOTES
[facility]  [address] [place] [address]., [address]  [place] [address]  Phone: [phone]  Fax: [phone]    Patient's YOB: 1950    ASSESSMENT   Simeon was seen today for lower back pain.    Diagnoses and all orders for this visit:    Radiculopathy, lumbar region  -     topiramate (TOPAMAX) 25 MG tablet; TAKE THREE TABLETS BY MOUTH EVERY NIGHT AT BEDTIME    Lumbar facet arthropathy    Spinal stenosis, lumbar region without neurogenic claudication    Cervical pain    Facet arthropathy, cervical    Left leg numbness  -     topiramate (TOPAMAX) 25 MG tablet; TAKE THREE TABLETS BY MOUTH EVERY NIGHT AT BEDTIME         IMPRESSION AND PLAN:  Simeon Doan is a 74 y.o. male with history of lumbar stenosis, lumbar radiculopathy, lumbar spondylolisthesis, cervical facet arthropathy, and cervical DDD. Pt complains of lower back pain. Since last visit, pt feels his symptoms have improved. Patient is currently taking Topamax 25mg TID. Patient utilizes Vitamin D supplements.     Patient was given information on muscle conditioning exercises.   Rf Topamax 25mg -- 3 at night for neuropathic symptoms.   Mr. Doan has a reminder for a \"due or due soon\" health maintenance. I have asked that he contact his primary care provider, Jae Martinez MD, for follow-up on this health maintenance.   demonstrated consistency with prescribing.     Return in about 6 months (around 8/25/2025) for Medication follow up.      HISTORY OF PRESENT ILLNESS:  Simeon Doan is a 74 y.o. male who presents to the office today for a medication follow up. At his last OV (8/27/24), Ordered MDP and Topamax 25mg TID.    Patient presents here for medication follow-up. Patient denies pain radiating down his arms and legs bilaterally. Patient states he spends 1 hour per day walking on the treadmill. Patient reports taking Topamax 25mg as directed and has a reserve MDP. He endorses slight issues with balance

## 2025-02-25 ENCOUNTER — OFFICE VISIT (OUTPATIENT)
Age: 75
End: 2025-02-25
Payer: MEDICARE

## 2025-02-25 VITALS
HEART RATE: 56 BPM | WEIGHT: 143 LBS | SYSTOLIC BLOOD PRESSURE: 125 MMHG | OXYGEN SATURATION: 96 % | TEMPERATURE: 96.8 F | HEIGHT: 70 IN | DIASTOLIC BLOOD PRESSURE: 78 MMHG | BODY MASS INDEX: 20.47 KG/M2

## 2025-02-25 DIAGNOSIS — M54.16 RADICULOPATHY, LUMBAR REGION: Primary | ICD-10-CM

## 2025-02-25 DIAGNOSIS — M47.812 FACET ARTHROPATHY, CERVICAL: ICD-10-CM

## 2025-02-25 DIAGNOSIS — M54.2 CERVICAL PAIN: ICD-10-CM

## 2025-02-25 DIAGNOSIS — M48.061 SPINAL STENOSIS, LUMBAR REGION WITHOUT NEUROGENIC CLAUDICATION: ICD-10-CM

## 2025-02-25 DIAGNOSIS — M47.816 LUMBAR FACET ARTHROPATHY: ICD-10-CM

## 2025-02-25 DIAGNOSIS — R20.0 LEFT LEG NUMBNESS: ICD-10-CM

## 2025-02-25 PROCEDURE — 3074F SYST BP LT 130 MM HG: CPT | Performed by: PHYSICAL MEDICINE & REHABILITATION

## 2025-02-25 PROCEDURE — G8420 CALC BMI NORM PARAMETERS: HCPCS | Performed by: PHYSICAL MEDICINE & REHABILITATION

## 2025-02-25 PROCEDURE — G8427 DOCREV CUR MEDS BY ELIG CLIN: HCPCS | Performed by: PHYSICAL MEDICINE & REHABILITATION

## 2025-02-25 PROCEDURE — 1123F ACP DISCUSS/DSCN MKR DOCD: CPT | Performed by: PHYSICAL MEDICINE & REHABILITATION

## 2025-02-25 PROCEDURE — 3078F DIAST BP <80 MM HG: CPT | Performed by: PHYSICAL MEDICINE & REHABILITATION

## 2025-02-25 PROCEDURE — 1160F RVW MEDS BY RX/DR IN RCRD: CPT | Performed by: PHYSICAL MEDICINE & REHABILITATION

## 2025-02-25 PROCEDURE — 1036F TOBACCO NON-USER: CPT | Performed by: PHYSICAL MEDICINE & REHABILITATION

## 2025-02-25 PROCEDURE — 1126F AMNT PAIN NOTED NONE PRSNT: CPT | Performed by: PHYSICAL MEDICINE & REHABILITATION

## 2025-02-25 PROCEDURE — 99213 OFFICE O/P EST LOW 20 MIN: CPT | Performed by: PHYSICAL MEDICINE & REHABILITATION

## 2025-02-25 PROCEDURE — 3017F COLORECTAL CA SCREEN DOC REV: CPT | Performed by: PHYSICAL MEDICINE & REHABILITATION

## 2025-02-25 PROCEDURE — 1159F MED LIST DOCD IN RCRD: CPT | Performed by: PHYSICAL MEDICINE & REHABILITATION

## 2025-02-25 RX ORDER — PREDNISOLONE ACETATE 10 MG/ML
1 SUSPENSION/ DROPS OPHTHALMIC 2 TIMES DAILY
COMMUNITY
Start: 2025-01-24

## 2025-02-25 RX ORDER — TOPIRAMATE 25 MG/1
TABLET, FILM COATED ORAL
Qty: 270 TABLET | Refills: 1 | Status: SHIPPED | OUTPATIENT
Start: 2025-02-25

## 2025-02-25 RX ORDER — KETOROLAC TROMETHAMINE 5 MG/ML
1 SOLUTION OPHTHALMIC 2 TIMES DAILY
COMMUNITY
Start: 2025-01-22

## 2025-02-25 NOTE — PROGRESS NOTES
Simeon Doan presents today for   Chief Complaint   Patient presents with    Lower Back Pain       Is someone accompanying this pt? no    Is the patient using any DME equipment during OV? no    Coordination of Care:  1. Have you been to the ER, urgent care clinic since your last visit? no  Hospitalized since your last visit? Yes, pt had out pt cataract surgery on both eyes    2. Have you seen or consulted any other health care providers outside of the [facility] since your last visit? Yes, eye doctor, pcp  Include any pap smears or colon screening. no

## 2025-08-19 ENCOUNTER — OFFICE VISIT (OUTPATIENT)
Age: 75
End: 2025-08-19
Payer: MEDICARE

## 2025-08-19 VITALS
HEIGHT: 70 IN | BODY MASS INDEX: 19.76 KG/M2 | TEMPERATURE: 97.4 F | SYSTOLIC BLOOD PRESSURE: 120 MMHG | HEART RATE: 60 BPM | WEIGHT: 138 LBS | OXYGEN SATURATION: 96 % | DIASTOLIC BLOOD PRESSURE: 80 MMHG

## 2025-08-19 DIAGNOSIS — M48.061 SPINAL STENOSIS, LUMBAR REGION WITHOUT NEUROGENIC CLAUDICATION: ICD-10-CM

## 2025-08-19 DIAGNOSIS — M47.812 FACET ARTHROPATHY, CERVICAL: ICD-10-CM

## 2025-08-19 DIAGNOSIS — R20.0 LEFT LEG NUMBNESS: ICD-10-CM

## 2025-08-19 DIAGNOSIS — M47.816 LUMBAR FACET ARTHROPATHY: ICD-10-CM

## 2025-08-19 DIAGNOSIS — M54.16 RADICULOPATHY, LUMBAR REGION: Primary | ICD-10-CM

## 2025-08-19 PROCEDURE — 99213 OFFICE O/P EST LOW 20 MIN: CPT | Performed by: PHYSICAL MEDICINE & REHABILITATION

## 2025-08-19 PROCEDURE — G8428 CUR MEDS NOT DOCUMENT: HCPCS | Performed by: PHYSICAL MEDICINE & REHABILITATION

## 2025-08-19 PROCEDURE — 3079F DIAST BP 80-89 MM HG: CPT | Performed by: PHYSICAL MEDICINE & REHABILITATION

## 2025-08-19 PROCEDURE — 3074F SYST BP LT 130 MM HG: CPT | Performed by: PHYSICAL MEDICINE & REHABILITATION

## 2025-08-19 PROCEDURE — 3017F COLORECTAL CA SCREEN DOC REV: CPT | Performed by: PHYSICAL MEDICINE & REHABILITATION

## 2025-08-19 PROCEDURE — 1126F AMNT PAIN NOTED NONE PRSNT: CPT | Performed by: PHYSICAL MEDICINE & REHABILITATION

## 2025-08-19 PROCEDURE — 1123F ACP DISCUSS/DSCN MKR DOCD: CPT | Performed by: PHYSICAL MEDICINE & REHABILITATION

## 2025-08-19 PROCEDURE — 1036F TOBACCO NON-USER: CPT | Performed by: PHYSICAL MEDICINE & REHABILITATION

## 2025-08-19 PROCEDURE — G8420 CALC BMI NORM PARAMETERS: HCPCS | Performed by: PHYSICAL MEDICINE & REHABILITATION

## 2025-08-19 RX ORDER — TOPIRAMATE 25 MG/1
TABLET, FILM COATED ORAL
Qty: 270 TABLET | Refills: 1 | Status: SHIPPED | OUTPATIENT
Start: 2025-08-19

## (undated) DEVICE — (D)BNDG ADHESIVE FABRIC 3/4X3 -- DISC BY MFR USE ITEM 357960

## (undated) DEVICE — 3M™ BAIR PAWS FLEX™ WARMING GOWN, STANDARD, 20 PER CASE 81003: Brand: BAIR PAWS™

## (undated) DEVICE — TRAY MYEL SFTY +

## (undated) DEVICE — REM POLYHESIVE ADULT PATIENT RETURN ELECTRODE: Brand: VALLEYLAB

## (undated) DEVICE — KENDALL SCD EXPRESS SLEEVES, KNEE LENGTH, MEDIUM: Brand: KENDALL SCD

## (undated) DEVICE — KIT POS W/ FOAM ARM CRADL SHEARGUARD CHST PD CVR FOR SPNL

## (undated) DEVICE — DRAIN SURG W7MMXL20CM SIL FULL PERF HUBLESS FLAT RADPQ STRP

## (undated) DEVICE — MEDIA CONTRAST 10ML 200MG/ML 41%

## (undated) DEVICE — AIRLIFE™ ADULT CUSHION NASAL CANNULA 14 FOOT (4.3) CRUSH-RESISTANT OXYGEN TUBING, AND U/CONNECT-IT ADAPTER: Brand: AIRLIFE™

## (undated) DEVICE — (D)SYR 10ML 1/5ML GRAD NSAF -- PKGING CHANGE USE ITEM 338027

## (undated) DEVICE — NDL SPNE MANAN 22GX6IN --

## (undated) DEVICE — SYR 10ML LUER LOK 1/5ML GRAD --

## (undated) DEVICE — KIT DIL PRB K WIRE DISP FOR EXTRM LUM INTBDY FUS

## (undated) DEVICE — BANDAGE ADH W0.75XL3IN UNIV WVN FAB NAT GEN USE STRP N ADH

## (undated) DEVICE — SOLUTION IV 1000ML 0.9% SOD CHL

## (undated) DEVICE — NEEDLE NRV STIM BVL TIP INSUL PEDCL ACCS SYS FOR EMG MON

## (undated) DEVICE — CATH URETH FOL 2W MED 16FRX5 --

## (undated) DEVICE — (D)NDL SPNE 22GX15CM -- DISC BY MFR W/NO SUB

## (undated) DEVICE — DRAPE XR C ARM 41X74IN LF --

## (undated) DEVICE — Device

## (undated) DEVICE — INTENDED FOR TISSUE SEPARATION, AND OTHER PROCEDURES THAT REQUIRE A SHARP SURGICAL BLADE TO PUNCTURE OR CUT.: Brand: BARD-PARKER SAFETY BLADES SIZE 20, STERILE

## (undated) DEVICE — SUTURE STRATAFIX SYMMETRIC PDS + SZ 2-0 L18IN ABSRB VLT SXPP1A403

## (undated) DEVICE — X-RAY SPONGES,12 PLY: Brand: DERMACEA

## (undated) DEVICE — SYSTEM SKIN CLSR 22CM DERMBND PRINEO

## (undated) DEVICE — GELFOAM SZ 100 SPNG

## (undated) DEVICE — WAX SURG 2.5GM HEMSTAT BNE BEESWAX PARAFFIN ISO PALMITATE

## (undated) DEVICE — STOCKING COMPR L L31-34IN LNG 19MMHG ANK 9-10IN CALF

## (undated) DEVICE — (D)PREP SKN CHLRAPRP APPL 26ML -- CONVERT TO ITEM 371833

## (undated) DEVICE — C-ARMOR C-ARM EQUIPMENT COVERS CLEAR STERILE UNIVERSAL FIT 12 PER CASE: Brand: C-ARMOR

## (undated) DEVICE — 3.0MM PRECISION NEURO (MATCH HEAD)

## (undated) DEVICE — SUTURE ABSORBABLE BRAIDED 2-0 CT-1 27 IN UD VICRYL J259H

## (undated) DEVICE — KIT CLN UP BON SECOURS MARYV

## (undated) DEVICE — SET EXTN 26ML L86IN TBNG DIA0054IN MINIBOR BACKCHECK VLV

## (undated) DEVICE — WATERPROOF, BACTERIA PROOF DRESSING WITH ABSORBENT SEE THROUGH PAD: Brand: OPSITE POST-OP VISIBLE 15X10CM CTN 20

## (undated) DEVICE — FLEX ADVANTAGE 3000CC: Brand: FLEX ADVANTAGE

## (undated) DEVICE — SUTURE STRATAFIX SYMMETRIC PDS + ETHIGUARD SZ 1 L18IN ABSRB SXPP1A300

## (undated) DEVICE — TRAY CATH OD16FR SIL URIN M STATLOK STBL DEV SURSTP

## (undated) DEVICE — INTENDED FOR TISSUE SEPARATION, AND OTHER PROCEDURES THAT REQUIRE A SHARP SURGICAL BLADE TO PUNCTURE OR CUT.: Brand: BARD-PARKER SAFETY BLADES SIZE 15, STERILE